# Patient Record
Sex: MALE | Race: BLACK OR AFRICAN AMERICAN
[De-identification: names, ages, dates, MRNs, and addresses within clinical notes are randomized per-mention and may not be internally consistent; named-entity substitution may affect disease eponyms.]

---

## 2019-04-06 ENCOUNTER — HOSPITAL ENCOUNTER (EMERGENCY)
Dept: HOSPITAL 62 - ER | Age: 64
Discharge: HOME | End: 2019-04-06
Payer: SELF-PAY

## 2019-04-06 VITALS — SYSTOLIC BLOOD PRESSURE: 172 MMHG | DIASTOLIC BLOOD PRESSURE: 102 MMHG

## 2019-04-06 DIAGNOSIS — I10: ICD-10-CM

## 2019-04-06 DIAGNOSIS — S81.801A: Primary | ICD-10-CM

## 2019-04-06 DIAGNOSIS — F17.210: ICD-10-CM
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
## 2019-04-06 PROCEDURE — 82962 GLUCOSE BLOOD TEST: CPT

## 2019-04-06 PROCEDURE — 99283 EMERGENCY DEPT VISIT LOW MDM: CPT

## 2019-04-06 NOTE — ER DOCUMENT REPORT
ED Medical Screen (RME)





- General


Chief Complaint: Skin Sore(s)


Stated Complaint: LEG SORE


Time Seen by Provider: 04/06/19 11:42


Primary Care Provider: 


ANCA TALLEY [Primary Care Provider] - Follow up as needed


TRAVEL OUTSIDE OF THE [place] IN LAST 30 DAYS: No





- HPI


Notes: 





04/06/19 11:47


Patient is a 63-year-old male with a history of hypertension who presents 

emergency department complaining of a wound to his right lower leg that is been 

present for 2 weeks.  Patient states that it did turn into an abscess and 

drained purulent material recently.  Patient states he does have some soreness 

associated, but has not noticed any further drainage.  Denies history of 

diabetes.  Patient states that he has not been taking his blood pressure 

medicine as he has been out and does not remember the name of it.  Denies HA, 

fever, neck pain, URI, CP, SOB, Abd pain.





I have treated and performed a rapid initial assessment of this patient.  A 

comprehensive ED assessment and evaluation of the patient, analysis of test 

results and completion of medical decision making process will be conducted by 

additional ED providers.





PHYSICAL EXAMINATION:





GENERAL: Well-appearing, well-nourished and in no acute distress.  A&Ox4.  

Answers questions appropriately.





LUNGS: Breath sounds clear to auscultation bilaterally and equal.  No wheezes 

rales or rhonchi.





HEART: Regular rate and rhythm without murmurs, rubs, gallops.





Extremities:  No cyanosis, clubbing, or edema b/l.  





Skin:  there is a fairly large scabbed wound noted to the right anterior lower 

leg w/o evidence of fluctuance, streaks, or active purulence noted.  + mild 

tenderness.





- Related Data


Allergies/Adverse Reactions: 


                                        





No Known Allergies Allergy (Verified 04/06/19 10:51)


   











Physical Exam





- Vital signs


Vitals: 





                                        











Temp Pulse Resp BP Pulse Ox


 


 98.1 F   68   16   199/109 H  97 


 


 04/06/19 11:08  04/06/19 11:08  04/06/19 11:08  04/06/19 11:08  04/06/19 11:08














Course





- Vital Signs


Vital signs: 





                                        











Temp Pulse Resp BP Pulse Ox


 


 98.1 F   68   16   199/109 H  97 


 


 04/06/19 11:08  04/06/19 11:08  04/06/19 11:08  04/06/19 11:08  04/06/19 11:08














Doctor's Discharge





- Discharge


Referrals: 


LOCALMD,NO [Primary Care Provider] - Follow up as needed

## 2019-04-06 NOTE — ER DOCUMENT REPORT
Entered by TONE CARRERA SCRIBE  04/06/19 1229 





Acting as scribe for:MARY ELLEN DE LA VEGA MD





ED General





- General


Chief Complaint: Skin Sore(s)


Stated Complaint: LEG SORE


Time Seen by Provider: 04/06/19 11:42


Primary Care Provider: 


MAYANK,NO [Primary Care Provider] - Follow up in 3-5 days


Mode of Arrival: Ambulatory


Information source: Patient


Notes: 


Patient is a 63 year old male presenting to the emergency department complaining

of a wound on his right lower leg onset 2 weeks ago. Patient states the area is 

painful and reports purulent discharge from the area recently. He reports 

possibly bumping into something that could have caused the wound.





TRAVEL OUTSIDE OF THE [place] IN LAST 30 DAYS: No





- Related Data


Allergies/Adverse Reactions: 


                                        





No Known Allergies Allergy (Verified 04/06/19 10:51)


   











Past Medical History





- General


Information source: Patient





- Social History


Smoking Status: Current Every Day Smoker


Cigarette use (# per day): Yes - less than 1PPD


Chew tobacco use (# tins/day): No


Smoking Education Provided: No


Frequency of alcohol use: Heavy - beer daily


Drug Abuse: None


Lives with: Spouse/Significant other


Family History: Reviewed & Not Pertinent


Patient has suicidal ideation: No


Patient has homicidal ideation: No





- Past Medical History


Cardiac Medical History: Reports: Hx Hypertension


Past Surgical History: Reports: Hx Orthopedic Surgery - Right ACL repair, Hx 

Tonsillectomy, Other - Explartory abdominal surgery in 2017





Review of Systems





- Review of Systems


Constitutional: No symptoms reported


EENT: No symptoms reported


Cardiovascular: No symptoms reported


Respiratory: No symptoms reported


Gastrointestinal: No symptoms reported


Genitourinary: No symptoms reported


Male Genitourinary: No symptoms reported


Musculoskeletal: See HPI


Skin: See HPI


Hematologic/Lymphatic: No symptoms reported


Neurological/Psychological: No symptoms reported


-: Yes All other systems reviewed and negative





Physical Exam





- Vital signs


Vitals: 


                                        











Temp Pulse Resp BP Pulse Ox


 


 98.1 F   68   16   199/109 H  97 


 


 04/06/19 11:08  04/06/19 11:08  04/06/19 11:08  04/06/19 11:08  04/06/19 11:08














- Notes


Notes: 


 


GENERAL: Alert, interacts well. No acute distress.


HEAD: Normocephalic, atraumatic.


EYES: Pupils equal, round, and reactive to light. Extraocular movements intact.


ENT: Oral mucosa moist, tongue midline


NECK: Full range of motion. Supple. Trachea midline.


LUNGS: Clear to auscultation bilaterally, no wheezes, rales, or rhonchi. No 

respiratory distress.


HEART: Regular rate and rhythm. No murmurs, gallops, or rubs.


ABDOMEN: Soft, non-tender. Non-distended. Bowel sounds present in all 4 

quadrants. No guarding, rigidity, or rebound.


EXTREMITIES: Moves all 4 extremities spontaneously. 


NEUROLOGICAL: Alert and oriented x3. Normal speech. 


PSYCH: Normal affect, normal mood.


SKIN: Warm, dry. Right lower medial tibia contains a 0.5x5cm wound. Skin is dry 

and thickened with no drainage at this time. In the center of the wound, there 

is a 2mm gap exposing subcutaneous tissue. Patient's socks and shoes are wet.








Course





- Vital Signs


Vital signs: 


                                        











Temp Pulse Resp BP Pulse Ox


 


 98.1 F   71   16   172/102 H  98 


 


 04/06/19 11:08  04/06/19 12:48  04/06/19 12:48  04/06/19 12:48  04/06/19 12:48














Discharge





- Discharge


Clinical Impression: 


Wound of right lower extremity


Qualifiers:


 Encounter type: initial encounter Qualified Code(s): S81.801A - Unspecified 

open wound, right lower leg, initial encounter





Condition: Stable


Disposition: HOME, SELF-CARE


Additional Instructions: 


Take the antibiotics as prescribed.


Keep the wound clean and dressed using Neosporin or bacitracin ointment on it.  

This should help keep the dried out hardened tissue soft and moist.


Elevate your leg as much as possible.


Be sure you take your blood pressure medicine, because your blood pressure was 

quite high today.


Follow-up with a local medical doctor this week to recheck your wound and treat 

your blood pressure.





RETURN TO THE EMERGENCY ROOM IF ANY NEW OR WORSENING SYMPTOMS.








Prescriptions: 


Cephalexin Monohydrate [Keflex 500 mg Capsule] 500 mg PO QID #28 capsule


Referrals: 


MAYANKNO [Primary Care Provider] - Follow up in 3-5 days


Scribe Attestation: 





04/06/19 12:24


I personally performed the services described in the documentation, reviewed and

edited the documentation which was dictated to the scribe in my presence, and it

accurately records my words and actions.





I personally performed the services described in the documentation, reviewed and

edited the documentation which was dictated to the scribe in my presence, and it

accurately records my words and actions.

## 2019-12-10 ENCOUNTER — HOSPITAL ENCOUNTER (EMERGENCY)
Dept: HOSPITAL 62 - ER | Age: 64
LOS: 1 days | Discharge: HOME | End: 2019-12-11
Payer: SELF-PAY

## 2019-12-10 DIAGNOSIS — R42: ICD-10-CM

## 2019-12-10 DIAGNOSIS — F17.200: ICD-10-CM

## 2019-12-10 DIAGNOSIS — E16.2: Primary | ICD-10-CM

## 2019-12-10 DIAGNOSIS — R53.1: ICD-10-CM

## 2019-12-10 DIAGNOSIS — I10: ICD-10-CM

## 2019-12-10 PROCEDURE — 99285 EMERGENCY DEPT VISIT HI MDM: CPT

## 2019-12-10 PROCEDURE — 82962 GLUCOSE BLOOD TEST: CPT

## 2019-12-11 VITALS — DIASTOLIC BLOOD PRESSURE: 109 MMHG | SYSTOLIC BLOOD PRESSURE: 150 MMHG

## 2019-12-11 NOTE — ER DOCUMENT REPORT
ED General





- General


Chief Complaint: Low Blood Sugar


Stated Complaint: DIZZY


Time Seen by Provider: 12/11/19 02:55


TRAVEL OUTSIDE OF THE [place] IN LAST 30 DAYS: No





- HPI


Notes: 





Patient is a 64-year-old male who presents emergency department for evaluation 

of dizziness.  He was at home, states that he called EMS because he felt dizzy 

and slightly weak.  He was treated with dextrose via EMS, and he states all of 

the symptoms resolved.  He states that all he had to eat today it was a bologna 

sandwich, which she described as a "double [place]."  That was at about 1130 this

morning.  He also admits to drinking 40 ounces of beer.  He states he otherwise 

did not eat anything, states he simply "forgot."  He has no history of diabetes.

 He states he feels entirely back to normal, denies any other acute complaints 

or concerns.





- Related Data


Allergies/Adverse Reactions: 


                                        





No Known Allergies Allergy (Verified 04/06/19 10:51)


   








Home Medications: None





Past Medical History





- General


Information source: Patient





- Social History


Smoking Status: Current Every Day Smoker


Frequency of alcohol use: Heavy - Will not elaborate on quantity of alcohol


Drug Abuse: None


Family History: Reviewed & Not Pertinent


Patient has suicidal ideation: No


Patient has homicidal ideation: No





- Past Medical History


Cardiac Medical History: Reports: Hx Hypertension - untreated


Renal/ Medical History: Denies: Hx Peritoneal Dialysis


Past Surgical History: Reports: Hx Orthopedic Surgery - Right ACL repair, Hx 

Tonsillectomy, Other - Explartory abdominal surgery in 2017





Review of Systems





- Review of Systems


Constitutional: See HPI


EENT: No symptoms reported


Cardiovascular: No symptoms reported


Respiratory: No symptoms reported


Gastrointestinal: No symptoms reported


Genitourinary: No symptoms reported


Musculoskeletal: No symptoms reported


Skin: No symptoms reported


Neurological/Psychological: No symptoms reported





Physical Exam





- Vital signs


Vitals: 


                                        











Temp Resp BP Pulse Ox


 


 97.9 F   22 H  131/79 H  94 


 


 12/10/19 23:29  12/10/19 23:29  12/10/19 23:29  12/10/19 23:29














- Notes


Notes: 





This is a 64-year-old male who appears older than his stated age, no acute 

distress.  He is resting comfortably in the room.  Vital signs reviewed, please 

refer to chart. Head is normocephalic, atraumatic.  Pupils equal round, reactive

to light.  Oral mucosa is moist.  Dentition is in poor condition, but I do not 

appreciate any significant abscesses or active infection.  Neck is supple 

without meningismus.  Heart is regular rate and rhythm.  Lungs are clear to 

auscultation bilaterally.  Abdomen is soft, nontender, normoactive bowel sounds 

throughout.  Extremities without cyanosis, clubbing. Posterior calves are 

nontender.  Peripheral pulses are equal.  Skin is warm and dry.  Patient is 

awake, alert, oriented x3.  Cranial nerves II - XII are grossly intact without 

focal neurological deficits.  Strength is plus 5 out of 5 bilateral upper and 

lower extremities.  Sensation is intact.  Reflexes symmetrical.  Intact 

finger-nose-finger, rapid alternating movements, heel-to-shin.





Course





- Re-evaluation


Re-evalutation: 





12/11/19 03:07


Patient presents emergency department for evaluation.  He is a 64-year-old 

gentleman who complained of dizziness, was found to be hypoglycemic.  My 

suspicion is that this patient may in fact have an alcohol abuse problem, which 

contributed to his hypoglycemia.  At the time of my evaluation, the patient had 

already eaten most of the food on a cafeteria tray.  He was resting comfortably.

 He had no acute complaints or concerns.  Because of my concerns of alcoholism, 

I did go ahead and administer thiamine orally.  The patient states he has no 

acute complaints and concerns, he is back to baseline.  He is anxious for 

discharge.  Pending 1 more normal blood glucose, we will discharge the patient 

with close follow-up.  He is to return to the ED with worsening.


12/11/19 04:03


Patient's repeat blood glucose is within normal limits.  He is stable.  He is 

neurologically intact.  His heart rate is normal at this time.  We will 

discharge him to home.  He is encouraged to quit drinking, we discussed 

appropriate nutrition.  He voiced understanding was discharged.











- Vital Signs


Vital signs: 


                                        











Temp Pulse Resp BP Pulse Ox


 


 97.9 F      21 H  140/76 H  98 


 


 12/10/19 23:30     12/11/19 00:01  12/11/19 00:01  12/11/19 00:01














- Laboratory


Laboratory results interpreted by me: 


                                        











  12/10/19





  23:22


 


POC Glucose  331 H














Discharge





- Discharge


Clinical Impression: 


 Hypoglycemia





Condition: Stable


Disposition: HOME, SELF-CARE


Instructions:  Dizziness ([facility]), Hypoglycemia ([facility])


Additional Instructions: 


It is important that you eat regularly.  Follow-up with primary care this week. 

Return to the ED with worsening or new concerning symptoms of any sort.


Forms:  Elevated Blood Pressure

## 2019-12-17 ENCOUNTER — HOSPITAL ENCOUNTER (INPATIENT)
Dept: HOSPITAL 62 - ER | Age: 64
LOS: 3 days | Discharge: HOME | DRG: 179 | End: 2019-12-20
Attending: INTERNAL MEDICINE | Admitting: INTERNAL MEDICINE
Payer: MEDICAID

## 2019-12-17 DIAGNOSIS — F17.210: ICD-10-CM

## 2019-12-17 DIAGNOSIS — I48.0: ICD-10-CM

## 2019-12-17 DIAGNOSIS — Z59.0: ICD-10-CM

## 2019-12-17 DIAGNOSIS — F10.20: ICD-10-CM

## 2019-12-17 DIAGNOSIS — J69.0: Primary | ICD-10-CM

## 2019-12-17 DIAGNOSIS — I10: ICD-10-CM
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
## 2019-12-17 DIAGNOSIS — J44.9: ICD-10-CM

## 2019-12-17 DIAGNOSIS — R07.9: ICD-10-CM

## 2019-12-17 LAB
ADD MANUAL DIFF: NO
ALBUMIN SERPL-MCNC: 3.6 G/DL (ref 3.5–5)
ALP SERPL-CCNC: 94 U/L (ref 38–126)
ANION GAP SERPL CALC-SCNC: 14 MMOL/L (ref 5–19)
AST SERPL-CCNC: 23 U/L (ref 17–59)
BASOPHILS # BLD AUTO: 0 10^3/UL (ref 0–0.2)
BASOPHILS NFR BLD AUTO: 0.8 % (ref 0–2)
BILIRUB DIRECT SERPL-MCNC: 0.2 MG/DL (ref 0–0.4)
BILIRUB SERPL-MCNC: 0.3 MG/DL (ref 0.2–1.3)
BUN SERPL-MCNC: 11 MG/DL (ref 7–20)
CALCIUM: 9.3 MG/DL (ref 8.4–10.2)
CHLORIDE SERPL-SCNC: 101 MMOL/L (ref 98–107)
CO2 SERPL-SCNC: 24 MMOL/L (ref 22–30)
EOSINOPHIL # BLD AUTO: 0.1 10^3/UL (ref 0–0.6)
EOSINOPHIL NFR BLD AUTO: 3.2 % (ref 0–6)
ERYTHROCYTE [DISTWIDTH] IN BLOOD BY AUTOMATED COUNT: 13.9 % (ref 11.5–14)
ETHANOL SERPL-MCNC: 78 MG/DL
GLUCOSE SERPL-MCNC: 104 MG/DL (ref 75–110)
HCT VFR BLD CALC: 38.5 % (ref 37.9–51)
HGB BLD-MCNC: 12.9 G/DL (ref 13.5–17)
LYMPHOCYTES # BLD AUTO: 1.2 10^3/UL (ref 0.5–4.7)
LYMPHOCYTES NFR BLD AUTO: 27 % (ref 13–45)
MCH RBC QN AUTO: 28.8 PG (ref 27–33.4)
MCHC RBC AUTO-ENTMCNC: 33.5 G/DL (ref 32–36)
MCV RBC AUTO: 86 FL (ref 80–97)
MONOCYTES # BLD AUTO: 0.6 10^3/UL (ref 0.1–1.4)
MONOCYTES NFR BLD AUTO: 13.4 % (ref 3–13)
NEUTROPHILS # BLD AUTO: 2.5 10^3/UL (ref 1.7–8.2)
NEUTS SEG NFR BLD AUTO: 55.6 % (ref 42–78)
PLATELET # BLD: 318 10^3/UL (ref 150–450)
POTASSIUM SERPL-SCNC: 3.9 MMOL/L (ref 3.6–5)
PROT SERPL-MCNC: 6.9 G/DL (ref 6.3–8.2)
RBC # BLD AUTO: 4.48 10^6/UL (ref 4.35–5.55)
TOTAL CELLS COUNTED % (AUTO): 100 %
WBC # BLD AUTO: 4.5 10^3/UL (ref 4–10.5)

## 2019-12-17 PROCEDURE — 80048 BASIC METABOLIC PNL TOTAL CA: CPT

## 2019-12-17 PROCEDURE — 84484 ASSAY OF TROPONIN QUANT: CPT

## 2019-12-17 PROCEDURE — 81001 URINALYSIS AUTO W/SCOPE: CPT

## 2019-12-17 PROCEDURE — 84481 FREE ASSAY (FT-3): CPT

## 2019-12-17 PROCEDURE — 87077 CULTURE AEROBIC IDENTIFY: CPT

## 2019-12-17 PROCEDURE — 80307 DRUG TEST PRSMV CHEM ANLYZR: CPT

## 2019-12-17 PROCEDURE — 84439 ASSAY OF FREE THYROXINE: CPT

## 2019-12-17 PROCEDURE — 87150 DNA/RNA AMPLIFIED PROBE: CPT

## 2019-12-17 PROCEDURE — 83735 ASSAY OF MAGNESIUM: CPT

## 2019-12-17 PROCEDURE — 36415 COLL VENOUS BLD VENIPUNCTURE: CPT

## 2019-12-17 PROCEDURE — 87205 SMEAR GRAM STAIN: CPT

## 2019-12-17 PROCEDURE — 84443 ASSAY THYROID STIM HORMONE: CPT

## 2019-12-17 PROCEDURE — 85025 COMPLETE CBC W/AUTO DIFF WBC: CPT

## 2019-12-17 PROCEDURE — 83690 ASSAY OF LIPASE: CPT

## 2019-12-17 PROCEDURE — 93010 ELECTROCARDIOGRAM REPORT: CPT

## 2019-12-17 PROCEDURE — 83036 HEMOGLOBIN GLYCOSYLATED A1C: CPT

## 2019-12-17 PROCEDURE — 87040 BLOOD CULTURE FOR BACTERIA: CPT

## 2019-12-17 PROCEDURE — 71260 CT THORAX DX C+: CPT

## 2019-12-17 PROCEDURE — 87186 SC STD MICRODIL/AGAR DIL: CPT

## 2019-12-17 PROCEDURE — 94799 UNLISTED PULMONARY SVC/PX: CPT

## 2019-12-17 PROCEDURE — 80053 COMPREHEN METABOLIC PANEL: CPT

## 2019-12-17 PROCEDURE — 71045 X-RAY EXAM CHEST 1 VIEW: CPT

## 2019-12-17 PROCEDURE — 99285 EMERGENCY DEPT VISIT HI MDM: CPT

## 2019-12-17 PROCEDURE — 84100 ASSAY OF PHOSPHORUS: CPT

## 2019-12-17 PROCEDURE — 87070 CULTURE OTHR SPECIMN AEROBIC: CPT

## 2019-12-17 PROCEDURE — 93005 ELECTROCARDIOGRAM TRACING: CPT

## 2019-12-17 PROCEDURE — 94667 MNPJ CHEST WALL 1ST: CPT

## 2019-12-17 PROCEDURE — 83605 ASSAY OF LACTIC ACID: CPT

## 2019-12-17 SDOH — ECONOMIC STABILITY - HOUSING INSECURITY: HOMELESSNESS: Z59.0

## 2019-12-17 NOTE — ER DOCUMENT REPORT
ED General





- General


Chief Complaint: Chest Pain


Stated Complaint: CHEST PAIN


Time Seen by Provider: 12/17/19 19:55


Notes: 





64 year old male with h/o htn takes medicine for this "when I can get it" 

complains of chest pain that started yesterday and has been constant.  No fever 

or chills.  Only htn as past history.  No heart disease or dm.  Tob user.  

Living with brother he tells me and pain is gone when I see him.  Drinks daily. 

2 shots of etoh today. Currently he is hungry and would like something to eat. 


TRAVEL OUTSIDE OF THE [place] IN LAST 30 DAYS: No





- HPI


Onset: Yesterday


Onset/Duration: Sudden, Constant


Quality of pain: No pain


Severity: Severe


Associated symptoms: None





- Related Data


Allergies/Adverse Reactions: 


                                        





No Known Allergies Allergy (Verified 04/06/19 10:51)


   











Past Medical History





- General


Information source: Patient





- Social History


Smoking Status: Current Every Day Smoker


Family History: Reviewed & Not Pertinent


Patient has suicidal ideation: No


Patient has homicidal ideation: No





- Past Medical History


Cardiac Medical History: Reports: Hx Hypertension - untreated


Renal/ Medical History: Denies: Hx Peritoneal Dialysis


Past Surgical History: Reports: Hx Orthopedic Surgery - Right ACL repair, Hx 

Tonsillectomy, Other - Explartory abdominal surgery in 2017





Review of Systems





- Review of Systems


Constitutional: No symptoms reported


EENT: No symptoms reported


Cardiovascular: No symptoms reported


Respiratory: No symptoms reported


Gastrointestinal: No symptoms reported


Genitourinary: No symptoms reported


Male Genitourinary: No symptoms reported


Musculoskeletal: No symptoms reported


Skin: No symptoms reported


Hematologic/Lymphatic: No symptoms reported


Neurological/Psychological: No symptoms reported





Physical Exam





- Vital signs


Vitals: 


                                        











Temp Pulse Resp BP Pulse Ox


 


 99.2 F   120 H  22 H  145/92 H  98 


 


 12/17/19 19:08  12/17/19 19:08  12/17/19 19:08  12/17/19 19:08  12/17/19 19:08











Interpretation: Normal





- General


General appearance: Appears well, Alert





- HEENT


Head: Normocephalic, Atraumatic


Eyes: Normal


Pupils: PERRL





- Respiratory


Respiratory status: No respiratory distress


Chest status: Nontender


Breath sounds: Normal


Chest palpation: Normal





- Cardiovascular


Rhythm: Regular


Heart sounds: Normal auscultation


Murmur: No





- Abdominal


Inspection: Normal


Distension: No distension


Bowel sounds: Normal


Tenderness: Nontender


Organomegaly: No organomegaly





- Back


Back: Normal, Nontender





- Extremities


General upper extremity: Normal inspection, Nontender, Normal color, Normal ROM,

Normal temperature


General lower extremity: Normal inspection, Nontender, Normal color, Normal ROM,

Normal temperature, Normal weight bearing.  No: Se's sign





- Neurological


Neuro grossly intact: Yes


Cognition: Normal


Orientation: AAOx4


[place] Coma Scale Eye Opening: Spontaneous


[place] Coma Scale Verbal: Oriented


[place] Coma Scale Motor: Obeys Commands


[place] Coma Scale Total: 15


Speech: Normal


Motor strength normal: LUE, RUE, LLE, RLE


Sensory: Normal





- Psychological


Associated symptoms: Normal affect, Normal mood





- Skin


Skin Temperature: Warm


Skin Moisture: Dry


Skin Color: Normal





Course





- Re-evaluation


Re-evalutation: 





12/18/19 01:35


MDM  64 year old with htn perhaps etohic is here with chest pain for a day. A 

fib originally and cxr and ct are supportive of airspace disease.  Will cover 

with antibiotics that would cover aspiration.  I have discussed with him 

admission and he is willing to stay here.  The hospitalist has been called and 

we are awaiting a call back. 





- Vital Signs


Vital signs: 


                                        











Temp Pulse Resp BP Pulse Ox


 


 98.3 F   120 H  33 H  146/94 H  94 


 


 12/18/19 02:01  12/17/19 19:08  12/18/19 02:01  12/18/19 02:00  12/18/19 02:01














- Laboratory


Result Diagrams: 


                                 12/17/19 19:45





                                 12/17/19 19:45


Laboratory results interpreted by me: 


                                        











  12/17/19 12/17/19





  19:45 23:40


 


Hgb  12.9 L 


 


Mono % (Auto)  13.4 H 


 


Urine Blood   SMALL H


 


Ur Leukocyte Esterase   SMALL H














- Diagnostic Test


Radiology reviewed: Image reviewed, Reports reviewed





- EKG Interpretation by Me


Rate: Tachycardia - Original EKG is A fib with  BPM no st elevation or 

depression recheck is NSR 80 BPM no st elevation or depression my 

interpretation.





Discharge





- Discharge


Clinical Impression: 


 Atrial fibrillation by electrocardiogram





Pneumonia


Qualifiers:


 Pneumonia type: due to unspecified organism Laterality: bilateral Lung locati

on: upper lobe of lung Qualified Code(s): J18.9 - Pneumonia, unspecified 

organism





Condition: Fair


Disposition: ADMITTED AS INPATIENT


Admitting Provider: Ciro (Hospitalist)


Unit Admitted: Telemetry

## 2019-12-17 NOTE — EKG REPORT
SEVERITY:- ABNORMAL ECG -

ATRIAL FIBRILLATION, V-RATE 

MULTIFORM VENTRICULAR PREMATURE COMPLEXES

BORDERLINE LEFT AXIS DEVIATION

CONSIDER POSTERIOR INFARCT

BORDERLINE PROLONGED QT INTERVAL

:

Confirmed by: Dejon Rodríguez 17-Dec-2019 22:29:59

## 2019-12-17 NOTE — EKG REPORT
SEVERITY:- ABNORMAL ECG -

SINUS RHYTHM

NONSPECIFIC T ABNORMALITIES, ANT-LAT LEADS

BORDERLINE PROLONGED QT INTERVAL

:

Confirmed by: Dejon Rodríguez 17-Dec-2019 22:29:24

## 2019-12-17 NOTE — RADIOLOGY REPORT (SQ)
EXAM DESCRIPTION:

X-RAY CHEST- TWO VIEWS



CLINICAL HISTORY:

Chest pain



COMPARISON:

None available



TECHNIQUE:

2 views of the chest



FINDINGS:

There are overlying EKG leads.

There are multifocal patchy opacities, with the most prominent

opacity overlying the right apex. 

No discrete pneumothorax or pleural effusion is identified.



The pulmonary vascularity is mildly prominent in appearance.

The cardiomediastinal silhouette is borderline enlarged.



No suspicious lytic or blastic osseous lesions are identified.



IMPRESSION:

1. Suggestion of multifocal patchy opacities most prominently

affecting the right upper lobe. Differential diagnosis includes

multifocal infectious or inflammatory process. Clinical

correlation is advised with consideration for CT chest if

clinically indicated.

2. Borderline enlargement of the cardiac silhouette with

prominence of the pulmonary vasculature.

## 2019-12-18 LAB
APPEARANCE UR: (no result)
APTT PPP: YELLOW S
BILIRUB UR QL STRIP: NEGATIVE
GLUCOSE UR STRIP-MCNC: NEGATIVE MG/DL
KETONES UR STRIP-MCNC: NEGATIVE MG/DL
NITRITE UR QL STRIP: NEGATIVE
PH UR STRIP: 6 [PH] (ref 5–9)
PHOSPHATE SERPL-MCNC: 4 MG/DL (ref 2.5–4.5)
PROT UR STRIP-MCNC: NEGATIVE MG/DL
SP GR UR STRIP: 1.01
UROBILINOGEN UR-MCNC: NEGATIVE MG/DL (ref ?–2)

## 2019-12-18 RX ADMIN — NICOTINE SCH: 14 PATCH, EXTENDED RELEASE TOPICAL at 09:01

## 2019-12-18 RX ADMIN — Medication SCH ML: at 05:09

## 2019-12-18 RX ADMIN — FLUTICASONE PROPIONATE SCH SPRAY: 50 SPRAY, METERED NASAL at 21:58

## 2019-12-18 RX ADMIN — IPRATROPIUM BROMIDE SCH MG: 0.5 SOLUTION RESPIRATORY (INHALATION) at 14:01

## 2019-12-18 RX ADMIN — FLUTICASONE PROPIONATE SCH SPRAY: 50 SPRAY, METERED NASAL at 09:32

## 2019-12-18 RX ADMIN — DILTIAZEM HYDROCHLORIDE SCH MG: 60 TABLET, FILM COATED ORAL at 13:57

## 2019-12-18 RX ADMIN — LEVALBUTEROL HYDROCHLORIDE SCH MG: 1.25 SOLUTION RESPIRATORY (INHALATION) at 02:13

## 2019-12-18 RX ADMIN — HEPARIN SODIUM SCH UNIT: 5000 INJECTION, SOLUTION INTRAVENOUS; SUBCUTANEOUS at 05:08

## 2019-12-18 RX ADMIN — HEPARIN SODIUM SCH UNIT: 5000 INJECTION, SOLUTION INTRAVENOUS; SUBCUTANEOUS at 22:01

## 2019-12-18 RX ADMIN — DIAZEPAM SCH MG: 5 TABLET ORAL at 21:58

## 2019-12-18 RX ADMIN — LEVOFLOXACIN SCH MLS/HR: 750 INJECTION, SOLUTION INTRAVENOUS at 05:08

## 2019-12-18 RX ADMIN — IPRATROPIUM BROMIDE SCH MG: 0.5 SOLUTION RESPIRATORY (INHALATION) at 08:40

## 2019-12-18 RX ADMIN — IPRATROPIUM BROMIDE SCH MG: 0.5 SOLUTION RESPIRATORY (INHALATION) at 21:12

## 2019-12-18 RX ADMIN — Medication SCH: at 14:05

## 2019-12-18 RX ADMIN — LEVALBUTEROL HYDROCHLORIDE SCH MG: 1.25 SOLUTION RESPIRATORY (INHALATION) at 21:10

## 2019-12-18 RX ADMIN — LEVALBUTEROL HYDROCHLORIDE SCH MG: 1.25 SOLUTION RESPIRATORY (INHALATION) at 14:01

## 2019-12-18 RX ADMIN — DIAZEPAM SCH MG: 5 TABLET ORAL at 09:34

## 2019-12-18 RX ADMIN — DIAZEPAM SCH MG: 5 TABLET ORAL at 02:12

## 2019-12-18 RX ADMIN — IPRATROPIUM BROMIDE SCH MG: 0.5 SOLUTION RESPIRATORY (INHALATION) at 02:12

## 2019-12-18 RX ADMIN — DILTIAZEM HYDROCHLORIDE SCH MG: 60 TABLET, FILM COATED ORAL at 21:58

## 2019-12-18 RX ADMIN — DILTIAZEM HYDROCHLORIDE SCH MG: 60 TABLET, FILM COATED ORAL at 05:09

## 2019-12-18 RX ADMIN — HEPARIN SODIUM SCH UNIT: 5000 INJECTION, SOLUTION INTRAVENOUS; SUBCUTANEOUS at 13:57

## 2019-12-18 RX ADMIN — NICOTINE SCH EACH: 14 PATCH, EXTENDED RELEASE TOPICAL at 02:12

## 2019-12-18 RX ADMIN — LEVALBUTEROL HYDROCHLORIDE SCH MG: 1.25 SOLUTION RESPIRATORY (INHALATION) at 08:40

## 2019-12-18 RX ADMIN — Medication SCH ML: at 22:02

## 2019-12-18 NOTE — RADIOLOGY REPORT (SQ)
EXAM DESCRIPTION: 



CT chest with contrast



CLINICAL HISTORY: 



64 years Male, pulmonary infiltrates on chest x-ray.    



COMPARISON: 

None.



TECHNIQUE Axial images of the chest were performed utilizing

intravenous contrast, with sagittal and coronal reformatted

images. 



This exam was performed according to our departmental

dose-optimization program which includes use of Automated

Exposure Control, adjustment of the mA and/or kV according to

patient size and/or use of iterative reconstruction technique.





FINDINGS: 



There is patchy bilateral pulmonary infiltrate, most apparent in

the right upper lobe, compatible with pneumonia.



There is emphysema.



There are small bilateral pleural effusions.



There is mediastinal and bilateral hilar adenopathy.



There is coronary artery calcification.



No evidence of pulmonary embolus.



No evidence of aortic dissection.



IMPRESSION: 



Bilateral pneumonia.



Emphysema.



Small bilateral pleural effusions.



Mediastinal and bilateral hilar adenopathy.



Coronary artery calcification.

## 2019-12-18 NOTE — PDOC H&P
History of Present Illness


Admission Date/PCP: 


  19 02:12





  





Patient complains of: Shortness of breath and chest pain


History of Present Illness: 


KIESHA WINN is a 64 year old male with a past medical history of COPD,, 

hypertension, tobacco and alcohol dependence who is long-term homeless.  He 

presents with chest pains.  In the emergency room he is found to have paroxysmal

atrial fibrillation and a right lung infiltrate.  He started on empiric 

antibiotics and referred to the hospitalist for admission.  Patient admits to 

some sharp intermittent self resolving pain without radiation associated with 

shortness of breath.  Denies fever or current chest pain.  Patient is a poor 

historian he denies current medication use admits to heavy alcohol.








Past Medical History


Cardiac Medical History: Reports: Hypertension - untreated


Pulmonary Medical History: Reports: Bronchitis, Chronic Obstructive Pulmonary 

Disease (COPD)


Psychiatric Medical History: Reports: Alcohol Dependency, Tobacco Dependency


   Denies: Depression





Past Surgical History


Past Surgical History: Reports: Orthopedic Surgery - Right ACL repair, 

Tonsillectomy, Other - Explartory abdominal surgery in 2017





Social History


Information Source: Patient


Smoking Status: Current Every Day Smoker


Electronic Cigarette use?: No


Number of Years Smokin


Last Time Smoked: 2019


Frequency of Alcohol Use: Heavy


Hx Recreational Drug Use: No


Drugs: None


Hx Prescription Drug Abuse: No





- Advance Directive


Resuscitation Status: Full Code





Family History


Family History: COPD, Hypertension


Parental Family History Reviewed: Yes


Children Family History Reviewed: Yes


Sibling(s) Family History Reviewed.: Yes





Medication/Allergy


Home Medications: 








RX: No Home Medications  19 








Allergies/Adverse Reactions: 


                                        





No Known Allergies Allergy (Verified 19 10:51)


   











Review of Systems


Constitutional: ABSENT: chills, fever(s), headache(s), weight gain, weight loss


Eyes: ABSENT: visual disturbances


Ears: ABSENT: hearing changes


Cardiovascular: ABSENT: chest pain, dyspnea on exertion, edema, orthropnea, 

palpitations


Respiratory: ABSENT: cough, hemoptysis


Gastrointestinal: ABSENT: abdominal pain, constipation, diarrhea, hematemesis, 

hematochezia, nausea, vomiting


Genitourinary: ABSENT: dysuria, hematuria


Musculoskeletal: ABSENT: joint swelling


Integumentary: ABSENT: rash, wounds


Neurological: ABSENT: abnormal gait, abnormal speech, confusion, dizziness, 

focal weakness, syncope


Psychiatric: ABSENT: anxiety, depression, homidical ideation, suicidal ideation


Endocrine: ABSENT: cold intolerance, heat intolerance, polydipsia, polyuria


Hematologic/Lymphatic: ABSENT: easy bleeding, easy bruising





Physical Exam


Vital Signs: 


                                        











Temp Pulse Resp BP Pulse Ox


 


 98.3 F   120 H  24 H  113/80   97 


 


 19 02:01  19 19:08  19 05:01  19 05:00  19 05:01








                                 Intake & Output











 19





 11:59 11:59 11:59


 


Weight   70 kg











General appearance: PRESENT: cooperative, disheveled, mild distress, thin


Head exam: PRESENT: atraumatic, normocephalic


Eye exam: PRESENT: conjunctiva pink, EOMI, PERRLA.  ABSENT: scleral icterus


Ear exam: PRESENT: normal external ear exam


Mouth exam: PRESENT: moist, tongue midline


Neck exam: ABSENT: carotid bruit, JVD, lymphadenopathy, thyromegaly


Respiratory exam: PRESENT: decreased breath sounds, prolonged expiratory phas, 

retraction, symmetrical, tachypnea.  ABSENT: chest wall tenderness


Cardiovascular exam: PRESENT: +S1, +S2, tachycardia


Pulses: PRESENT: normal dorsalis pedis pul


Vascular exam: PRESENT: normal capillary refill


GI/Abdominal exam: PRESENT: normal bowel sounds, soft.  ABSENT: distended, 

guarding, mass, organolmegaly, rebound, tenderness


Rectal exam: PRESENT: deferred


Extremities exam: PRESENT: full ROM.  ABSENT: calf tenderness, clubbing, pedal 

edema


Neurological exam: PRESENT: alert, awake, oriented to person, oriented to place,

oriented to time, oriented to situation, CN II-XII grossly intact.  ABSENT: 

motor sensory deficit


Psychiatric exam: PRESENT: appropriate affect, normal mood.  ABSENT: homicidal 

ideation, suicidal ideation


Skin exam: PRESENT: dry, intact, warm.  ABSENT: cyanosis, rash





Results


Laboratory Results: 


                                        





                                 19 19:45 





                                 19 19:45 





                                        











  19





  19:45 19:45 22:43


 


WBC  4.5  


 


RBC  4.48  


 


Hgb  12.9 L  


 


Hct  38.5  


 


MCV  86  


 


MCH  28.8  


 


MCHC  33.5  


 


RDW  13.9  


 


Plt Count  318  


 


Seg Neutrophils %  55.6  


 


Sodium   138.6 


 


Potassium   3.9 


 


Chloride   101 


 


Carbon Dioxide   24 


 


Anion Gap   14 


 


BUN   11 


 


Creatinine   0.76 


 


Est GFR ( Amer)   > 60 


 


Glucose   104 


 


Lactic Acid   


 


Calcium   9.3 


 


Phosphorus    4.0


 


Magnesium   2.1  2.1


 


Total Bilirubin   0.3 


 


AST   23 


 


Alkaline Phosphatase   94 


 


Total Protein   6.9 


 


Albumin   3.6 


 


Lipase   33.4 


 


Urine Color   


 


Urine Appearance   


 


Urine pH   


 


Ur Specific Gravity   


 


Urine Protein   


 


Urine Glucose (UA)   


 


Urine Ketones   


 


Urine Blood   


 


Urine Nitrite   


 


Ur Leukocyte Esterase   














  19





  22:43 23:40 23:40


 


WBC   


 


RBC   


 


Hgb   


 


Hct   


 


MCV   


 


MCH   


 


MCHC   


 


RDW   


 


Plt Count   


 


Seg Neutrophils %   


 


Sodium   


 


Potassium   


 


Chloride   


 


Carbon Dioxide   


 


Anion Gap   


 


BUN   


 


Creatinine   


 


Est GFR (African Amer)   


 


Glucose   


 


Lactic Acid   1.3 


 


Calcium   


 


Phosphorus  Cancelled  


 


Magnesium   


 


Total Bilirubin   


 


AST   


 


Alkaline Phosphatase   


 


Total Protein   


 


Albumin   


 


Lipase   


 


Urine Color    YELLOW


 


Urine Appearance    SLIGHTLY-CLOUDY


 


Urine pH    6.0


 


Ur Specific Gravity    1.013


 


Urine Protein    NEGATIVE


 


Urine Glucose (UA)    NEGATIVE


 


Urine Ketones    NEGATIVE


 


Urine Blood    SMALL H


 


Urine Nitrite    NEGATIVE


 


Ur Leukocyte Esterase    SMALL H








                                        











  19





  19:45 22:43


 


Troponin I  0.050  0.040











Impressions: 


                                        





Chest X-Ray  19 20:15


IMPRESSION:


1. Suggestion of multifocal patchy opacities most prominently


affecting the right upper lobe. Differential diagnosis includes


multifocal infectious or inflammatory process. Clinical


correlation is advised with consideration for CT chest if


clinically indicated.


2. Borderline enlargement of the cardiac silhouette with


prominence of the pulmonary vasculature.


 








Chest CT  19 22:43


IMPRESSION: 


 


Bilateral pneumonia.


 


Emphysema.


 


Small bilateral pleural effusions.


 


Mediastinal and bilateral hilar adenopathy.


 


Coronary artery calcification.


 














Assessment and Plan





- Diagnosis


(1) Atrial fibrillation by electrocardiogram


Is this a current diagnosis for this admission?: Yes   


Plan: 


Spontaneous resolution to sinus rhythm with improved respiratory status.  Not a 

candidate for anticoagulation given alcoholism and homeless state.  Follow-up 

telemetry








(2) Pneumonia


Qualifiers: 


   Pneumonia type: due to unspecified organism   Laterality: bilateral   Lung 

location: upper lobe of lung   Qualified Code(s): J18.9 - Pneumonia, unspecified

organism   


Is this a current diagnosis for this admission?: Yes   


Plan: 


Possible aspiration pneumonitis, pneumonia care set, aggressive pulmonary 

toilet, empiric antibiotics, follow-up CBC and blood culture








(3) Alcohol dependence


Is this a current diagnosis for this admission?: Yes   


Plan: 


Thiamine, folate, Valium with Ativan PRN








(4) Homeless


Is this a current diagnosis for this admission?: Yes   


Plan: 


Discharge planning consulted








(5) Tobacco abuse


Is this a current diagnosis for this admission?: Yes   


Plan: 


Cessation counseling, patient agrees to nicotine replacement








- Time


Time Spent with patient: 25-34 minutes





- Inpatient Certification


Medical Necessity: Need Close Monitoring Due to Risk of Patient Decompensation

## 2019-12-19 LAB
ADD MANUAL DIFF: NO
ANION GAP SERPL CALC-SCNC: 7 MMOL/L (ref 5–19)
BASOPHILS # BLD AUTO: 0 10^3/UL (ref 0–0.2)
BASOPHILS NFR BLD AUTO: 0.9 % (ref 0–2)
BUN SERPL-MCNC: 10 MG/DL (ref 7–20)
CALCIUM: 9.1 MG/DL (ref 8.4–10.2)
CHLORIDE SERPL-SCNC: 104 MMOL/L (ref 98–107)
CO2 SERPL-SCNC: 26 MMOL/L (ref 22–30)
EOSINOPHIL # BLD AUTO: 0.2 10^3/UL (ref 0–0.6)
EOSINOPHIL NFR BLD AUTO: 5.8 % (ref 0–6)
ERYTHROCYTE [DISTWIDTH] IN BLOOD BY AUTOMATED COUNT: 13.9 % (ref 11.5–14)
FREE T4 (FREE THYROXINE): 1.09 NG/DL (ref 0.78–2.19)
GLUCOSE SERPL-MCNC: 132 MG/DL (ref 75–110)
HCT VFR BLD CALC: 39.2 % (ref 37.9–51)
HGB BLD-MCNC: 13.3 G/DL (ref 13.5–17)
LYMPHOCYTES # BLD AUTO: 1.1 10^3/UL (ref 0.5–4.7)
LYMPHOCYTES NFR BLD AUTO: 29.2 % (ref 13–45)
MCH RBC QN AUTO: 29 PG (ref 27–33.4)
MCHC RBC AUTO-ENTMCNC: 33.8 G/DL (ref 32–36)
MCV RBC AUTO: 86 FL (ref 80–97)
MONOCYTES # BLD AUTO: 0.6 10^3/UL (ref 0.1–1.4)
MONOCYTES NFR BLD AUTO: 15.2 % (ref 3–13)
NEUTROPHILS # BLD AUTO: 1.9 10^3/UL (ref 1.7–8.2)
NEUTS SEG NFR BLD AUTO: 48.9 % (ref 42–78)
PLATELET # BLD: 333 10^3/UL (ref 150–450)
POTASSIUM SERPL-SCNC: 4.5 MMOL/L (ref 3.6–5)
RBC # BLD AUTO: 4.58 10^6/UL (ref 4.35–5.55)
T3FREE SERPL-MCNC: 3.53 PG/ML (ref 2.77–5.27)
TOTAL CELLS COUNTED % (AUTO): 100 %
TSH SERPL-ACNC: 2.94 UIU/ML (ref 0.47–4.68)
WBC # BLD AUTO: 3.9 10^3/UL (ref 4–10.5)

## 2019-12-19 RX ADMIN — Medication SCH ML: at 06:56

## 2019-12-19 RX ADMIN — Medication SCH ML: at 21:43

## 2019-12-19 RX ADMIN — DILTIAZEM HYDROCHLORIDE SCH MG: 60 TABLET, FILM COATED ORAL at 21:38

## 2019-12-19 RX ADMIN — DIAZEPAM SCH MG: 5 TABLET ORAL at 09:40

## 2019-12-19 RX ADMIN — FLUTICASONE PROPIONATE SCH SPRAY: 50 SPRAY, METERED NASAL at 09:50

## 2019-12-19 RX ADMIN — IPRATROPIUM BROMIDE SCH MG: 0.5 SOLUTION RESPIRATORY (INHALATION) at 08:34

## 2019-12-19 RX ADMIN — Medication SCH ML: at 14:29

## 2019-12-19 RX ADMIN — LEVOFLOXACIN SCH MLS/HR: 750 INJECTION, SOLUTION INTRAVENOUS at 06:56

## 2019-12-19 RX ADMIN — DILTIAZEM HYDROCHLORIDE SCH MG: 60 TABLET, FILM COATED ORAL at 06:55

## 2019-12-19 RX ADMIN — IPRATROPIUM BROMIDE SCH MG: 0.5 SOLUTION RESPIRATORY (INHALATION) at 02:47

## 2019-12-19 RX ADMIN — DILTIAZEM HYDROCHLORIDE SCH MG: 60 TABLET, FILM COATED ORAL at 14:33

## 2019-12-19 RX ADMIN — NICOTINE SCH EACH: 14 PATCH, EXTENDED RELEASE TOPICAL at 09:41

## 2019-12-19 RX ADMIN — IPRATROPIUM BROMIDE SCH MG: 0.5 SOLUTION RESPIRATORY (INHALATION) at 13:48

## 2019-12-19 RX ADMIN — DIAZEPAM SCH MG: 5 TABLET ORAL at 21:38

## 2019-12-19 RX ADMIN — LEVALBUTEROL HYDROCHLORIDE SCH MG: 1.25 SOLUTION RESPIRATORY (INHALATION) at 20:48

## 2019-12-19 RX ADMIN — LEVALBUTEROL HYDROCHLORIDE SCH MG: 1.25 SOLUTION RESPIRATORY (INHALATION) at 02:47

## 2019-12-19 RX ADMIN — HEPARIN SODIUM SCH UNIT: 5000 INJECTION, SOLUTION INTRAVENOUS; SUBCUTANEOUS at 21:42

## 2019-12-19 RX ADMIN — IPRATROPIUM BROMIDE SCH MG: 0.5 SOLUTION RESPIRATORY (INHALATION) at 20:48

## 2019-12-19 RX ADMIN — HEPARIN SODIUM SCH UNIT: 5000 INJECTION, SOLUTION INTRAVENOUS; SUBCUTANEOUS at 14:33

## 2019-12-19 RX ADMIN — FLUTICASONE PROPIONATE SCH: 50 SPRAY, METERED NASAL at 21:37

## 2019-12-19 RX ADMIN — LEVALBUTEROL HYDROCHLORIDE SCH MG: 1.25 SOLUTION RESPIRATORY (INHALATION) at 13:50

## 2019-12-19 RX ADMIN — HEPARIN SODIUM SCH UNIT: 5000 INJECTION, SOLUTION INTRAVENOUS; SUBCUTANEOUS at 06:55

## 2019-12-19 RX ADMIN — LEVALBUTEROL HYDROCHLORIDE SCH MG: 1.25 SOLUTION RESPIRATORY (INHALATION) at 08:34

## 2019-12-19 NOTE — PDOC PROGRESS REPORT
Subjective


Progress Note for:: 12/19/19


Subjective:: 


This is a 64 year old male with a past medical history of COPD, hypertension, 

tobacco, homelessness and alcohol dependence who was admitted for bilateral 

pneumonia.  He also had a very short run of A. fib which spontaneously converted

to normal sinus rhythm.  Started on IV antibiotics and breathing treatments.





On encounter this morning, patient says that her shortness of breath continued 

to improve.  He denies chest pain today.  He  is in sinus rhythm.





Called by RN later this afternoon.  Patient became very agitated after his 

personal belongings got lost.  He also became very tachycardic in the 140s and 

was given IV Ativan.


Reason For Visit: 


COPD EXACERBATION,TACHYCARDIA,ETOH DEP PNUEMONIA








Physical Exam


Vital Signs: 


                                        











Temp Pulse Resp BP Pulse Ox


 


 97.1 F   138 H  19   138/86 H  100 


 


 12/19/19 16:00  12/19/19 16:00  12/19/19 16:00  12/19/19 16:00  12/19/19 16:00








                                 Intake & Output











 12/18/19 12/19/19 12/20/19





 06:59 06:59 06:59


 


Intake Total 150 2080 746


 


Output Total  1460 725


 


Balance 150 620 21


 


Weight 154 lb 5.177 oz 150 lb 2.157 oz 











General appearance: PRESENT: no acute distress, well-developed, well-nourished


Head exam: PRESENT: atraumatic, normocephalic


Eye exam: PRESENT: conjunctiva pink, EOMI, PERRLA.  ABSENT: scleral icterus


Ear exam: PRESENT: normal external ear exam


Mouth exam: PRESENT: moist, tongue midline


Neck exam: ABSENT: carotid bruit, JVD, lymphadenopathy, thyromegaly


Respiratory exam: PRESENT: rhonchi.  ABSENT: rales, wheezes


Cardiovascular exam: PRESENT: RRR.  ABSENT: diastolic murmur, rubs, systolic 

murmur


Pulses: PRESENT: normal dorsalis pedis pul


GI/Abdominal exam: PRESENT: normal bowel sounds, soft.  ABSENT: distended, 

guarding, mass, organolmegaly, rebound, tenderness


Rectal exam: PRESENT: deferred


Extremities exam: PRESENT: full ROM.  ABSENT: calf tenderness, clubbing, pedal 

edema


Neurological exam: PRESENT: alert, awake, oriented to person, oriented to place,

oriented to time, oriented to situation, CN II-XII grossly intact.  ABSENT: 

motor sensory deficit





Results


Laboratory Results: 


                                        





                                 12/19/19 04:03 





                                 12/19/19 04:03 





                                        











  12/19/19 12/19/19 12/19/19





  04:03 04:03 04:03


 


WBC  3.9 L  


 


RBC  4.58  


 


Hgb  13.3 L  


 


Hct  39.2  


 


MCV  86  


 


MCH  29.0  


 


MCHC  33.8  


 


RDW  13.9  


 


Plt Count  333  


 


Seg Neutrophils %  48.9  


 


Sodium   137.2 


 


Potassium   4.5 


 


Chloride   104 


 


Carbon Dioxide   26 


 


Anion Gap   7 


 


BUN   10 


 


Creatinine   0.92 


 


Est GFR ( Amer)   > 60 


 


Glucose   132 H 


 


Calcium   9.1 


 


TSH    2.94


 


Free T4    1.09


 


Free T3 pg/mL    3.53








                                        





12/18/19 00:28   Blood   Blood Culture (PCR) - Final


                            Staphylococcus Species





                                        











  12/17/19 12/17/19 12/18/19





  19:45 22:43 07:45


 


Troponin I  0.050  0.040  0.043











Impressions: 


                                        





Chest X-Ray  12/17/19 20:15


IMPRESSION:


1. Suggestion of multifocal patchy opacities most prominently


affecting the right upper lobe. Differential diagnosis includes


multifocal infectious or inflammatory process. Clinical


correlation is advised with consideration for CT chest if


clinically indicated.


2. Borderline enlargement of the cardiac silhouette with


prominence of the pulmonary vasculature.


 








Chest CT  12/17/19 22:43


IMPRESSION: 


 


Bilateral pneumonia.


 


Emphysema.


 


Small bilateral pleural effusions.


 


Mediastinal and bilateral hilar adenopathy.


 


Coronary artery calcification.


 














Assessment and Plan





- Diagnosis


(1) Pneumonia


Qualifiers: 


   Pneumonia type: due to unspecified organism   Laterality: bilateral   Lung 

location: upper lobe of lung   Qualified Code(s): J18.9 - Pneumonia, unspecified

organism   


Is this a current diagnosis for this admission?: Yes   


Plan: 


CT chest shows bilateral pneumonia more prominent on the right upper lobe.  

Suspect there is aspiration component given his alcohol abuse.  Continue 

levofloxacin. Blood culture growing Staph 1/2.








(2) Alcohol dependence


Is this a current diagnosis for this admission?: Yes   


Plan: 


Counseled in length about alcohol cessation.








(3) Tobacco abuse


Is this a current diagnosis for this admission?: Yes   


Plan: 


Counseled about smoking cessation.








(4) Paroxysmal atrial fibrillation


Is this a current diagnosis for this admission?: Yes   


Plan: 


Patient had a short run of A. fib which spontaneously converted to normal sinus 

rhythm in the ER. He has a CHADVASC score of 2.  Attempted to discuss long-term 

anticoagulation given his alcohol abuse and homelessness but patient is upset at

the moment and does not want to carry on this conversation.








- Time


Time Spent with patient: 25-34 minutes

## 2019-12-20 VITALS — DIASTOLIC BLOOD PRESSURE: 69 MMHG | SYSTOLIC BLOOD PRESSURE: 113 MMHG

## 2019-12-20 RX ADMIN — IPRATROPIUM BROMIDE SCH MG: 0.5 SOLUTION RESPIRATORY (INHALATION) at 02:17

## 2019-12-20 RX ADMIN — IPRATROPIUM BROMIDE SCH MG: 0.5 SOLUTION RESPIRATORY (INHALATION) at 07:48

## 2019-12-20 RX ADMIN — DILTIAZEM HYDROCHLORIDE SCH MG: 60 TABLET, FILM COATED ORAL at 05:47

## 2019-12-20 RX ADMIN — NICOTINE SCH EACH: 14 PATCH, EXTENDED RELEASE TOPICAL at 09:41

## 2019-12-20 RX ADMIN — LEVALBUTEROL HYDROCHLORIDE SCH MG: 1.25 SOLUTION RESPIRATORY (INHALATION) at 07:48

## 2019-12-20 RX ADMIN — LEVOFLOXACIN SCH MLS/HR: 750 INJECTION, SOLUTION INTRAVENOUS at 05:48

## 2019-12-20 RX ADMIN — Medication SCH ML: at 05:48

## 2019-12-20 RX ADMIN — FLUTICASONE PROPIONATE SCH SPRAY: 50 SPRAY, METERED NASAL at 09:42

## 2019-12-20 RX ADMIN — LEVALBUTEROL HYDROCHLORIDE SCH MG: 1.25 SOLUTION RESPIRATORY (INHALATION) at 02:17

## 2019-12-20 RX ADMIN — HEPARIN SODIUM SCH UNIT: 5000 INJECTION, SOLUTION INTRAVENOUS; SUBCUTANEOUS at 05:48

## 2019-12-20 RX ADMIN — DIAZEPAM SCH: 5 TABLET ORAL at 09:44

## 2019-12-20 NOTE — EKG REPORT
SEVERITY:- BORDERLINE ECG -

SINUS RHYTHM

BORDERLINE PROLONGED QT INTERVAL

:

Confirmed by: Dejon Rodríguez 20-Dec-2019 17:04:47

## 2019-12-21 NOTE — PDOC DISCHARGE SUMMARY
Impression





- Admit/DC Date/PCP


Admission Date/Primary Care Provider: 


  12/18/19 02:12





  





Discharge Date: 12/20/19





- Discharge Diagnosis


(1) Pneumonia


Is this a current diagnosis for this admission?: Yes   





(2) Alcohol dependence


Is this a current diagnosis for this admission?: Yes   





(3) Tobacco abuse


Is this a current diagnosis for this admission?: Yes   





(4) Paroxysmal atrial fibrillation


Is this a current diagnosis for this admission?: Yes   





- Additional Information


Resuscitation Status: Full Code


Discharge Diet: Regular


Referrals: 


[facility] [Provider Group] - 01/08/20 (PLEASE STOP BY OFFICE AND 

 YOUR NEW PATIENT PAPERWORK AND A TIME FOR YOUR APPOINTMENT WILL BE GIVEN

TO YOU AT THAT TIME)


Prescriptions: 


Aspirin [Aspirin 81 mg Chewable Tablet] 81 mg PO DAILY #30 tab.chew


Diltiazem HCl [Cardizem Cd 120 mg Capsule] 1 cap.sr PO DAILY #30 cap.sr


Levofloxacin [Levaquin 500 mg Tablet] 500 mg PO DAILY 5 Days #5 tablet


Nicotine [Nicoderm 14 mg/24 Hr Transdermal Patch] 1 each TD DAILY #30 patch.td24


Home Medications: 








Aspirin [Aspirin 81 mg Chewable Tablet] 81 mg PO DAILY #30 tab.chew 12/20/19 


Diltiazem HCl [Cardizem Cd 120 mg Capsule] 1 cap.sr PO DAILY #30 cap.sr 12/20/19




Levofloxacin [Levaquin 500 mg Tablet] 500 mg PO DAILY 5 Days #5 tablet 12/20/19 


Nicotine [Nicoderm 14 mg/24 Hr Transdermal Patch] 1 each TD DAILY #30 patch.td24

12/20/19 











History of Present Illiness


History of Present Illness: 


KIESHA WINN is a 64 year old male with a past medical history of COPD,, hy

pertension, tobacco and alcohol dependence who is long-term homeless.  He 

presents with chest pains.  In the emergency room he is found to have paroxysmal

atrial fibrillation and a right lung infiltrate.  He started on empiric 

antibiotics and referred to the hospitalist for admission.  Patient admits to 

some sharp intermittent self resolving pain without radiation associated with 

shortness of breath.  Denies fever or current chest pain.  Patient is a poor 

historian he denies current medication use but admits to heavy alcohol drinking.











Hospital Course


Hospital Course: 


This is a 64 year old male with a past medical history of COPD, hypertension, 

tobacco, homelessness and alcohol dependence who was admitted for bilateral 

pneumonia.  He also had a very short run of A. fib which spontaneously converted

to normal sinus rhythm.  He was started on IV antibiotics and breathing 

treatments.





Patient promptly returned to his baseline.  His pneumonia is likely aspiration 

in nature due to his heavy alcohol drinking. He remains in normal sinus rhythm. 

We also discussed risk and benefits long-term anticoagulation for his paroxysmal

A. fib.  He is not amenable to the risk of bleeding and prefers not to be on 

long-term anticoagulation.  Offered him on just aspirin instead and he is 

amenable to this.





He will be discharged on p.o. levofloxacin.  Counseled in length about tobacco 

and alcohol cessation.





Physical Exam


Vital Signs: 


                                        











Temp Pulse Resp BP Pulse Ox


 


 98.3 F   78   17   113/69   100 


 


 12/20/19 12:00  12/20/19 12:00  12/20/19 12:00  12/20/19 12:00  12/20/19 12:00








                                 Intake & Output











 12/19/19 12/20/19 12/21/19





 06:59 06:59 06:59


 


Intake Total 2080 2408 390


 


Output Total 1460 1075 


 


Balance 620 1333 390


 


Weight 150 lb 2.157 oz 150 lb 5.684 oz 











General appearance: PRESENT: no acute distress, well-developed, well-nourished


Head exam: PRESENT: atraumatic, normocephalic


Eye exam: PRESENT: conjunctiva pink, EOMI, PERRLA.  ABSENT: scleral icterus


Ear exam: PRESENT: normal external ear exam


Mouth exam: PRESENT: moist, tongue midline


Neck exam: ABSENT: carotid bruit, JVD, lymphadenopathy, thyromegaly


Respiratory exam: PRESENT: clear to auscultation ana paula.  ABSENT: rales, rhonchi, 

wheezes


Cardiovascular exam: PRESENT: RRR.  ABSENT: diastolic murmur, rubs, systolic 

murmur


Pulses: PRESENT: normal dorsalis pedis pul


Vascular exam: PRESENT: normal capillary refill


GI/Abdominal exam: PRESENT: normal bowel sounds, soft.  ABSENT: distended, 

guarding, mass, organolmegaly, rebound, tenderness


Rectal exam: PRESENT: deferred


Extremities exam: PRESENT: full ROM.  ABSENT: calf tenderness, clubbing, pedal 

edema


Neurological exam: PRESENT: alert, awake, oriented to person, oriented to place,

oriented to time, oriented to situation, CN II-XII grossly intact.  ABSENT: 

motor sensory deficit





Results


Laboratory Results: 


                                        











WBC  3.9 10^3/uL (4.0-10.5)  L  12/19/19  04:03    


 


RBC  4.58 10^6/uL (4.35-5.55)   12/19/19  04:03    


 


Hgb  13.3 g/dL (13.5-17.0)  L  12/19/19  04:03    


 


Hct  39.2 % (37.9-51.0)   12/19/19  04:03    


 


MCV  86 fl (80-97)   12/19/19  04:03    


 


MCH  29.0 pg (27.0-33.4)   12/19/19  04:03    


 


MCHC  33.8 g/dL (32.0-36.0)   12/19/19  04:03    


 


RDW  13.9 % (11.5-14.0)   12/19/19  04:03    


 


Plt Count  333 10^3/uL (150-450)   12/19/19  04:03    


 


Lymph % (Auto)  29.2 % (13-45)   12/19/19  04:03    


 


Mono % (Auto)  15.2 % (3-13)  H  12/19/19  04:03    


 


Eos % (Auto)  5.8 % (0-6)   12/19/19  04:03    


 


Baso % (Auto)  0.9 % (0-2)   12/19/19  04:03    


 


Absolute Neuts (auto)  1.9 10^3/uL (1.7-8.2)   12/19/19  04:03    


 


Absolute Lymphs (auto)  1.1 10^3/uL (0.5-4.7)   12/19/19  04:03    


 


Absolute Monos (auto)  0.6 10^3/uL (0.1-1.4)   12/19/19  04:03    


 


Absolute Eos (auto)  0.2 10^3/uL (0.0-0.6)   12/19/19  04:03    


 


Absolute Basos (auto)  0.0 10^3/uL (0.0-0.2)   12/19/19  04:03    


 


Seg Neutrophils %  48.9 % (42-78)   12/19/19  04:03    


 


Sodium  137.2 mmol/L (137-145)   12/19/19  04:03    


 


Potassium  4.5 mmol/L (3.6-5.0)   12/19/19  04:03    


 


Chloride  104 mmol/L ()   12/19/19  04:03    


 


Carbon Dioxide  26 mmol/L (22-30)   12/19/19  04:03    


 


Anion Gap  7  (5-19)   12/19/19  04:03    


 


BUN  10 mg/dL (7-20)   12/19/19  04:03    


 


Creatinine  0.92 mg/dL (0.52-1.25)   12/19/19  04:03    


 


Est GFR ( Amer)  > 60  (>60)   12/19/19  04:03    


 


Est GFR (MDRD) Non-Af  > 60  (>60)   12/19/19  04:03    


 


Glucose  132 mg/dL ()  H  12/19/19  04:03    


 


Hemoglobin A1c %  5.8 % (4.7-6.0)   12/19/19  04:03    


 


Lactic Acid  1.3 mmol/L (0.7-2.1)   12/17/19  23:40    


 


Calcium  9.1 mg/dL (8.4-10.2)   12/19/19  04:03    


 


Phosphorus  4.0 mg/dL (2.5-4.5)   12/17/19  22:43    


 


Phosphorus  Cancelled   12/17/19  22:43    


 


Magnesium  2.1 mg/dL (1.6-2.3)   12/17/19  22:43    


 


Total Bilirubin  0.3 mg/dL (0.2-1.3)   12/17/19  19:45    


 


Direct Bilirubin  0.2 mg/dL (0.0-0.4)   12/17/19  19:45    


 


Neonat Total Bilirubin  Not Reportable   12/17/19  19:45    


 


Neonat Direct Bilirubin  Not Reportable   12/17/19  19:45    


 


Neonat Indirect Bili  Not Reportable   12/17/19  19:45    


 


AST  23 U/L (17-59)   12/17/19  19:45    


 


ALT  12 U/L (<50)   12/17/19  19:45    


 


Alkaline Phosphatase  94 U/L ()   12/17/19  19:45    


 


Troponin I  0.043 ng/mL  12/18/19  07:45    


 


Total Protein  6.9 g/dL (6.3-8.2)   12/17/19  19:45    


 


Albumin  3.6 g/dL (3.5-5.0)   12/17/19  19:45    


 


Lipase  33.4 U/L ()   12/17/19  19:45    


 


TSH  2.94 uIU/mL (0.47-4.68)   12/19/19  04:03    


 


Free T4  1.09 ng/dL (0.78-2.19)   12/19/19  04:03    


 


Free T3 pg/mL  3.53 pg/mL (2.77-5.27)   12/19/19  04:03    


 


Urine Color  YELLOW   12/17/19  23:40    


 


Urine Appearance  SLIGHTLY-CLOUDY   12/17/19  23:40    


 


Urine pH  6.0  (5.0-9.0)   12/17/19  23:40    


 


Ur Specific Gravity  1.013   12/17/19  23:40    


 


Urine Protein  NEGATIVE mg/dL (NEGATIVE)   12/17/19  23:40    


 


Urine Glucose (UA)  NEGATIVE mg/dL (NEGATIVE)   12/17/19  23:40    


 


Urine Ketones  NEGATIVE mg/dL (NEGATIVE)   12/17/19  23:40    


 


Urine Blood  SMALL  (NEGATIVE)  H  12/17/19  23:40    


 


Urine Nitrite  NEGATIVE  (NEGATIVE)   12/17/19  23:40    


 


Urine Bilirubin  NEGATIVE  (NEGATIVE)   12/17/19  23:40    


 


Urine Urobilinogen  NEGATIVE mg/dL (<2.0)   12/17/19  23:40    


 


Ur Leukocyte Esterase  SMALL  (NEGATIVE)  H  12/17/19  23:40    


 


Urine Ascorbic Acid  NEGATIVE  (NEGATIVE)   12/17/19  23:40    


 


Serum Alcohol  78 mg/dL (NONE DETECTED)   12/17/19  19:45    








                                        











  12/17/19 12/17/19 12/18/19





  19:45 22:43 07:45


 


Troponin I  0.050  0.040  0.043











Impressions: 


                                        





Chest X-Ray  12/17/19 20:15


IMPRESSION:


1. Suggestion of multifocal patchy opacities most prominently


affecting the right upper lobe. Differential diagnosis includes


multifocal infectious or inflammatory process. Clinical


correlation is advised with consideration for CT chest if


clinically indicated.


2. Borderline enlargement of the cardiac silhouette with


prominence of the pulmonary vasculature.


 








Chest CT  12/17/19 22:43


IMPRESSION: 


 


Bilateral pneumonia.


 


Emphysema.


 


Small bilateral pleural effusions.


 


Mediastinal and bilateral hilar adenopathy.


 


Coronary artery calcification.


 














Stroke


Is this a Stroke Patient?: No





Acute Heart Failure





- **


Is this a Heart Failure Patient?: No

## 2020-06-14 ENCOUNTER — HOSPITAL ENCOUNTER (INPATIENT)
Dept: HOSPITAL 62 - ER | Age: 65
LOS: 12 days | Discharge: NURSING, REHAB OR LONG-TERM CARE | DRG: 291 | End: 2020-06-26
Attending: FAMILY MEDICINE | Admitting: INTERNAL MEDICINE
Payer: MEDICAID

## 2020-06-14 DIAGNOSIS — I50.21: ICD-10-CM

## 2020-06-14 DIAGNOSIS — F17.210: ICD-10-CM

## 2020-06-14 DIAGNOSIS — F12.10: ICD-10-CM

## 2020-06-14 DIAGNOSIS — Z78.1: ICD-10-CM

## 2020-06-14 DIAGNOSIS — Z82.61: ICD-10-CM

## 2020-06-14 DIAGNOSIS — I48.0: ICD-10-CM

## 2020-06-14 DIAGNOSIS — G93.41: ICD-10-CM

## 2020-06-14 DIAGNOSIS — I11.0: Primary | ICD-10-CM

## 2020-06-14 DIAGNOSIS — R94.01: ICD-10-CM

## 2020-06-14 DIAGNOSIS — Z59.0: ICD-10-CM

## 2020-06-14 DIAGNOSIS — Z82.49: ICD-10-CM

## 2020-06-14 DIAGNOSIS — J44.9: ICD-10-CM

## 2020-06-14 DIAGNOSIS — E87.1: ICD-10-CM

## 2020-06-14 DIAGNOSIS — Z20.818: ICD-10-CM

## 2020-06-14 DIAGNOSIS — I42.6: ICD-10-CM

## 2020-06-14 DIAGNOSIS — R68.0: ICD-10-CM

## 2020-06-14 DIAGNOSIS — F10.288: ICD-10-CM

## 2020-06-14 LAB
ADD MANUAL DIFF: NO
ALBUMIN SERPL-MCNC: 3.9 G/DL (ref 3.5–5)
ALP SERPL-CCNC: 142 U/L (ref 38–126)
ANION GAP SERPL CALC-SCNC: 12 MMOL/L (ref 5–19)
APPEARANCE UR: (no result)
APTT PPP: (no result) S
AST SERPL-CCNC: 62 U/L (ref 17–59)
BARBITURATES UR QL SCN: NEGATIVE
BASOPHILS # BLD AUTO: 0 10^3/UL (ref 0–0.2)
BASOPHILS NFR BLD AUTO: 0.9 % (ref 0–2)
BILIRUB DIRECT SERPL-MCNC: 0.3 MG/DL (ref 0–0.4)
BILIRUB SERPL-MCNC: 1.3 MG/DL (ref 0.2–1.3)
BILIRUB UR QL STRIP: NEGATIVE
BUN SERPL-MCNC: 14 MG/DL (ref 7–20)
CALCIUM: 9.1 MG/DL (ref 8.4–10.2)
CHLORIDE SERPL-SCNC: 98 MMOL/L (ref 98–107)
CK SERPL-CCNC: 289 U/L (ref 55–170)
CO2 SERPL-SCNC: 21 MMOL/L (ref 22–30)
EOSINOPHIL # BLD AUTO: 0.1 10^3/UL (ref 0–0.6)
EOSINOPHIL NFR BLD AUTO: 1.4 % (ref 0–6)
ERYTHROCYTE [DISTWIDTH] IN BLOOD BY AUTOMATED COUNT: 15.1 % (ref 11.5–14)
ETHANOL SERPL-MCNC: 10 MG/DL
GLUCOSE SERPL-MCNC: 76 MG/DL (ref 75–110)
GLUCOSE UR STRIP-MCNC: NEGATIVE MG/DL
HCT VFR BLD CALC: 42.7 % (ref 37.9–51)
HGB BLD-MCNC: 14.6 G/DL (ref 13.5–17)
KETONES UR STRIP-MCNC: NEGATIVE MG/DL
LYMPHOCYTES # BLD AUTO: 1.2 10^3/UL (ref 0.5–4.7)
LYMPHOCYTES NFR BLD AUTO: 23.5 % (ref 13–45)
MCH RBC QN AUTO: 29.4 PG (ref 27–33.4)
MCHC RBC AUTO-ENTMCNC: 34.1 G/DL (ref 32–36)
MCV RBC AUTO: 86 FL (ref 80–97)
METHADONE UR QL SCN: NEGATIVE
MONOCYTES # BLD AUTO: 0.6 10^3/UL (ref 0.1–1.4)
MONOCYTES NFR BLD AUTO: 11.1 % (ref 3–13)
NEUTROPHILS # BLD AUTO: 3.2 10^3/UL (ref 1.7–8.2)
NEUTS SEG NFR BLD AUTO: 63.1 % (ref 42–78)
NITRITE UR QL STRIP: NEGATIVE
PCP UR QL SCN: NEGATIVE
PH UR STRIP: 6 [PH] (ref 5–9)
PLATELET # BLD: 346 10^3/UL (ref 150–450)
POTASSIUM SERPL-SCNC: 4.6 MMOL/L (ref 3.6–5)
PROT SERPL-MCNC: 7.4 G/DL (ref 6.3–8.2)
PROT UR STRIP-MCNC: 100 MG/DL
RBC # BLD AUTO: 4.95 10^6/UL (ref 4.35–5.55)
SP GR UR STRIP: 1.02
TOTAL CELLS COUNTED % (AUTO): 100 %
TROPONIN I SERPL-MCNC: 0.03 NG/ML
URINE AMPHETAMINES SCREEN: NEGATIVE
URINE BENZODIAZEPINES SCREEN: NEGATIVE
URINE COCAINE SCREEN: NEGATIVE
URINE MARIJUANA (THC) SCREEN: (no result)
UROBILINOGEN UR-MCNC: 4 MG/DL (ref ?–2)
WBC # BLD AUTO: 5.1 10^3/UL (ref 4–10.5)

## 2020-06-14 PROCEDURE — 81001 URINALYSIS AUTO W/SCOPE: CPT

## 2020-06-14 PROCEDURE — 82962 GLUCOSE BLOOD TEST: CPT

## 2020-06-14 PROCEDURE — 94640 AIRWAY INHALATION TREATMENT: CPT

## 2020-06-14 PROCEDURE — 87077 CULTURE AEROBIC IDENTIFY: CPT

## 2020-06-14 PROCEDURE — 70450 CT HEAD/BRAIN W/O DYE: CPT

## 2020-06-14 PROCEDURE — 87070 CULTURE OTHR SPECIMN AEROBIC: CPT

## 2020-06-14 PROCEDURE — 87186 SC STD MICRODIL/AGAR DIL: CPT

## 2020-06-14 PROCEDURE — 83735 ASSAY OF MAGNESIUM: CPT

## 2020-06-14 PROCEDURE — 96360 HYDRATION IV INFUSION INIT: CPT

## 2020-06-14 PROCEDURE — 80307 DRUG TEST PRSMV CHEM ANLYZR: CPT

## 2020-06-14 PROCEDURE — 36415 COLL VENOUS BLD VENIPUNCTURE: CPT

## 2020-06-14 PROCEDURE — 84443 ASSAY THYROID STIM HORMONE: CPT

## 2020-06-14 PROCEDURE — 80053 COMPREHEN METABOLIC PANEL: CPT

## 2020-06-14 PROCEDURE — 87635 SARS-COV-2 COVID-19 AMP PRB: CPT

## 2020-06-14 PROCEDURE — C9803 HOPD COVID-19 SPEC COLLECT: HCPCS

## 2020-06-14 PROCEDURE — 82550 ASSAY OF CK (CPK): CPT

## 2020-06-14 PROCEDURE — 99285 EMERGENCY DEPT VISIT HI MDM: CPT

## 2020-06-14 PROCEDURE — 87040 BLOOD CULTURE FOR BACTERIA: CPT

## 2020-06-14 PROCEDURE — 71046 X-RAY EXAM CHEST 2 VIEWS: CPT

## 2020-06-14 PROCEDURE — 82533 TOTAL CORTISOL: CPT

## 2020-06-14 PROCEDURE — 82607 VITAMIN B-12: CPT

## 2020-06-14 PROCEDURE — 82746 ASSAY OF FOLIC ACID SERUM: CPT

## 2020-06-14 PROCEDURE — 83880 ASSAY OF NATRIURETIC PEPTIDE: CPT

## 2020-06-14 PROCEDURE — 71045 X-RAY EXAM CHEST 1 VIEW: CPT

## 2020-06-14 PROCEDURE — 80048 BASIC METABOLIC PNL TOTAL CA: CPT

## 2020-06-14 PROCEDURE — 84484 ASSAY OF TROPONIN QUANT: CPT

## 2020-06-14 PROCEDURE — 93005 ELECTROCARDIOGRAM TRACING: CPT

## 2020-06-14 PROCEDURE — 83605 ASSAY OF LACTIC ACID: CPT

## 2020-06-14 PROCEDURE — 93010 ELECTROCARDIOGRAM REPORT: CPT

## 2020-06-14 PROCEDURE — 80076 HEPATIC FUNCTION PANEL: CPT

## 2020-06-14 PROCEDURE — 85025 COMPLETE CBC W/AUTO DIFF WBC: CPT

## 2020-06-14 PROCEDURE — 87205 SMEAR GRAM STAIN: CPT

## 2020-06-14 PROCEDURE — 93306 TTE W/DOPPLER COMPLETE: CPT

## 2020-06-14 SDOH — ECONOMIC STABILITY - HOUSING INSECURITY: HOMELESSNESS: Z59.0

## 2020-06-14 NOTE — RADIOLOGY REPORT (SQ)
XR CHEST 1 VIEW



HISTORY: Shortness of breath.



COMPARISON: 12/18/2019



FINDINGS:



The heart size is enlarged. There is no pulmonary vascular

congestion. No consolidation, pleural effusion, or pneumothorax

is seen. There is scarring at the left lung apex. The bony

structures are preserved.



IMPRESSION:



No evidence of acute cardiopulmonary disease.

## 2020-06-15 LAB
ANION GAP SERPL CALC-SCNC: 8 MMOL/L (ref 5–19)
BUN SERPL-MCNC: 22 MG/DL (ref 7–20)
CALCIUM: 9 MG/DL (ref 8.4–10.2)
CHLORIDE SERPL-SCNC: 101 MMOL/L (ref 98–107)
CK SERPL-CCNC: 212 U/L (ref 55–170)
CO2 SERPL-SCNC: 24 MMOL/L (ref 22–30)
GLUCOSE SERPL-MCNC: 98 MG/DL (ref 75–110)
POTASSIUM SERPL-SCNC: 4.1 MMOL/L (ref 3.6–5)

## 2020-06-15 RX ADMIN — LEVALBUTEROL HYDROCHLORIDE SCH MG: 1.25 SOLUTION RESPIRATORY (INHALATION) at 08:58

## 2020-06-15 RX ADMIN — DIAZEPAM SCH MG: 5 TABLET ORAL at 06:45

## 2020-06-15 RX ADMIN — DILTIAZEM HYDROCHLORIDE SCH MG: 60 TABLET, FILM COATED ORAL at 23:33

## 2020-06-15 RX ADMIN — HEPARIN SODIUM SCH: 5000 INJECTION, SOLUTION INTRAVENOUS; SUBCUTANEOUS at 13:07

## 2020-06-15 RX ADMIN — DILTIAZEM HYDROCHLORIDE SCH MG: 60 TABLET, FILM COATED ORAL at 17:23

## 2020-06-15 RX ADMIN — Medication SCH ML: at 21:22

## 2020-06-15 RX ADMIN — PANTOPRAZOLE SODIUM SCH MG: 20 TABLET, DELAYED RELEASE ORAL at 06:45

## 2020-06-15 RX ADMIN — LEVALBUTEROL HYDROCHLORIDE SCH MG: 1.25 SOLUTION RESPIRATORY (INHALATION) at 16:18

## 2020-06-15 RX ADMIN — METOCLOPRAMIDE SCH MLS/HR: 5 INJECTION, SOLUTION INTRAMUSCULAR; INTRAVENOUS at 09:51

## 2020-06-15 RX ADMIN — HEPARIN SODIUM SCH: 5000 INJECTION, SOLUTION INTRAVENOUS; SUBCUTANEOUS at 09:07

## 2020-06-15 RX ADMIN — DILTIAZEM HYDROCHLORIDE SCH MG: 60 TABLET, FILM COATED ORAL at 12:31

## 2020-06-15 RX ADMIN — DIAZEPAM SCH MG: 5 TABLET ORAL at 21:21

## 2020-06-15 RX ADMIN — Medication SCH ML: at 06:45

## 2020-06-15 RX ADMIN — ASPIRIN SCH MG: 81 TABLET, COATED ORAL at 09:51

## 2020-06-15 RX ADMIN — Medication SCH: at 13:07

## 2020-06-15 RX ADMIN — DIAZEPAM SCH MG: 5 TABLET ORAL at 15:01

## 2020-06-15 RX ADMIN — HEPARIN SODIUM SCH UNIT: 5000 INJECTION, SOLUTION INTRAVENOUS; SUBCUTANEOUS at 21:21

## 2020-06-15 NOTE — ER DOCUMENT REPORT
Entered by GENARO GRAFF SCRIBE  06/14/20 2153 





Acting as scribe for:MARGARITA NATHAN IV, MD





ED General





- General


Chief Complaint: Shortness Of Breath


Stated Complaint: LEG/FEET SWELLING,SHORT OF BREATH


Time Seen by Provider: 06/14/20 17:27


Mode of Arrival: Wheelchair


Information source: Patient


Notes: 





This 64 year old male patient with a history of COPD and HTN presents to the ED 

today with complaints of shortness of breath and bilateral feet swelling for the

past x1 week. Patient reports that he noted the redness to his bilateral feet 

around the same time. He notes that he has never had a problem with swelling to 

his feet in the past. Patient states that he is a current every day smoker. Per 

nursing note, patient admits to being a chronic alcoholic and that he is trying 

to quit.


TRAVEL OUTSIDE OF THE [place] IN LAST 30 DAYS: No





- Related Data


Allergies/Adverse Reactions: 


                                        





No Known Allergies Allergy (Verified 06/14/20 17:38)


   











Past Medical History





- General


Information source: Patient, [facility] Records





- Social History


Smoking Status: Current Every Day Smoker


Cigarette use (# per day): Yes


Chew tobacco use (# tins/day): No


Smoking Education Provided: No


Frequency of alcohol use: Heavy


Family History: Reviewed & Not Pertinent, COPD, Hypertension


Patient has suicidal ideation: No


Patient has homicidal ideation: No





- Past Medical History


Cardiac Medical History: Reports: Hx Hypertension - untreated


Pulmonary Medical History: Reports: Hx Bronchitis, Hx COPD


Past Surgical History: Reports: Hx Abdominal Surgery - Exploratory in 2017, Hx 

Orthopedic Surgery - Right ACL repair, Hx Tonsillectomy, Other





Review of Systems





- Review of Systems


Constitutional: No symptoms reported


EENT: No symptoms reported


Cardiovascular: No symptoms reported


Respiratory: See HPI, Short of breath


Gastrointestinal: No symptoms reported


Genitourinary: No symptoms reported


Male Genitourinary: No symptoms reported


Musculoskeletal: See HPI, Other - Feet swelling


Skin: See HPI, Other - Erythema to bilateral feet


Hematologic/Lymphatic: No symptoms reported


Neurological/Psychological: No symptoms reported


-: Yes All other systems reviewed and negative





Physical Exam





- Vital signs


Vitals: 


                                        











Temp Pulse Resp BP Pulse Ox


 


 97.5 F   110 H  35 H  149/89 H  100 


 


 06/14/20 17:38  06/14/20 17:38  06/14/20 17:38  06/14/20 17:38  06/14/20 17:38














- General


General appearance: Alert


In distress: None





- HEENT


Head: Normocephalic, Atraumatic


Eyes: Normal


Pupils: PERRL





- Respiratory


Respiratory status: No respiratory distress


Chest status: Nontender


Breath sounds: Normal


Chest palpation: Normal





- Cardiovascular


Rhythm: Regular, Tachycardia


Heart sounds: Normal auscultation


Murmur: No


Friction rub: No


Gallop: None auscultated





- Abdominal


Inspection: Normal


Distension: No distension


Bowel sounds: Normal


Tenderness: Nontender - Abdomen soft


Organomegaly: No organomegaly





- Back


Back: Normal, Nontender





- Extremities


General upper extremity: Normal inspection


Foot: Edema - +1 pitting edema to feet bilaterally





- Neurological


Neuro grossly intact: Yes


Orientation: AAOx4





- Psychological


Associated symptoms: Normal affect, Normal mood





- Skin


Skin irregularity: Erythema - Erythema noted to dorsal surface of feet 

bilaterally and anterior lower legs, other - Chronic appearing excoriations 

noted to anterior lower legs bilaterally, appearance which is consistent with 

chronic venous insufficiency





Course





- Re-evaluation


Re-evalutation: 





06/15/20 03:31


Patient's current heart rate is 97, blood pressure is 138/93, O2 sats 100%, 

respirations 17.  Patient discussed with hospitalist will be admitted for acute 

EKG changes and elevated BNP.





- Vital Signs


Vital signs: 


                                        











Temp Pulse Resp BP Pulse Ox


 


 97.5 F   110 H  24 H  151/103 H  96 


 


 06/14/20 17:38  06/14/20 17:38  06/15/20 02:01  06/15/20 02:01  06/15/20 02:01














- Laboratory


Result Diagrams: 


                                 06/14/20 17:46





                                 06/14/20 17:46


Laboratory results interpreted by me: 


                                        











  06/14/20 06/14/20 06/14/20





  17:46 17:46 17:46


 


RDW  15.1 H  


 


Sodium   130.6 L 


 


Carbon Dioxide   21 L 


 


AST   62 H 


 


Alkaline Phosphatase   142 H 


 


Creatine Kinase   289 H 


 


NT-Pro-B Natriuret Pep    8780 H


 


Urine Protein   


 


Urine Urobilinogen   














  06/14/20





  17:46


 


RDW 


 


Sodium 


 


Carbon Dioxide 


 


AST 


 


Alkaline Phosphatase 


 


Creatine Kinase 


 


NT-Pro-B Natriuret Pep 


 


Urine Protein  100 H


 


Urine Urobilinogen  4.0 H














- Diagnostic Test


Radiology reviewed: Reports reviewed





- EKG Interpretation by Me


Additional EKG results interpreted by me: 





06/15/20 02:58


EKG obtained on 6/15/2020 at 00 32 hours was interpreted by this MD.  Findings: 

Sinus tachycardia, rate 102, normal axis, P waves preceding QRS complexes, 

patient has T wave inversions in leads V2 through V5 which are not seen on the 

patient's prior EKG done 12/20/2019.  Impression: EKG appears abnormal and 

changed from prior.





- Consults


  ** dr. dominguez


Time consulted: 03:32 - dr dominguez agreed to admit pt


Reason for consultation: 





06/15/20 03:32


ekg changes, elevated bnp





Discharge





- Discharge


Clinical Impression: 


 Acute electrocardiogram changes, Elevated brain natriuretic peptide (BNP) level





Dyspnea


Qualifiers:


 Dyspnea type: unspecified Qualified Code(s): R06.00 - Dyspnea, unspecified





Condition: Good


Disposition: ADMITTED AS INPATIENT


Admitting Provider: Ciro (Hospitalist)


Unit Admitted: Telemetry





I personally performed the services described in the documentation, reviewed and

edited the documentation which was dictated to the scribe in my presence, and it

accurately records my words and actions.

## 2020-06-15 NOTE — PROGRESS NOTE
Provider Note


Provider Note: 





Patient comfortable on room air.  Vital signs stable.  No chest pain.  Troponins

have been flat.  Echocardiogram shows an EF of 20 to 25% with moderately reduced

right ventricular systolic function.  The main issue is going to be 

dispositioning him.  Medically he looks very stable.

## 2020-06-15 NOTE — EKG REPORT
SEVERITY:- ABNORMAL ECG -

SINUS TACHYCARDIA

LEFT ANTERIOR FASCICULAR BLOCK

NONSPECIFIC T ABNORMALITIES, DIFFUSE LEADS, NEW, SINCE 12/20/19 EKG , CLINICAL CORRELATION NEEDED

BORDERLINE PROLONGED QT INTERVAL

:

Confirmed by: Mauricio Browne MD 15-Edilson-2020 07:26:41

## 2020-06-15 NOTE — PDOC H&P
History of Present Illness


Admission Date/PCP: 


  06/15/20 03:43





  





Patient complains of: Shortness of breath


History of Present Illness: 


KIESHA WINN is a 64 year old male with a past medical history of COPD, 

hypertension, tobacco, alcohol and substance abuse who is long-term homeless.  

He presents with 2 days of shortness of breath, nonproductive cough and lower 

extremity swelling.  He denies fever, chest pain nausea vomiting or 

palpitations.  In the emergency department he is found to have an sinus 

tachycardia, uncontrolled hypertension, borderline long QT, new T wave 

inversions and elevated BNP of 9000.  He admits to drinking a minimum of 12 

beers per day and history of blackouts.  He denies any medication use.








Past Medical History


Cardiac Medical History: Reports: Atrial Fibrillation, Hypertension - untreated


   Denies: Congestive Heart Failure


Pulmonary Medical History: Reports: Bronchitis, Chronic Obstructive Pulmonary 

Disease (COPD)


Psychiatric Medical History: Reports: Alcohol Dependency, Substance Abuse, 

Tobacco Dependency


   Denies: Depression





Past Surgical History


Past Surgical History: Reports: Orthopedic Surgery - Right ACL repair, 

Tonsillectomy, Other - Homeless





Social History


Information Source: Patient, [facility] Records


Lives with: Homeless


Smoking Status: Current Every Day Smoker


Electronic Cigarette use?: No


Frequency of Alcohol Use: Heavy


Hx Recreational Drug Use: No


Drugs: None


Hx Prescription Drug Abuse: No





- Advance Directive


Resuscitation Status: Full Code





Family History


Family History: Arthritis, COPD, Hypertension


Parental Family History Reviewed: Yes


Children Family History Reviewed: Yes


Sibling(s) Family History Reviewed.: Yes





Medication/Allergy


Home Medications: 








Aspirin [Aspirin 81 mg Chewable Tablet] 81 mg PO DAILY #30 tab.chew 12/20/19 


Diltiazem HCl [Cardizem Cd 120 mg Capsule] 1 cap.sr PO DAILY #30 cap.sr 12/20/19




Levofloxacin [Levaquin 500 mg Tablet] 500 mg PO DAILY 5 Days #5 tablet 12/20/19 


Nicotine [Nicoderm 14 mg/24 Hr Transdermal Patch] 1 each TD DAILY #30 patch.td24

12/20/19 








Allergies/Adverse Reactions: 


                                        





No Known Allergies Allergy (Verified 06/14/20 17:38)


   











Review of Systems


Constitutional: ABSENT: chills, fever(s), headache(s), weight gain, weight loss


Eyes: ABSENT: visual disturbances


Ears: ABSENT: hearing changes


Cardiovascular: ABSENT: chest pain, dyspnea on exertion, edema, orthropnea, 

palpitations


Respiratory: ABSENT: cough, hemoptysis


Gastrointestinal: ABSENT: abdominal pain, constipation, diarrhea, hematemesis, 

hematochezia, nausea, vomiting


Genitourinary: ABSENT: dysuria, hematuria


Musculoskeletal: ABSENT: joint swelling


Integumentary: ABSENT: rash, wounds


Neurological: ABSENT: abnormal gait, abnormal speech, confusion, dizziness, foca

l weakness, syncope


Psychiatric: ABSENT: anxiety, depression, homidical ideation, suicidal ideation


Endocrine: ABSENT: cold intolerance, heat intolerance, polydipsia, polyuria


Hematologic/Lymphatic: ABSENT: easy bleeding, easy bruising





Physical Exam


Vital Signs: 


                                        











Temp Pulse Resp BP Pulse Ox


 


 97.8 F   110 H  19   129/89 H  94 


 


 06/15/20 03:51  06/14/20 17:38  06/15/20 05:01  06/15/20 05:01  06/15/20 04:31








                                 Intake & Output











 06/13/20 06/14/20 06/15/20





 11:59 11:59 11:59


 


Intake Total   500


 


Balance   500


 


Weight   68.492 kg











General appearance: PRESENT: cooperative, disheveled, mild distress, thin, 

well-developed.  ABSENT: well-nourished


Head exam: PRESENT: atraumatic, normocephalic


Eye exam: PRESENT: conjunctiva pink, EOMI, PERRLA.  ABSENT: scleral icterus


Ear exam: PRESENT: normal external ear exam


Mouth exam: PRESENT: moist, tongue midline


Neck exam: PRESENT: JVD.  ABSENT: carotid bruit, lymphadenopathy, thyromegaly


Respiratory exam: PRESENT: accessory muscle use, crackles, prolonged expiratory 

phas, symmetrical, tachypnea.  ABSENT: rhonchi, stridor


Cardiovascular exam: PRESENT: gallop, RRR, +S1, +S2, systolic murmur, tach

ycardia


Pulses: PRESENT: normal dorsalis pedis pul


Vascular exam: PRESENT: normal capillary refill


GI/Abdominal exam: PRESENT: normal bowel sounds, soft.  ABSENT: distended, 

guarding, mass, organolmegaly, rebound, tenderness


Rectal exam: PRESENT: deferred


Extremities exam: PRESENT: pedal edema, +2 edema.  ABSENT: joint swelling


Neurological exam: PRESENT: alert, awake, oriented to person, oriented to place,

oriented to time, oriented to situation, CN II-XII grossly intact.  ABSENT: 

motor sensory deficit


Psychiatric exam: PRESENT: appropriate affect, normal mood.  ABSENT: homicidal 

ideation, suicidal ideation


Skin exam: PRESENT: dry, intact, warm.  ABSENT: cyanosis, rash





Results


Laboratory Results: 


                                        





                                 06/14/20 17:46 





                                 06/14/20 17:46 





                                        











  06/14/20 06/14/20 06/14/20





  17:46 17:46 17:46


 


WBC  5.1  


 


RBC  4.95  


 


Hgb  14.6  


 


Hct  42.7  


 


MCV  86  


 


MCH  29.4  


 


MCHC  34.1  


 


RDW  15.1 H  


 


Plt Count  346  


 


Seg Neutrophils %  63.1  


 


Sodium   130.6 L 


 


Potassium   4.6 


 


Chloride   98 


 


Carbon Dioxide   21 L 


 


Anion Gap   12 


 


BUN   14 


 


Creatinine   0.87 


 


Est GFR ( Amer)   > 60 


 


Glucose   76 


 


Calcium   9.1 


 


Magnesium   


 


Total Bilirubin   1.3 


 


AST   62 H 


 


Alkaline Phosphatase   142 H 


 


Total Protein   7.4 


 


Albumin   3.9 


 


TSH   


 


Urine Color    MANDI


 


Urine Appearance    SLIGHTLY-CLOUDY


 


Urine pH    6.0


 


Ur Specific Gravity    1.021


 


Urine Protein    100 H


 


Urine Glucose (UA)    NEGATIVE


 


Urine Ketones    NEGATIVE


 


Urine Blood    NEGATIVE


 


Urine Nitrite    NEGATIVE


 


Ur Leukocyte Esterase    NEGATIVE


 


Urine WBC (Auto)    1


 


Urine RBC (Auto)    0














  06/14/20 06/15/20





  17:46 01:38


 


WBC  


 


RBC  


 


Hgb  


 


Hct  


 


MCV  


 


MCH  


 


MCHC  


 


RDW  


 


Plt Count  


 


Seg Neutrophils %  


 


Sodium  


 


Potassium  


 


Chloride  


 


Carbon Dioxide  


 


Anion Gap  


 


BUN  


 


Creatinine  


 


Est GFR (African Amer)  


 


Glucose  


 


Calcium  


 


Magnesium   1.9


 


Total Bilirubin  


 


AST  


 


Alkaline Phosphatase  


 


Total Protein  


 


Albumin  


 


TSH  3.62 


 


Urine Color  


 


Urine Appearance  


 


Urine pH  


 


Ur Specific Gravity  


 


Urine Protein  


 


Urine Glucose (UA)  


 


Urine Ketones  


 


Urine Blood  


 


Urine Nitrite  


 


Ur Leukocyte Esterase  


 


Urine WBC (Auto)  


 


Urine RBC (Auto)  








                                        











  06/14/20 06/14/20 06/15/20





  17:46 17:46 01:38


 


Creatine Kinase  289 H  


 


Troponin I   0.025  0.028


 


NT-Pro-B Natriuret Pep   8780 H 














  06/15/20





  01:38


 


Creatine Kinase  212 H


 


Troponin I 


 


NT-Pro-B Natriuret Pep 











Impressions: 


                                        





Chest X-Ray  06/14/20 19:14


IMPRESSION:


 


No evidence of acute cardiopulmonary disease.


 














Assessment and Plan





- Diagnosis


(1) Congestive heart failure


Qualifiers: 


   Heart failure type: combined systolic and diastolic 


Is this a current diagnosis for this admission?: Yes   


Plan: 


Likely secondary to uncontrolled paroxysmal atrial fibrillation versus alcoholic

cardiomyopathy.  Cardizem initiated for rate control, thiamine and folate, 

follow-up 2D echo.  Introduce ACE inhibitor and beta-blocker as pressure allows.








(2) Paroxysmal atrial fibrillation


Is this a current diagnosis for this admission?: Yes   


Plan: 


Cardizem trial, not an anticoagulation candidate given alcoholism and 

homelessness.








(3) Acute electrocardiography changes


Is this a current diagnosis for this admission?: Yes   


Plan: 


Follow-up serial cardiac enzymes








(4) Alcohol dependence


Is this a current diagnosis for this admission?: Yes   


Plan: 


Thiamine, folate, Valium ordered with Ativan as needed








(5) Homeless


Is this a current diagnosis for this admission?: Yes   


Plan: 


Discharge planning consult








(6) Tobacco abuse


Is this a current diagnosis for this admission?: Yes   


Plan: 


Tobacco cessation counseling performed and nicotine replacement options 

discussed








- Time


Time Spent with patient: 25-34 minutes





- Inpatient Certification


Medical Necessity: Need Close Monitoring Due to Risk of Patient Decompensation

## 2020-06-15 NOTE — XCELERA REPORT
[address] [address]

                               Tel: [phone]

                               Fax: [phone]



                      Transthoracic Echocardiogram Report

_______________________________________________________________________________



Name: KIESHA WINN

MRN: [id number]                           Age: 64 yrs

Gender: Male                              : 1955

Patient Status: Inpatient                 Patient Location: [address] [place] [address]

Account #: [id number]

Study Date: 06/15/2020 09:05 AM

History: Daisy

Accession #: [id number]

_______________________________________________________________________________



Height: 60 in        Weight: 151 lb        BSA: 1.7 m2

_______________________________________________________________________________

Procedure: A complete two-dimensional transthoracic echocardiogram was

performed (2D, M-mode, spectral and color flow Doppler). The study was

technically adequate with some images being suboptimal in quality.

Reason For Study: sytolic murmur

Previous Evaluation: No previous studies were available.

History: Murmur.



Ordering Physician: PATRICIA CORDOVA

Performed By: Aurelia Brock

_______________________________________________________________________________



Interpretation Summary

Left ventricular systolic function is moderate to severely reduced.

The Ejection Fraction estimate is 20-25%

The right ventricular systolic function is moderately reduced.

There is a mild amount of mitral regurgitation

There is no aortic valve stenosis

There is a mild to moderate amount of tricuspid regurgitation

Minimal pericardial effusion.



MMode/2D Measurements & Calculations



RVDd: 2.5 cm         LVIDd: 5.6 cm     FS: 14.6 %         Ao root diam:

IVSd: 1.1 cm         LVIDs: 4.8 cm     EDV(Teich):        3.0 cm

                     LVPWd: 0.97 cm    154.8 ml           Ao root area:

                                       ESV(Teich):        7.2 cm2

                                       107.3 ml

                                       EF(Teich): 30.7 %

        _______________________________________________________________

EDV(MOD-sp4):        SV(MOD-sp4):

116.3 ml             27.5 ml

ESV(MOD-sp4):

88.8 ml

EF(MOD-sp4): 23.6 %



Doppler Measurements & Calculations

MV E max yony:       MV dec slope:        Ao V2 max:         LV V1 max P.1 cm/sec                              76.9 cm/sec        1.0 mmHg

MV A max yony:       679.1 cm/sec2        Ao max P.4 mmHgLV V1 max:

64.2 cm/sec         MV dec time: 0.12 sec                   50.5 cm/sec



MV E/A: 1.2

        _______________________________________________________________

PA V2 max:          PI end-d yony:        TR max yony:

43.9 cm/sec         125.2 cm/sec         233.0 cm/sec

PA max PG:                               TR max P.77 mmHg                                21.8 mmHg



Left Ventricle

The left ventricle is mildly dilated. There is mild to moderate concentric

left ventricular hypertrophy. Left ventricular systolic function is moderate

to severely reduced. The Ejection Fraction estimate is 20-25%. Doppler

measurements suggest reversible restrictive left ventricular relaxation, which

is associated with grade III/IV or moderate diastolic dysfunction. There is

moderate to severe global hypokinesis of the left ventricle.





Right Ventricle

The right ventricle is moderately dilated. The right ventricular systolic

function is moderately reduced.



Atria

The right atrium is normal. The left atrium is borderline dilated.



Mitral Valve

The mitral valve is grossly normal. There is no mitral valve stenosis. There

is a mild amount of mitral regurgitation.



Aortic Valve

The aortic valve is normal in structure and function. The aortic valve is

trileaflet. The aortic valve opens well. There is no aortic valve stenosis. No

aortic regurgitation is present.



Tricuspid Valve

The tricuspid valve is normal in structure and function. There is a mild to

moderate amount of tricuspid regurgitation. Best estimated RVSP is

approximately 20-25 mm Hg mm/Hg. There is mild pulmonary hypertension by echo.





Pulmonic Valve

The pulmonic valve is normal in structure and function. There is a mild amount

of pulmonic regurgitation.



Great Vessels

The aortic root is normal size. The inferior vena cava appeared dilated and

did not change with respiration (RAP > 20 mmHg).



Effusions

Minimal pericardial effusion.





_______________________________________________________________________________

_______________________________________________________________________________

Electronically signed by:      Mikal Diaz      on 06/15/2020 02:14 PM



CC: PATRICIA CORDOVA Anil

## 2020-06-16 LAB
ANION GAP SERPL CALC-SCNC: 8 MMOL/L (ref 5–19)
BUN SERPL-MCNC: 17 MG/DL (ref 7–20)
CALCIUM: 8.7 MG/DL (ref 8.4–10.2)
CHLORIDE SERPL-SCNC: 102 MMOL/L (ref 98–107)
CO2 SERPL-SCNC: 22 MMOL/L (ref 22–30)
GLUCOSE SERPL-MCNC: 122 MG/DL (ref 75–110)
POTASSIUM SERPL-SCNC: 3.9 MMOL/L (ref 3.6–5)

## 2020-06-16 RX ADMIN — METOCLOPRAMIDE SCH MLS/HR: 5 INJECTION, SOLUTION INTRAMUSCULAR; INTRAVENOUS at 10:07

## 2020-06-16 RX ADMIN — Medication SCH ML: at 05:59

## 2020-06-16 RX ADMIN — DIAZEPAM SCH MG: 5 TABLET ORAL at 14:25

## 2020-06-16 RX ADMIN — LEVALBUTEROL HYDROCHLORIDE SCH MG: 1.25 SOLUTION RESPIRATORY (INHALATION) at 16:14

## 2020-06-16 RX ADMIN — NICOTINE PRN EACH: 14 PATCH, EXTENDED RELEASE TOPICAL at 12:14

## 2020-06-16 RX ADMIN — DILTIAZEM HYDROCHLORIDE SCH MG: 60 TABLET, FILM COATED ORAL at 05:59

## 2020-06-16 RX ADMIN — HEPARIN SODIUM SCH UNIT: 5000 INJECTION, SOLUTION INTRAVENOUS; SUBCUTANEOUS at 21:19

## 2020-06-16 RX ADMIN — ASPIRIN SCH MG: 81 TABLET, COATED ORAL at 10:07

## 2020-06-16 RX ADMIN — LISINOPRIL SCH MG: 5 TABLET ORAL at 10:07

## 2020-06-16 RX ADMIN — HEPARIN SODIUM SCH UNIT: 5000 INJECTION, SOLUTION INTRAVENOUS; SUBCUTANEOUS at 06:00

## 2020-06-16 RX ADMIN — DIAZEPAM SCH MG: 5 TABLET ORAL at 06:00

## 2020-06-16 RX ADMIN — Medication SCH ML: at 14:27

## 2020-06-16 RX ADMIN — Medication SCH ML: at 21:19

## 2020-06-16 RX ADMIN — LEVALBUTEROL HYDROCHLORIDE SCH MG: 1.25 SOLUTION RESPIRATORY (INHALATION) at 00:22

## 2020-06-16 RX ADMIN — HEPARIN SODIUM SCH UNIT: 5000 INJECTION, SOLUTION INTRAVENOUS; SUBCUTANEOUS at 14:24

## 2020-06-16 RX ADMIN — PANTOPRAZOLE SODIUM SCH MG: 20 TABLET, DELAYED RELEASE ORAL at 05:59

## 2020-06-16 RX ADMIN — LEVALBUTEROL HYDROCHLORIDE SCH MG: 1.25 SOLUTION RESPIRATORY (INHALATION) at 08:41

## 2020-06-16 NOTE — CDI QUERY
CDI Query


CDI Review: 





Dear Provider:


 To better reflect your patients severity of illness, morbidity, and resource 


utilization    


 Please specify and document in the Progress Notes and Discharge Summary        


                                                                   if you are 

monitoring / treating / evaluating any of the following conditions:











                                             Query                              

    Clinical indicators                       


 


Please specify the acuity of the CHF:





Acute


Acute on chronic


Chronic


Unable to determine








Please clarify if the A-Fib can be further specified:





Persistent


Chronic (permanent)


Longstanding


Other 


Unable to determine








 Per H&P:


 presents with 2 days of shortness of breath, nonproductive cough and lower 

extremity swelling





- Diagnosis


(1) Congestive heart failure


Qualifiers: 


   Heart failure type: combined systolic and diastolic 


Is this a current diagnosis for this admission?: Yes   


Plan: 


Likely secondary to uncontrolled paroxysmal atrial fibrillation versus alcoholic

cardiomyopathy.  Cardizem initiated for rate control, thiamine and folate, 

follow-up 2D echo.  Introduce ACE inhibitor and beta-blocker as pressure allows.








  The terms probable, suspected, likely, possible or still to be ruled 

out may be used if you are unable to determine the exact nature of a condition.


Thank you for your consideration,


                    Clinical Documentation Physician Advisors





                                                                                

                                                                       [email or web address]     


                                                                                

                                                                         Cell: 

[phone]

## 2020-06-16 NOTE — PDOC PROGRESS REPORT
Subjective


Progress Note for:: 06/16/20


Subjective:: 





Patient states improvement in leg swelling.  Denies shortness of breath at this 

time.


Reason For Visit: 


ALCOHOLIC CARDIOMYOPATHY,HEART FAILURE








Physical Exam


Vital Signs: 


                                        











Temp Pulse Resp BP Pulse Ox


 


 97.4 F   85   16   106/65   94 


 


 06/16/20 16:02  06/16/20 16:14  06/16/20 16:14  06/16/20 16:02  06/16/20 16:14








                                 Intake & Output











 06/15/20 06/16/20 06/17/20





 06:59 06:59 06:59


 


Intake Total 500 1477.2 847.2


 


Output Total  450 


 


Balance 500 1027.2 847.2


 


Weight 72.9 kg 73.5 kg 











General appearance: PRESENT: no acute distress, cooperative


Respiratory exam: PRESENT: clear to auscultation ana paula, symmetrical, unlabored.  

ABSENT: tachypnea, wheezes


Cardiovascular exam: PRESENT: RRR, +S1, +S2.  ABSENT: tachycardia


GI/Abdominal exam: PRESENT: soft.  ABSENT: rebound, rigid, tenderness


Extremities exam: ABSENT: pedal edema


Musculoskeletal exam: PRESENT: ambulatory


Neurological exam: PRESENT: alert, awake


Psychiatric exam: ABSENT: agitated, anxious





Results


Laboratory Results: 


                                        





                                 06/14/20 17:46 





                                 06/16/20 06:23 





                                        











  06/16/20





  06:23


 


Sodium  131.7 L


 


Potassium  3.9


 


Chloride  102


 


Carbon Dioxide  22


 


Anion Gap  8


 


BUN  17


 


Creatinine  0.82


 


Est GFR (African Amer)  > 60


 


Glucose  122 H


 


Calcium  8.7








                                        











  06/14/20 06/14/20 06/15/20





  17:46 17:46 01:38


 


Creatine Kinase  289 H  


 


Troponin I   0.025  0.028


 


NT-Pro-B Natriuret Pep   8780 H 














  06/15/20 06/15/20 06/15/20





  01:38 07:35 13:18


 


Creatine Kinase  212 H  


 


Troponin I   0.028  0.024


 


NT-Pro-B Natriuret Pep   














  06/15/20





  19:57


 


Creatine Kinase 


 


Troponin I  0.021


 


NT-Pro-B Natriuret Pep 











Impressions: 


                                        





Chest X-Ray  06/14/20 19:14


IMPRESSION:


 


No evidence of acute cardiopulmonary disease.


 














Assessment and Plan





- Diagnosis


(1) Acute systolic (congestive) heart failure


Is this a current diagnosis for this admission?: Yes   





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


Is this a current diagnosis for this admission?: Yes   





(3) Paroxysmal atrial fibrillation


Is this a current diagnosis for this admission?: Yes   





(4) Alcohol dependence


Is this a current diagnosis for this admission?: Yes   





(5) Homeless


Is this a current diagnosis for this admission?: Yes   





(6) Tobacco abuse


Is this a current diagnosis for this admission?: Yes   





- Plan Summary


Summary: 


Discussed with patient about his cardiomyopathy noted on echo with EF of 20 to 

25% and biventricular dilation.  Picture appears to be typical for alcoholic 

cardiomyopathy.  He denies any history of prior CHF.


I have started patient on low-dose lisinopril, Toprol-XL and Lasix.  

Electrolytes in the morning.


He will need to be set up with cardiology for outpatient follow-up prior to 

discharge.


Does not appear to be overtly fluid overloaded at this moment.


CIWA and as needed Ativan as needed.  Will discontinue diazepam this evening.


Alcohol cessation and tobacco cessation counseling performed.





- Time


Time Spent with patient: 15-24 minutes

## 2020-06-17 LAB
ADD MANUAL DIFF: NO
ALBUMIN SERPL-MCNC: 3.3 G/DL (ref 3.5–5)
ALP SERPL-CCNC: 113 U/L (ref 38–126)
ANION GAP SERPL CALC-SCNC: 9 MMOL/L (ref 5–19)
AST SERPL-CCNC: 39 U/L (ref 17–59)
BASOPHILS # BLD AUTO: 0.1 10^3/UL (ref 0–0.2)
BASOPHILS NFR BLD AUTO: 1.2 % (ref 0–2)
BILIRUB DIRECT SERPL-MCNC: 0 MG/DL (ref 0–0.4)
BILIRUB SERPL-MCNC: 0.7 MG/DL (ref 0.2–1.3)
BUN SERPL-MCNC: 14 MG/DL (ref 7–20)
CALCIUM: 8.8 MG/DL (ref 8.4–10.2)
CHLORIDE SERPL-SCNC: 100 MMOL/L (ref 98–107)
CO2 SERPL-SCNC: 25 MMOL/L (ref 22–30)
EOSINOPHIL # BLD AUTO: 0.1 10^3/UL (ref 0–0.6)
EOSINOPHIL NFR BLD AUTO: 2.9 % (ref 0–6)
ERYTHROCYTE [DISTWIDTH] IN BLOOD BY AUTOMATED COUNT: 15.2 % (ref 11.5–14)
GLUCOSE SERPL-MCNC: 104 MG/DL (ref 75–110)
HCT VFR BLD CALC: 39.7 % (ref 37.9–51)
HGB BLD-MCNC: 13.1 G/DL (ref 13.5–17)
LYMPHOCYTES # BLD AUTO: 1.3 10^3/UL (ref 0.5–4.7)
LYMPHOCYTES NFR BLD AUTO: 30.1 % (ref 13–45)
MCH RBC QN AUTO: 28.9 PG (ref 27–33.4)
MCHC RBC AUTO-ENTMCNC: 33.1 G/DL (ref 32–36)
MCV RBC AUTO: 87 FL (ref 80–97)
MONOCYTES # BLD AUTO: 0.5 10^3/UL (ref 0.1–1.4)
MONOCYTES NFR BLD AUTO: 12.4 % (ref 3–13)
NEUTROPHILS # BLD AUTO: 2.2 10^3/UL (ref 1.7–8.2)
NEUTS SEG NFR BLD AUTO: 53.4 % (ref 42–78)
PLATELET # BLD: 291 10^3/UL (ref 150–450)
POTASSIUM SERPL-SCNC: 4 MMOL/L (ref 3.6–5)
PROT SERPL-MCNC: 6.4 G/DL (ref 6.3–8.2)
RBC # BLD AUTO: 4.55 10^6/UL (ref 4.35–5.55)
TOTAL CELLS COUNTED % (AUTO): 100 %
WBC # BLD AUTO: 4.2 10^3/UL (ref 4–10.5)

## 2020-06-17 RX ADMIN — Medication SCH ML: at 05:42

## 2020-06-17 RX ADMIN — LISINOPRIL SCH MG: 5 TABLET ORAL at 11:39

## 2020-06-17 RX ADMIN — HEPARIN SODIUM SCH UNIT: 5000 INJECTION, SOLUTION INTRAVENOUS; SUBCUTANEOUS at 15:36

## 2020-06-17 RX ADMIN — LEVALBUTEROL HYDROCHLORIDE SCH MG: 1.25 SOLUTION RESPIRATORY (INHALATION) at 00:45

## 2020-06-17 RX ADMIN — PANTOPRAZOLE SODIUM SCH MG: 20 TABLET, DELAYED RELEASE ORAL at 05:42

## 2020-06-17 RX ADMIN — FUROSEMIDE SCH MG: 10 INJECTION, SOLUTION INTRAMUSCULAR; INTRAVENOUS at 17:51

## 2020-06-17 RX ADMIN — HEPARIN SODIUM SCH UNIT: 5000 INJECTION, SOLUTION INTRAVENOUS; SUBCUTANEOUS at 05:42

## 2020-06-17 RX ADMIN — Medication SCH: at 21:15

## 2020-06-17 RX ADMIN — METOPROLOL SUCCINATE SCH MG: 25 TABLET, EXTENDED RELEASE ORAL at 11:40

## 2020-06-17 RX ADMIN — Medication SCH: at 15:37

## 2020-06-17 RX ADMIN — HEPARIN SODIUM SCH UNIT: 5000 INJECTION, SOLUTION INTRAVENOUS; SUBCUTANEOUS at 21:15

## 2020-06-17 RX ADMIN — FUROSEMIDE SCH MG: 10 INJECTION, SOLUTION INTRAMUSCULAR; INTRAVENOUS at 15:37

## 2020-06-17 RX ADMIN — LEVALBUTEROL HYDROCHLORIDE SCH MG: 1.25 SOLUTION RESPIRATORY (INHALATION) at 08:21

## 2020-06-17 RX ADMIN — FUROSEMIDE SCH MG: 20 TABLET ORAL at 11:40

## 2020-06-17 RX ADMIN — METOCLOPRAMIDE SCH MLS/HR: 5 INJECTION, SOLUTION INTRAMUSCULAR; INTRAVENOUS at 11:40

## 2020-06-17 RX ADMIN — ASPIRIN SCH MG: 81 TABLET, COATED ORAL at 11:40

## 2020-06-17 RX ADMIN — NICOTINE PRN EACH: 14 PATCH, EXTENDED RELEASE TOPICAL at 11:47

## 2020-06-17 RX ADMIN — LEVALBUTEROL HYDROCHLORIDE SCH: 1.25 SOLUTION RESPIRATORY (INHALATION) at 23:44

## 2020-06-17 RX ADMIN — LEVALBUTEROL HYDROCHLORIDE SCH: 1.25 SOLUTION RESPIRATORY (INHALATION) at 16:59

## 2020-06-17 NOTE — PDOC CONSULTATION
Consultation


Consult Date: 20


Attending physician:: TACO STEVENS


Provider Consulted: MO JALLOH


Consult reason:: Congestive heart failure





History of Present Illness


Admission Date/PCP: 


  06/15/20 03:43





  





Patient complains of: Dyspnea


History of Present Illness: 


KIESHA WINN is a 64 year old male


Who is not a great historian.


Active problems


1.  Homelessness


2.  Alcohol dependence


3.  LV dysfunction


4.  Congestive heart failure


5.  Paroxysmal atrial fibrillation





He was recently admitted to the hospital for pneumonia and atrial fibrillation 

with rapid ventricular response.  This admission he is admitted with complaint 

suggestive of congestive heart failure.  Echocardiogram revealed severely 

diminished left ventricular systolic function with ejection fraction estimated 

at 20 to 25%.  He has evidence of RV dysfunction as well.


Patient reports heavy alcohol use and is homeless.  Other medical history is not

very forthcoming.  He reports prior orthopedic surgery but no implants.





He smokes cigarettes and also drinks a sixpack of beer every day.  This  is 

probably an underestimation





Past Medical History


Cardiac Medical History: Reports: Atrial Fibrillation, Hypertension - untreated


   Denies: Congestive Heart Failure


Pulmonary Medical History: Reports: Bronchitis, Chronic Obstructive Pulmonary 

Disease (COPD)


Psychiatric Medical History: Reports: Alcohol Dependency, Substance Abuse, 

Tobacco Dependency


   Denies: Depression





Past Surgical History


Past Surgical History: Reports: Orthopedic Surgery - Right ACL repair, 

Tonsillectomy, Other - Homeless





Social History


Lives with: Homeless


Smoking Status: Current Every Day Smoker


Cigarettes Packs Per Day: 10


Electronic Cigarette use?: No


Number of Years Smokin


Last Time Smoked: t-1


Frequency of Alcohol Use: Heavy


Hx Recreational Drug Use: Yes


Drugs: Marijuana


Hx Prescription Drug Abuse: No





- Advance Directive


Resuscitation Status: Full Code





Family History


Family History: Arthritis, COPD, Hypertension


Parental Family History Reviewed: No - Unable to obtain.  Patient is not very 

coherent about details


Children Family History Reviewed: NA


Sibling(s) Family History Reviewed.: NA





Medication/Allergy


Home Medications: 








Diltiazem HCl [Cardizem Cd 120 mg Capsule] 1 cap.sr PO DAILY #30 cap.sr 19










Allergies/Adverse Reactions: 


                                        





No Known Allergies Allergy (Verified 20 17:38)


   











Review of Systems


Constitutional: PRESENT: as per HPI


Ears: PRESENT: as per HPI


Cardiovascular: PRESENT: dyspnea on exertion, edema


Respiratory: PRESENT: as per HPI, dyspnea





Physical Exam


Vital Signs: 


                                        











Temp Pulse Resp BP Pulse Ox


 


 97.4 F   107 H  23 H  135/104 H  98 


 


 20 12:09  20 12:09  20 12:09  20 12:09  20 12:09








                                 Intake & Output











 20





 06:59 06:59 06:59


 


Intake Total 1477.2 1467.2 358


 


Output Total 450  


 


Balance 1027.2 1467.2 358


 


Weight 73.5 kg 75.4 kg 











General appearance: PRESENT: no acute distress, cooperative, thin, well-

developed


Head exam: PRESENT: atraumatic, normocephalic


Eye exam: PRESENT: conjunctiva pink, EOMI


Mouth exam: PRESENT: moist


Respiratory exam: PRESENT: crackles, decreased breath sounds, rales, 

symmetrical, tachypnea


Cardiovascular exam: PRESENT: +S1, +S2, systolic murmur


Pulses: PRESENT: normal radial pulses


GI/Abdominal exam: PRESENT: soft


Rectal exam: PRESENT: deferred


Extremities exam: PRESENT: pedal edema


Neurological exam: PRESENT: alert, awake, oriented to person, oriented to place,

oriented to time, oriented to situation


Skin exam: PRESENT: dry, intact





Results


Laboratory Results: 


                                        





                                 20 09:11 





                                 20 05:40 





                                        











  20





  05:40 09:11 09:11


 


WBC   4.2 


 


RBC   4.55 


 


Hgb   13.1 L 


 


Hct   39.7 


 


MCV   87 


 


MCH   28.9 


 


MCHC   33.1 


 


RDW   15.2 H 


 


Plt Count   291 


 


Seg Neutrophils %   53.4 


 


Sodium  134.4 L  


 


Potassium  4.0  


 


Chloride  100  


 


Carbon Dioxide  25  


 


Anion Gap  9  


 


BUN  14  


 


Creatinine  0.85  


 


Est GFR ( Amer)  > 60  


 


Glucose  104  


 


Lactic Acid    3.1 H


 


Calcium  8.8  


 


Magnesium  1.9  


 


Total Bilirubin   


 


AST   


 


Alkaline Phosphatase   


 


Total Protein   


 


Albumin   














  20





  09:11


 


WBC 


 


RBC 


 


Hgb 


 


Hct 


 


MCV 


 


MCH 


 


MCHC 


 


RDW 


 


Plt Count 


 


Seg Neutrophils % 


 


Sodium 


 


Potassium 


 


Chloride 


 


Carbon Dioxide 


 


Anion Gap 


 


BUN 


 


Creatinine 


 


Est GFR (African Amer) 


 


Glucose 


 


Lactic Acid 


 


Calcium 


 


Magnesium 


 


Total Bilirubin  0.7


 


AST  39


 


Alkaline Phosphatase  113


 


Total Protein  6.4


 


Albumin  3.3 L








                                        











  06/14/20 06/14/20 06/15/20





  17:46 17:46 01:38


 


Creatine Kinase  289 H  


 


Troponin I   0.025  0.028


 


NT-Pro-B Natriuret Pep   8780 H 














  06/15/20 06/15/20 06/15/20





  01:38 07:35 13:18


 


Creatine Kinase  212 H  


 


Troponin I   0.028  0.024


 


NT-Pro-B Natriuret Pep   














  06/15/20





  19:57


 


Creatine Kinase 


 


Troponin I  0.021


 


NT-Pro-B Natriuret Pep 











EKG Comments: 





Twelve-lead EKG 6/15/2020


Sinus tachycardia 102 bpm, left anterior fascicular block nonspecific T 

inversion in anterior precordial leads with nonspecific ST-T abnormalities, QTC 

is 496 ms





Transthoracic echocardiogram 6/15/2020


Left ventricular ejection fraction is severely diminished and is estimated at 

approximately 20 to 25%.


Moderately reduced RV function also.


Mild mitral regurgitation no aortic stenosis mild to moderate tricuspid 

regurgitation minimal pericardial effusion





Telemetry shows sinus rhythm at 96 bpm


Impressions: 


                                        





Chest X-Ray  20 00:00


IMPRESSION:  New small moderate bilateral pleural effusions


Minimal right basilar airspace disease


 














Assessment & Plan





- Diagnosis


(1) Acute systolic (congestive) heart failure


Is this a current diagnosis for this admission?: Yes   


Plan: 


Likely acute exacerbation of chronic systolic congestive heart failure


Echocardiogram with severe LV dysfunction with ejection fraction estimated at 20

to 25%


Evidence of RV dysfunction as well


Likely metabolic cardiomyopathy probably secondary to heavy alcohol usage over 

the years.


Ischemic etiology has yet to be excluded and is required as part of the work-up.


Given acute exacerbation would persist with intravenous diuretic


Clinical presentations suggest a wet and dry presentation suggestive of low 

output state with elevated filling pressures and pulmonary edema.  Given this 

would recommend a trial of low-dose dobutamine to augment contractility to 

increase output while continuing diuretic therapy


Anticipate continued use of beta-blocker and ACE inhibitor


Difficulties in arranging appropriate follow-up care and other therapies due to 

homelessness and other social issues.








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


Is this a current diagnosis for this admission?: Yes   


Plan: 


Likely secondary to alcohol although ischemic etiology needs to be excluded.


Supportive care with maintenance of electrolytes








(3) Alcohol dependence


Is this a current diagnosis for this admission?: Yes   


Plan: 


Needs counseling








(4) Paroxysmal atrial fibrillation


Is this a current diagnosis for this admission?: No   


Plan: 


Paroxysmal atrial fibrillation likely in the setting of infection.


Maintaining sinus rhythm presently


Continue beta-blocker to suppress triggers








- Notes


Notes: 





Trial of low-dose dobutamine to augment contractility given low output and 

pulmonary edema

## 2020-06-17 NOTE — RADIOLOGY REPORT (SQ)
EXAM DESCRIPTION:  CHEST 2 VIEWS



IMAGES COMPLETED DATE/TIME:  6/17/2020 9:54 am



REASON FOR STUDY:  hypothermia



COMPARISON:  Chest films 6/14/2020, 12/17/2019

CT chest 12/18/2019



EXAM PARAMETERS:  NUMBER OF VIEWS: two views

TECHNIQUE: Digital Frontal and Lateral radiographic views of the chest acquired.

RADIATION DOSE: NA

LIMITATIONS: none



FINDINGS:  LUNGS AND PLEURA: Small to moderate bilateral pleural effusions are present, new compared 
to 6/14/2020.

Minimal right basilar airspace disease

No pneumothorax

MEDIASTINUM AND HILAR STRUCTURES: No masses or contour abnormalities.

HEART AND VASCULAR STRUCTURES: Stable moderate cardiomegaly

BONES: No acute findings.

HARDWARE: None in the chest.

OTHER: No other significant finding.



IMPRESSION:  New small moderate bilateral pleural effusions

Minimal right basilar airspace disease



TECHNICAL DOCUMENTATION:  JOB ID:  [id number]

 2011 [employer]- All Rights Reserved



Reading location - IP/workstation name: [email or web address]

## 2020-06-17 NOTE — PDOC PROGRESS REPORT
Subjective


Progress Note for:: 06/17/20


Subjective:: 





Patient became hypothermic this morning.  Patient denies any fevers, chills, 

cough, shortness of breath, chest pain, abdominal pain, diarrhea or headaches.


Reason For Visit: 


ALCOHOLIC CARDIOMYOPATHY,HEART FAILURE








Physical Exam


Vital Signs: 


                                        











Temp Pulse Resp BP Pulse Ox


 


 97.4 F   107 H  23 H  135/104 H  98 


 


 06/17/20 12:09  06/17/20 12:09  06/17/20 12:09  06/17/20 12:09  06/17/20 12:09








                                 Intake & Output











 06/16/20 06/17/20 06/18/20





 06:59 06:59 06:59


 


Intake Total 1477.2 1467.2 


 


Output Total 450  


 


Balance 1027.2 1467.2 


 


Weight 73.5 kg 75.4 kg 











General appearance: PRESENT: no acute distress, cooperative


Neck exam: PRESENT: JVD


Respiratory exam: PRESENT: symmetrical, unlabored.  ABSENT: tachypnea, wheezes


Cardiovascular exam: PRESENT: RRR, +S1, +S2.  ABSENT: tachycardia


GI/Abdominal exam: PRESENT: soft.  ABSENT: rebound, rigid, tenderness


Extremities exam: PRESENT: other - Hands were cool to touch. Feet were warm at 

time of encounter as patient was already on the jessica hugger. However informed by

RN that feet were cold earlier..  ABSENT: pedal edema


Neurological exam: PRESENT: alert, awake


Psychiatric exam: PRESENT: flat affect.  ABSENT: agitated, anxious


Focused psych exam: ABSENT: pressured speech


Skin exam: ABSENT: jaundice





Results


Laboratory Results: 


                                        





                                 06/17/20 09:11 





                                 06/17/20 05:40 





                                        











  06/17/20 06/17/20 06/17/20





  05:40 09:11 09:11


 


WBC   4.2 


 


RBC   4.55 


 


Hgb   13.1 L 


 


Hct   39.7 


 


MCV   87 


 


MCH   28.9 


 


MCHC   33.1 


 


RDW   15.2 H 


 


Plt Count   291 


 


Seg Neutrophils %   53.4 


 


Sodium  134.4 L  


 


Potassium  4.0  


 


Chloride  100  


 


Carbon Dioxide  25  


 


Anion Gap  9  


 


BUN  14  


 


Creatinine  0.85  


 


Est GFR ( Amer)  > 60  


 


Glucose  104  


 


Lactic Acid    3.1 H


 


Calcium  8.8  


 


Magnesium  1.9  








                                        











  06/14/20 06/14/20 06/15/20





  17:46 17:46 01:38


 


Creatine Kinase  289 H  


 


Troponin I   0.025  0.028


 


NT-Pro-B Natriuret Pep   8780 H 














  06/15/20 06/15/20 06/15/20





  01:38 07:35 13:18


 


Creatine Kinase  212 H  


 


Troponin I   0.028  0.024


 


NT-Pro-B Natriuret Pep   














  06/15/20





  19:57


 


Creatine Kinase 


 


Troponin I  0.021


 


NT-Pro-B Natriuret Pep 











Impressions: 


                                        





Chest X-Ray  06/17/20 00:00


IMPRESSION:  New small moderate bilateral pleural effusions


Minimal right basilar airspace disease


 














Assessment and Plan





- Diagnosis


(1) Acute systolic (congestive) heart failure


Is this a current diagnosis for this admission?: Yes   





(2) Hypothermia


Is this a current diagnosis for this admission?: Yes   





(3) Lactic acidosis


Is this a current diagnosis for this admission?: Yes   





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


Is this a current diagnosis for this admission?: Yes   





(5) Paroxysmal atrial fibrillation


Is this a current diagnosis for this admission?: Yes   





(6) Alcohol dependence


Is this a current diagnosis for this admission?: Yes   





(7) Homeless


Is this a current diagnosis for this admission?: Yes   





(8) Tobacco abuse


Is this a current diagnosis for this admission?: Yes   





- Plan Summary


Summary: 


Discussed with patient about his cardiomyopathy noted on echo with EF of 20 to 

25% and biventricular dilation.  Picture appears to be typical for alcoholic 

cardiomyopathy.  He denies any history of prior CHF.


I have started patient on low-dose lisinopril, Toprol-XL and Lasix.  

Electrolytes in the morning.


He will need to be set up with cardiology for outpatient follow-up prior to 

discharge.


Does not appear to be overtly fluid overloaded at this moment.


CIWA and as needed Ativan as needed.  Will discontinue diazepam this evening.


Alcohol cessation and tobacco cessation counseling performed.





6/17/2020


Patient hypothermic to 94F this morning.  Lactic acid also elevated at 3.1.  

Cool extremities noted.


Chest x-ray showing interstitial opacities possibly pulmonary edema without 

pneumonia.  Prominent JVD noted on exam.  Urinalysis on 614 was negative.  Will 

repeat.


Currently doubt that patient's presentation is due to an infection as he has no 

complaints concerning for infection no other findings.


Patient has been placed on a jessica hugger with improvement to 97F.


Concern for possible low output heart failure especially given his dilated 

cardiomyopathy with biventricular failure.


I have consulted Dr. Diaz


Will consider placing patient on milrinone drip and repeat lactic acid later.  

Patient currently not hypotensive.  Will check liver enzymes and Bnp.


IV Lasix, continue lisinopril and beta-blocker.





- Time


Time Spent with patient: 15-24 minutes

## 2020-06-18 LAB
ANION GAP SERPL CALC-SCNC: 9 MMOL/L (ref 5–19)
BUN SERPL-MCNC: 13 MG/DL (ref 7–20)
CALCIUM: 9 MG/DL (ref 8.4–10.2)
CHLORIDE SERPL-SCNC: 101 MMOL/L (ref 98–107)
CO2 SERPL-SCNC: 24 MMOL/L (ref 22–30)
GLUCOSE SERPL-MCNC: 89 MG/DL (ref 75–110)
POTASSIUM SERPL-SCNC: 4.1 MMOL/L (ref 3.6–5)

## 2020-06-18 RX ADMIN — LEVALBUTEROL HYDROCHLORIDE SCH MG: 1.25 SOLUTION RESPIRATORY (INHALATION) at 16:37

## 2020-06-18 RX ADMIN — HEPARIN SODIUM SCH UNIT: 5000 INJECTION, SOLUTION INTRAVENOUS; SUBCUTANEOUS at 13:21

## 2020-06-18 RX ADMIN — HEPARIN SODIUM SCH: 5000 INJECTION, SOLUTION INTRAVENOUS; SUBCUTANEOUS at 05:27

## 2020-06-18 RX ADMIN — HEPARIN SODIUM SCH: 5000 INJECTION, SOLUTION INTRAVENOUS; SUBCUTANEOUS at 22:42

## 2020-06-18 RX ADMIN — NICOTINE PRN EACH: 14 PATCH, EXTENDED RELEASE TOPICAL at 09:16

## 2020-06-18 RX ADMIN — PANTOPRAZOLE SODIUM SCH: 20 TABLET, DELAYED RELEASE ORAL at 05:27

## 2020-06-18 RX ADMIN — ASPIRIN SCH MG: 81 TABLET, COATED ORAL at 09:15

## 2020-06-18 RX ADMIN — LEVALBUTEROL HYDROCHLORIDE SCH MG: 1.25 SOLUTION RESPIRATORY (INHALATION) at 23:42

## 2020-06-18 RX ADMIN — LORAZEPAM PRN MG: 2 INJECTION INTRAMUSCULAR; INTRAVENOUS at 23:46

## 2020-06-18 RX ADMIN — METOPROLOL SUCCINATE SCH MG: 25 TABLET, EXTENDED RELEASE ORAL at 09:15

## 2020-06-18 RX ADMIN — LEVALBUTEROL HYDROCHLORIDE SCH MG: 1.25 SOLUTION RESPIRATORY (INHALATION) at 08:26

## 2020-06-18 RX ADMIN — Medication SCH: at 05:27

## 2020-06-18 RX ADMIN — FOLIC ACID SCH MG: 1 TABLET ORAL at 09:15

## 2020-06-18 RX ADMIN — Medication SCH ML: at 22:42

## 2020-06-18 RX ADMIN — LISINOPRIL SCH MG: 5 TABLET ORAL at 09:15

## 2020-06-18 RX ADMIN — Medication SCH ML: at 13:22

## 2020-06-18 RX ADMIN — FUROSEMIDE SCH MG: 20 TABLET ORAL at 09:15

## 2020-06-18 NOTE — PDOC PROGRESS REPORT
Subjective


Progress Note for:: 06/18/20


Subjective:: 





Patient feels better today.  States that tingling sensation in his legs 

resolved.  Denies any shortness of breath.


Reason For Visit: 


ALCOHOLIC CARDIOMYOPATHY,HEART FAILURE








Physical Exam


Vital Signs: 


                                        











Temp Pulse Resp BP Pulse Ox


 


 97.4 F   104 H  22 H  131/80 H  94 


 


 06/18/20 11:36  06/18/20 13:00  06/18/20 11:36  06/18/20 13:00  06/18/20 11:36








                                 Intake & Output











 06/17/20 06/18/20 06/19/20





 06:59 06:59 06:59


 


Intake Total 1467.2 1098 476


 


Output Total  1500 


 


Balance 1467.2 -402 476


 


Weight 75.4 kg 75.4 kg 











General appearance: PRESENT: no acute distress, cooperative


Neck exam: PRESENT: JVD


Respiratory exam: PRESENT: symmetrical, unlabored.  ABSENT: tachypnea, wheezes


Cardiovascular exam: PRESENT: RRR, +S1, +S2.  ABSENT: tachycardia


GI/Abdominal exam: PRESENT: soft.  ABSENT: rebound, rigid, tenderness


Neurological exam: PRESENT: alert, awake


Psychiatric exam: PRESENT: flat affect.  ABSENT: agitated, anxious


Focused psych exam: ABSENT: pressured speech


Skin exam: ABSENT: jaundice





Results


Laboratory Results: 


                                        





                                 06/17/20 09:11 





                                 06/18/20 06:25 





                                        











  06/17/20 06/18/20 06/18/20





  18:43 06:25 06:25


 


Sodium   133.5 L 


 


Potassium   4.1 


 


Chloride   101 


 


Carbon Dioxide   24 


 


Anion Gap   9 


 


BUN   13 


 


Creatinine   0.96 


 


Est GFR ( Amer)   > 60 


 


Glucose   89 


 


Lactic Acid  1.5   1.6


 


Calcium   9.0 








                                        











  06/14/20 06/14/20 06/15/20





  17:46 17:46 01:38


 


Creatine Kinase  289 H  


 


Troponin I   0.025  0.028


 


NT-Pro-B Natriuret Pep   8780 H 














  06/15/20 06/15/20 06/15/20





  01:38 07:35 13:18


 


Creatine Kinase  212 H  


 


Troponin I   0.028  0.024


 


NT-Pro-B Natriuret Pep   














  06/15/20 06/17/20





  19:57 09:11


 


Creatine Kinase  


 


Troponin I  0.021 


 


NT-Pro-B Natriuret Pep   2400 H











Impressions: 


                                        





Chest X-Ray  06/17/20 00:00


IMPRESSION:  New small moderate bilateral pleural effusions


Minimal right basilar airspace disease


 














Assessment and Plan





- Diagnosis


(1) Acute systolic (congestive) heart failure


Is this a current diagnosis for this admission?: Yes   





(2) Hypothermia


Is this a current diagnosis for this admission?: Yes   





(3) Lactic acidosis


Is this a current diagnosis for this admission?: Yes   





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


Is this a current diagnosis for this admission?: Yes   





(5) Paroxysmal atrial fibrillation


Is this a current diagnosis for this admission?: Yes   





(6) Alcohol dependence


Is this a current diagnosis for this admission?: Yes   





(7) Homeless


Is this a current diagnosis for this admission?: Yes   





(8) Tobacco abuse


Is this a current diagnosis for this admission?: Yes   





- Plan Summary


Summary: 


Discussed with patient about his cardiomyopathy noted on echo with EF of 20 to 

25% and biventricular dilation.  Picture appears to be typical for alcoholic ca

rdiomyopathy.  He denies any history of prior CHF.


I have started patient on low-dose lisinopril, Toprol-XL and Lasix.  

Electrolytes in the morning.


He will need to be set up with cardiology for outpatient follow-up prior to 

discharge.


Does not appear to be overtly fluid overloaded at this moment.


CIWA and as needed Ativan as needed.  Will discontinue diazepam this evening.


Alcohol cessation and tobacco cessation counseling performed.





6/17/2020


Patient hypothermic to 94F this morning.  Lactic acid also elevated at 3.1.  

Cool extremities noted.


Chest x-ray showing interstitial opacities possibly pulmonary edema without 

pneumonia.  Prominent JVD noted on exam.  Urinalysis on 614 was negative.  Will 

repeat.


Currently doubt that patient's presentation is due to an infection as he has no 

complaints concerning for infection no other findings.


Patient has been placed on a jessica hugger with improvement to 97F.


Concern for possible low output heart failure especially given his dilated 

cardiomyopathy with biventricular failure.


I have consulted Dr. Diaz


Will consider placing patient on milrinone drip and repeat lactic acid later.  

Patient currently not hypotensive.  Will check liver enzymes and Bnp.


IV Lasix, continue lisinopril and beta-blocker.





6/18/2020


Hypothermia has resolved at this time as well as lactic acidosis.  Extremities 

feel warm today.


Started patient on dobutamine low-dose yesterday as milrinone drip can only be 

done in the ICU.  This seems to have led to resolution of hypothermia lactic 

acidosis.  We will maintain on dopamine drip for today.  Continue lisinopril and

beta-blocker.


Continue to monitor vital signs closely in the IMCU.  EKG in the morning


Cardiology following.





- Time


Time Spent with patient: Less than 15 minutes

## 2020-06-18 NOTE — PDOC PROGRESS REPORT
Subjective


Progress Note for:: 06/18/20


Subjective:: 





KIESHA WINN is a 64 year old homeless male with history of alcohol 

dependence, paroxysmal atrial fibrillation, who has been followed by my partner,

Dr. Diaz, for what appears to be new onset cardiomyopathy with an ejection 

fraction between 20 and 25% as well as new onset of heart failure with reduced 

ejection fraction.  He apparently had been stable until yesterday or the day 

before when he became hypothermic, with significant JVD and shortness of breath.

 At that point he was placed on low-dose dobutamine.  Yesterday evening the 

nursing staff found the patient sitting at the edge of the bed covered in feces.

 He had pulled out his IV and dobutamine which is running onto the floor.  He 

was cleaned up and placed back in the bed.  This morning he is found sitting at 

the edge of the bed after finishing breakfast and without any cardiac 

complaints.  He is eager to go home as soon as possible.  His telemetry shows 

normal sinus rhythm with episodes of sinus tachycardia and PVCs.  His proBNP was

elevated at 2400, he tested positive for marijuana and his most recent chemistry

shows hyponatremia.  His blood pressure is stable.  Unfortunately he is not 

measuring his urine therefore accurate urinary output is not possible.





Physical exam on 06/18/2020:


GENERAL:  Pleasant and conversational.  He is disheveled, with feces on the 

floor and on his hands, appears chronically ill, he does not appear to 

comprehend the seriousness of his illness and appears to have some degree of 

mental disability.  Not in acute distress.


HEENT:  Normocephalic, atraumatic.  Pupils equal.   Sclerae anicteric.  

Oropharynx moist. 


NECK:  No JVD.  No carotid bruits. 


LUNGS:  Clear to auscultation bilaterally.  Normal respiratory effort without 

the use of accessory muscles or intercostal retractions.  


CARDIOVASCULAR:  Regular rate and rhythm, normal S1 and S2 without murmurs, 

rubs, or gallops.  PMI not displaced.


EXTREMITIES:  No pretibial edema, very mild edema in the dorsum of both feet, no

cyanosis, no clubbing.  +2 pulses femoral and pedal pulses bilaterally.  


SKIN: No lesions or rashes.


MUSCULOSKELETAL:  No chest tenderness to palpation. 


NEUROLOGIC: Nonfocal.  No gross sensory or motor deficits bilateral upper or 

lower extremities.





Echocardiogram on 06/15/2020:


-LV systolic function is moderately to severely reduced.


-EF 20 to 25%.


-RV systolic function is moderately reduced.


-Mild MR, mild to moderate TR.


-Minimal pericardial effusion.





Reason For Visit: 


ALCOHOLIC CARDIOMYOPATHY,HEART FAILURE








Physical Exam


Vital Signs: 


                                        











Temp Pulse Resp BP Pulse Ox


 


 97.8 F   96   18   108/75   94 


 


 06/18/20 03:48  06/18/20 06:00  06/18/20 03:48  06/18/20 06:00  06/18/20 03:48








                                 Intake & Output











 06/17/20 06/18/20 06/19/20





 06:59 06:59 06:59


 


Intake Total 1467.2 1098 


 


Output Total  1500 


 


Balance 1467.2 -402 


 


Weight 75.4 kg 75.4 kg 














Results


Laboratory Results: 


                                        





                                 06/17/20 09:11 





                                 06/18/20 06:25 





                                        











  06/17/20 06/17/20 06/17/20





  09:11 09:11 09:11


 


WBC  4.2  


 


RBC  4.55  


 


Hgb  13.1 L  


 


Hct  39.7  


 


MCV  87  


 


MCH  28.9  


 


MCHC  33.1  


 


RDW  15.2 H  


 


Plt Count  291  


 


Seg Neutrophils %  53.4  


 


Sodium   


 


Potassium   


 


Chloride   


 


Carbon Dioxide   


 


Anion Gap   


 


BUN   


 


Creatinine   


 


Est GFR (African Amer)   


 


Glucose   


 


Lactic Acid   3.1 H 


 


Calcium   


 


Total Bilirubin    0.7


 


AST    39


 


Alkaline Phosphatase    113


 


Total Protein    6.4


 


Albumin    3.3 L














  06/17/20 06/18/20 06/18/20





  18:43 06:25 06:25


 


WBC   


 


RBC   


 


Hgb   


 


Hct   


 


MCV   


 


MCH   


 


MCHC   


 


RDW   


 


Plt Count   


 


Seg Neutrophils %   


 


Sodium   133.5 L 


 


Potassium   4.1 


 


Chloride   101 


 


Carbon Dioxide   24 


 


Anion Gap   9 


 


BUN   13 


 


Creatinine   0.96 


 


Est GFR ( Amer)   > 60 


 


Glucose   89 


 


Lactic Acid  1.5   1.6


 


Calcium   9.0 


 


Total Bilirubin   


 


AST   


 


Alkaline Phosphatase   


 


Total Protein   


 


Albumin   








                                        











  06/14/20 06/14/20 06/15/20





  17:46 17:46 01:38


 


Creatine Kinase  289 H  


 


Troponin I   0.025  0.028


 


NT-Pro-B Natriuret Pep   8780 H 














  06/15/20 06/15/20 06/15/20





  01:38 07:35 13:18


 


Creatine Kinase  212 H  


 


Troponin I   0.028  0.024


 


NT-Pro-B Natriuret Pep   














  06/15/20 06/17/20





  19:57 09:11


 


Creatine Kinase  


 


Troponin I  0.021 


 


NT-Pro-B Natriuret Pep   2400 H











Impressions: 


                                        





Chest X-Ray  06/17/20 00:00


IMPRESSION:  New small moderate bilateral pleural effusions


Minimal right basilar airspace disease


 








                                        





                                 06/17/20 09:11 





                                 06/18/20 06:25 





                                        











MCV  87 fl (80-97)   06/17/20  09:11    


 


MCH  28.9 pg (27.0-33.4)   06/17/20  09:11    


 


MCHC  33.1 g/dL (32.0-36.0)   06/17/20  09:11    


 


RDW  15.2 % (11.5-14.0)  H  06/17/20  09:11    


 


Seg Neutrophils %  53.4 % (42-78)   06/17/20  09:11    


 


Chloride  101 mmol/L ()   06/18/20  06:25    


 


Carbon Dioxide  24 mmol/L (22-30)   06/18/20  06:25    


 


Anion Gap  9  (5-19)   06/18/20  06:25    


 


Est GFR ( Amer)  > 60  (>60)   06/18/20  06:25    


 


Glucose  89 mg/dL ()   06/18/20  06:25    


 


Lactic Acid  1.6 mmol/L (0.7-2.1)   06/18/20  06:25    


 


Calcium  9.0 mg/dL (8.4-10.2)   06/18/20  06:25    


 


Magnesium  1.9 mg/dL (1.6-2.3)   06/17/20  05:40    


 


Total Bilirubin  0.7 mg/dL (0.2-1.3)   06/17/20  09:11    


 


AST  39 U/L (17-59)   06/17/20  09:11    


 


Alkaline Phosphatase  113 U/L ()   06/17/20  09:11    


 


Total Protein  6.4 g/dL (6.3-8.2)   06/17/20  09:11    


 


Albumin  3.3 g/dL (3.5-5.0)  L  06/17/20  09:11    


 


TSH  3.62 uIU/mL (0.47-4.68)   06/14/20  17:46    


 


Urine Color  MANDI   06/14/20  17:46    


 


Urine Appearance  SLIGHTLY-CLOUDY   06/14/20  17:46    


 


Urine pH  6.0  (5.0-9.0)   06/14/20  17:46    


 


Ur Specific Gravity  1.021   06/14/20  17:46    


 


Urine Protein  100 mg/dL (NEGATIVE)  H  06/14/20  17:46    


 


Urine Glucose (UA)  NEGATIVE mg/dL (NEGATIVE)   06/14/20  17:46    


 


Urine Ketones  NEGATIVE mg/dL (NEGATIVE)   06/14/20  17:46    


 


Urine Blood  NEGATIVE  (NEGATIVE)   06/14/20  17:46    


 


Urine Nitrite  NEGATIVE  (NEGATIVE)   06/14/20  17:46    


 


Ur Leukocyte Esterase  NEGATIVE  (NEGATIVE)   06/14/20  17:46    


 


Urine WBC (Auto)  1 /HPF  06/14/20  17:46    


 


Urine RBC (Auto)  0 /HPF  06/14/20  17:46    








                                        











  06/14/20 06/14/20 06/15/20





  17:46 17:46 01:38


 


Creatine Kinase  289 H  


 


Troponin I   0.025  0.028


 


NT-Pro-B Natriuret Pep   8780 H 














  06/15/20 06/15/20 06/15/20





  01:38 07:35 13:18


 


Creatine Kinase  212 H  


 


Troponin I   0.028  0.024


 


NT-Pro-B Natriuret Pep   














  06/15/20 06/17/20





  19:57 09:11


 


Creatine Kinase  


 


Troponin I  0.021 


 


NT-Pro-B Natriuret Pep   2400 H








                             Current Medication List











Generic Name Dose Route Start Last Admin





  Trade Name Nirmalq  PRN Reason Stop Dose Admin


 


Acetaminophen  650 mg  06/15/20 03:39 





  Tylenol 325 Mg Tablet  PO  07/15/20 03:38 





  Q4HP PRN  





  pain or temp greater than 101F  


 


Al Hydrox/Mg Hydrox/Simethicone  30 ml  06/15/20 03:39 





  Maalox Plus Susp 30 Udcup  PO  07/15/20 03:38 





  Q4HP PRN  





  HEARTBURN  


 


Aspirin  81 mg  06/15/20 10:00  06/17/20 11:40





  Ecotrin 81 Mg Ec Tablet  PO  07/15/20 09:59  81 mg





  DAILY ILSY   Administration


 


Folic Acid  1 mg  06/18/20 10:00 





  Folvite 1 Mg Tablet  PO  07/18/20 09:59 





  DAILY LISY  


 


Furosemide  20 mg  06/17/20 10:00  06/17/20 11:40





  Lasix 20 Mg Tablet  PO  07/17/20 09:59  20 mg





  DAILY LISY   Administration


 


Heparin Sodium (Porcine)  5,000 unit  06/15/20 06:00  06/18/20 05:27





  Heparin Inj 5,000 Units/Ml 1 Ml Vial  SUBCUT  07/15/20 05:59  Not Given





  Q8 LISY  


 


Dobutamine HCl/Dextrose  500 mg in 250 mls @ 0 mls/hr  06/17/20 13:58  06/17/20 

15:36





  Dobutrex Rtu 500 Mg-D5w 250 Ml Premixed Bag  IV  07/17/20 13:57  5.65 mls/hr





  CONTINUOUS PRN   5.65 mls/hr





  THIS MED IS NOT "PRN"   Administration





  Protocol  





  Titrate  


 


Levalbuterol HCl  1.25 mg  06/15/20 08:00  06/17/20 23:44





  Xopenex Neb 1.25 Mg/3 Ml Ampul  NEB  07/15/20 07:59  Not Given





  RTQ8 LISY  


 


Lisinopril  5 mg  06/16/20 10:00  06/17/20 11:39





  Prinivil 5 Mg Tablet  PO  07/16/20 09:59  5 mg





  DAILY LISY   Administration


 


Lorazepam  2 mg  06/16/20 18:06 





  Ativan Inj 2 Mg/1 Ml Vial  IV  06/22/20 03:38 





  Q2HP PRN  





  SEE LABEL COMMENTS  


 


Metoprolol Succinate  25 mg  06/17/20 10:00  06/17/20 11:40





  Toprol Xl 25 Mg Tab.Sr  PO  07/17/20 09:59  25 mg





  DAILY LISY   Administration


 


Nicotine  1 each  06/16/20 10:20  06/17/20 11:47





  Nicoderm 14 Mg/24 Hr Transdermal Patch  TD  07/16/20 10:19  1 each





  DAILYP PRN   Administration





  SMOKING CESSATION  


 


Pantoprazole Sodium  20 mg  06/15/20 06:00  06/18/20 05:27





  Protonix 20 Mg Dr Tablet  PO  07/15/20 05:59  Not Given





  Q6AM LISY  


 


Sodium Chloride  2.5 ml  06/15/20 06:00  06/18/20 05:27





  Saline Flush 2.5 Ml Monoject Prefil Syrin  IV  07/15/20 05:59  Not Given





  Q8 LISY  














Discontinued Medications














Generic Name Dose Route Start Last Admin





  Trade Name Freq  PRN Reason Stop Dose Admin


 


Aspirin  324 mg  06/15/20 00:57  06/15/20 01:20





  Aspirin 81 Mg Chewable Tablet  PO  06/15/20 00:58  324 mg





  NOW ONE   Administration


 


Diazepam  5 mg  06/15/20 00:27  06/15/20 00:56





  Valium 5 Mg Tablet  PO  06/15/20 00:28  5 mg





  NOW ONE   Administration


 


Diazepam  2.5 mg  06/15/20 06:00  06/16/20 14:25





  Valium 5 Mg Tablet  PO  06/22/20 05:59  2.5 mg





  Q8 LISY   Administration


 


Diltiazem HCl  90 mg  06/15/20 03:35  06/15/20 04:26





  Cardizem 90 Mg Tablet  PO  06/15/20 03:36  Not Given





  NOW ONE  


 


Diltiazem HCl  60 mg  06/15/20 06:00 





  Cardizem 60 Mg Tablet  PO  07/15/20 05:59 





  Q6 LISY  


 


Diltiazem HCl  60 mg  06/15/20 12:00  06/16/20 05:59





  Cardizem 60 Mg Tablet  PO  07/15/20 11:59  60 mg





  Q6 LISY   Administration


 


Folic Acid  1 mg  06/15/20 00:27  06/15/20 00:56





  Folvite 1 Mg Tablet  PO  06/15/20 00:28  1 mg





  NOW ONE   Administration


 


Furosemide  20 mg  06/15/20 03:31  06/15/20 03:45





  Lasix Inj/Pf 20 Mg/2 Ml Sdv  IV  06/15/20 03:32  20 mg





  NOW ONE   Administration


 


Furosemide  20 mg  06/16/20 18:04  06/16/20 18:42





  Lasix 20 Mg Tablet  PO  06/16/20 18:05  20 mg





  NOW ONE   Administration


 


Furosemide  20 mg  06/17/20 12:00  06/17/20 17:51





  Lasix Inj/Pf 20 Mg/2 Ml Sdv  IV  06/17/20 18:01  20 mg





  Q6H LISY   Administration


 


Sodium Chloride  500 mls @ 0 mls/hr  06/14/20 22:09  06/15/20 01:34





  Nacl 0.9% 500 Ml Iv Soln  IV  06/14/20 22:10  Infused





  NOW ONE   Infusion





  Wide Open  


 


Thiamine HCl 100 mg/ Folic  251.2 mls @ 502.4 mls/hr  06/15/20 10:00  06/17/20 

11:40





  Acid 1 mg/ Sodium Chloride  IV  07/15/20 09:59  502.4 mls/hr





  DAILY LISY   Administration


 


Lorazepam  2 mg  06/15/20 03:39 





  Ativan Inj 2 Mg/1 Ml Vial  IV  06/22/20 03:38 





  Q2HP PRN  





  ANXIETY/AGITATION  


 


Metoprolol Succinate  25 mg  06/16/20 10:00  06/16/20 12:14





  Toprol Xl 25 Mg Tab.Sr  PO  07/16/20 09:59  25 mg





  Q12 LISY   Administration


 


Multivitamins  1 tab  06/15/20 00:26  06/15/20 00:56





  Tab-A-Leidy (Multiple Vitamin) Tablet  PO  06/15/20 00:27  1 tab





  NOW ONE   Administration


 


Thiamine HCl  100 mg  06/14/20 22:10  06/15/20 01:20





  Thiamine 100 Mg Tablet  PO  06/14/20 22:11  100 mg





  NOW ONE   Administration


 


Thiamine HCl  100 mg  06/15/20 02:00  06/15/20 01:33





  Thiamine 100 Mg Tablet  PO  06/15/20 02:01  Not Given





  NOW ONE  














Assessment & Plan





- Diagnosis


(1) Acute systolic (congestive) heart failure


Is this a current diagnosis for this admission?: Yes   


Plan: 


Likely acute exacerbation of chronic systolic congestive heart failure with an 

ejection fraction between 20 and 25% and RV dysfunction.  His blood pressure is 

stable and he has been ambulating in his room without any cardiovascular 

complaints.  His blood pressure is stable and without any particular evidence of

low cardiac output.





Recommendations:


-Restrict fluid intake to 1500 cc daily.


-Low sodium diet, less than 1500 mg daily.


-Strict intake and output.


-Daily weights.


-Continue with metoprolol and lisinopril at current doses for now.


-Discontinue p.o. Lasix and start IV Lasix 20 mg twice daily.


-Wean off/discontinue dobutamine infusion.


-The patient will require an ischemic assessment prior to discharge and when he 

is more stable.


-Please note that outpatient follow-up on this gentleman will be very difficult 

to obtain given his homelessness and his other social issues to include 

marijuana use.








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


Is this a current diagnosis for this admission?: Yes   


Plan: 


Likely secondary to alcohol although ischemic etiology needs to be excluded.





Recommendations:


-Please see #1 above for recommendations.








(3) Alcohol dependence


Is this a current diagnosis for this admission?: Yes   


Plan: 


Needs counseling.








(4) Paroxysmal atrial fibrillation


Is this a current diagnosis for this admission?: Yes   


Plan: 


The patient is currently in normal sinus rhythm.  His calculated chads 2 

vascular score is currently 1, given his social issues, drug use and 

difficulties obtaining follow-up we will hold off on full anticoagulation for 

now.  Continue cardiac telemetry please.

## 2020-06-19 LAB
ALBUMIN SERPL-MCNC: 3.5 G/DL (ref 3.5–5)
ALP SERPL-CCNC: 133 U/L (ref 38–126)
ANION GAP SERPL CALC-SCNC: 7 MMOL/L (ref 5–19)
AST SERPL-CCNC: 53 U/L (ref 17–59)
BILIRUB DIRECT SERPL-MCNC: 0.1 MG/DL (ref 0–0.4)
BILIRUB SERPL-MCNC: 1.2 MG/DL (ref 0.2–1.3)
BUN SERPL-MCNC: 15 MG/DL (ref 7–20)
CALCIUM: 9 MG/DL (ref 8.4–10.2)
CHLORIDE SERPL-SCNC: 99 MMOL/L (ref 98–107)
CO2 SERPL-SCNC: 26 MMOL/L (ref 22–30)
GLUCOSE SERPL-MCNC: 111 MG/DL (ref 75–110)
POTASSIUM SERPL-SCNC: 4.1 MMOL/L (ref 3.6–5)
PROT SERPL-MCNC: 6.9 G/DL (ref 6.3–8.2)

## 2020-06-19 RX ADMIN — HEPARIN SODIUM SCH UNIT: 5000 INJECTION, SOLUTION INTRAVENOUS; SUBCUTANEOUS at 05:09

## 2020-06-19 RX ADMIN — HEPARIN SODIUM SCH: 5000 INJECTION, SOLUTION INTRAVENOUS; SUBCUTANEOUS at 13:41

## 2020-06-19 RX ADMIN — DIAZEPAM SCH MG: 5 TABLET ORAL at 13:42

## 2020-06-19 RX ADMIN — HALOPERIDOL LACTATE PRN MG: 5 INJECTION, SOLUTION INTRAMUSCULAR at 20:48

## 2020-06-19 RX ADMIN — ASPIRIN SCH MG: 81 TABLET, COATED ORAL at 09:18

## 2020-06-19 RX ADMIN — NICOTINE PRN EACH: 14 PATCH, EXTENDED RELEASE TOPICAL at 09:18

## 2020-06-19 RX ADMIN — HEPARIN SODIUM SCH UNIT: 5000 INJECTION, SOLUTION INTRAVENOUS; SUBCUTANEOUS at 21:08

## 2020-06-19 RX ADMIN — DIAZEPAM SCH MG: 5 TABLET ORAL at 09:18

## 2020-06-19 RX ADMIN — FOLIC ACID SCH MG: 1 TABLET ORAL at 09:18

## 2020-06-19 RX ADMIN — DIAZEPAM SCH MG: 5 TABLET ORAL at 01:47

## 2020-06-19 RX ADMIN — Medication SCH: at 13:41

## 2020-06-19 RX ADMIN — LEVALBUTEROL HYDROCHLORIDE SCH MG: 1.25 SOLUTION RESPIRATORY (INHALATION) at 16:08

## 2020-06-19 RX ADMIN — LEVALBUTEROL HYDROCHLORIDE SCH MG: 1.25 SOLUTION RESPIRATORY (INHALATION) at 08:24

## 2020-06-19 RX ADMIN — LORAZEPAM PRN MG: 2 INJECTION INTRAMUSCULAR; INTRAVENOUS at 01:48

## 2020-06-19 RX ADMIN — Medication SCH ML: at 05:10

## 2020-06-19 RX ADMIN — LORAZEPAM PRN MG: 2 INJECTION INTRAMUSCULAR; INTRAVENOUS at 16:11

## 2020-06-19 RX ADMIN — LISINOPRIL SCH MG: 10 TABLET ORAL at 09:18

## 2020-06-19 RX ADMIN — Medication SCH ML: at 21:08

## 2020-06-19 RX ADMIN — LORAZEPAM PRN MG: 2 INJECTION INTRAMUSCULAR; INTRAVENOUS at 10:33

## 2020-06-19 RX ADMIN — PANTOPRAZOLE SODIUM SCH MG: 20 TABLET, DELAYED RELEASE ORAL at 05:09

## 2020-06-19 RX ADMIN — DIAZEPAM SCH: 5 TABLET ORAL at 17:57

## 2020-06-19 RX ADMIN — LEVALBUTEROL HYDROCHLORIDE SCH MG: 1.25 SOLUTION RESPIRATORY (INHALATION) at 23:55

## 2020-06-19 RX ADMIN — DIAZEPAM SCH MG: 5 TABLET ORAL at 05:09

## 2020-06-19 NOTE — PDOC PROGRESS REPORT
Subjective


Progress Note for:: 06/19/20


Subjective:: 


Informed that patient got agitated earlier requiring ativan.  Started on some 

diazepam overnight as well.  Also reported that patient often defecates himself 

and urinates all over the floor.


Reason For Visit: 


ALCOHOLIC CARDIOMYOPATHY,HEART FAILURE








Physical Exam


Vital Signs: 


                                        











Temp Pulse Resp BP Pulse Ox


 


 97.4 F   98   16   128/87 H  95 


 


 06/19/20 12:05  06/19/20 14:00  06/19/20 12:05  06/19/20 12:05  06/19/20 12:05








                                 Intake & Output











 06/18/20 06/19/20 06/20/20





 06:59 06:59 06:59


 


Intake Total 1098 1056 120


 


Output Total 1500 300 


 


Balance -402 756 120


 


Weight 75.4 kg 69.3 kg 











General appearance: PRESENT: no acute distress, disheveled, thin.  ABSENT: 

cooperative


Neck exam: PRESENT: JVD


Respiratory exam: PRESENT: clear to auscultation ana paula, unlabored.  ABSENT: 

crackles, tachypnea, wheezes


Cardiovascular exam: PRESENT: RRR, +S1, +S2.  ABSENT: tachycardia


GI/Abdominal exam: PRESENT: soft.  ABSENT: rebound, rigid, tenderness


Neurological exam: PRESENT: alert, awake, oriented to person, other - Confused





Results


Laboratory Results: 


                                        





                                 06/17/20 09:11 





                                 06/19/20 06:32 





                                        











  06/19/20





  06:32


 


Sodium  132.2 L


 


Potassium  4.1


 


Chloride  99


 


Carbon Dioxide  26


 


Anion Gap  7


 


BUN  15


 


Creatinine  0.90


 


Est GFR (African Amer)  > 60


 


Glucose  111 H


 


Calcium  9.0


 


Magnesium  1.9


 


Total Bilirubin  1.2


 


AST  53


 


Alkaline Phosphatase  133 H


 


Total Protein  6.9


 


Albumin  3.5








                                        











  06/14/20 06/14/20 06/15/20





  17:46 17:46 01:38


 


Creatine Kinase  289 H  


 


Troponin I   0.025  0.028


 


NT-Pro-B Natriuret Pep   8780 H 














  06/15/20 06/15/20 06/15/20





  01:38 07:35 13:18


 


Creatine Kinase  212 H  


 


Troponin I   0.028  0.024


 


NT-Pro-B Natriuret Pep   














  06/15/20 06/17/20





  19:57 09:11


 


Creatine Kinase  


 


Troponin I  0.021 


 


NT-Pro-B Natriuret Pep   2400 H











Impressions: 


                                        





Chest X-Ray  06/17/20 00:00


IMPRESSION:  New small moderate bilateral pleural effusions


Minimal right basilar airspace disease


 














Assessment and Plan





- Diagnosis


(1) Acute systolic (congestive) heart failure


Is this a current diagnosis for this admission?: Yes   





(2) Encephalopathy, metabolic


Is this a current diagnosis for this admission?: Yes   





(3) Hypothermia


Is this a current diagnosis for this admission?: Yes   





(4) Lactic acidosis


Is this a current diagnosis for this admission?: Yes   





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


Is this a current diagnosis for this admission?: Yes   





(6) Paroxysmal atrial fibrillation


Is this a current diagnosis for this admission?: Yes   





(7) Alcohol dependence


Is this a current diagnosis for this admission?: Yes   





(8) Homeless


Is this a current diagnosis for this admission?: Yes   





(9) Tobacco abuse


Is this a current diagnosis for this admission?: Yes   





- Plan Summary


Summary: 


Discussed with patient about his cardiomyopathy noted on echo with EF of 20 to 

25% and biventricular dilation.  Picture appears to be typical for alcoholic 

cardiomyopathy.  He denies any history of prior CHF.


I have started patient on low-dose lisinopril, Toprol-XL and Lasix.  

Electrolytes in the morning.


He will need to be set up with cardiology for outpatient follow-up prior to 

discharge.


Does not appear to be overtly fluid overloaded at this moment.


CIWA and as needed Ativan as needed.  Will discontinue diazepam this evening.


Alcohol cessation and tobacco cessation counseling performed.





6/17/2020


Patient hypothermic to 94F this morning.  Lactic acid also elevated at 3.1.  

Cool extremities noted.


Chest x-ray showing interstitial opacities possibly pulmonary edema without 

pneumonia.  Prominent JVD noted on exam.  Urinalysis on 614 was negative.  Will 

repeat.


Currently doubt that patient's presentation is due to an infection as he has no 

complaints concerning for infection no other findings.


Patient has been placed on a jessica hugger with improvement to 97F.


Concern for possible low output heart failure especially given his dilated 

cardiomyopathy with biventricular failure.


I have consulted Dr. Diaz


Will consider placing patient on milrinone drip and repeat lactic acid later.  

Patient currently not hypotensive.  Will check liver enzymes and Bnp.


IV Lasix, continue lisinopril and beta-blocker.





6/18/2020


Hypothermia has resolved at this time as well as lactic acidosis.  Extremities 

feel warm today.


Started patient on dobutamine low-dose yesterday as milrinone drip can only be 

done in the ICU.  This seems to have led to resolution of hypothermia lactic 

acidosis.  We will maintain on dopamine drip for today.  Continue lisinopril and

beta-blocker.


Continue to monitor vital signs closely in the IMCU.  EKG in the morning


Cardiology following.





6/19/2020


Dobutamine drip discontinued.  Continue Toprol and lisinopril.  Diuretics 

changed to IV Lasix per Cardiology recommendation.


I believe patient likely has encephalopathy that is likely chronic from his 

chronic alcohol abuse.  There is a very good chance that he has Wernicke's 

encephalopathy.


I do not think he is mental status is as a result of alcohol withdrawal.


I will discontinue Ativan.  I will give some standing diazepam.  I will order 

for Haldol IM as needed.  Redirection if possible.  Currently in restraints.


I have called patient's brother to try to get a better sense of patient's 

baseline mental status but no response.  Left voice message to call back 

tomorrow or before end of shift today.








- Time


Time Spent with patient: Less than 15 minutes

## 2020-06-19 NOTE — PDOC PROGRESS REPORT
Subjective


Progress Note for:: 06/19/20


Subjective:: 





KIESHA WINN is a 64 year old homeless male with history of alcohol 

dependence, paroxysmal atrial fibrillation, who has been followed by my partner,

Dr. Diaz, for what appears to be new onset cardiomyopathy with an ejection 

fraction between 20 and 25% as well as new onset of heart failure with reduced 

ejection fraction.  He apparently had been stable until yesterday or the day 

before when he became hypothermic, with significant JVD and shortness of breath.

 At that point he was placed on low-dose dobutamine.  Yesterday evening the 

nursing staff found the patient sitting at the edge of the bed covered in feces.

 He had pulled out his IV and dobutamine which is running onto the floor.  He 

was cleaned up and placed back in the bed.  This morning he is found sitting at 

the edge of the bed after finishing breakfast and without any cardiac 

complaints.  He is eager to go home as soon as possible.  His telemetry shows 

normal sinus rhythm with episodes of sinus tachycardia and PVCs.  His proBNP was

elevated at 2400, he tested positive for marijuana and his most recent chemistry

shows hyponatremia.  His blood pressure is stable.  Unfortunately he is not 

measuring his urine therefore accurate urinary output is not possible.





06/19/2020:


The patient became agitated again last night, pulling out IVs, removing 

telemetry, demading to be able to smoke a cigarette.  He was treated with 

lorazepam administered at 2346 and 0148.  Valium was given at 0545 for increased

agitation again, pulling IV out, removing clothes, telemetry, urinating on the 

floor, screaming at staff.  This morning he is found in bed, fully awake, with 

four-point soft restraints in place and eating breakfast with his bare hands.  

He does complain of increased shortness of breath but denies chest pain, 

palpitations, syncope and presyncope.  His blood pressure is now elevated as he 

was kept on dobutamine drip yesterday.  His telemetry shows normal sinus rhythm 

with one episode of 5 beat SVT yesterday.  His lisinopril was increased to 10 mg

daily, his Lasix was increased to 40 mg p.o. daily and his Toprol was also 

increased to 50 mg daily.





Physical exam on 06/19/2020:


GENERAL:  Pleasant and conversational.  He is disheveled, with feces on the 

floor and on his hands, appears chronically ill, he does not appear to 

comprehend the seriousness of his illness and appears to have some degree of 

mental disability.  Not in acute distress.


HEENT:  Normocephalic, atraumatic.  Pupils equal.   Sclerae anicteric.  

Oropharynx moist. 


NECK:  No JVD.  No carotid bruits. 


LUNGS:  Clear to auscultation bilaterally.  Normal respiratory effort without 

the use of accessory muscles or intercostal retractions.  


CARDIOVASCULAR:  Regular rate and rhythm, normal S1 and S2 without murmurs, 

rubs, or gallops.  PMI not displaced.


EXTREMITIES:  No pretibial edema, very mild edema in the dorsum of both feet, no

cyanosis, no clubbing.  +2 pulses femoral and pedal pulses bilaterally.  


SKIN: No lesions or rashes.


MUSCULOSKELETAL:  No chest tenderness to palpation. 


NEUROLOGIC: Nonfocal.  No gross sensory or motor deficits bilateral upper or 

lower extremities.





Echocardiogram on 06/15/2020:


-LV systolic function is moderately to severely reduced.


-EF 20 to 25%.


-RV systolic function is moderately reduced.


-Mild MR, mild to moderate TR.


-Minimal pericardial effusion.





Reason For Visit: 


ALCOHOLIC CARDIOMYOPATHY,HEART FAILURE








Physical Exam


Vital Signs: 


                                        











Temp Pulse Resp BP Pulse Ox


 


 98.7 F   92   24 H  127/99 H  97 


 


 06/18/20 23:20  06/19/20 07:00  06/18/20 23:42  06/19/20 07:00  06/18/20 23:42








                                 Intake & Output











 06/18/20 06/19/20 06/20/20





 06:59 06:59 06:59


 


Intake Total 1098 1056 


 


Output Total 1500 300 


 


Balance -402 756 


 


Weight 75.4 kg 75.4 kg 














Results


Laboratory Results: 


                                        





                                 06/17/20 09:11 





                                        











  06/18/20 06/18/20





  06:25 06:25


 


Sodium  133.5 L 


 


Potassium  4.1 


 


Chloride  101 


 


Carbon Dioxide  24 


 


Anion Gap  9 


 


BUN  13 


 


Creatinine  0.96 


 


Est GFR (African Amer)  > 60 


 


Glucose  89 


 


Lactic Acid   1.6


 


Calcium  9.0 








                                        











  06/14/20 06/14/20 06/15/20





  17:46 17:46 01:38


 


Creatine Kinase  289 H  


 


Troponin I   0.025  0.028


 


NT-Pro-B Natriuret Pep   8780 H 














  06/15/20 06/15/20 06/15/20





  01:38 07:35 13:18


 


Creatine Kinase  212 H  


 


Troponin I   0.028  0.024


 


NT-Pro-B Natriuret Pep   














  06/15/20 06/17/20





  19:57 09:11


 


Creatine Kinase  


 


Troponin I  0.021 


 


NT-Pro-B Natriuret Pep   2400 H











Impressions: 


                                        





Chest X-Ray  06/17/20 00:00


IMPRESSION:  New small moderate bilateral pleural effusions


Minimal right basilar airspace disease


 








                                        





                                 06/17/20 09:11 





                                        











MCV  87 fl (80-97)   06/17/20  09:11    


 


MCH  28.9 pg (27.0-33.4)   06/17/20  09:11    


 


MCHC  33.1 g/dL (32.0-36.0)   06/17/20  09:11    


 


RDW  15.2 % (11.5-14.0)  H  06/17/20  09:11    


 


Seg Neutrophils %  53.4 % (42-78)   06/17/20  09:11    


 


Chloride  101 mmol/L ()   06/18/20  06:25    


 


Carbon Dioxide  24 mmol/L (22-30)   06/18/20  06:25    


 


Anion Gap  9  (5-19)   06/18/20  06:25    


 


Est GFR ( Amer)  > 60  (>60)   06/18/20  06:25    


 


Glucose  89 mg/dL ()   06/18/20  06:25    


 


Lactic Acid  1.6 mmol/L (0.7-2.1)   06/18/20  06:25    


 


Calcium  9.0 mg/dL (8.4-10.2)   06/18/20  06:25    


 


Magnesium  1.9 mg/dL (1.6-2.3)   06/17/20  05:40    


 


Total Bilirubin  0.7 mg/dL (0.2-1.3)   06/17/20  09:11    


 


AST  39 U/L (17-59)   06/17/20  09:11    


 


Alkaline Phosphatase  113 U/L ()   06/17/20  09:11    


 


Total Protein  6.4 g/dL (6.3-8.2)   06/17/20  09:11    


 


Albumin  3.3 g/dL (3.5-5.0)  L  06/17/20  09:11    


 


TSH  3.62 uIU/mL (0.47-4.68)   06/14/20  17:46    


 


Urine Color  MANDI   06/14/20  17:46    


 


Urine Appearance  SLIGHTLY-CLOUDY   06/14/20  17:46    


 


Urine pH  6.0  (5.0-9.0)   06/14/20  17:46    


 


Ur Specific Gravity  1.021   06/14/20  17:46    


 


Urine Protein  100 mg/dL (NEGATIVE)  H  06/14/20  17:46    


 


Urine Glucose (UA)  NEGATIVE mg/dL (NEGATIVE)   06/14/20  17:46    


 


Urine Ketones  NEGATIVE mg/dL (NEGATIVE)   06/14/20  17:46    


 


Urine Blood  NEGATIVE  (NEGATIVE)   06/14/20  17:46    


 


Urine Nitrite  NEGATIVE  (NEGATIVE)   06/14/20  17:46    


 


Ur Leukocyte Esterase  NEGATIVE  (NEGATIVE)   06/14/20  17:46    


 


Urine WBC (Auto)  1 /HPF  06/14/20  17:46    


 


Urine RBC (Auto)  0 /HPF  06/14/20  17:46    








                                        











  06/14/20 06/14/20 06/15/20





  17:46 17:46 01:38


 


Creatine Kinase  289 H  


 


Troponin I   0.025  0.028


 


NT-Pro-B Natriuret Pep   8780 H 














  06/15/20 06/15/20 06/15/20





  01:38 07:35 13:18


 


Creatine Kinase  212 H  


 


Troponin I   0.028  0.024


 


NT-Pro-B Natriuret Pep   














  06/15/20 06/17/20





  19:57 09:11


 


Creatine Kinase  


 


Troponin I  0.021 


 


NT-Pro-B Natriuret Pep   2400 H








                             Current Medication List











Generic Name Dose Route Start Last Admin





  Trade Name [facility]  PRN Reason Stop Dose Admin


 


Acetaminophen  650 mg  06/15/20 03:39 





  Tylenol 325 Mg Tablet  PO  07/15/20 03:38 





  Q4HP PRN  





  pain or temp greater than 101F  


 


Al Hydrox/Mg Hydrox/Simethicone  30 ml  06/15/20 03:39 





  Maalox Plus Susp 30 Udcup  PO  07/15/20 03:38 





  Q4HP PRN  





  HEARTBURN  


 


Aspirin  81 mg  06/15/20 10:00  06/18/20 09:15





  Ecotrin 81 Mg Ec Tablet  PO  07/15/20 09:59  81 mg





  DAILY LISY   Administration


 


Diazepam  10 mg  06/19/20 01:12  06/19/20 05:45





  Valium Inj 10 Mg/2 Ml Disp.Syrin  IV  06/26/20 01:11  10 mg





  Q1HP PRN   Administration





  AGITATION/ANXIETY  


 


Diazepam  10 mg  06/19/20 02:00  06/19/20 05:09





  Valium 5 Mg Tablet  PO  06/26/20 01:11  10 mg





  Q4 LISY   Administration


 


Folic Acid  1 mg  06/18/20 10:00  06/18/20 09:15





  Folvite 1 Mg Tablet  PO  07/18/20 09:59  1 mg





  DAILY LISY   Administration


 


Furosemide  20 mg  06/17/20 10:00  06/18/20 09:15





  Lasix 20 Mg Tablet  PO  07/17/20 09:59  20 mg





  DAILY LISY   Administration


 


Heparin Sodium (Porcine)  5,000 unit  06/15/20 06:00  06/19/20 05:09





  Heparin Inj 5,000 Units/Ml 1 Ml Vial  SUBCUT  07/15/20 05:59  5,000 unit





  Q8 LISY   Administration


 


Dobutamine HCl/Dextrose  500 mg in 250 mls @ 0 mls/hr  06/17/20 13:58  06/17/20 

15:36





  Dobutrex Rtu 500 Mg-D5w 250 Ml Premixed Bag  IV  07/17/20 13:57  5.65 mls/hr





  CONTINUOUS PRN   5.65 mls/hr





  THIS MED IS NOT "PRN"   Administration





  Protocol  





  Titrate  


 


Levalbuterol HCl  1.25 mg  06/15/20 08:00  06/18/20 23:42





  Xopenex Neb 1.25 Mg/3 Ml Ampul  NEB  07/15/20 07:59  1.25 mg





  RTQ8 LISY   Administration


 


Lisinopril  5 mg  06/16/20 10:00  06/18/20 09:15





  Prinivil 5 Mg Tablet  PO  07/16/20 09:59  5 mg





  DAILY LISY   Administration


 


Lorazepam  2 mg  06/16/20 18:06  06/19/20 01:48





  Ativan Inj 2 Mg/1 Ml Vial  IV  06/22/20 03:38  2 mg





  Q2HP PRN   Administration





  SEE LABEL COMMENTS  


 


Metoprolol Succinate  25 mg  06/17/20 10:00  06/18/20 09:15





  Toprol Xl 25 Mg Tab.Sr  PO  07/17/20 09:59  25 mg





  DAILY LISY   Administration


 


Nicotine  1 each  06/16/20 10:20  06/18/20 09:16





  Nicoderm 14 Mg/24 Hr Transdermal Patch  TD  07/16/20 10:19  1 each





  DAILYP PRN   Administration





  SMOKING CESSATION  


 


Pantoprazole Sodium  20 mg  06/15/20 06:00  06/19/20 05:09





  Protonix 20 Mg Dr Tablet  PO  07/15/20 05:59  20 mg





  Q6AM LISY   Administration


 


Sodium Chloride  2.5 ml  06/15/20 06:00  06/19/20 05:10





  Saline Flush 2.5 Ml Monoject Prefil Syrin  IV  07/15/20 05:59  2.5 ml





  Q8 LISY   Administration














Discontinued Medications














Generic Name Dose Route Start Last Admin





  Trade Name Freq  PRN Reason Stop Dose Admin


 


Aspirin  324 mg  06/15/20 00:57  06/15/20 01:20





  Aspirin 81 Mg Chewable Tablet  PO  06/15/20 00:58  324 mg





  NOW ONE   Administration


 


Diazepam  5 mg  06/15/20 00:27  06/15/20 00:56





  Valium 5 Mg Tablet  PO  06/15/20 00:28  5 mg





  NOW ONE   Administration


 


Diazepam  2.5 mg  06/15/20 06:00  06/16/20 14:25





  Valium 5 Mg Tablet  PO  06/22/20 05:59  2.5 mg





  Q8 LISY   Administration


 


Diazepam  10 mg  06/19/20 01:12 





  Valium 5 Mg Tablet  PO  06/26/20 01:11 





  Q4HP PRN  





  AGITATION  


 


Diltiazem HCl  90 mg  06/15/20 03:35  06/15/20 04:26





  Cardizem 90 Mg Tablet  PO  06/15/20 03:36  Not Given





  NOW ONE  


 


Diltiazem HCl  60 mg  06/15/20 06:00 





  Cardizem 60 Mg Tablet  PO  07/15/20 05:59 





  Q6 LISY  


 


Diltiazem HCl  60 mg  06/15/20 12:00  06/16/20 05:59





  Cardizem 60 Mg Tablet  PO  07/15/20 11:59  60 mg





  Q6 LISY   Administration


 


Folic Acid  1 mg  06/15/20 00:27  06/15/20 00:56





  Folvite 1 Mg Tablet  PO  06/15/20 00:28  1 mg





  NOW ONE   Administration


 


Furosemide  20 mg  06/15/20 03:31  06/15/20 03:45





  Lasix Inj/Pf 20 Mg/2 Ml Sdv  IV  06/15/20 03:32  20 mg





  NOW ONE   Administration


 


Furosemide  20 mg  06/16/20 18:04  06/16/20 18:42





  Lasix 20 Mg Tablet  PO  06/16/20 18:05  20 mg





  NOW ONE   Administration


 


Furosemide  20 mg  06/17/20 12:00  06/17/20 17:51





  Lasix Inj/Pf 20 Mg/2 Ml Sdv  IV  06/17/20 18:01  20 mg





  Q6H LISY   Administration


 


Furosemide  40 mg  06/18/20 20:02  06/18/20 22:42





  Lasix Inj/Pf 40 Mg/4 Ml Sdv  IV  06/18/20 20:03  40 mg





  NOW ONE   Administration


 


Furosemide  40 mg  06/18/20 22:30  06/18/20 22:44





  Lasix Inj/Pf 40 Mg/4 Ml Sdv  IV  06/18/20 22:31  Not Given





  NOW ONE  


 


Sodium Chloride  500 mls @ 0 mls/hr  06/14/20 22:09  06/15/20 01:34





  Nacl 0.9% 500 Ml Iv Soln  IV  06/14/20 22:10  Infused





  NOW ONE   Infusion





  [employer] [employer]  


 


Thiamine HCl 100 mg/ Folic  251.2 mls @ 502.4 mls/hr  06/15/20 10:00  06/17/20 

11:40





  Acid 1 mg/ Sodium Chloride  IV  07/15/20 09:59  502.4 mls/hr





  DAILY LISY   Administration


 


Lorazepam  2 mg  06/15/20 03:39 





  Ativan Inj 2 Mg/1 Ml Vial  IV  06/22/20 03:38 





  Q2HP PRN  





  ANXIETY/AGITATION  


 


Metoprolol Succinate  25 mg  06/16/20 10:00  06/16/20 12:14





  Toprol Xl 25 Mg Tab.Sr  PO  07/16/20 09:59  25 mg





  Q12 LISY   Administration


 


Multivitamins  1 tab  06/15/20 00:26  06/15/20 00:56





  Tab-A-Leidy (Multiple Vitamin) Tablet  PO  06/15/20 00:27  1 tab





  NOW ONE   Administration


 


Thiamine HCl  100 mg  06/14/20 22:10  06/15/20 01:20





  Thiamine 100 Mg Tablet  PO  06/14/20 22:11  100 mg





  NOW ONE   Administration


 


Thiamine HCl  100 mg  06/15/20 02:00  06/15/20 01:33





  Thiamine 100 Mg Tablet  PO  06/15/20 02:01  Not Given





  NOW ONE  














Assessment & Plan





- Diagnosis


(1) Acute systolic (congestive) heart failure


Is this a current diagnosis for this admission?: Yes   


Plan: 


Likely acute exacerbation of chronic systolic congestive heart failure with an 

ejection fraction between 20 and 25% and RV dysfunction.  His blood pressure is 

actually elevated.  He does complain of mild shortness of breath this morning.  

Unfortunately his urine output is unreliable as the patient does not measure it 

consistently.  It does not appear that p.o. Lasix is been effective in diuresing

the patient given his increased shortness of breath.





Recommendations:


-Restrict fluid intake to 1500 cc daily.


-Low sodium diet, less than 1500 mg daily.


-Strict intake and output.


-Daily weights.


-Continue with metoprolol and lisinopril at current doses for now.


-Discontinue p.o. Lasix and start IV Lasix 40 mg daily.


-Discontinue dobutamine infusion.


-The patient will require an ischemic assessment prior to discharge and when he 

is more stable.


-Replace electrolytes as needed.


-Please note that outpatient follow-up on this gentleman will be very difficult 

to obtain given his homelessness and his other social issues to include 

marijuana use.








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


Is this a current diagnosis for this admission?: Yes   


Plan: 


Likely secondary to alcohol although ischemic etiology needs to be excluded.





Recommendations:


-Please see #1 above for recommendations.








(3) Alcohol dependence


Is this a current diagnosis for this admission?: Yes   


Plan: 


Needs counseling.








(4) Paroxysmal atrial fibrillation


Is this a current diagnosis for this admission?: Yes   


Plan: 


The patient is remains in normal sinus rhythm.  His calculated chads 2 vascular 

score is currently 1, given his social issues, drug use and difficulties 

obtaining follow-up we will hold off on full anticoagulation for now.  Continue 

cardiac telemetry please.

## 2020-06-19 NOTE — EKG REPORT
SEVERITY:- ABNORMAL ECG -

SINUS RHYTHM

LEFT ATRIAL ABNORMALITY

LAD, CONSIDER LEFT ANTERIOR FASCICULAR BLOCK

ABNORMAL T, CONSIDER ISCHEMIA, ANTERIOR LEADS

BORDERLINE PROLONGED QT INTERVAL

:

Confirmed by: Mauricio Browne MD 19-Jun-2020 06:57:26

## 2020-06-20 LAB
ANION GAP SERPL CALC-SCNC: 9 MMOL/L (ref 5–19)
BUN SERPL-MCNC: 13 MG/DL (ref 7–20)
CALCIUM: 9.1 MG/DL (ref 8.4–10.2)
CHLORIDE SERPL-SCNC: 100 MMOL/L (ref 98–107)
CO2 SERPL-SCNC: 26 MMOL/L (ref 22–30)
GLUCOSE SERPL-MCNC: 97 MG/DL (ref 75–110)
POTASSIUM SERPL-SCNC: 4.2 MMOL/L (ref 3.6–5)

## 2020-06-20 RX ADMIN — DIAZEPAM SCH MG: 5 TABLET ORAL at 00:03

## 2020-06-20 RX ADMIN — HALOPERIDOL LACTATE PRN MG: 5 INJECTION, SOLUTION INTRAMUSCULAR at 20:00

## 2020-06-20 RX ADMIN — METOPROLOL SUCCINATE SCH: 50 TABLET, EXTENDED RELEASE ORAL at 10:41

## 2020-06-20 RX ADMIN — FUROSEMIDE SCH MG: 10 INJECTION, SOLUTION INTRAMUSCULAR; INTRAVENOUS at 11:01

## 2020-06-20 RX ADMIN — Medication SCH ML: at 05:31

## 2020-06-20 RX ADMIN — ASPIRIN SCH: 81 TABLET, COATED ORAL at 10:40

## 2020-06-20 RX ADMIN — HEPARIN SODIUM SCH UNIT: 5000 INJECTION, SOLUTION INTRAVENOUS; SUBCUTANEOUS at 21:56

## 2020-06-20 RX ADMIN — HEPARIN SODIUM SCH UNIT: 5000 INJECTION, SOLUTION INTRAVENOUS; SUBCUTANEOUS at 05:31

## 2020-06-20 RX ADMIN — ASPIRIN SCH MG: 81 TABLET, COATED ORAL at 12:13

## 2020-06-20 RX ADMIN — LEVALBUTEROL HYDROCHLORIDE SCH MG: 1.25 SOLUTION RESPIRATORY (INHALATION) at 08:34

## 2020-06-20 RX ADMIN — LEVALBUTEROL HYDROCHLORIDE SCH MG: 1.25 SOLUTION RESPIRATORY (INHALATION) at 16:11

## 2020-06-20 RX ADMIN — PANTOPRAZOLE SODIUM SCH MG: 20 TABLET, DELAYED RELEASE ORAL at 05:31

## 2020-06-20 RX ADMIN — DIAZEPAM SCH MG: 5 TABLET ORAL at 05:31

## 2020-06-20 RX ADMIN — LISINOPRIL SCH MG: 10 TABLET ORAL at 12:13

## 2020-06-20 RX ADMIN — Medication SCH: at 13:27

## 2020-06-20 RX ADMIN — FOLIC ACID SCH MG: 1 TABLET ORAL at 12:13

## 2020-06-20 RX ADMIN — HEPARIN SODIUM SCH: 5000 INJECTION, SOLUTION INTRAVENOUS; SUBCUTANEOUS at 13:27

## 2020-06-20 RX ADMIN — FOLIC ACID SCH: 1 TABLET ORAL at 10:41

## 2020-06-20 RX ADMIN — LISINOPRIL SCH: 10 TABLET ORAL at 10:41

## 2020-06-20 RX ADMIN — METOPROLOL SUCCINATE SCH MG: 50 TABLET, EXTENDED RELEASE ORAL at 12:13

## 2020-06-20 RX ADMIN — Medication SCH: at 22:54

## 2020-06-20 NOTE — RADIOLOGY REPORT (SQ)
EXAM DESCRIPTION:  CHEST SINGLE VIEW



IMAGES COMPLETED DATE/TIME:  6/20/2020 8:55 am



REASON FOR STUDY:  possible aspiration



COMPARISON:  6/14/2020



EXAM PARAMETERS:  NUMBER OF VIEWS: One view.

TECHNIQUE: Single frontal radiographic view of the chest acquired.

RADIATION DOSE: NA

LIMITATIONS: None.



FINDINGS:  LUNGS AND PLEURA: Very minimal opacity in the right mid zone and left base.  No effusions 
or pneumothorax.

MEDIASTINUM AND HILAR STRUCTURES: No masses.  Contour normal.

HEART AND VASCULAR STRUCTURES: Heart normal in size.  Normal vasculature.

BONES: No acute findings.

HARDWARE: None in the chest.

OTHER: No other significant finding.



IMPRESSION:  Minimal right lung and left basilar opacities.  Unlikely to represent aspiration.



TECHNICAL DOCUMENTATION:  JOB ID:  [id number]

 2011 [employer]- All Rights Reserved



Reading location - IP/workstation name: [email or web address]

## 2020-06-20 NOTE — PDOC PROGRESS REPORT
Subjective


Progress Note for:: 06/20/20


Subjective:: 





KIESHA WINN is a 64 year old homeless male with history of alcohol 

dependence, paroxysmal atrial fibrillation, who has been followed by my partner,

Dr. Diaz, for what appears to be new onset cardiomyopathy with an ejection 

fraction between 20 and 25% as well as new onset of heart failure with reduced 

ejection fraction.  He apparently had been stable until yesterday or the day 

before when he became hypothermic, with significant JVD and shortness of breath.

 At that point he was placed on low-dose dobutamine.  Yesterday evening the 

nursing staff found the patient sitting at the edge of the bed covered in feces.

 He had pulled out his IV and dobutamine which is running onto the floor.  He 

was cleaned up and placed back in the bed.  This morning he is found sitting at 

the edge of the bed after finishing breakfast and without any cardiac 

complaints.  He is eager to go home as soon as possible.  His telemetry shows 

normal sinus rhythm with episodes of sinus tachycardia and PVCs.  His proBNP was

elevated at 2400, he tested positive for marijuana and his most recent chemistry

shows hyponatremia.  His blood pressure is stable.  Unfortunately he is not 

measuring his urine therefore accurate urinary output is not possible.





06/20/2020:


The patient became agitated again last night and was provided Haldol.  This 

morning he is sleeping, on soft four-point restraints.  He is arousable however 

falls back asleep very quickly.  His blood pressure has been stable even after 

dobutamine infusion was discontinued.  His telemetry shows normal sinus rhythm 

with episodes of sinus tachycardia and 1 episode of nonsustained VT lasting 5 

beats.





Physical exam on 06/20/2020:


GENERAL: Sleeping.  Arousable but falls back asleep quickly.


HEENT:  Normocephalic, atraumatic.  Pupils equal.   Sclerae anicteric.  

Oropharynx moist. 


NECK:  No JVD.  No carotid bruits. 


LUNGS:  Clear to auscultation bilaterally.  Normal respiratory effort without 

the use of accessory muscles or intercostal retractions.  


CARDIOVASCULAR:  Regular rate and rhythm, normal S1 and S2 without murmurs, 

rubs, or gallops.  PMI not displaced.


EXTREMITIES:  No pretibial edema, very mild edema in the dorsum of both feet, no

cyanosis, no clubbing.  +2 pulses femoral and pedal pulses bilaterally.  


SKIN: No lesions or rashes.


MUSCULOSKELETAL:  No chest tenderness to palpation. 


NEUROLOGIC: Nonfocal.  No gross sensory or motor deficits bilateral upper or 

lower extremities.





Echocardiogram on 06/15/2020:


-LV systolic function is moderately to severely reduced.


-EF 20 to 25%.


-RV systolic function is moderately reduced.


-Mild MR, mild to moderate TR.


-Minimal pericardial effusion.





Reason For Visit: 


ALCOHOLIC CARDIOMYOPATHY,HEART FAILURE








Physical Exam


Vital Signs: 


                                        











Temp Pulse Resp BP Pulse Ox


 


 97.4 F   99   16   140/88 H  94 


 


 06/20/20 04:08  06/20/20 04:08  06/20/20 04:08  06/20/20 04:08  06/20/20 04:08








                                 Intake & Output











 06/19/20 06/20/20 06/21/20





 06:59 06:59 06:59


 


Intake Total 1056 120 


 


Output Total 300  


 


Balance 756 120 


 


Weight 69.3 kg 62.9 kg 














Results


Laboratory Results: 


                                        





                                 06/17/20 09:11 





                                 06/20/20 06:35 





                                        











  06/20/20





  06:35


 


Sodium  134.9 L


 


Potassium  4.2


 


Chloride  100


 


Carbon Dioxide  26


 


Anion Gap  9


 


BUN  13


 


Creatinine  0.83


 


Est GFR (African Amer)  > 60


 


Glucose  97


 


Calcium  9.1








                                        











  06/14/20 06/14/20 06/15/20





  17:46 17:46 01:38


 


Creatine Kinase  289 H  


 


Troponin I   0.025  0.028


 


NT-Pro-B Natriuret Pep   8780 H 














  06/15/20 06/15/20 06/15/20





  01:38 07:35 13:18


 


Creatine Kinase  212 H  


 


Troponin I   0.028  0.024


 


NT-Pro-B Natriuret Pep   














  06/15/20 06/17/20





  19:57 09:11


 


Creatine Kinase  


 


Troponin I  0.021 


 


NT-Pro-B Natriuret Pep   2400 H











Impressions: 


                                        





Chest X-Ray  06/17/20 00:00


IMPRESSION:  New small moderate bilateral pleural effusions


Minimal right basilar airspace disease


 








                                        





                                 06/17/20 09:11 





                                 06/20/20 06:35 





                                        











MCV  87 fl (80-97)   06/17/20  09:11    


 


MCH  28.9 pg (27.0-33.4)   06/17/20  09:11    


 


MCHC  33.1 g/dL (32.0-36.0)   06/17/20  09:11    


 


RDW  15.2 % (11.5-14.0)  H  06/17/20  09:11    


 


Seg Neutrophils %  53.4 % (42-78)   06/17/20  09:11    


 


Chloride  100 mmol/L ()   06/20/20  06:35    


 


Carbon Dioxide  26 mmol/L (22-30)   06/20/20  06:35    


 


Anion Gap  9  (5-19)   06/20/20  06:35    


 


Est GFR ( Amer)  > 60  (>60)   06/20/20  06:35    


 


Glucose  97 mg/dL ()   06/20/20  06:35    


 


Lactic Acid  1.6 mmol/L (0.7-2.1)   06/18/20  06:25    


 


Calcium  9.1 mg/dL (8.4-10.2)   06/20/20  06:35    


 


Magnesium  1.9 mg/dL (1.6-2.3)   06/19/20  06:32    


 


Total Bilirubin  1.2 mg/dL (0.2-1.3)   06/19/20  06:32    


 


AST  53 U/L (17-59)   06/19/20  06:32    


 


Alkaline Phosphatase  133 U/L ()  H  06/19/20  06:32    


 


Total Protein  6.9 g/dL (6.3-8.2)   06/19/20  06:32    


 


Albumin  3.5 g/dL (3.5-5.0)   06/19/20  06:32    


 


TSH  3.62 uIU/mL (0.47-4.68)   06/14/20  17:46    


 


Urine Color  MANDI   06/14/20  17:46    


 


Urine Appearance  SLIGHTLY-CLOUDY   06/14/20  17:46    


 


Urine pH  6.0  (5.0-9.0)   06/14/20  17:46    


 


Ur Specific Gravity  1.021   06/14/20  17:46    


 


Urine Protein  100 mg/dL (NEGATIVE)  H  06/14/20  17:46    


 


Urine Glucose (UA)  NEGATIVE mg/dL (NEGATIVE)   06/14/20  17:46    


 


Urine Ketones  NEGATIVE mg/dL (NEGATIVE)   06/14/20  17:46    


 


Urine Blood  NEGATIVE  (NEGATIVE)   06/14/20  17:46    


 


Urine Nitrite  NEGATIVE  (NEGATIVE)   06/14/20  17:46    


 


Ur Leukocyte Esterase  NEGATIVE  (NEGATIVE)   06/14/20  17:46    


 


Urine WBC (Auto)  1 /HPF  06/14/20  17:46    


 


Urine RBC (Auto)  0 /HPF  06/14/20  17:46    








                                        











  06/14/20 06/14/20 06/15/20





  17:46 17:46 01:38


 


Creatine Kinase  289 H  


 


Troponin I   0.025  0.028


 


NT-Pro-B Natriuret Pep   8780 H 














  06/15/20 06/15/20 06/15/20





  01:38 07:35 13:18


 


Creatine Kinase  212 H  


 


Troponin I   0.028  0.024


 


NT-Pro-B Natriuret Pep   














  06/15/20 06/17/20





  19:57 09:11


 


Creatine Kinase  


 


Troponin I  0.021 


 


NT-Pro-B Natriuret Pep   2400 H








                             Current Medication List











Generic Name Dose Route Start Last Admin





  Trade Name Freq  PRN Reason Stop Dose Admin


 


Acetaminophen  650 mg  06/15/20 03:39 





  Tylenol 325 Mg Tablet  PO  07/15/20 03:38 





  Q4HP PRN  





  pain or temp greater than 101F  


 


Al Hydrox/Mg Hydrox/Simethicone  30 ml  06/15/20 03:39 





  Maalox Plus Susp 30 Udcup  PO  07/15/20 03:38 





  Q4HP PRN  





  HEARTBURN  


 


Aspirin  81 mg  06/15/20 10:00  06/19/20 09:18





  Ecotrin 81 Mg Ec Tablet  PO  07/15/20 09:59  81 mg





  DAILY LISY   Administration


 


Diazepam  10 mg  06/19/20 18:00  06/20/20 05:31





  Valium 5 Mg Tablet  PO  06/26/20 17:59  10 mg





  Q6 LISY   Administration


 


Folic Acid  1 mg  06/18/20 10:00  06/19/20 09:18





  Folvite 1 Mg Tablet  PO  07/18/20 09:59  1 mg





  DAILY LISY   Administration


 


Furosemide  40 mg  06/20/20 10:00 





  Lasix Inj/Pf 40 Mg/4 Ml Sdv  IV  07/20/20 09:59 





  DAILY LISY  


 


Haloperidol Lactate  5 mg  06/19/20 17:29  06/19/20 20:48





  Haldol 5 Mg/Ml Inj 1 Ml Vial  IM  07/19/20 17:28  5 mg





  Q6HP PRN   Administration





  RESTLESSNESS/AGITATION  


 


Heparin Sodium (Porcine)  5,000 unit  06/15/20 06:00  06/20/20 05:31





  Heparin Inj 5,000 Units/Ml 1 Ml Vial  SUBCUT  07/15/20 05:59  5,000 unit





  Q8 LISY   Administration


 


Levalbuterol HCl  1.25 mg  06/15/20 08:00  06/19/20 23:55





  Xopenex Neb 1.25 Mg/3 Ml Ampul  NEB  07/15/20 07:59  1.25 mg





  RTQ8 LISY   Administration


 


Lisinopril  10 mg  06/19/20 10:00  06/19/20 09:18





  Prinivil 10 Mg Tablet  PO  07/19/20 09:59  10 mg





  DAILY LISY   Administration


 


Metoprolol Succinate  50 mg  06/19/20 10:00  06/19/20 09:18





  Toprol Xl 50 Mg Tab.Sr  PO  07/19/20 09:59  50 mg





  DAILY LISY   Administration


 


Nicotine  1 each  06/16/20 10:20  06/19/20 09:18





  Nicoderm 14 Mg/24 Hr Transdermal Patch  TD  07/16/20 10:19  1 each





  DAILYP PRN   Administration





  SMOKING CESSATION  


 


Pantoprazole Sodium  20 mg  06/15/20 06:00  06/20/20 05:31





  Protonix 20 Mg Dr Tablet  PO  07/15/20 05:59  20 mg





  Q6AM LISY   Administration


 


Sodium Chloride  2.5 ml  06/15/20 06:00  06/20/20 05:31





  Saline Flush 2.5 Ml Monoject Prefil Syrin  IV  07/15/20 05:59  2.5 ml





  Q8 LISY   Administration














Discontinued Medications














Generic Name Dose Route Start Last Admin





  Trade Name Freq  PRN Reason Stop Dose Admin


 


Aspirin  324 mg  06/15/20 00:57  06/15/20 01:20





  Aspirin 81 Mg Chewable Tablet  PO  06/15/20 00:58  324 mg





  NOW ONE   Administration


 


Diazepam  5 mg  06/15/20 00:27  06/15/20 00:56





  Valium 5 Mg Tablet  PO  06/15/20 00:28  5 mg





  NOW ONE   Administration


 


Diazepam  2.5 mg  06/15/20 06:00  06/16/20 14:25





  Valium 5 Mg Tablet  PO  06/22/20 05:59  2.5 mg





  Q8 LISY   Administration


 


Diazepam  10 mg  06/19/20 01:12  06/19/20 05:45





  Valium Inj 10 Mg/2 Ml Disp.Syrin  IV  06/26/20 01:11  10 mg





  Q1HP PRN   Administration





  AGITATION/ANXIETY  


 


Diazepam  10 mg  06/19/20 01:12 





  Valium 5 Mg Tablet  PO  06/26/20 01:11 





  Q4HP PRN  





  AGITATION  


 


Diazepam  10 mg  06/19/20 02:00  06/19/20 13:42





  Valium 5 Mg Tablet  PO  06/26/20 01:11  10 mg





  Q4 LISY   Administration


 


Diltiazem HCl  90 mg  06/15/20 03:35  06/15/20 04:26





  Cardizem 90 Mg Tablet  PO  06/15/20 03:36  Not Given





  NOW ONE  


 


Diltiazem HCl  60 mg  06/15/20 06:00 





  Cardizem 60 Mg Tablet  PO  07/15/20 05:59 





  Q6 LISY  


 


Diltiazem HCl  60 mg  06/15/20 12:00  06/16/20 05:59





  Cardizem 60 Mg Tablet  PO  07/15/20 11:59  60 mg





  Q6 LISY   Administration


 


Folic Acid  1 mg  06/15/20 00:27  06/15/20 00:56





  Folvite 1 Mg Tablet  PO  06/15/20 00:28  1 mg





  NOW ONE   Administration


 


Furosemide  20 mg  06/15/20 03:31  06/15/20 03:45





  Lasix Inj/Pf 20 Mg/2 Ml Sdv  IV  06/15/20 03:32  20 mg





  NOW ONE   Administration


 


Furosemide  20 mg  06/16/20 18:04  06/16/20 18:42





  Lasix 20 Mg Tablet  PO  06/16/20 18:05  20 mg





  NOW ONE   Administration


 


Furosemide  20 mg  06/17/20 10:00  06/18/20 09:15





  Lasix 20 Mg Tablet  PO  07/17/20 09:59  20 mg





  DAILY LISY   Administration


 


Furosemide  20 mg  06/17/20 12:00  06/17/20 17:51





  Lasix Inj/Pf 20 Mg/2 Ml Sdv  IV  06/17/20 18:01  20 mg





  Q6H LISY   Administration


 


Furosemide  40 mg  06/18/20 20:02  06/18/20 22:42





  Lasix Inj/Pf 40 Mg/4 Ml Sdv  IV  06/18/20 20:03  40 mg





  NOW ONE   Administration


 


Furosemide  40 mg  06/18/20 22:30  06/18/20 22:44





  Lasix Inj/Pf 40 Mg/4 Ml Sdv  IV  06/18/20 22:31  Not Given





  NOW ONE  


 


Furosemide  40 mg  06/19/20 10:00  06/19/20 09:18





  Lasix 40 Mg Tablet  PO  07/19/20 09:59  40 mg





  DAILY LISY   Administration


 


Sodium Chloride  500 mls @ 0 mls/hr  06/14/20 22:09  06/15/20 01:34





  Nacl 0.9% 500 Ml Iv Soln  IV  06/14/20 22:10  Infused





  NOW ONE   Infusion





  Wide Open  


 


Thiamine HCl 100 mg/ Folic  251.2 mls @ 502.4 mls/hr  06/15/20 10:00  06/17/20 

11:40





  Acid 1 mg/ Sodium Chloride  IV  07/15/20 09:59  502.4 mls/hr





  DAILY LISY   Administration


 


Dobutamine HCl/Dextrose  500 mg in 250 mls @ 0 mls/hr  06/17/20 13:58  06/17/20 

15:36





  Dobutrex Rtu 500 Mg-D5w 250 Ml Premixed Bag  IV  07/17/20 13:57  5.65 mls/hr





  CONTINUOUS PRN   5.65 mls/hr





  THIS MED IS NOT "PRN"   Administration





  Protocol  





  Titrate  


 


Lisinopril  5 mg  06/16/20 10:00  06/18/20 09:15





  Prinivil 5 Mg Tablet  PO  07/16/20 09:59  5 mg





  DAILY LISY   Administration


 


Lorazepam  2 mg  06/15/20 03:39 





  Ativan Inj 2 Mg/1 Ml Vial  IV  06/22/20 03:38 





  Q2HP PRN  





  ANXIETY/AGITATION  


 


Lorazepam  2 mg  06/16/20 18:06  06/19/20 16:11





  Ativan Inj 2 Mg/1 Ml Vial  IV  06/22/20 03:38  2 mg





  Q2HP PRN   Administration





  SEE LABEL COMMENTS  


 


Magnesium Oxide  400 mg  06/19/20 08:45  06/19/20 09:18





  Mag-Ox 400 Mg Tablet  PO  06/19/20 08:46  400 mg





  NOW ONE   Administration


 


Metoprolol Succinate  25 mg  06/16/20 10:00  06/16/20 12:14





  Toprol Xl 25 Mg Tab.Sr  PO  07/16/20 09:59  25 mg





  Q12 LISY   Administration


 


Metoprolol Succinate  25 mg  06/17/20 10:00  06/18/20 09:15





  Toprol Xl 25 Mg Tab.Sr  PO  07/17/20 09:59  25 mg





  DAILY LISY   Administration


 


Multivitamins  1 tab  06/15/20 00:26  06/15/20 00:56





  Tab-A-Leidy (Multiple Vitamin) Tablet  PO  06/15/20 00:27  1 tab





  NOW ONE   Administration


 


Thiamine HCl  100 mg  06/14/20 22:10  06/15/20 01:20





  Thiamine 100 Mg Tablet  PO  06/14/20 22:11  100 mg





  NOW ONE   Administration


 


Thiamine HCl  100 mg  06/15/20 02:00  06/15/20 01:33





  Thiamine 100 Mg Tablet  PO  06/15/20 02:01  Not Given





  NOW ONE  














Assessment & Plan





- Diagnosis


(1) Acute systolic (congestive) heart failure


Is this a current diagnosis for this admission?: Yes   


Plan: 


Likely acute exacerbation of chronic systolic congestive heart failure with an 

ejection fraction between 20 and 25% and RV dysfunction.  His blood pressure is 

now at goal and he appears very comfortable laying flat in bed.  He denies 

shortness of breath this morning.  Unfortunately his urine output is unreliable 

as the patient does not measure it consistently.  Of concern is his short 

episode of nonsustained VT therefore we will increase his metoprolol..





Recommendations:


-Chest x-ray today.


-Restrict fluid intake to 1500 cc daily.


-Low sodium diet, less than 1500 mg daily.


-Strict intake and output.


-Daily weights.


-Continue with lisinopril at current doses for now.


-Increase Toprol-XL to 100 mg daily.


-Continue with IV Lasix 40 mg daily.


-The patient will require an ischemic assessment prior to discharge and when he 

is more stable.


-Replace electrolytes as needed.


-Please note that outpatient follow-up on this gentleman will be very difficult 

to obtain given his homelessness and his other social issues to include 

marijuana use.








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


Is this a current diagnosis for this admission?: Yes   


Plan: 


Likely secondary to alcohol although ischemic etiology needs to be excluded.





Recommendations:


-Please see #1 above for recommendations.








(3) Alcohol dependence


Is this a current diagnosis for this admission?: Yes   





(4) Paroxysmal atrial fibrillation


Is this a current diagnosis for this admission?: Yes   


Plan: 


The patient is remains in normal sinus rhythm.  His calculated chads 2 vascular 

score is currently 1, given his social issues, drug use and difficulties 

obtaining follow-up we will hold off on full anticoagulation for now.  Continue 

cardiac telemetry please.

## 2020-06-20 NOTE — PDOC PROGRESS REPORT
Subjective


Progress Note for:: 06/20/20


Subjective:: 





Patient denies any shortness of breath at this time.  Denies any chest pain.  

His affect is very poor.  Seems very nonchalant.  Reported by nursing staff that

patient from urinates and defecates over himself and he is unsteady on his feet.

 Also noted to have moments of confusion.


Reason For Visit: 


ALCOHOLIC CARDIOMYOPATHY,HEART FAILURE








Physical Exam


Vital Signs: 


                                        











Temp Pulse Resp BP Pulse Ox


 


 98.3 F   91   20   157/111 H  99 


 


 06/20/20 11:37  06/20/20 14:00  06/20/20 11:37  06/20/20 11:37  06/20/20 11:37








                                 Intake & Output











 06/19/20 06/20/20 06/21/20





 06:59 06:59 06:59


 


Intake Total 1056 120 


 


Output Total 300  


 


Balance 756 120 


 


Weight 69.3 kg 62.9 kg 











General appearance: PRESENT: no acute distress, cooperative


Neck exam: ABSENT: JVD


Respiratory exam: PRESENT: clear to auscultation ana paula, unlabored.  ABSENT: 

tachypnea, wheezes


Cardiovascular exam: PRESENT: RRR, +S1, +S2.  ABSENT: tachycardia


GI/Abdominal exam: PRESENT: soft.  ABSENT: rebound, rigid, tenderness


Neurological exam: PRESENT: alert, awake, oriented to person, oriented to place.

 ABSENT: oriented to time


Psychiatric exam: ABSENT: agitated, anxious


Focused psych exam: PRESENT: other - Psychomotor retardation





Results


Laboratory Results: 


                                        





                                 06/17/20 09:11 





                                 06/20/20 06:35 





                                        











  06/20/20





  06:35


 


Sodium  134.9 L


 


Potassium  4.2


 


Chloride  100


 


Carbon Dioxide  26


 


Anion Gap  9


 


BUN  13


 


Creatinine  0.83


 


Est GFR (African Amer)  > 60


 


Glucose  97


 


Calcium  9.1








                                        











  06/14/20 06/14/20 06/15/20





  17:46 17:46 01:38


 


Creatine Kinase  289 H  


 


Troponin I   0.025  0.028


 


NT-Pro-B Natriuret Pep   8780 H 














  06/15/20 06/15/20 06/15/20





  01:38 07:35 13:18


 


Creatine Kinase  212 H  


 


Troponin I   0.028  0.024


 


NT-Pro-B Natriuret Pep   














  06/15/20 06/17/20





  19:57 09:11


 


Creatine Kinase  


 


Troponin I  0.021 


 


NT-Pro-B Natriuret Pep   2400 H











Impressions: 


                                        





Chest X-Ray  06/20/20 00:00


IMPRESSION:  Minimal right lung and left basilar opacities.  Unlikely to 

represent aspiration.


 














Assessment and Plan





- Diagnosis


(1) Acute systolic (congestive) heart failure


Is this a current diagnosis for this admission?: Yes   





(2) Encephalopathy, metabolic


Is this a current diagnosis for this admission?: Yes   





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


Is this a current diagnosis for this admission?: Yes   





(4) Paroxysmal atrial fibrillation


Is this a current diagnosis for this admission?: Yes   





(5) Alcohol dependence


Is this a current diagnosis for this admission?: Yes   





(6) Homeless


Is this a current diagnosis for this admission?: Yes   





(7) Tobacco abuse


Is this a current diagnosis for this admission?: Yes   





(8) Debility


Is this a current diagnosis for this admission?: Yes   





- Plan Summary


Summary: 


Discussed with patient about his cardiomyopathy noted on echo with EF of 20 to 

25% and biventricular dilation.  Picture appears to be typical for alcoholic 

cardiomyopathy.  He denies any history of prior CHF.


I have started patient on low-dose lisinopril, Toprol-XL and Lasix.  

Electrolytes in the morning.


He will need to be set up with cardiology for outpatient follow-up prior to 

discharge.


Does not appear to be overtly fluid overloaded at this moment.


CIWA and as needed Ativan as needed.  Will discontinue diazepam this evening.


Alcohol cessation and tobacco cessation counseling performed.





6/17/2020


Patient hypothermic to 94F this morning.  Lactic acid also elevated at 3.1.  

Cool extremities noted.


Chest x-ray showing interstitial opacities possibly pulmonary edema without 

pneumonia.  Prominent JVD noted on exam.  Urinalysis on 614 was negative.  Will 

repeat.


Currently doubt that patient's presentation is due to an infection as he has no 

complaints concerning for infection no other findings.


Patient has been placed on a jessica hugger with improvement to 97F.


Concern for possible low output heart failure especially given his dilated 

cardiomyopathy with biventricular failure.


I have consulted Dr. Diaz


Will consider placing patient on milrinone drip and repeat lactic acid later.  

Patient currently not hypotensive.  Will check liver enzymes and Bnp.


IV Lasix, continue lisinopril and beta-blocker.





6/18/2020


Hypothermia has resolved at this time as well as lactic acidosis.  Extremities 

feel warm today.


Started patient on dobutamine low-dose yesterday as milrinone drip can only be 

done in the ICU.  This seems to have led to resolution of hypothermia lactic 

acidosis.  We will maintain on dopamine drip for today.  Continue lisinopril and

beta-blocker.


Continue to monitor vital signs closely in the IMCU.  EKG in the morning


Cardiology following.





6/19/2020


Dobutamine drip discontinued.  Continue Toprol and lisinopril.  Diuretics armstrong

ed to IV Lasix per Cardiology recommendation.


I believe patient likely has encephalopathy that is likely chronic from his 

chronic alcohol abuse.  There is a very good chance that he has Wernicke's enc

ephalopathy.


I do not think he is mental status is as a result of alcohol withdrawal.


I will discontinue Ativan.  I will give some standing diazepam.  I will order 

for Haldol IM as needed.  Redirection if possible.  Currently in restraints.


I have called patient's brother to try to get a better sense of patient's 

baseline mental status but no response.  Left voice message to call back 

tomorrow or before end of shift today.





6/20/2020


Toprol-XL on lisinopril 10 mg daily which I will increase if patient remains 

hypertensive.  IV Lasix.  Cardiology following and recommending ischemic 

evaluation prior to discharge.


Patient certainly has cognitive impairment unlikely chronic encephalopathy from 

his chronic alcohol abuse.  It is very possible that he may have a component of 

Wernicke's especially given his unsteady gait.  


Despite patient's hypotension and tachycardia, patient is not showing any 

tremulousness, damp skin, diaphoresis, agitation or other physical signs 

suggestive of alcohol withdrawal and I do not believe him to be withdrawing at 

this moment.


I will check B12 and folic acid levels.


Continue IV thiamine supplements.


I have asked psychiatry to evaluate patient for capacity.  Also discussed with 

patient's brother.


Physical therapy will need to evaluate patient and help to walk strength.








- Time


Time Spent with patient: 15-24 minutes

## 2020-06-21 LAB
ANION GAP SERPL CALC-SCNC: 7 MMOL/L (ref 5–19)
BUN SERPL-MCNC: 13 MG/DL (ref 7–20)
CALCIUM: 8.5 MG/DL (ref 8.4–10.2)
CHLORIDE SERPL-SCNC: 104 MMOL/L (ref 98–107)
CO2 SERPL-SCNC: 23 MMOL/L (ref 22–30)
FOLATE SERPL-MCNC: 15.6 NG/ML (ref 2.76–?)
GLUCOSE SERPL-MCNC: 109 MG/DL (ref 75–110)
POTASSIUM SERPL-SCNC: 4.4 MMOL/L (ref 3.6–5)

## 2020-06-21 RX ADMIN — ASPIRIN SCH MG: 81 TABLET, COATED ORAL at 10:52

## 2020-06-21 RX ADMIN — FUROSEMIDE SCH MG: 10 INJECTION, SOLUTION INTRAMUSCULAR; INTRAVENOUS at 10:52

## 2020-06-21 RX ADMIN — FOLIC ACID SCH MG: 1 TABLET ORAL at 10:53

## 2020-06-21 RX ADMIN — LEVALBUTEROL HYDROCHLORIDE SCH MG: 1.25 SOLUTION RESPIRATORY (INHALATION) at 16:19

## 2020-06-21 RX ADMIN — LEVALBUTEROL HYDROCHLORIDE SCH MG: 1.25 SOLUTION RESPIRATORY (INHALATION) at 00:34

## 2020-06-21 RX ADMIN — AMPICILLIN SODIUM AND SULBACTAM SODIUM SCH MLS/HR: 2; 1 INJECTION, POWDER, FOR SOLUTION INTRAMUSCULAR; INTRAVENOUS at 17:56

## 2020-06-21 RX ADMIN — LEVALBUTEROL HYDROCHLORIDE SCH MG: 1.25 SOLUTION RESPIRATORY (INHALATION) at 08:43

## 2020-06-21 RX ADMIN — Medication SCH ML: at 05:08

## 2020-06-21 RX ADMIN — METOPROLOL SUCCINATE SCH MG: 50 TABLET, EXTENDED RELEASE ORAL at 10:52

## 2020-06-21 RX ADMIN — AMPICILLIN SODIUM AND SULBACTAM SODIUM SCH MLS/HR: 2; 1 INJECTION, POWDER, FOR SOLUTION INTRAMUSCULAR; INTRAVENOUS at 11:15

## 2020-06-21 RX ADMIN — PANTOPRAZOLE SODIUM SCH MG: 20 TABLET, DELAYED RELEASE ORAL at 05:07

## 2020-06-21 RX ADMIN — Medication SCH ML: at 22:17

## 2020-06-21 RX ADMIN — HEPARIN SODIUM SCH UNIT: 5000 INJECTION, SOLUTION INTRAVENOUS; SUBCUTANEOUS at 22:17

## 2020-06-21 RX ADMIN — HEPARIN SODIUM SCH UNIT: 5000 INJECTION, SOLUTION INTRAVENOUS; SUBCUTANEOUS at 05:06

## 2020-06-21 RX ADMIN — HEPARIN SODIUM SCH UNIT: 5000 INJECTION, SOLUTION INTRAVENOUS; SUBCUTANEOUS at 13:53

## 2020-06-21 RX ADMIN — Medication SCH ML: at 17:56

## 2020-06-21 RX ADMIN — LISINOPRIL SCH MG: 10 TABLET ORAL at 10:52

## 2020-06-21 NOTE — PDOC PROGRESS REPORT
Subjective


Progress Note for:: 06/21/20


Subjective:: 





KIESHA WINN is a 64 year old homeless male with history of alcohol 

dependence, paroxysmal atrial fibrillation, who has been followed by my partner,

Dr. Diaz, for what appears to be new onset cardiomyopathy with an ejection 

fraction between 20 and 25% as well as new onset of heart failure with reduced 

ejection fraction.  He apparently had been stable until yesterday or the day 

before when he became hypothermic, with significant JVD and shortness of breath.

 At that point he was placed on low-dose dobutamine.  Yesterday evening the 

nursing staff found the patient sitting at the edge of the bed covered in feces.

 He had pulled out his IV and dobutamine which is running onto the floor.  He 

was cleaned up and placed back in the bed.  This morning he is found sitting at 

the edge of the bed after finishing breakfast and without any cardiac 

complaints.  He is eager to go home as soon as possible.  His telemetry shows 

normal sinus rhythm with episodes of sinus tachycardia and PVCs.  His proBNP was

elevated at 2400, he tested positive for marijuana and his most recent chemistry

shows hyponatremia.  His blood pressure is stable.  Unfortunately he is not 

measuring his urine therefore accurate urinary output is not possible.





06/21/2020:


The patient spiked a fever last night associated with a decrease in his pulse ox

down to 85% needing oxygen by nasal cannula.  He was provided with Tylenol and 

Motrin with resolution of his fever.  This morning he is more awake than 

yesterday and asking for his breakfast.  He denies chest pain and shortness of 

breath.  His most recent BNP is actually worst than on admission at 6800 

although his exam is benign.  His telemetry shows normal sinus rhythm with 

episodes of supraventricular tachycardia some of which appear to be atrial 

tachycardia.





Physical exam on 06/21/2020:


GENERAL: Awake and cooperative.  Oriented x3.


HEENT:  Normocephalic, atraumatic.  Pupils equal.   Sclerae anicteric.  

Oropharynx moist. 


NECK:  No JVD.  No carotid bruits. 


LUNGS:  Clear to auscultation bilaterally.  Normal respiratory effort without 

the use of accessory muscles or intercostal retractions.  


CARDIOVASCULAR:  Regular rate and rhythm, normal S1 and S2 without murmurs, 

rubs, or gallops.  PMI not displaced.


EXTREMITIES:  No pretibial edema, very mild edema in the dorsum of both feet, no

cyanosis, no clubbing.  +2 pulses femoral and pedal pulses bilaterally.  


SKIN: No lesions or rashes.


MUSCULOSKELETAL:  No chest tenderness to palpation. 


NEUROLOGIC: Nonfocal.  No gross sensory or motor deficits bilateral upper or l

ower extremities.





Echocardiogram on 06/15/2020:


-LV systolic function is moderately to severely reduced.


-EF 20 to 25%.


-RV systolic function is moderately reduced.


-Mild MR, mild to moderate TR.


-Minimal pericardial effusion.





Reason For Visit: 


ALCOHOLIC CARDIOMYOPATHY,HEART FAILURE








Physical Exam


Vital Signs: 


                                        











Temp Pulse Resp BP Pulse Ox


 


 97.1 F   104 H  20   113/85   100 


 


 06/21/20 03:34  06/21/20 03:34  06/21/20 03:34  06/21/20 03:34  06/21/20 03:34








                                 Intake & Output











 06/19/20 06/20/20 06/21/20





 06:59 06:59 06:59


 


Intake Total 1056 120 100


 


Output Total 300  20


 


Balance 756 120 80


 


Weight 69.3 kg 62.9 kg 65.9 kg














Results


Laboratory Results: 


                                        





                                 06/17/20 09:11 





                                 06/21/20 05:54 





                                        











  06/20/20 06/21/20





  06:35 05:54


 


Sodium  134.9 L  133.5 L


 


Potassium  4.2  4.4


 


Chloride  100  104


 


Carbon Dioxide  26  23


 


Anion Gap  9  7


 


BUN  13  13


 


Creatinine  0.83  0.78


 


Est GFR ( Amer)  > 60  > 60


 


Glucose  97  109


 


Calcium  9.1  8.5








                                        











  06/14/20 06/14/20 06/15/20





  17:46 17:46 01:38


 


Creatine Kinase  289 H  


 


Troponin I   0.025  0.028


 


NT-Pro-B Natriuret Pep   8780 H 














  06/15/20 06/15/20 06/15/20





  01:38 07:35 13:18


 


Creatine Kinase  212 H  


 


Troponin I   0.028  0.024


 


NT-Pro-B Natriuret Pep   














  06/15/20 06/17/20





  19:57 09:11


 


Creatine Kinase  


 


Troponin I  0.021 


 


NT-Pro-B Natriuret Pep   2400 H











Impressions: 


                                        





Chest X-Ray  06/20/20 00:00


IMPRESSION:  Minimal right lung and left basilar opacities.  Unlikely to 

represent aspiration.


 








                                        





                                 06/17/20 09:11 





                                 06/21/20 05:54 





                                        











MCV  87 fl (80-97)   06/17/20  09:11    


 


MCH  28.9 pg (27.0-33.4)   06/17/20  09:11    


 


MCHC  33.1 g/dL (32.0-36.0)   06/17/20  09:11    


 


RDW  15.2 % (11.5-14.0)  H  06/17/20  09:11    


 


Seg Neutrophils %  53.4 % (42-78)   06/17/20  09:11    


 


Chloride  104 mmol/L ()   06/21/20  05:54    


 


Carbon Dioxide  23 mmol/L (22-30)   06/21/20  05:54    


 


Anion Gap  7  (5-19)   06/21/20  05:54    


 


Est GFR ( Amer)  > 60  (>60)   06/21/20  05:54    


 


Glucose  109 mg/dL ()   06/21/20  05:54    


 


Lactic Acid  1.6 mmol/L (0.7-2.1)   06/18/20  06:25    


 


Calcium  8.5 mg/dL (8.4-10.2)   06/21/20  05:54    


 


Magnesium  1.9 mg/dL (1.6-2.3)   06/19/20  06:32    


 


Total Bilirubin  1.2 mg/dL (0.2-1.3)   06/19/20  06:32    


 


AST  53 U/L (17-59)   06/19/20  06:32    


 


Alkaline Phosphatase  133 U/L ()  H  06/19/20  06:32    


 


Total Protein  6.9 g/dL (6.3-8.2)   06/19/20  06:32    


 


Albumin  3.5 g/dL (3.5-5.0)   06/19/20  06:32    


 


TSH  3.62 uIU/mL (0.47-4.68)   06/14/20  17:46    


 


Urine Color  MANDI   06/14/20  17:46    


 


Urine Appearance  SLIGHTLY-CLOUDY   06/14/20  17:46    


 


Urine pH  6.0  (5.0-9.0)   06/14/20  17:46    


 


Ur Specific Gravity  1.021   06/14/20  17:46    


 


Urine Protein  100 mg/dL (NEGATIVE)  H  06/14/20  17:46    


 


Urine Glucose (UA)  NEGATIVE mg/dL (NEGATIVE)   06/14/20  17:46    


 


Urine Ketones  NEGATIVE mg/dL (NEGATIVE)   06/14/20  17:46    


 


Urine Blood  NEGATIVE  (NEGATIVE)   06/14/20  17:46    


 


Urine Nitrite  NEGATIVE  (NEGATIVE)   06/14/20  17:46    


 


Ur Leukocyte Esterase  NEGATIVE  (NEGATIVE)   06/14/20  17:46    


 


Urine WBC (Auto)  1 /HPF  06/14/20  17:46    


 


Urine RBC (Auto)  0 /HPF  06/14/20  17:46    








                                        











  06/14/20 06/14/20 06/15/20





  17:46 17:46 01:38


 


Creatine Kinase  289 H  


 


Troponin I   0.025  0.028


 


NT-Pro-B Natriuret Pep   8780 H 














  06/15/20 06/15/20 06/15/20





  01:38 07:35 13:18


 


Creatine Kinase  212 H  


 


Troponin I   0.028  0.024


 


NT-Pro-B Natriuret Pep   














  06/15/20 06/17/20





  19:57 09:11


 


Creatine Kinase  


 


Troponin I  0.021 


 


NT-Pro-B Natriuret Pep   2400 H








                             Current Medication List











Generic Name Dose Route Start Last Admin





  Trade Name Freq  PRN Reason Stop Dose Admin


 


Acetaminophen  650 mg  06/15/20 03:39  06/20/20 20:01





  Tylenol 325 Mg Tablet  PO  07/15/20 03:38  650 mg





  Q4HP PRN   Administration





  pain or temp greater than 101F  


 


Al Hydrox/Mg Hydrox/Simethicone  30 ml  06/15/20 03:39 





  Maalox Plus Susp 30 Udcup  PO  07/15/20 03:38 





  Q4HP PRN  





  HEARTBURN  


 


Aspirin  81 mg  06/15/20 10:00  06/20/20 12:13





  Ecotrin 81 Mg Ec Tablet  PO  07/15/20 09:59  81 mg





  DAILY LISY   Administration


 


Folic Acid  1 mg  06/18/20 10:00  06/20/20 12:13





  Folvite 1 Mg Tablet  PO  07/18/20 09:59  1 mg





  DAILY LISY   Administration


 


Furosemide  40 mg  06/20/20 10:00  06/20/20 11:01





  Lasix Inj/Pf 40 Mg/4 Ml Sdv  IV  07/20/20 09:59  40 mg





  DAILY LISY   Administration


 


Haloperidol Lactate  5 mg  06/19/20 17:29  06/20/20 20:00





  Haldol 5 Mg/Ml Inj 1 Ml Vial  IM  07/19/20 17:28  5 mg





  Q6HP PRN   Administration





  RESTLESSNESS/AGITATION  


 


Heparin Sodium (Porcine)  5,000 unit  06/15/20 06:00  06/21/20 05:06





  Heparin Inj 5,000 Units/Ml 1 Ml Vial  SUBCUT  07/15/20 05:59  5,000 unit





  Q8 LISY   Administration


 


Levalbuterol HCl  1.25 mg  06/15/20 08:00  06/21/20 00:34





  Xopenex Neb 1.25 Mg/3 Ml Ampul  NEB  07/15/20 07:59  1.25 mg





  RTQ8 LISY   Administration


 


Lisinopril  10 mg  06/19/20 10:00  06/20/20 12:13





  Prinivil 10 Mg Tablet  PO  07/19/20 09:59  10 mg





  DAILY LISY   Administration


 


Metoprolol Succinate  100 mg  06/20/20 10:00  06/20/20 12:13





  Toprol Xl 50 Mg Tab.Sr  PO  07/20/20 09:59  100 mg





  DAILY LISY   Administration


 


Nicotine  1 each  06/16/20 10:20  06/19/20 09:18





  Nicoderm 14 Mg/24 Hr Transdermal Patch  TD  07/16/20 10:19  1 each





  DAILYP PRN   Administration





  SMOKING CESSATION  


 


Pantoprazole Sodium  20 mg  06/15/20 06:00  06/21/20 05:07





  Protonix 20 Mg Dr Tablet  PO  07/15/20 05:59  20 mg





  Q6AM LISY   Administration


 


Sodium Chloride  2.5 ml  06/15/20 06:00  06/21/20 05:08





  Saline Flush 2.5 Ml Monoject Prefil Syrin  IV  07/15/20 05:59  2.5 ml





  Q8 LISY   Administration














Discontinued Medications














Generic Name Dose Route Start Last Admin





  Trade Name Freq  PRN Reason Stop Dose Admin


 


Aspirin  324 mg  06/15/20 00:57  06/15/20 01:20





  Aspirin 81 Mg Chewable Tablet  PO  06/15/20 00:58  324 mg





  NOW ONE   Administration


 


Diazepam  5 mg  06/15/20 00:27  06/15/20 00:56





  Valium 5 Mg Tablet  PO  06/15/20 00:28  5 mg





  NOW ONE   Administration


 


Diazepam  2.5 mg  06/15/20 06:00  06/16/20 14:25





  Valium 5 Mg Tablet  PO  06/22/20 05:59  2.5 mg





  Q8 LISY   Administration


 


Diazepam  10 mg  06/19/20 01:12  06/19/20 05:45





  Valium Inj 10 Mg/2 Ml Disp.Syrin  IV  06/26/20 01:11  10 mg





  Q1HP PRN   Administration





  AGITATION/ANXIETY  


 


Diazepam  10 mg  06/19/20 01:12 





  Valium 5 Mg Tablet  PO  06/26/20 01:11 





  Q4HP PRN  





  AGITATION  


 


Diazepam  10 mg  06/19/20 02:00  06/19/20 13:42





  Valium 5 Mg Tablet  PO  06/26/20 01:11  10 mg





  Q4 LISY   Administration


 


Diazepam  10 mg  06/19/20 18:00  06/20/20 05:31





  Valium 5 Mg Tablet  PO  06/26/20 17:59  10 mg





  Q6 LISY   Administration


 


Diltiazem HCl  90 mg  06/15/20 03:35  06/15/20 04:26





  Cardizem 90 Mg Tablet  PO  06/15/20 03:36  Not Given





  NOW ONE  


 


Diltiazem HCl  60 mg  06/15/20 06:00 





  Cardizem 60 Mg Tablet  PO  07/15/20 05:59 





  Q6 LISY  


 


Diltiazem HCl  60 mg  06/15/20 12:00  06/16/20 05:59





  Cardizem 60 Mg Tablet  PO  07/15/20 11:59  60 mg





  Q6 LISY   Administration


 


Folic Acid  1 mg  06/15/20 00:27  06/15/20 00:56





  Folvite 1 Mg Tablet  PO  06/15/20 00:28  1 mg





  NOW ONE   Administration


 


Furosemide  20 mg  06/15/20 03:31  06/15/20 03:45





  Lasix Inj/Pf 20 Mg/2 Ml Sdv  IV  06/15/20 03:32  20 mg





  NOW ONE   Administration


 


Furosemide  20 mg  06/16/20 18:04  06/16/20 18:42





  Lasix 20 Mg Tablet  PO  06/16/20 18:05  20 mg





  NOW ONE   Administration


 


Furosemide  20 mg  06/17/20 10:00  06/18/20 09:15





  Lasix 20 Mg Tablet  PO  07/17/20 09:59  20 mg





  DAILY LISY   Administration


 


Furosemide  20 mg  06/17/20 12:00  06/17/20 17:51





  Lasix Inj/Pf 20 Mg/2 Ml Sdv  IV  06/17/20 18:01  20 mg





  Q6H LISY   Administration


 


Furosemide  40 mg  06/18/20 20:02  06/18/20 22:42





  Lasix Inj/Pf 40 Mg/4 Ml Sdv  IV  06/18/20 20:03  40 mg





  NOW ONE   Administration


 


Furosemide  40 mg  06/18/20 22:30  06/18/20 22:44





  Lasix Inj/Pf 40 Mg/4 Ml Sdv  IV  06/18/20 22:31  Not Given





  NOW ONE  


 


Furosemide  40 mg  06/19/20 10:00  06/19/20 09:18





  Lasix 40 Mg Tablet  PO  07/19/20 09:59  40 mg





  DAILY LISY   Administration


 


Sodium Chloride  500 mls @ 0 mls/hr  06/14/20 22:09  06/15/20 01:34





  Nacl 0.9% 500 Ml Iv Soln  IV  06/14/20 22:10  Infused





  NOW ONE   Infusion





  Wide Open  


 


Thiamine HCl 100 mg/ Folic  251.2 mls @ 502.4 mls/hr  06/15/20 10:00  06/17/20 

11:40





  Acid 1 mg/ Sodium Chloride  IV  07/15/20 09:59  502.4 mls/hr





  DAILY LISY   Administration


 


Dobutamine HCl/Dextrose  500 mg in 250 mls @ 0 mls/hr  06/17/20 13:58  06/17/20 

15:36





  Dobutrex Rtu 500 Mg-D5w 250 Ml Premixed Bag  IV  07/17/20 13:57  5.65 mls/hr





  CONTINUOUS PRN   5.65 mls/hr





  THIS MED IS NOT "PRN"   Administration





  Protocol  





  Titrate  


 


Ibuprofen  800 mg  06/20/20 20:45  06/20/20 20:53





  Motrin 400 Mg Tablet  PO  06/20/20 20:46  800 mg





  NOW ONE   Administration


 


Ibuprofen  Confirm  06/20/20 20:52  06/20/20 21:54





  Motrin 800 Mg Tablet  Administered  06/20/20 20:53  Not Given





  Dose  





  800 mg  





  .ROUTE  





  .STK-MED ONE  


 


Lisinopril  5 mg  06/16/20 10:00  06/18/20 09:15





  Prinivil 5 Mg Tablet  PO  07/16/20 09:59  5 mg





  DAILY LISY   Administration


 


Lorazepam  2 mg  06/15/20 03:39 





  Ativan Inj 2 Mg/1 Ml Vial  IV  06/22/20 03:38 





  Q2HP PRN  





  ANXIETY/AGITATION  


 


Lorazepam  2 mg  06/16/20 18:06  06/19/20 16:11





  Ativan Inj 2 Mg/1 Ml Vial  IV  06/22/20 03:38  2 mg





  Q2HP PRN   Administration





  SEE LABEL COMMENTS  


 


Magnesium Oxide  400 mg  06/19/20 08:45  06/19/20 09:18





  Mag-Ox 400 Mg Tablet  PO  06/19/20 08:46  400 mg





  NOW ONE   Administration


 


Metoprolol Succinate  25 mg  06/16/20 10:00  06/16/20 12:14





  Toprol Xl 25 Mg Tab.Sr  PO  07/16/20 09:59  25 mg





  Q12 LISY   Administration


 


Metoprolol Succinate  25 mg  06/17/20 10:00  06/18/20 09:15





  Toprol Xl 25 Mg Tab.Sr  PO  07/17/20 09:59  25 mg





  DAILY LISY   Administration


 


Metoprolol Succinate  50 mg  06/19/20 10:00  06/19/20 09:18





  Toprol Xl 50 Mg Tab.Sr  PO  07/19/20 09:59  50 mg





  DAILY LISY   Administration


 


Multivitamins  1 tab  06/15/20 00:26  06/15/20 00:56





  Tab-A-Leidy (Multiple Vitamin) Tablet  PO  06/15/20 00:27  1 tab





  NOW ONE   Administration


 


Thiamine HCl  100 mg  06/14/20 22:10  06/15/20 01:20





  Thiamine 100 Mg Tablet  PO  06/14/20 22:11  100 mg





  NOW ONE   Administration


 


Thiamine HCl  100 mg  06/15/20 02:00  06/15/20 01:33





  Thiamine 100 Mg Tablet  PO  06/15/20 02:01  Not Given





  NOW ONE  














Assessment & Plan





- Diagnosis


(1) Acute systolic (congestive) heart failure


Is this a current diagnosis for this admission?: Yes   


Plan: 


Likely acute exacerbation of chronic systolic congestive heart failure with an 

ejection fraction between 20 and 25% and RV dysfunction.  His blood pressure is 

now at goal and he appears very comfortable laying flat in bed.  He did spike a 

fever last night of unclear etiology although his chest x-ray demonstrated 

bilateral basilar opacities.  Although his proBNP is worse than on admission, 

his clinical exam is not consistent with fluid overload any longer, there is no 

lower extremity edema, his lungs are clear and he is able to lay flat without 

any shortness of breath.  It has been very difficult to gauge his response to 

treatment because of the fact that he is not measuring his urine output given 

his mental status.





Recommendations:


-Restrict fluid intake to 1500 cc daily.


-Low sodium diet, less than 1500 mg daily.


-Strict intake and output.


-Daily weights.


-Continue with lisinopril at current doses for now.


-Increase Toprol-XL to 100 mg daily.


-Continue with IV Lasix 40 mg daily.


-Ideally the patient should get an ischemic assessment with nuclear stress test 

prior to discharge however I do not believe he fully understands his condition 

and certainly he will not be able to be compliant with cardiology follow-up 

given his homelessness.  I do not believe that the patient at this point is a 

candidate for invasive cardiovascular procedures.


-Replace electrolytes as needed.


-Please note that outpatient follow-up on this gentleman will be very difficult 

to obtain given his homelessness and his other social issues to include 

marijuana use.








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


Is this a current diagnosis for this admission?: Yes   


Plan: 


Likely secondary to alcohol although ischemic etiology needs to be excluded.





Recommendations:


-Please see #1 above for recommendations.








(3) Alcohol dependence


Is this a current diagnosis for this admission?: Yes   


Plan: 


Needs counseling.








(4) Paroxysmal atrial fibrillation


Is this a current diagnosis for this admission?: Yes   


Plan: 


The patient is remains in normal sinus rhythm.  His calculated chads 2 vascular 

score is currently 1, given his social issues, drug use and difficulties 

obtaining follow-up we will hold off on full anticoagulation for now.  Continue 

cardiac telemetry please.

## 2020-06-21 NOTE — PDOC PROGRESS REPORT
Subjective


Progress Note for:: 06/21/20


Subjective:: 





Patient denies any shortness of breath or chest pain today.  Appears comfortable

in bed.  Asking if he can get a ladder to climb down from his bed.


Reason For Visit: 


ALCOHOLIC CARDIOMYOPATHY,HEART FAILURE








Physical Exam


Vital Signs: 


                                        











Temp Pulse Resp BP Pulse Ox


 


 97.4 F   77   20   91/59 L  96 


 


 06/21/20 11:27  06/21/20 11:27  06/21/20 11:27  06/21/20 11:27  06/21/20 11:27








                                 Intake & Output











 06/20/20 06/21/20 06/22/20





 06:59 06:59 06:59


 


Intake Total 120 100 340


 


Output Total  20 


 


Balance 120 80 340


 


Weight 62.9 kg 65.9 kg 











General appearance: PRESENT: no acute distress, cooperative


Neck exam: ABSENT: JVD


Respiratory exam: PRESENT: clear to auscultation [place], unlabored.  ABSENT: 

tachypnea, wheezes


Cardiovascular exam: PRESENT: RRR, +S1, +S2.  ABSENT: tachycardia


GI/Abdominal exam: PRESENT: soft.  ABSENT: rebound, rigid, tenderness


Extremities exam: ABSENT: joint swelling, pedal edema, +1 edema, +2 edema


Neurological exam: PRESENT: alert, awake, oriented to person, oriented to place,

oriented to time





Results


Laboratory Results: 


                                        





                                 06/17/20 09:11 





                                 06/21/20 05:54 





                                        











  06/21/20





  05:54


 


Sodium  133.5 L


 


Potassium  4.4


 


Chloride  104


 


Carbon Dioxide  23


 


Anion Gap  7


 


BUN  13


 


Creatinine  0.78


 


Est GFR (African Amer)  > 60


 


Glucose  109


 


Calcium  8.5


 


Vitamin B12  784.0


 


Folate  15.60








                                        











  06/14/20 06/14/20 06/15/20





  17:46 17:46 01:38


 


Creatine Kinase  289 H  


 


Troponin I   0.025  0.028


 


NT-Pro-B Natriuret Pep   8780 H 














  06/15/20 06/15/20 06/15/20





  01:38 07:35 13:18


 


Creatine Kinase  212 H  


 


Troponin I   0.028  0.024


 


NT-Pro-B Natriuret Pep   














  06/15/20 06/17/20 06/21/20





  19:57 09:11 05:54


 


Creatine Kinase   


 


Troponin I  0.021  


 


NT-Pro-B Natriuret Pep   2400 H  6810 H











Impressions: 


                                        





Chest X-Ray  06/20/20 00:00


IMPRESSION:  Minimal right lung and left basilar opacities.  Unlikely to 

represent aspiration.


 














Assessment and Plan





- Diagnosis


(1) Acute systolic (congestive) heart failure


Is this a current diagnosis for this admission?: Yes   





(2) Encephalopathy, metabolic


Is this a current diagnosis for this admission?: Yes   





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


Is this a current diagnosis for this admission?: Yes   





(4) Fever


Is this a current diagnosis for this admission?: Yes   





(5) Paroxysmal atrial fibrillation


Is this a current diagnosis for this admission?: Yes   





(6) Alcohol dependence


Is this a current diagnosis for this admission?: Yes   





(7) Homeless


Is this a current diagnosis for this admission?: Yes   





(8) Tobacco abuse


Is this a current diagnosis for this admission?: Yes   





(9) Debility


Is this a current diagnosis for this admission?: Yes   





- Plan Summary


Summary: 


Discussed with patient about his cardiomyopathy noted on echo with EF of 20 to 

25% and biventricular dilation.  Picture appears to be typical for alcoholic 

cardiomyopathy.  He denies any history of prior CHF.


I have started patient on low-dose lisinopril, Toprol-XL and Lasix.  

Electrolytes in the morning.


He will need to be set up with cardiology for outpatient follow-up prior to 

discharge.


Does not appear to be overtly fluid overloaded at this moment.


CIWA and as needed Ativan as needed.  Will discontinue diazepam this evening.


Alcohol cessation and tobacco cessation counseling performed.





6/17/2020


Patient hypothermic to 94F this morning.  Lactic acid also elevated at 3.1.  

Cool extremities noted.


Chest x-ray showing interstitial opacities possibly pulmonary edema without 

pneumonia.  Prominent JVD noted on exam.  Urinalysis on 614 was negative.  Will 

repeat.


Currently doubt that patient's presentation is due to an infection as he has no 

complaints concerning for infection no other findings.


Patient has been placed on a jessica hugger with improvement to 97F.


Concern for possible low output heart failure especially given his dilated 

cardiomyopathy with biventricular failure.


I have consulted Dr. Diaz


Will consider placing patient on milrinone drip and repeat lactic acid later.  

Patient currently not hypotensive.  Will check liver enzymes and Bnp.


IV Lasix, continue lisinopril and beta-blocker.





6/18/2020


Hypothermia has resolved at this time as well as lactic acidosis.  Extremities 

feel warm today.


Started patient on dobutamine low-dose yesterday as milrinone drip can only be 

done in the ICU.  This seems to have led to resolution of hypothermia lactic 

acidosis.  We will maintain on dopamine drip for today.  Continue lisinopril and

beta-blocker.


Continue to monitor vital signs closely in the IMCU.  EKG in the morning


Cardiology following.





6/19/2020


Dobutamine drip discontinued.  Continue Toprol and lisinopril.  Diuretics 

changed to IV Lasix per Cardiology recommendation.


I believe patient likely has encephalopathy that is likely chronic from his 

chronic alcohol abuse.  There is a very good chance that he has Wernicke's 

encephalopathy.


I do not think he is mental status is as a result of alcohol withdrawal.


I will discontinue Ativan.  I will give some standing diazepam.  I will order 

for Haldol IM as needed.  Redirection if possible.  Currently in restraints.


I have called patient's brother to try to get a better sense of patient's 

baseline mental status but no response.  Left voice message to call back 

tomorrow or before end of shift today.





6/20/2020


Toprol-XL on lisinopril 10 mg daily which I will increase if patient remains 

hypertensive.  IV Lasix.  Cardiology following and recommending ischemic 

evaluation prior to discharge.


Patient certainly has cognitive impairment unlikely chronic encephalopathy from 

his chronic alcohol abuse.  It is very possible that he may have a component of 

Wernicke's especially given his unsteady gait.  


Despite patient's hypotension and tachycardia, patient is not showing any 

tremulousness, damp skin, diaphoresis, agitation or other physical signs 

suggestive of alcohol withdrawal and I do not believe him to be withdrawing at 

this moment.


I will check B12 and folic acid levels.


Continue IV thiamine supplements.


I have asked psychiatry to evaluate patient for capacity.  Also discussed with 

patient's brother.


Physical therapy will need to evaluate patient and help to walk strength.





6/21/2020


Patient appears better today.  He is more calm and off all restraints.  He also 

seems more awake.  I requested a physical therapy work with him today.  

Currently awaiting evaluation by psychiatry for capacity assessment.  I do 

believe patient is not withdrawing from alcohol and this is just his baseline m

ental status from chronic alcohol use.  Continue thiamine supplements.


His vitamin B12 and folic acid levels are normal.  TSH and cortisol levels are 

normal as well.


BMP seems to still be elevated over 6000 but this does not correlate with 

patient's improvement in terms of respiratory status and resolution of lower 

extremity swelling.  We will continue on Toprol XL, lisinopril and IV Lasix will

be continued for today and switch to p.o. Lasix tomorrow.


I have discussed with cardiologist about patient's plan and we have made the 

shared decision to forego stress test at this time as patient is certainly not 

going to be compliant with therapy and as such will not be a candidate for any 

form of PCI even if stress test was positive.


Patient notably spiked a fever earlier.  Chest x-ray does show some opacities in

the right lobe and patient has been noted to be aspirating during this 

admission.  I have started patient on Unasyn for coverage for aspiration 

pneumonia.  Blood cultures and sputum cultures will be obtained.








- Time


Time Spent with patient: 15-24 minutes

## 2020-06-21 NOTE — RADIOLOGY REPORT (SQ)
EXAM DESCRIPTION:  CT HEAD WITHOUT



IMAGES COMPLETED DATE/TIME:  6/21/2020 4:46 pm



REASON FOR STUDY:  encephalopathy, alcoholic



COMPARISON:  None.



TECHNIQUE:  Axial images acquired through the brain without intravenous contrast.  Images reviewed wi
th bone, brain and subdural windows.  Additional sagittal and coronal reconstructions were generated.
 Images stored on PACS.

All CT scanners at this facility use dose modulation, iterative reconstruction, and/or weight based d
osing when appropriate to reduce radiation dose to as low as reasonably achievable (ALARA).

[facility]: Dose Right  [facility]: CareDose    [facility]: Dose Right    [facility]: Teradose 4D    [facility]: [email or web address]



RADIATION DOSE:  CT Rad equipment meets quality standard of care and radiation dose reduction techniq
ues were employed. CTDIvol: 53.2 mGy. DLP: 937 mGy-cm.mGy.



LIMITATIONS:  None.



FINDINGS:  VENTRICLES: Prominent.

CEREBRUM: No masses.  No hemorrhage.  No midline shift.  Focal encephalomalacia is seen involving the
 right frontal lobe, consistent with sequela of remote ischemic injury.  Scattered areas of low densi
ty in the white matter most likely due to chronic micro-vascular ischemic change.  No evidence for ac
[place] infarction.

CEREBELLUM: No masses.  No hemorrhage.  No alteration of density.  No evidence for acute infarction.

EXTRAAXIAL SPACES: Age-related involutional change.  No fluid collections.  No masses.

ORBITS AND GLOBE: No intra- or extraconal masses.  Normal contour of globe without masses.

CALVARIUM: No fracture.

PARANASAL SINUSES: No fluid or mucosal thickening.

SOFT TISSUES: No mass or hematoma.

OTHER: No other significant finding.



IMPRESSION:  Right frontal lobe encephalomalacia consistent with sequela of previous ischemic injury.
  Background of chronic microvascular ischemic changes and age related involutional changes.  No acut
e findings.

EVIDENCE OF ACUTE STROKE: NO.



TECHNICAL DOCUMENTATION:  JOB ID:  [id number]

Quality ID # 436: Final reports with documentation of one or more dose reduction techniques (e.g., Au
tomated exposure control, adjustment of the mA and/or kV according to patient size, use of iterative 
reconstruction technique)

 2011 [employer]- All Rights Reserved



Reading location - IP/workstation name: [email or web address]

## 2020-06-22 LAB
ANION GAP SERPL CALC-SCNC: 11 MMOL/L (ref 5–19)
BUN SERPL-MCNC: 16 MG/DL (ref 7–20)
CALCIUM: 9.3 MG/DL (ref 8.4–10.2)
CHLORIDE SERPL-SCNC: 103 MMOL/L (ref 98–107)
CO2 SERPL-SCNC: 23 MMOL/L (ref 22–30)
GLUCOSE SERPL-MCNC: 112 MG/DL (ref 75–110)
POTASSIUM SERPL-SCNC: 4.2 MMOL/L (ref 3.6–5)

## 2020-06-22 RX ADMIN — Medication SCH MG: at 10:36

## 2020-06-22 RX ADMIN — HEPARIN SODIUM SCH UNIT: 5000 INJECTION, SOLUTION INTRAVENOUS; SUBCUTANEOUS at 05:19

## 2020-06-22 RX ADMIN — AMPICILLIN SODIUM AND SULBACTAM SODIUM SCH MLS/HR: 2; 1 INJECTION, POWDER, FOR SOLUTION INTRAMUSCULAR; INTRAVENOUS at 10:32

## 2020-06-22 RX ADMIN — LEVALBUTEROL HYDROCHLORIDE SCH MG: 1.25 SOLUTION RESPIRATORY (INHALATION) at 16:55

## 2020-06-22 RX ADMIN — Medication SCH ML: at 05:19

## 2020-06-22 RX ADMIN — LISINOPRIL SCH MG: 10 TABLET ORAL at 10:36

## 2020-06-22 RX ADMIN — PANTOPRAZOLE SODIUM SCH MG: 20 TABLET, DELAYED RELEASE ORAL at 05:19

## 2020-06-22 RX ADMIN — AMPICILLIN SODIUM AND SULBACTAM SODIUM SCH MLS/HR: 2; 1 INJECTION, POWDER, FOR SOLUTION INTRAMUSCULAR; INTRAVENOUS at 01:23

## 2020-06-22 RX ADMIN — AMOXICILLIN AND CLAVULANATE POTASSIUM SCH TAB: 875; 125 TABLET, FILM COATED ORAL at 22:16

## 2020-06-22 RX ADMIN — HEPARIN SODIUM SCH UNIT: 5000 INJECTION, SOLUTION INTRAVENOUS; SUBCUTANEOUS at 22:16

## 2020-06-22 RX ADMIN — FUROSEMIDE SCH: 10 INJECTION, SOLUTION INTRAMUSCULAR; INTRAVENOUS at 10:29

## 2020-06-22 RX ADMIN — NICOTINE PRN EACH: 14 PATCH, EXTENDED RELEASE TOPICAL at 01:23

## 2020-06-22 RX ADMIN — Medication SCH ML: at 14:12

## 2020-06-22 RX ADMIN — LEVALBUTEROL HYDROCHLORIDE SCH MG: 1.25 SOLUTION RESPIRATORY (INHALATION) at 07:41

## 2020-06-22 RX ADMIN — LEVALBUTEROL HYDROCHLORIDE SCH: 1.25 SOLUTION RESPIRATORY (INHALATION) at 04:17

## 2020-06-22 RX ADMIN — Medication SCH: at 22:16

## 2020-06-22 RX ADMIN — ASPIRIN SCH MG: 81 TABLET, COATED ORAL at 10:36

## 2020-06-22 RX ADMIN — HEPARIN SODIUM SCH UNIT: 5000 INJECTION, SOLUTION INTRAVENOUS; SUBCUTANEOUS at 14:12

## 2020-06-22 RX ADMIN — METOPROLOL SUCCINATE SCH MG: 50 TABLET, EXTENDED RELEASE ORAL at 10:36

## 2020-06-22 NOTE — PDOC PROGRESS REPORT
Subjective


Progress Note for:: 06/22/20


Subjective:: 





Patient denies any shortness of breath or chest pain today.  Appears comfortable

in bed.  


Reason For Visit: 


ALCOHOLIC CARDIOMYOPATHY,HEART FAILURE








Physical Exam


Vital Signs: 


                                        











Temp Pulse Resp BP Pulse Ox


 


 97.3 F   95   17   124/95 H  92 


 


 06/22/20 12:00  06/22/20 12:00  06/22/20 12:00  06/22/20 12:00  06/22/20 12:00








                                 Intake & Output











 06/21/20 06/22/20 06/23/20





 06:59 06:59 06:59


 


Intake Total 100 1156 100


 


Output Total 20  


 


Balance 80 1156 100


 


Weight 65.9 kg 65.9 kg 











General appearance: PRESENT: no acute distress, cooperative


Neck exam: ABSENT: JVD


Respiratory exam: PRESENT: clear to auscultation [place], symmetrical, unlabored.  

ABSENT: tachypnea, wheezes


Cardiovascular exam: PRESENT: RRR, +S1, +S2.  ABSENT: tachycardia


GI/Abdominal exam: PRESENT: normal bowel sounds, soft.  ABSENT: rebound, rigid, 

tenderness


Neurological exam: PRESENT: alert, awake, oriented to person, oriented to place,

other - Forgetful.  ABSENT: oriented to time





Results


Laboratory Results: 


                                        





                                 06/17/20 09:11 





                                 06/22/20 08:17 





                                        











  06/22/20





  08:17


 


Sodium  137.4


 


Potassium  4.2


 


Chloride  103


 


Carbon Dioxide  23


 


Anion Gap  11


 


BUN  16


 


Creatinine  0.85


 


Est GFR (African Amer)  > 60


 


Glucose  112 H


 


Calcium  9.3


 


Magnesium  2.1








                                        











  06/14/20 06/14/20 06/15/20





  17:46 17:46 01:38


 


Creatine Kinase  289 H  


 


Troponin I   0.025  0.028


 


NT-Pro-B Natriuret Pep   8780 H 














  06/15/20 06/15/20 06/15/20





  01:38 07:35 13:18


 


Creatine Kinase  212 H  


 


Troponin I   0.028  0.024


 


NT-Pro-B Natriuret Pep   














  06/15/20 06/17/20 06/21/20





  19:57 09:11 05:54


 


Creatine Kinase   


 


Troponin I  0.021  


 


NT-Pro-B Natriuret Pep   2400 H  6810 H











Impressions: 


                                        





Chest X-Ray  06/20/20 00:00


IMPRESSION:  Minimal right lung and left basilar opacities.  Unlikely to 

represent aspiration.


 








Head CT  06/21/20 00:00


IMPRESSION:  Right frontal lobe encephalomalacia consistent with sequela of pre

vious ischemic injury.  Background of chronic microvascular ischemic changes and

age related involutional changes.  No acute findings.


EVIDENCE OF ACUTE STROKE: NO.


 














Assessment and Plan





- Diagnosis


(1) Acute systolic (congestive) heart failure


Is this a current diagnosis for this admission?: Yes   





(2) Encephalopathy, metabolic


Is this a current diagnosis for this admission?: Yes   





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


Is this a current diagnosis for this admission?: Yes   





(4) Fever


Is this a current diagnosis for this admission?: Yes   





(5) Paroxysmal atrial fibrillation


Is this a current diagnosis for this admission?: Yes   





(6) Alcohol dependence


Is this a current diagnosis for this admission?: Yes   





(7) Homeless


Is this a current diagnosis for this admission?: Yes   





(8) Tobacco abuse


Is this a current diagnosis for this admission?: Yes   





(9) Debility


Is this a current diagnosis for this admission?: Yes   





- Plan Summary


Summary: 


Discussed with patient about his cardiomyopathy noted on echo with EF of 20 to 

25% and biventricular dilation.  Picture appears to be typical for alcoholic 

cardiomyopathy.  He denies any history of prior CHF.


I have started patient on low-dose lisinopril, Toprol-XL and Lasix.  

Electrolytes in the morning.


He will need to be set up with cardiology for outpatient follow-up prior to 

discharge.


Does not appear to be overtly fluid overloaded at this moment.


CIWA and as needed Ativan as needed.  Will discontinue diazepam this evening.


Alcohol cessation and tobacco cessation counseling performed.





6/17/2020


Patient hypothermic to 94F this morning.  Lactic acid also elevated at 3.1.  

Cool extremities noted.


Chest x-ray showing interstitial opacities possibly pulmonary edema without 

pneumonia.  Prominent JVD noted on exam.  Urinalysis on 614 was negative.  Will 

repeat.


Currently doubt that patient's presentation is due to an infection as he has no 

complaints concerning for infection no other findings.


Patient has been placed on a jessica hugger with improvement to 97F.


Concern for possible low output heart failure especially given his dilated 

cardiomyopathy with biventricular failure.


I have consulted Dr. Diaz


Will consider placing patient on milrinone drip and repeat lactic acid later.  

Patient currently not hypotensive.  Will check liver enzymes and Bnp.


IV Lasix, continue lisinopril and beta-blocker.





6/18/2020


Hypothermia has resolved at this time as well as lactic acidosis.  Extremities 

feel warm today.


Started patient on dobutamine low-dose yesterday as milrinone drip can only be 

done in the ICU.  This seems to have led to resolution of hypothermia lactic 

acidosis.  We will maintain on dopamine drip for today.  Continue lisinopril and

beta-blocker.


Continue to monitor vital signs closely in the IMCU.  EKG in the morning


Cardiology following.





6/19/2020


Dobutamine drip discontinued.  Continue Toprol and lisinopril.  Diuretics 

changed to IV Lasix per Cardiology recommendation.


I believe patient likely has encephalopathy that is likely chronic from his 

chronic alcohol abuse.  There is a very good chance that he has Wernicke's 

encephalopathy.


I do not think he is mental status is as a result of alcohol withdrawal.


I will discontinue Ativan.  I will give some standing diazepam.  I will order 

for Haldol IM as needed.  Redirection if possible.  Currently in restraints.


I have called patient's brother to try to get a better sense of patient's 

baseline mental status but no response.  Left voice message to call back 

tomorrow or before end of shift today.





6/20/2020


Toprol-XL on lisinopril 10 mg daily which I will increase if patient remains 

hypertensive.  IV Lasix.  Cardiology following and recommending ischemic 

evaluation prior to discharge.


Patient certainly has cognitive impairment unlikely chronic encephalopathy from 

his chronic alcohol abuse.  It is very possible that he may have a component of 

Wernicke's especially given his unsteady gait.  


Despite patient's hypotension and tachycardia, patient is not showing any 

tremulousness, damp skin, diaphoresis, agitation or other physical signs 

suggestive of alcohol withdrawal and I do not believe him to be withdrawing at 

this moment.


I will check B12 and folic acid levels.


Continue IV thiamine supplements.


I have asked psychiatry to evaluate patient for capacity.  Also discussed with 

patient's brother.


Physical therapy will need to evaluate patient and help to walk strength.





6/21/2020


Patient appears better today.  He is more calm and off all restraints.  He also 

seems more awake.  I requested a physical therapy work with him today.  

Currently awaiting evaluation by psychiatry for capacity assessment.  I do 

believe patient is not withdrawing from alcohol and this is just his baseline 

mental status from chronic alcohol use.  Continue thiamine supplements.


His vitamin B12 and folic acid levels are normal.  TSH and cortisol levels are 

normal as well.


BMP seems to still be elevated over 6000 but this does not correlate with 

patient's improvement in terms of respiratory status and resolution of lower 

extremity swelling.  We will continue on Toprol XL, lisinopril and IV Lasix will

be continued for today and switch to p.o. Lasix tomorrow.


I have discussed with cardiologist about patient's plan and we have made the 

shared decision to forego stress test at this time as patient is certainly not g

oing to be compliant with therapy and as such will not be a candidate for any 

form of PCI even if stress test was positive.


Patient notably spiked a fever earlier.  Chest x-ray does show some opacities in

the right lobe and patient has been noted to be aspirating during this 

admission.  I have started patient on Unasyn for coverage for aspiration 

pneumonia.  Blood cultures and sputum cultures will be obtained.





6/22/2020


Patient has been adequately treated for heart failure secondary to dilated 

cardiomyopathy EF 20 to 25% likely from alcohol abuse.  He seems to be euvolemic

at this time and Lasix has been de-escalated to p.o. 20 mg daily.  Continue 

Toprol-XL and lisinopril. On ASA.


I agree with cardiology that ischemic evaluation should be deferred at this time

as patient is a high risk for noncompliance with therapy and will not be a PCI 

candidate, if warranted, due to this.


DC Unasyn.  Augmentin.  D2/5 ABX.  Fever resolved.


I discussed case with Dr. Mike Wilson yesterday and awaiting capacity 

evaluation as this will significantly affect disposition given his homelessness 

and may need long-term placement at SNF.


As part of capacity evaluation, I ordered head CT which shows focal right 

frontal lobe encephalomalacia likely from a prior ischemic injury as well as 

age-related involutional changes.








- Time


Time Spent with patient: Less than 15 minutes

## 2020-06-22 NOTE — PDOC PROGRESS REPORT
Subjective


Progress Note for:: 06/22/20


Subjective:: 





KIESHA WINN is a 64 year old homeless male with history of alcohol 

dependence, paroxysmal atrial fibrillation, who has been followed by my partner,

Dr. Diaz, for what appears to be new onset cardiomyopathy with an ejection 

fraction between 20 and 25% as well as new onset of heart failure with reduced 

ejection fraction.  He apparently had been stable until yesterday or the day 

before when he became hypothermic, with significant JVD and shortness of breath.

 At that point he was placed on low-dose dobutamine.  Yesterday evening the 

nursing staff found the patient sitting at the edge of the bed covered in feces.

 He had pulled out his IV and dobutamine which is running onto the floor.  He 

was cleaned up and placed back in the bed.  This morning he is found sitting at 

the edge of the bed after finishing breakfast and without any cardiac 

complaints.  He is eager to go home as soon as possible.  His telemetry shows 

normal sinus rhythm with episodes of sinus tachycardia and PVCs.  His proBNP was

elevated at 2400, he tested positive for marijuana and his most recent chemistry

shows hyponatremia.  His blood pressure is stable.  Unfortunately he is not 

measuring his urine therefore accurate urinary output is not possible.





06/22/2020:


The patient spiked another fever last night and his blood pressure has been on 

the low side.  He was provided with Tylenol and Motrin with resolution of his 

fever.  This morning he is awake and eating breakfast.  He denies chest pain and

shortness of breath.  His telemetry shows normal sinus rhythm with episodes of 

supraventricular tachycardia some of which appear to be atrial tachycardia.





Physical exam on 06/22/2020:


GENERAL: Awake and cooperative.  Oriented x3.


HEENT:  Normocephalic, atraumatic.  Pupils equal.   Sclerae anicteric.  

Oropharynx moist. 


NECK:  No JVD.  No carotid bruits. 


LUNGS:  Clear to auscultation bilaterally.  Normal respiratory effort without 

the use of accessory muscles or intercostal retractions.  


CARDIOVASCULAR:  Regular rate and rhythm, normal S1 and S2 without murmurs, 

rubs, or gallops.  PMI not displaced.


EXTREMITIES:  No pretibial edema, very mild edema in the dorsum of both feet, no

cyanosis, no clubbing.  +2 pulses femoral and pedal pulses bilaterally.  


SKIN: No lesions or rashes.


MUSCULOSKELETAL:  No chest tenderness to palpation. 


NEUROLOGIC: Nonfocal.  No gross sensory or motor deficits bilateral upper or 

lower extremities.





Echocardiogram on 06/15/2020:


-LV systolic function is moderately to severely reduced.


-EF 20 to 25%.


-RV systolic function is moderately reduced.


-Mild MR, mild to moderate TR.


-Minimal pericardial effusion.





Reason For Visit: 


ALCOHOLIC CARDIOMYOPATHY,HEART FAILURE








Physical Exam


Vital Signs: 


                                        











Temp Pulse Resp BP Pulse Ox


 


 97.3 F   89   18   116/96 H  95 


 


 06/22/20 08:00  06/22/20 08:00  06/22/20 08:00  06/22/20 08:00  06/22/20 08:00








                                 Intake & Output











 06/21/20 06/22/20 06/23/20





 06:59 06:59 06:59


 


Intake Total 100 1156 


 


Output Total 20  


 


Balance 80 1156 


 


Weight 65.9 kg 65.9 kg 














Results


Laboratory Results: 


                                        





                                 06/17/20 09:11 





                                        











  06/14/20 06/14/20 06/15/20





  17:46 17:46 01:38


 


Creatine Kinase  289 H  


 


Troponin I   0.025  0.028


 


NT-Pro-B Natriuret Pep   8780 H 














  06/15/20 06/15/20 06/15/20





  01:38 07:35 13:18


 


Creatine Kinase  212 H  


 


Troponin I   0.028  0.024


 


NT-Pro-B Natriuret Pep   














  06/15/20 06/17/20 06/21/20





  19:57 09:11 05:54


 


Creatine Kinase   


 


Troponin I  0.021  


 


NT-Pro-B Natriuret Pep   2400 H  6810 H











Impressions: 


                                        





Chest X-Ray  06/20/20 00:00


IMPRESSION:  Minimal right lung and left basilar opacities.  Unlikely to 

represent aspiration.


 








Head CT  06/21/20 00:00


IMPRESSION:  Right frontal lobe encephalomalacia consistent with sequela of 

previous ischemic injury.  Background of chronic microvascular ischemic changes 

and age related involutional changes.  No acute findings.


EVIDENCE OF ACUTE STROKE: NO.


 








                                        





                                 06/17/20 09:11 





                                        











MCV  87 fl (80-97)   06/17/20  09:11    


 


MCH  28.9 pg (27.0-33.4)   06/17/20  09:11    


 


MCHC  33.1 g/dL (32.0-36.0)   06/17/20  09:11    


 


RDW  15.2 % (11.5-14.0)  H  06/17/20  09:11    


 


Seg Neutrophils %  53.4 % (42-78)   06/17/20  09:11    


 


Chloride  104 mmol/L ()   06/21/20  05:54    


 


Carbon Dioxide  23 mmol/L (22-30)   06/21/20  05:54    


 


Anion Gap  7  (5-19)   06/21/20  05:54    


 


Est GFR ( Amer)  > 60  (>60)   06/21/20  05:54    


 


Glucose  109 mg/dL ()   06/21/20  05:54    


 


Lactic Acid  1.6 mmol/L (0.7-2.1)   06/18/20  06:25    


 


Calcium  8.5 mg/dL (8.4-10.2)   06/21/20  05:54    


 


Magnesium  1.9 mg/dL (1.6-2.3)   06/19/20  06:32    


 


Total Bilirubin  1.2 mg/dL (0.2-1.3)   06/19/20  06:32    


 


AST  53 U/L (17-59)   06/19/20  06:32    


 


Alkaline Phosphatase  133 U/L ()  H  06/19/20  06:32    


 


Total Protein  6.9 g/dL (6.3-8.2)   06/19/20  06:32    


 


Albumin  3.5 g/dL (3.5-5.0)   06/19/20  06:32    


 


Vitamin B12  784.0 pg/mL (239-931)   06/21/20  05:54    


 


Folate  15.60 ng/mL (>2.76)   06/21/20  05:54    


 


TSH  3.62 uIU/mL (0.47-4.68)   06/14/20  17:46    


 


Urine Color  MANDI   06/14/20  17:46    


 


Urine Appearance  SLIGHTLY-CLOUDY   06/14/20  17:46    


 


Urine pH  6.0  (5.0-9.0)   06/14/20  17:46    


 


Ur Specific Gravity  1.021   06/14/20  17:46    


 


Urine Protein  100 mg/dL (NEGATIVE)  H  06/14/20  17:46    


 


Urine Glucose (UA)  NEGATIVE mg/dL (NEGATIVE)   06/14/20  17:46    


 


Urine Ketones  NEGATIVE mg/dL (NEGATIVE)   06/14/20  17:46    


 


Urine Blood  NEGATIVE  (NEGATIVE)   06/14/20  17:46    


 


Urine Nitrite  NEGATIVE  (NEGATIVE)   06/14/20  17:46    


 


Ur Leukocyte Esterase  NEGATIVE  (NEGATIVE)   06/14/20  17:46    


 


Urine WBC (Auto)  1 /HPF  06/14/20  17:46    


 


Urine RBC (Auto)  0 /HPF  06/14/20  17:46    








                                        











  06/14/20 06/14/20 06/15/20





  17:46 17:46 01:38


 


Creatine Kinase  289 H  


 


Troponin I   0.025  0.028


 


NT-Pro-B Natriuret Pep   8780 H 














  06/15/20 06/15/20 06/15/20





  01:38 07:35 13:18


 


Creatine Kinase  212 H  


 


Troponin I   0.028  0.024


 


NT-Pro-B Natriuret Pep   














  06/15/20 06/17/20 06/21/20





  19:57 09:11 05:54


 


Creatine Kinase   


 


Troponin I  0.021  


 


NT-Pro-B Natriuret Pep   2400 H  6810 H








                             Current Medication List











Generic Name Dose Route Start Last Admin





  Trade Name Nirmal  PRN Reason Stop Dose Admin


 


Acetaminophen  650 mg  06/15/20 03:39  06/20/20 20:01





  Tylenol 325 Mg Tablet  PO  07/15/20 03:38  650 mg





  Q4HP PRN   Administration





  pain or temp greater than 101F  


 


Al Hydrox/Mg Hydrox/Simethicone  30 ml  06/15/20 03:39 





  Maalox Plus Susp 30 Udcup  PO  07/15/20 03:38 





  Q4HP PRN  





  HEARTBURN  


 


Aspirin  81 mg  06/15/20 10:00  06/21/20 10:52





  Ecotrin 81 Mg Ec Tablet  PO  07/15/20 09:59  81 mg





  DAILY LISY   Administration


 


Furosemide  40 mg  06/20/20 10:00  06/21/20 10:52





  Lasix Inj/Pf 40 Mg/4 Ml Sdv  IV  07/20/20 09:59  40 mg





  DAILY LISY   Administration


 


Haloperidol Lactate  5 mg  06/19/20 17:29  06/20/20 20:00





  Haldol 5 Mg/Ml Inj 1 Ml Vial  IM  07/19/20 17:28  5 mg





  Q6HP PRN   Administration





  RESTLESSNESS/AGITATION  


 


Heparin Sodium (Porcine)  5,000 unit  06/15/20 06:00  06/22/20 05:19





  Heparin Inj 5,000 Units/Ml 1 Ml Vial  SUBCUT  07/15/20 05:59  5,000 unit





  Q8 LISY   Administration


 


Ampicillin Sodium/Sulbactam  100 mls @ 100 mls/hr  06/21/20 11:00  06/22/20 

02:23





  Sodium 3 gm/ Sodium Chloride  IV  06/28/20 10:59  Infused





  Q8A LISY   Infusion


 


Levalbuterol HCl  1.25 mg  06/15/20 08:00  06/22/20 07:41





  Xopenex Neb 1.25 Mg/3 Ml Ampul  NEB  07/15/20 07:59  1.25 mg





  RTQ8 LISY   Administration


 


Lisinopril  10 mg  06/19/20 10:00  06/21/20 10:52





  Prinivil 10 Mg Tablet  PO  07/19/20 09:59  10 mg





  DAILY LISY   Administration


 


Metoprolol Succinate  100 mg  06/20/20 10:00  06/21/20 10:52





  Toprol Xl 50 Mg Tab.Sr  PO  07/20/20 09:59  100 mg





  DAILY LISY   Administration


 


Nicotine  1 each  06/16/20 10:20  06/22/20 01:23





  Nicoderm 14 Mg/24 Hr Transdermal Patch  TD  07/16/20 10:19  1 each





  DAILYP PRN   Administration





  SMOKING CESSATION  


 


Pantoprazole Sodium  20 mg  06/15/20 06:00  06/22/20 05:19





  Protonix 20 Mg Dr Tablet  PO  07/15/20 05:59  20 mg





  Q6AM LISY   Administration


 


Sodium Chloride  2.5 ml  06/15/20 06:00  06/22/20 05:19





  Saline Flush 2.5 Ml Monoject Prefil Syrin  IV  07/15/20 05:59  2.5 ml





  Q8 LISY   Administration


 


Thiamine HCl  100 mg  06/22/20 10:00 





  Thiamine 100 Mg Tablet  PO  07/22/20 09:59 





  DAILY LISY  














Discontinued Medications














Generic Name Dose Route Start Last Admin





  Trade Name Freq  PRN Reason Stop Dose Admin


 


Aspirin  324 mg  06/15/20 00:57  06/15/20 01:20





  Aspirin 81 Mg Chewable Tablet  PO  06/15/20 00:58  324 mg





  NOW ONE   Administration


 


Diazepam  5 mg  06/15/20 00:27  06/15/20 00:56





  Valium 5 Mg Tablet  PO  06/15/20 00:28  5 mg





  NOW ONE   Administration


 


Diazepam  2.5 mg  06/15/20 06:00  06/16/20 14:25





  Valium 5 Mg Tablet  PO  06/22/20 05:59  2.5 mg





  Q8 LISY   Administration


 


Diazepam  10 mg  06/19/20 01:12  06/19/20 05:45





  Valium Inj 10 Mg/2 Ml Disp.Syrin  IV  06/26/20 01:11  10 mg





  Q1HP PRN   Administration





  AGITATION/ANXIETY  


 


Diazepam  10 mg  06/19/20 01:12 





  Valium 5 Mg Tablet  PO  06/26/20 01:11 





  Q4HP PRN  





  AGITATION  


 


Diazepam  10 mg  06/19/20 02:00  06/19/20 13:42





  Valium 5 Mg Tablet  PO  06/26/20 01:11  10 mg





  Q4 LISY   Administration


 


Diazepam  10 mg  06/19/20 18:00  06/20/20 05:31





  Valium 5 Mg Tablet  PO  06/26/20 17:59  10 mg





  Q6 LISY   Administration


 


Diltiazem HCl  90 mg  06/15/20 03:35  06/15/20 04:26





  Cardizem 90 Mg Tablet  PO  06/15/20 03:36  Not Given





  NOW ONE  


 


Diltiazem HCl  60 mg  06/15/20 06:00 





  Cardizem 60 Mg Tablet  PO  07/15/20 05:59 





  Q6 LISY  


 


Diltiazem HCl  60 mg  06/15/20 12:00  06/16/20 05:59





  Cardizem 60 Mg Tablet  PO  07/15/20 11:59  60 mg





  Q6 LISY   Administration


 


Folic Acid  1 mg  06/15/20 00:27  06/15/20 00:56





  Folvite 1 Mg Tablet  PO  06/15/20 00:28  1 mg





  NOW ONE   Administration


 


Folic Acid  1 mg  06/18/20 10:00  06/21/20 10:53





  Folvite 1 Mg Tablet  PO  07/18/20 09:59  1 mg





  DAILY LISY   Administration


 


Furosemide  20 mg  06/15/20 03:31  06/15/20 03:45





  Lasix Inj/Pf 20 Mg/2 Ml Sdv  IV  06/15/20 03:32  20 mg





  NOW ONE   Administration


 


Furosemide  20 mg  06/16/20 18:04  06/16/20 18:42





  Lasix 20 Mg Tablet  PO  06/16/20 18:05  20 mg





  NOW ONE   Administration


 


Furosemide  20 mg  06/17/20 10:00  06/18/20 09:15





  Lasix 20 Mg Tablet  PO  07/17/20 09:59  20 mg





  DAILY LISY   Administration


 


Furosemide  20 mg  06/17/20 12:00  06/17/20 17:51





  Lasix Inj/Pf 20 Mg/2 Ml Sdv  IV  06/17/20 18:01  20 mg





  Q6H LISY   Administration


 


Furosemide  40 mg  06/18/20 20:02  06/18/20 22:42





  Lasix Inj/Pf 40 Mg/4 Ml Sdv  IV  06/18/20 20:03  40 mg





  NOW ONE   Administration


 


Furosemide  40 mg  06/18/20 22:30  06/18/20 22:44





  Lasix Inj/Pf 40 Mg/4 Ml Sdv  IV  06/18/20 22:31  Not Given





  NOW ONE  


 


Furosemide  40 mg  06/19/20 10:00  06/19/20 09:18





  Lasix 40 Mg Tablet  PO  07/19/20 09:59  40 mg





  DAILY LISY   Administration


 


Sodium Chloride  500 mls @ 0 mls/hr  06/14/20 22:09  06/15/20 01:34





  Nacl 0.9% 500 Ml Iv Soln  IV  06/14/20 22:10  Infused





  NOW ONE   Infusion





  Wide Open  


 


Thiamine HCl 100 mg/ Folic  251.2 mls @ 502.4 mls/hr  06/15/20 10:00  06/17/20 

11:40





  Acid 1 mg/ Sodium Chloride  IV  07/15/20 09:59  502.4 mls/hr





  DAILY LISY   Administration


 


Dobutamine HCl/Dextrose  500 mg in 250 mls @ 0 mls/hr  06/17/20 13:58  06/17/20 

15:36





  Dobutrex Rtu 500 Mg-D5w 250 Ml Premixed Bag  IV  07/17/20 13:57  5.65 mls/hr





  CONTINUOUS PRN   5.65 mls/hr





  THIS MED IS NOT "PRN"   Administration





  Protocol  





  Titrate  


 


Ibuprofen  800 mg  06/20/20 20:45  06/20/20 20:53





  Motrin 400 Mg Tablet  PO  06/20/20 20:46  800 mg





  NOW ONE   Administration


 


Ibuprofen  Confirm  06/20/20 20:52  06/20/20 21:54





  Motrin 800 Mg Tablet  Administered  06/20/20 20:53  Not Given





  Dose  





  800 mg  





  .ROUTE  





  .STK-MED ONE  


 


Lisinopril  5 mg  06/16/20 10:00  06/18/20 09:15





  Prinivil 5 Mg Tablet  PO  07/16/20 09:59  5 mg





  DAILY LISY   Administration


 


Lorazepam  2 mg  06/15/20 03:39 





  Ativan Inj 2 Mg/1 Ml Vial  IV  06/22/20 03:38 





  Q2HP PRN  





  ANXIETY/AGITATION  


 


Lorazepam  2 mg  06/16/20 18:06  06/19/20 16:11





  Ativan Inj 2 Mg/1 Ml Vial  IV  06/22/20 03:38  2 mg





  Q2HP PRN   Administration





  SEE LABEL COMMENTS  


 


Magnesium Oxide  400 mg  06/19/20 08:45  06/19/20 09:18





  Mag-Ox 400 Mg Tablet  PO  06/19/20 08:46  400 mg





  NOW ONE   Administration


 


Metoprolol Succinate  25 mg  06/16/20 10:00  06/16/20 12:14





  Toprol Xl 25 Mg Tab.Sr  PO  07/16/20 09:59  25 mg





  Q12 LISY   Administration


 


Metoprolol Succinate  25 mg  06/17/20 10:00  06/18/20 09:15





  Toprol Xl 25 Mg Tab.Sr  PO  07/17/20 09:59  25 mg





  DAILY LISY   Administration


 


Metoprolol Succinate  50 mg  06/19/20 10:00  06/19/20 09:18





  Toprol Xl 50 Mg Tab.Sr  PO  07/19/20 09:59  50 mg





  DAILY LISY   Administration


 


Multivitamins  1 tab  06/15/20 00:26  06/15/20 00:56





  Tab-A-Leidy (Multiple Vitamin) Tablet  PO  06/15/20 00:27  1 tab





  NOW ONE   Administration


 


Thiamine HCl  100 mg  06/14/20 22:10  06/15/20 01:20





  Thiamine 100 Mg Tablet  PO  06/14/20 22:11  100 mg





  NOW ONE   Administration


 


Thiamine HCl  100 mg  06/15/20 02:00  06/15/20 01:33





  Thiamine 100 Mg Tablet  PO  06/15/20 02:01  Not Given





  NOW ONE  














Assessment & Plan





- Diagnosis


(1) Acute systolic (congestive) heart failure


Is this a current diagnosis for this admission?: Yes   


Plan: 


Likely acute exacerbation of chronic systolic congestive heart failure with an 

ejection fraction between 20 and 25% and RV dysfunction.  His blood pressure is 

now on the low side and the patient continues to spike fevers therefore he is 

now on antibiotics.  He continues to be free of heart failure and ischemic 

symptoms.  He appears to be euvolemic on exam. Unfortunately he is homeless and 

his cognition is not normal which makes him ineligible for invasive 

cardiovascular treatments.





Recommendations:


-Restrict fluid intake to 1500 cc daily.


-Low sodium diet, less than 1500 mg daily.


-Strict intake and output.


-Daily weights.


-Continue with current medical management.


-Transition patient from IV Lasix to p.o. Lasix.


-Replace electrolytes as needed.


-Please note that outpatient follow-up on this gentleman will be very difficult 

to obtain given his homelessness and his other social issues to include 

marijuana use.


-Cardiology does not have further recommendations therefore we will signed off 

for now.  Please reconsult if necessary.








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


Is this a current diagnosis for this admission?: Yes   


Plan: 


Likely secondary to alcohol.





Recommendations:


-Please see #1 above for recommendations.








(3) Alcohol dependence


Is this a current diagnosis for this admission?: Yes   





(4) Paroxysmal atrial fibrillation


Is this a current diagnosis for this admission?: Yes   


Plan: 


The patient is remains in normal sinus rhythm.  His calculated chads 2 vascular 

score is currently 1, given his social issues, drug use and difficulties 

obtaining follow-up we will hold off on full anticoagulation for now.  Continue 

cardiac telemetry please.

## 2020-06-23 RX ADMIN — Medication SCH: at 17:57

## 2020-06-23 RX ADMIN — HEPARIN SODIUM SCH: 5000 INJECTION, SOLUTION INTRAVENOUS; SUBCUTANEOUS at 17:56

## 2020-06-23 RX ADMIN — METOPROLOL SUCCINATE SCH MG: 50 TABLET, EXTENDED RELEASE ORAL at 09:39

## 2020-06-23 RX ADMIN — LEVALBUTEROL HYDROCHLORIDE SCH MG: 1.25 SOLUTION RESPIRATORY (INHALATION) at 16:03

## 2020-06-23 RX ADMIN — AMOXICILLIN AND CLAVULANATE POTASSIUM SCH TAB: 875; 125 TABLET, FILM COATED ORAL at 21:17

## 2020-06-23 RX ADMIN — ASPIRIN SCH MG: 81 TABLET, COATED ORAL at 09:39

## 2020-06-23 RX ADMIN — LEVALBUTEROL HYDROCHLORIDE SCH MG: 1.25 SOLUTION RESPIRATORY (INHALATION) at 08:53

## 2020-06-23 RX ADMIN — HEPARIN SODIUM SCH UNIT: 5000 INJECTION, SOLUTION INTRAVENOUS; SUBCUTANEOUS at 21:17

## 2020-06-23 RX ADMIN — FUROSEMIDE SCH MG: 20 TABLET ORAL at 09:39

## 2020-06-23 RX ADMIN — Medication SCH MG: at 09:39

## 2020-06-23 RX ADMIN — AMOXICILLIN AND CLAVULANATE POTASSIUM SCH TAB: 875; 125 TABLET, FILM COATED ORAL at 09:39

## 2020-06-23 RX ADMIN — HEPARIN SODIUM SCH UNIT: 5000 INJECTION, SOLUTION INTRAVENOUS; SUBCUTANEOUS at 05:07

## 2020-06-23 RX ADMIN — PANTOPRAZOLE SODIUM SCH MG: 20 TABLET, DELAYED RELEASE ORAL at 05:07

## 2020-06-23 RX ADMIN — Medication SCH: at 21:18

## 2020-06-23 RX ADMIN — Medication SCH: at 05:07

## 2020-06-23 RX ADMIN — LEVALBUTEROL HYDROCHLORIDE SCH MG: 1.25 SOLUTION RESPIRATORY (INHALATION) at 00:52

## 2020-06-23 RX ADMIN — LISINOPRIL SCH MG: 10 TABLET ORAL at 09:40

## 2020-06-23 NOTE — PDOC PROGRESS REPORT
Subjective


Progress Note for:: 06/23/20


Subjective:: 


Patient feels well today.  Denies any shortness of breath or chest pain.  States

he is agreeable to going to SNF for rehabilitation.





Reason For Visit: 


ALCOHOLIC CARDIOMYOPATHY,HEART FAILURE








Physical Exam


Vital Signs: 


                                        











Temp Pulse Resp BP Pulse Ox


 


 97.3 F   90   17   141/96 H  99 


 


 06/23/20 08:18  06/23/20 08:53  06/23/20 08:53  06/23/20 08:18  06/23/20 08:18








                                 Intake & Output











 06/22/20 06/23/20 06/24/20





 06:59 06:59 06:59


 


Intake Total 1156 580 


 


Output Total  525 


 


Balance 1156 55 


 


Weight 65.9 kg 65.9 kg 65.9 kg











General appearance: PRESENT: no acute distress, cooperative, thin


Neck exam: ABSENT: JVD


Respiratory exam: PRESENT: clear to auscultation ana paula, symmetrical, unlabored.  

ABSENT: tachypnea, wheezes


Cardiovascular exam: PRESENT: RRR, +S1, +S2.  ABSENT: tachycardia


Neurological exam: PRESENT: alert, awake


Psychiatric exam: ABSENT: agitated, anxious





Results


Laboratory Results: 


                                        





                                 06/17/20 09:11 





                                 06/22/20 08:17 





                                        











  06/14/20 06/14/20 06/15/20





  17:46 17:46 01:38


 


Creatine Kinase  289 H  


 


Troponin I   0.025  0.028


 


NT-Pro-B Natriuret Pep   8780 H 














  06/15/20 06/15/20 06/15/20





  01:38 07:35 13:18


 


Creatine Kinase  212 H  


 


Troponin I   0.028  0.024


 


NT-Pro-B Natriuret Pep   














  06/15/20 06/17/20 06/21/20





  19:57 09:11 05:54


 


Creatine Kinase   


 


Troponin I  0.021  


 


NT-Pro-B Natriuret Pep   2400 H  6810 H











Impressions: 


                                        





Chest X-Ray  06/20/20 00:00


IMPRESSION:  Minimal right lung and left basilar opacities.  Unlikely to 

represent aspiration.


 








Head CT  06/21/20 00:00


IMPRESSION:  Right frontal lobe encephalomalacia consistent with sequela of 

previous ischemic injury.  Background of chronic microvascular ischemic changes 

and age related involutional changes.  No acute findings.


EVIDENCE OF ACUTE STROKE: NO.


 














Assessment and Plan





- Diagnosis


(1) Acute systolic (congestive) heart failure


Is this a current diagnosis for this admission?: Yes   





(2) Encephalopathy, metabolic


Is this a current diagnosis for this admission?: Yes   





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


Is this a current diagnosis for this admission?: Yes   





(4) Fever


Is this a current diagnosis for this admission?: Yes   





(5) Paroxysmal atrial fibrillation


Is this a current diagnosis for this admission?: Yes   





(6) Alcohol dependence


Is this a current diagnosis for this admission?: Yes   





(7) Homeless


Is this a current diagnosis for this admission?: Yes   





(8) Tobacco abuse


Is this a current diagnosis for this admission?: Yes   





(9) Debility


Is this a current diagnosis for this admission?: Yes   





- Plan Summary


Summary: 


Discussed with patient about his cardiomyopathy noted on echo with EF of 20 to 

25% and biventricular dilation.  Picture appears to be typical for alcoholic 

cardiomyopathy.  He denies any history of prior CHF.


I have started patient on low-dose lisinopril, Toprol-XL and Lasix.  

Electrolytes in the morning.


He will need to be set up with cardiology for outpatient follow-up prior to 

discharge.


Does not appear to be overtly fluid overloaded at this moment.


CIWA and as needed Ativan as needed.  Will discontinue diazepam this evening.


Alcohol cessation and tobacco cessation counseling performed.





6/17/2020


Patient hypothermic to 94F this morning.  Lactic acid also elevated at 3.1.  

Cool extremities noted.


Chest x-ray showing interstitial opacities possibly pulmonary edema without 

pneumonia.  Prominent JVD noted on exam.  Urinalysis on 614 was negative.  Will 

repeat.


Currently doubt that patient's presentation is due to an infection as he has no 

complaints concerning for infection no other findings.


Patient has been placed on a jessica hugger with improvement to 97F.


Concern for possible low output heart failure especially given his dilated 

cardiomyopathy with biventricular failure.


I have consulted Dr. Diaz


Will consider placing patient on milrinone drip and repeat lactic acid later.  

Patient currently not hypotensive.  Will check liver enzymes and Bnp.


IV Lasix, continue lisinopril and beta-blocker.





6/18/2020


Hypothermia has resolved at this time as well as lactic acidosis.  Extremities 

feel warm today.


Started patient on dobutamine low-dose yesterday as milrinone drip can only be 

done in the ICU.  This seems to have led to resolution of hypothermia lactic 

acidosis.  We will maintain on dopamine drip for today.  Continue lisinopril and

beta-blocker.


Continue to monitor vital signs closely in the IMCU.  EKG in the morning


Cardiology following.





6/19/2020


Dobutamine drip discontinued.  Continue Toprol and lisinopril.  Diuretics 

changed to IV Lasix per Cardiology recommendation.


I believe patient likely has encephalopathy that is likely chronic from his 

chronic alcohol abuse.  There is a very good chance that he has Wernicke's 

encephalopathy.


I do not think he is mental status is as a result of alcohol withdrawal.


I will discontinue Ativan.  I will give some standing diazepam.  I will order 

for Haldol IM as needed.  Redirection if possible.  Currently in restraints.


I have called patient's brother to try to get a better sense of patient's 

baseline mental status but no response.  Left voice message to call back 

tomorrow or before end of shift today.





6/20/2020


Toprol-XL on lisinopril 10 mg daily which I will increase if patient remains 

hypertensive.  IV Lasix.  Cardiology following and recommending ischemic 

evaluation prior to discharge.


Patient certainly has cognitive impairment unlikely chronic encephalopathy from 

his chronic alcohol abuse.  It is very possible that he may have a component of 

Wernicke's especially given his unsteady gait.  


Despite patient's hypotension and tachycardia, patient is not showing any 

tremulousness, damp skin, diaphoresis, agitation or other physical signs 

suggestive of alcohol withdrawal and I do not believe him to be withdrawing at 

this moment.


I will check B12 and folic acid levels.


Continue IV thiamine supplements.


I have asked psychiatry to evaluate patient for capacity.  Also discussed with 

patient's brother.


Physical therapy will need to evaluate patient and help to walk strength.





6/21/2020


Patient appears better today.  He is more calm and off all restraints.  He also 

seems more awake.  I requested a physical therapy work with him today.  

Currently awaiting evaluation by psychiatry for capacity assessment.  I do 

believe patient is not withdrawing from alcohol and this is just his baseline 

mental status from chronic alcohol use.  Continue thiamine supplements.


His vitamin B12 and folic acid levels are normal.  TSH and cortisol levels are 

normal as well.


BMP seems to still be elevated over 6000 but this does not correlate with phani gilbert's improvement in terms of respiratory status and resolution of lower 

extremity swelling.  We will continue on Toprol XL, lisinopril and IV Lasix will

be continued for today and switch to p.o. Lasix tomorrow.


I have discussed with cardiologist about patient's plan and we have made the 

shared decision to forego stress test at this time as patient is certainly not 

going to be compliant with therapy and as such will not be a candidate for any 

form of PCI even if stress test was positive.


Patient notably spiked a fever earlier.  Chest x-ray does show some opacities in

the right lobe and patient has been noted to be aspirating during this 

admission.  I have started patient on Unasyn for coverage for aspiration 

pneumonia.  Blood cultures and sputum cultures will be obtained.





6/22/2020


Patient has been adequately treated for heart failure secondary to dilated 

cardiomyopathy EF 20 to 25% likely from alcohol abuse.  He seems to be euvolemic

at this time and Lasix has been de-escalated to p.o. 20 mg daily.  Continue 

Toprol-XL and lisinopril. On ASA.


I agree with cardiology that ischemic evaluation should be deferred at this time

as patient is a high risk for noncompliance with therapy and will not be a PCI 

candidate, if warranted, due to this.


DC Unasyn.  Augmentin.  D2/5 ABX.  Fever resolved.


I discussed case with Dr. Mike Wilson yesterday and awaiting capacity 

evaluation as this will significantly affect disposition given his homelessness 

and may need long-term placement at SNF.


As part of capacity evaluation, I ordered head CT which shows focal right 

frontal lobe encephalomalacia likely from a prior ischemic injury as well as 

age-related involutional changes.





6/23/2020


Patient stable from heart failure perspective.  Plan to continue Toprol-XL, 

lisinopril, aspirin and Lasix.


I agree with cardiology that ischemic evaluation should be deferred at this time

as patient is a high risk for noncompliance with therapy and will not be a PCI 

candidate, if warranted, due to this.


Augmentin.  D3/5 ABX.  Fever resolved.


Patient has been evaluated by psychiatry and deemed to have capacity to make his

own decisions.  I have discussed with patient about rehab at SNF and he is a

greeable to it at this time.  Discharge planning aware.  At this time, patient 

is medically stable for discharge pending SNF placement.








- Time


Time Spent with patient: Less than 15 minutes

## 2020-06-23 NOTE — PDOC CONSULTATION
Consultation-Loli


Consultation: 





DOS: 06.21.2020


Time: 19:21





Met with Patient at attending Physician request.





Met with Patient who was noted to be laying in bed watching television. I 

introduced myself and explained the nature of the consultation. Patient was 

noted to be cooperative during the evaluation but difficult to understand at 

times as he speaks in a low tone and has poor dentation. Patient was oriented to

person, place, time, and circumstance. Mood was cooperative while affect was 

blunted. Patient reported he was ready to "go home" but when asked where "home" 

was, it was difficult to understand his answer even after asking him to repeat 

his response. When asked if his response was his brother's, he responded 

affirmatively both verbally and by shaking his head. Patient answered "no" when 

asked if returning to live on the streets was an option for him.Upon seeking 

clarification or a reason for his "no" response, Patient simply stated "my 

medicines." He did not provide further elaboration. Patient reported he is 

capable of walking and caring for self though he observably appears cachectic. 

Patient acknowledged he liked to drink alcohol, specifically beer, but would not

elaborate on how much, how often, or the size of the beers he consumed. Patient 

stated "heart problem" when asked if he knew what medical history he had, and 

stated,"yes" to wanting to live. He denied suicidal/homicidal ideation, intent 

or plan. He denied auditory/visual hallucinations and no delusions were noted. 

Thought processes were difficult to evaluate given the brevity of his answers, 

though his responses were on task. Conversational speech was notable for 

apoverty of speech and language was simple vocabulary, consistent with his 

self-reported 11th grade education. Eye contact was poor as Patient tended to 

keep them closed most of the evaluation but opened them when asked. His remote, 

recent, and immediate memories were intact. Attention and concentration were 

within normal limits. Insight, judgment, and impulse control were fair as 

evidenced by answers to questions regarding management of his medical illness 

such as follow up with outpatient cardiac appointments, filling prescriptions 

medications and taking them daily, etc. Patient's responses to how he would 

accomplish these tasks were mostly "I don't know" and no further problem solving

thought process. Patient knew to call 911 if he saw smoke coming from his 

neighbor's house, but was unsure what he should do if he came home to find his 

bathroom overflowing with water. 





Patient's medical history reveals significant cardiac issues that require 

monitoring by a physician, testing, and medication intervention. Optimally, 

Patient would be asked to complete these tests outpatient as diligently and 

quickly as he can to ensure to ensure longevity and healthiness of his heart, 

organs and brain. However, Patient was homeless upon admission to [facility] and has a 

history of chronic alcoholism and his ability to follow up outpatient unknown. 

Moreover, Patient's head CT completed on 06.21.2020 revealed no acute processes 

but there is strong evidence of prominent ventricles which suggests cerebral 

atrophy, old right frontal ischemic injury or ischemic stroke, chronic white 

matter ischemia, and age related involutional changes in the cerebrum. Taken 

together, these findings suggest a neurodegenerative process in the brain often 

consistent with a dementia like process. 





In summary, the evaluation revealed the Patient responded mostly appropriately 

to questions concerning capacity, though questions of safety and of insight were

not completely satisfied. This type of presentation, where many questions are 

answered appropriately and others fall within the borderline range and others 

are unsatisfactory, does not allow for a recommendation to the court for 

capacity. The Patient at this time is felt to be capable of participating in his

own medical care even though it may be felt by the professionals he is making a 

wrong decision. The best that can happen is to make sure he is clearly informed 

both verbally and in writing of his choices, and allow him some time to think 

about his choices, and then reapproach him for his decision. If indeed he does 

have Dementia, he will likely require a guardian in the future to take over the 

decision making responsibilities, but at this time, that recommendation cannot 

be made. His physical stature is diminished and he will likely require 

assistance to regain strength and stamina. As the attending physician you feel 

it is appropriate, physical and occupational therapy referrals might be 

beneficial. An Adult Protective Services referral would also be helpful as they 

can assist the Patient with transportation to medical appointments, assist with 

ensuring he is getting his medications, and link him to the appropriate medical 

and  that he needs following their evaluation of him.





Diagnoses:


1. 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


2. Alcoholic Cardiomyopathy


3. Alcohol Dependence


4. Ischemic Injury by history


5. Homelessness





Medication Recommendations from consulting psychiatric provider include:


1. Add Depakote 250 mg twice per day


2. Add Buspar 5 mg twice per day





Impression: Patient is currently capable of participating and making decisions 

about his medical care, though futuristically this may  not be the case as his 

neurocognitive and physical capacities are likely to decline given his cognitive

and medical diagnoses. Patient's cardiac disease process is progressive and shou

ld he continue to choose to drink, it is very likely that his heart function 

will fail as will his cognitive acuity. Patient would currently benefit from a 

power of  who could assist the Patient in the event he rapidly declines 

and can no longer assist in his treatment. 





Recommendations:


1. Power of  in the event of Rapid physical and cognitive decline 

impacting his ability to participate in his medical care.


2. Follow up with Neurology for assessment of ischemia and medication management


3. IF attending physician feels it is appropriate, referral to physical and 

occupational therapies for rehabilitation.


4. Recommend abstinence from alcohol.


5. Recommend attendance at a 12-step meeting or fattend a virtual 12-meeting. 

Meetings and resources can be found online at [email or web address]





Thank you for this kind referall. Please contact the behavioral health office at

[phone] with questions regarding this assessment.

## 2020-06-24 RX ADMIN — HEPARIN SODIUM SCH UNIT: 5000 INJECTION, SOLUTION INTRAVENOUS; SUBCUTANEOUS at 16:12

## 2020-06-24 RX ADMIN — Medication SCH: at 16:22

## 2020-06-24 RX ADMIN — Medication SCH: at 21:04

## 2020-06-24 RX ADMIN — PANTOPRAZOLE SODIUM SCH MG: 20 TABLET, DELAYED RELEASE ORAL at 05:29

## 2020-06-24 RX ADMIN — Medication SCH: at 05:31

## 2020-06-24 RX ADMIN — AMOXICILLIN AND CLAVULANATE POTASSIUM SCH TAB: 875; 125 TABLET, FILM COATED ORAL at 09:28

## 2020-06-24 RX ADMIN — HEPARIN SODIUM SCH UNIT: 5000 INJECTION, SOLUTION INTRAVENOUS; SUBCUTANEOUS at 05:31

## 2020-06-24 RX ADMIN — HEPARIN SODIUM SCH UNIT: 5000 INJECTION, SOLUTION INTRAVENOUS; SUBCUTANEOUS at 21:04

## 2020-06-24 RX ADMIN — FUROSEMIDE SCH MG: 20 TABLET ORAL at 09:26

## 2020-06-24 RX ADMIN — LISINOPRIL SCH MG: 10 TABLET ORAL at 09:26

## 2020-06-24 RX ADMIN — LEVALBUTEROL HYDROCHLORIDE SCH MG: 1.25 SOLUTION RESPIRATORY (INHALATION) at 00:57

## 2020-06-24 RX ADMIN — AMOXICILLIN AND CLAVULANATE POTASSIUM SCH TAB: 875; 125 TABLET, FILM COATED ORAL at 21:04

## 2020-06-24 RX ADMIN — ASPIRIN SCH MG: 81 TABLET, COATED ORAL at 09:25

## 2020-06-24 RX ADMIN — NICOTINE PRN EACH: 14 PATCH, EXTENDED RELEASE TOPICAL at 16:12

## 2020-06-24 RX ADMIN — Medication SCH MG: at 09:26

## 2020-06-24 RX ADMIN — LEVALBUTEROL HYDROCHLORIDE SCH MG: 1.25 SOLUTION RESPIRATORY (INHALATION) at 08:46

## 2020-06-24 RX ADMIN — METOPROLOL SUCCINATE SCH MG: 50 TABLET, EXTENDED RELEASE ORAL at 09:25

## 2020-06-24 RX ADMIN — LEVALBUTEROL HYDROCHLORIDE SCH MG: 1.25 SOLUTION RESPIRATORY (INHALATION) at 16:30

## 2020-06-24 NOTE — PDOC PROGRESS REPORT
Subjective


Progress Note for:: 06/24/20


Subjective:: 





No adverse events overnight.  No new complaints.  Vital signs been stable.  

Eating and drinking without difficulty.  He fell this evening while coming back 

from the bathroom.  1 of the staff was with him and apparently it was not a very

hard fall and he was able to ease himself into the floor and did not hit his 

head.  He was not complaining of any pain or discomfort afterwards.


Reason For Visit: 


ALCOHOLIC CARDIOMYOPATHY,HEART FAILURE








Physical Exam


Vital Signs: 


                                        











Temp Pulse Resp BP Pulse Ox


 


 98.1 F   91   18   128/64 H  95 


 


 06/24/20 12:00  06/24/20 16:32  06/24/20 16:32  06/24/20 12:00  06/24/20 12:00








                                 Intake & Output











 06/23/20 06/24/20 06/25/20





 06:59 06:59 06:59


 


Intake Total 580 1200 


 


Output Total 525  


 


Balance 55 1200 


 


Weight 65.9 kg 65.9 kg 











General appearance: PRESENT: no acute distress, cooperative, disheveled, thin


Respiratory exam: PRESENT: clear to auscultation ana paula, symmetrical, unlabored.  

ABSENT: accessory muscle use, chest wall tenderness, crackles, prolonged 

expiratory phas, rhonchi, tachypnea, wheezes


Cardiovascular exam: PRESENT: RRR, +S1, +S2


Pulses: PRESENT: normal carotid pulses


Vascular exam: PRESENT: normal capillary refill


GI/Abdominal exam: PRESENT: normal bowel sounds, soft.  ABSENT: distended, 

guarding, rebound, tenderness


Extremities exam: ABSENT: clubbing, pedal edema


Musculoskeletal exam: PRESENT: normal inspection.  ABSENT: deformity


Neurological exam: PRESENT: awake, oriented to person, oriented to place


Psychiatric exam: PRESENT: flat affect


Skin exam: PRESENT: dry, warm





Results


Laboratory Results: 


                                        





                                 06/17/20 09:11 





                                 06/22/20 08:17 





                                        











  06/14/20 06/14/20 06/15/20





  17:46 17:46 01:38


 


Creatine Kinase  289 H  


 


Troponin I   0.025  0.028


 


NT-Pro-B Natriuret Pep   8780 H 














  06/15/20 06/15/20 06/15/20





  01:38 07:35 13:18


 


Creatine Kinase  212 H  


 


Troponin I   0.028  0.024


 


NT-Pro-B Natriuret Pep   














  06/15/20 06/17/20 06/21/20





  19:57 09:11 05:54


 


Creatine Kinase   


 


Troponin I  0.021  


 


NT-Pro-B Natriuret Pep   2400 H  6810 H











Impressions: 


                                        





Chest X-Ray  06/20/20 00:00


IMPRESSION:  Minimal right lung and left basilar opacities.  Unlikely to 

represent aspiration.


 








Head CT  06/21/20 00:00


IMPRESSION:  Right frontal lobe encephalomalacia consistent with sequela of 

previous ischemic injury.  Background of chronic microvascular ischemic changes 

and age related involutional changes.  No acute findings.


EVIDENCE OF ACUTE STROKE: NO.


 














Assessment and Plan





- Diagnosis


(1) Acute systolic (congestive) heart failure


Is this a current diagnosis for this admission?: Yes   


Plan: 


Resolved.  He has been medically optimized.  Cardiology has been following.  

Further ischemic work-up seems to be deferred for now due to concerns over high 

potential for loss to follow-up.








(2) Debility


Is this a current diagnosis for this admission?: Yes   


Plan: 


We are currently working on placement.  Case management is involved.  He is on 

fall precautions but is a high fall risk.








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


Is this a current diagnosis for this admission?: Yes   


Plan: 


Medical optimization.  Cardiology following.








(4) Encephalopathy, metabolic


Is this a current diagnosis for this admission?: Yes   


Plan: 


Resolved.  Mental status back to baseline.








(5) Alcohol dependence


Qualifiers: 


   Substance use status: uncomplicated   Qualified Code(s): F10.20 - Alcohol 

dependence, uncomplicated   


Is this a current diagnosis for this admission?: Yes   


Plan: 


No signs of withdrawal at this time








(6) Homeless


Is this a current diagnosis for this admission?: Yes   


Plan: 


Case management involved for dispositioning, currently working on placement








(7) Tobacco abuse


Is this a current diagnosis for this admission?: Yes   


Plan: 


Tobacco cessation counseling performed and nicotine replacement options 

discussed








- Plan Summary


Summary: 


Discussed with patient about his cardiomyopathy noted on echo with EF of 20 to 

25% and biventricular dilation.  Picture appears to be typical for alcoholic 

cardiomyopathy.  He denies any history of prior CHF.


I have started patient on low-dose lisinopril, Toprol-XL and Lasix.  Elect

rolytes in the morning.


He will need to be set up with cardiology for outpatient follow-up prior to 

discharge.


Does not appear to be overtly fluid overloaded at this moment.


CIWA and as needed Ativan as needed.  Will discontinue diazepam this evening.


Alcohol cessation and tobacco cessation counseling performed.





6/17/2020


Patient hypothermic to 94F this morning.  Lactic acid also elevated at 3.1.  

Cool extremities noted.


Chest x-ray showing interstitial opacities possibly pulmonary edema without 

pneumonia.  Prominent JVD noted on exam.  Urinalysis on 614 was negative.  Will 

repeat.


Currently doubt that patient's presentation is due to an infection as he has no 

complaints concerning for infection no other findings.


Patient has been placed on a jessica hugger with improvement to 97F.


Concern for possible low output heart failure especially given his dilated 

cardiomyopathy with biventricular failure.


I have consulted Dr. Diaz


Will consider placing patient on milrinone drip and repeat lactic acid later.  

Patient currently not hypotensive.  Will check liver enzymes and Bnp.


IV Lasix, continue lisinopril and beta-blocker.





6/18/2020


Hypothermia has resolved at this time as well as lactic acidosis.  Extremities 

feel warm today.


Started patient on dobutamine low-dose yesterday as milrinone drip can only be 

done in the ICU.  This seems to have led to resolution of hypothermia lactic aci

dosis.  We will maintain on dopamine drip for today.  Continue lisinopril and 

beta-blocker.


Continue to monitor vital signs closely in the IMCU.  EKG in the morning


Cardiology following.





6/19/2020


Dobutamine drip discontinued.  Continue Toprol and lisinopril.  Diuretics 

changed to IV Lasix per Cardiology recommendation.


I believe patient likely has encephalopathy that is likely chronic from his c

hronic alcohol abuse.  There is a very good chance that he has Wernicke's 

encephalopathy.


I do not think he is mental status is as a result of alcohol withdrawal.


I will discontinue Ativan.  I will give some standing diazepam.  I will order 

for Haldol IM as needed.  Redirection if possible.  Currently in restraints.


I have called patient's brother to try to get a better sense of patient's 

baseline mental status but no response.  Left voice message to call back 

tomorrow or before end of shift today.





6/20/2020


Toprol-XL on lisinopril 10 mg daily which I will increase if patient remains 

hypertensive.  IV Lasix.  Cardiology following and recommending ischemic 

evaluation prior to discharge.


Patient certainly has cognitive impairment unlikely chronic encephalopathy from 

his chronic alcohol abuse.  It is very possible that he may have a component of 

Wernicke's especially given his unsteady gait.  


Despite patient's hypotension and tachycardia, patient is not showing any 

tremulousness, damp skin, diaphoresis, agitation or other physical signs 

suggestive of alcohol withdrawal and I do not believe him to be withdrawing at 

this moment.


I will check B12 and folic acid levels.


Continue IV thiamine supplements.


I have asked psychiatry to evaluate patient for capacity.  Also discussed with 

patient's brother.


Physical therapy will need to evaluate patient and help to walk strength.





6/21/2020


Patient appears better today.  He is more calm and off all restraints.  He also 

seems more awake.  I requested a physical therapy work with him today.  

Currently awaiting evaluation by psychiatry for capacity assessment.  I do 

believe patient is not withdrawing from alcohol and this is just his baseline 

mental status from chronic alcohol use.  Continue thiamine supplements.


His vitamin B12 and folic acid levels are normal.  TSH and cortisol levels are 

normal as well.


BMP seems to still be elevated over 6000 but this does not correlate with 

patient's improvement in terms of respiratory status and resolution of lower 

extremity swelling.  We will continue on Toprol XL, lisinopril and IV Lasix will

be continued for today and switch to p.o. Lasix tomorrow.


I have discussed with cardiologist about patient's plan and we have made the 

shared decision to forego stress test at this time as patient is certainly not 

going to be compliant with therapy and as such will not be a candidate for any 

form of PCI even if stress test was positive.


Patient notably spiked a fever earlier.  Chest x-ray does show some opacities in

the right lobe and patient has been noted to be aspirating during this admi

ssion.  I have started patient on Unasyn for coverage for aspiration pneumonia. 

Blood cultures and sputum cultures will be obtained.





6/22/2020


Patient has been adequately treated for heart failure secondary to dilated 

cardiomyopathy EF 20 to 25% likely from alcohol abuse.  He seems to be euvolemic

at this time and Lasix has been de-escalated to p.o. 20 mg daily.  Continue 

Toprol-XL and lisinopril. On ASA.


I agree with cardiology that ischemic evaluation should be deferred at this time

as patient is a high risk for noncompliance with therapy and will not be a PCI 

candidate, if warranted, due to this.


DC Unasyn.  Augmentin.  D2/5 ABX.  Fever resolved.


I discussed case with Dr. Mike Wilson yesterday and awaiting capacity 

evaluation as this will significantly affect disposition given his homelessness 

and may need long-term placement at SNF.


As part of capacity evaluation, I ordered head CT which shows focal right 

frontal lobe encephalomalacia likely from a prior ischemic injury as well as 

age-related involutional changes.





6/23/2020


Patient stable from heart failure perspective.  Plan to continue Toprol-XL, 

lisinopril, aspirin and Lasix.


I agree with cardiology that ischemic evaluation should be deferred at this time

as patient is a high risk for noncompliance with therapy and will not be a PCI 

candidate, if warranted, due to this.


Augmentin.  D3/5 ABX.  Fever resolved.


Patient has been evaluated by psychiatry and deemed to have capacity to make his

own decisions.  I have discussed with patient about rehab at SNF and he is 

agreeable to it at this time.  Discharge planning aware.  At this time, patient 

is medically stable for discharge pending SNF placement.








- Time


Time Spent with patient: 25-34 minutes

## 2020-06-25 RX ADMIN — HEPARIN SODIUM SCH UNIT: 5000 INJECTION, SOLUTION INTRAVENOUS; SUBCUTANEOUS at 13:51

## 2020-06-25 RX ADMIN — HEPARIN SODIUM SCH UNIT: 5000 INJECTION, SOLUTION INTRAVENOUS; SUBCUTANEOUS at 21:06

## 2020-06-25 RX ADMIN — LISINOPRIL SCH MG: 10 TABLET ORAL at 10:18

## 2020-06-25 RX ADMIN — Medication SCH MG: at 10:18

## 2020-06-25 RX ADMIN — PANTOPRAZOLE SODIUM SCH MG: 20 TABLET, DELAYED RELEASE ORAL at 05:01

## 2020-06-25 RX ADMIN — HEPARIN SODIUM SCH UNIT: 5000 INJECTION, SOLUTION INTRAVENOUS; SUBCUTANEOUS at 05:01

## 2020-06-25 RX ADMIN — ASPIRIN SCH MG: 81 TABLET, COATED ORAL at 10:18

## 2020-06-25 RX ADMIN — AMOXICILLIN AND CLAVULANATE POTASSIUM SCH TAB: 875; 125 TABLET, FILM COATED ORAL at 10:17

## 2020-06-25 RX ADMIN — Medication SCH: at 21:06

## 2020-06-25 RX ADMIN — AMOXICILLIN AND CLAVULANATE POTASSIUM SCH TAB: 875; 125 TABLET, FILM COATED ORAL at 21:06

## 2020-06-25 RX ADMIN — METOPROLOL SUCCINATE SCH MG: 50 TABLET, EXTENDED RELEASE ORAL at 10:17

## 2020-06-25 RX ADMIN — FUROSEMIDE SCH MG: 20 TABLET ORAL at 10:18

## 2020-06-25 RX ADMIN — Medication SCH ML: at 13:52

## 2020-06-25 RX ADMIN — Medication SCH: at 05:01

## 2020-06-25 RX ADMIN — LEVALBUTEROL HYDROCHLORIDE SCH MG: 1.25 SOLUTION RESPIRATORY (INHALATION) at 08:15

## 2020-06-25 RX ADMIN — LEVALBUTEROL HYDROCHLORIDE SCH MG: 1.25 SOLUTION RESPIRATORY (INHALATION) at 16:24

## 2020-06-25 RX ADMIN — LEVALBUTEROL HYDROCHLORIDE SCH MG: 1.25 SOLUTION RESPIRATORY (INHALATION) at 00:08

## 2020-06-25 NOTE — PDOC PROGRESS REPORT
Subjective


Progress Note for:: 06/25/20


Subjective:: 





No adverse events overnight.  No new complaints.  Vital signs been stable.  He 

has been calm and pleasant today.  Appetite is been fair.


Reason For Visit: 


ALCOHOLIC CARDIOMYOPATHY,HEART FAILURE








Physical Exam


Vital Signs: 


                                        











Temp Pulse Resp BP Pulse Ox


 


 97.6 F   83   20   127/88 H  99 


 


 06/25/20 15:33  06/25/20 15:33  06/25/20 15:33  06/25/20 15:33  06/25/20 15:33








                                 Intake & Output











 06/24/20 06/25/20 06/26/20





 06:59 06:59 06:59


 


Intake Total 1200 1030 480


 


Balance 1200 1030 480


 


Weight 65.9 kg 70.1 kg 








General appearance: PRESENT: no acute distress, cooperative, disheveled, thin


Respiratory exam: PRESENT: clear to auscultation ana paula, symmetrical, unlabored.  

ABSENT: accessory muscle use, chest wall tenderness, crackles, prolonged 

expiratory phas, rhonchi, tachypnea, wheezes


Cardiovascular exam: PRESENT: RRR, +S1, +S2


Pulses: PRESENT: normal carotid pulses


Vascular exam: PRESENT: normal capillary refill


GI/Abdominal exam: PRESENT: normal bowel sounds, soft.  ABSENT: distended, 

guarding, rebound, tenderness


Extremities exam: ABSENT: clubbing, pedal edema


Musculoskeletal exam: PRESENT: normal inspection.  ABSENT: deformity


Neurological exam: PRESENT: awake, oriented to person, oriented to place


Psychiatric exam: PRESENT: flat affect


Skin exam: PRESENT: dry, warm





Results


Laboratory Results: 


                                        





                                 06/17/20 09:11 





                                 06/22/20 08:17 





                                        











  06/14/20 06/14/20 06/15/20





  17:46 17:46 01:38


 


Creatine Kinase  289 H  


 


Troponin I   0.025  0.028


 


NT-Pro-B Natriuret Pep   8780 H 














  06/15/20 06/15/20 06/15/20





  01:38 07:35 13:18


 


Creatine Kinase  212 H  


 


Troponin I   0.028  0.024


 


NT-Pro-B Natriuret Pep   














  06/15/20 06/17/20 06/21/20





  19:57 09:11 05:54


 


Creatine Kinase   


 


Troponin I  0.021  


 


NT-Pro-B Natriuret Pep   2400 H  6810 H











Impressions: 


                                        





Chest X-Ray  06/20/20 00:00


IMPRESSION:  Minimal right lung and left basilar opacities.  Unlikely to 

represent aspiration.


 








Head CT  06/21/20 00:00


IMPRESSION:  Right frontal lobe encephalomalacia consistent with sequela of 

previous ischemic injury.  Background of chronic microvascular ischemic changes 

and age related involutional changes.  No acute findings.


EVIDENCE OF ACUTE STROKE: NO.


 














Assessment and Plan





- Diagnosis


(1) Acute systolic (congestive) heart failure


Is this a current diagnosis for this admission?: Yes   


Plan: 


Resolved.  He has been medically optimized.  Cardiology has been following.  

Further ischemic work-up seems to be deferred for now due to concerns over high 

potential for loss to follow-up.








(2) Debility


Is this a current diagnosis for this admission?: Yes   


Plan: 


We are currently working on placement.  Case management is involved.  He is on 

fall precautions but is a high fall risk.








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


Is this a current diagnosis for this admission?: Yes   


Plan: 


Medical optimization.  Cardiology following.








(4) Encephalopathy, metabolic


Is this a current diagnosis for this admission?: Yes   


Plan: 


Resolved.  Mental status back to baseline.








(5) Alcohol dependence


Qualifiers: 


   Substance use status: uncomplicated   Qualified Code(s): F10.20 - Alcohol 

dependence, uncomplicated   


Is this a current diagnosis for this admission?: Yes   


Plan: 


No signs of withdrawal at this time








(6) Homeless


Is this a current diagnosis for this admission?: Yes   


Plan: 


Case management involved for dispositioning, currently working on placement








(7) Tobacco abuse


Is this a current diagnosis for this admission?: Yes   


Plan: 


Tobacco cessation counseling performed and nicotine replacement options 

discussed








- Plan Summary


Summary: 


Discussed with patient about his cardiomyopathy noted on echo with EF of 20 to 

25% and biventricular dilation.  Picture appears to be typical for alcoholic 

cardiomyopathy.  He denies any history of prior CHF.


I have started patient on low-dose lisinopril, Toprol-XL and Lasix.  

Electrolytes in the morning.


He will need to be set up with cardiology for outpatient follow-up prior to 

discharge.


Does not appear to be overtly fluid overloaded at this moment.


CIWA and as needed Ativan as needed.  Will discontinue diazepam this evening.


Alcohol cessation and tobacco cessation counseling performed.





6/17/2020


Patient hypothermic to 94F this morning.  Lactic acid also elevated at 3.1.  

Cool extremities noted.


Chest x-ray showing interstitial opacities possibly pulmonary edema without 

pneumonia.  Prominent JVD noted on exam.  Urinalysis on 614 was negative.  Will 

repeat.


Currently doubt that patient's presentation is due to an infection as he has no 

complaints concerning for infection no other findings.


Patient has been placed on a jessica hugger with improvement to 97F.


Concern for possible low output heart failure especially given his dilated 

cardiomyopathy with biventricular failure.


I have consulted Dr. Diaz


Will consider placing patient on milrinone drip and repeat lactic acid later.  

Patient currently not hypotensive.  Will check liver enzymes and Bnp.


IV Lasix, continue lisinopril and beta-blocker.





6/18/2020


Hypothermia has resolved at this time as well as lactic acidosis.  Extremities 

feel warm today.


Started patient on dobutamine low-dose yesterday as milrinone drip can only be 

done in the ICU.  This seems to have led to resolution of hypothermia lactic 

acidosis.  We will maintain on dopamine drip for today.  Continue lisinopril and

beta-blocker.


Continue to monitor vital signs closely in the IMCU.  EKG in the morning


Cardiology following.





6/19/2020


Dobutamine drip discontinued.  Continue Toprol and lisinopril.  Diuretics 

changed to IV Lasix per Cardiology recommendation.


I believe patient likely has encephalopathy that is likely chronic from his 

chronic alcohol abuse.  There is a very good chance that he has Wernicke's 

encephalopathy.


I do not think he is mental status is as a result of alcohol withdrawal.


I will discontinue Ativan.  I will give some standing diazepam.  I will order 

for Haldol IM as needed.  Redirection if possible.  Currently in restraints.


I have called patient's brother to try to get a better sense of patient's 

baseline mental status but no response.  Left voice message to call back 

tomorrow or before end of shift today.





6/20/2020


Toprol-XL on lisinopril 10 mg daily which I will increase if patient remains 

hypertensive.  IV Lasix.  Cardiology following and recommending ischemic 

evaluation prior to discharge.


Patient certainly has cognitive impairment unlikely chronic encephalopathy from 

his chronic alcohol abuse.  It is very possible that he may have a component of 

Wernicke's especially given his unsteady gait.  


Despite patient's hypotension and tachycardia, patient is not showing any 

tremulousness, damp skin, diaphoresis, agitation or other physical signs sugges

tive of alcohol withdrawal and I do not believe him to be withdrawing at this 

moment.


I will check B12 and folic acid levels.


Continue IV thiamine supplements.


I have asked psychiatry to evaluate patient for capacity.  Also discussed with 

patient's brother.


Physical therapy will need to evaluate patient and help to walk strength.





6/21/2020


Patient appears better today.  He is more calm and off all restraints.  He also 

seems more awake.  I requested a physical therapy work with him today.  Glorai berger awaiting evaluation by psychiatry for capacity assessment.  I do believe 

patient is not withdrawing from alcohol and this is just his baseline mental 

status from chronic alcohol use.  Continue thiamine supplements.


His vitamin B12 and folic acid levels are normal.  TSH and cortisol levels are n

ormal as well.


BMP seems to still be elevated over 6000 but this does not correlate with 

patient's improvement in terms of respiratory status and resolution of lower 

extremity swelling.  We will continue on Toprol XL, lisinopril and IV Lasix will

be continued for today and switch to p.o. Lasix tomorrow.


I have discussed with cardiologist about patient's plan and we have made the 

shared decision to forego stress test at this time as patient is certainly not 

going to be compliant with therapy and as such will not be a candidate for any 

form of PCI even if stress test was positive.


Patient notably spiked a fever earlier.  Chest x-ray does show some opacities in

the right lobe and patient has been noted to be aspirating during this 

admission.  I have started patient on Unasyn for coverage for aspiration 

pneumonia.  Blood cultures and sputum cultures will be obtained.





6/22/2020


Patient has been adequately treated for heart failure secondary to dilated 

cardiomyopathy EF 20 to 25% likely from alcohol abuse.  He seems to be euvolemic

at this time and Lasix has been de-escalated to p.o. 20 mg daily.  Continue 

Toprol-XL and lisinopril. On ASA.


I agree with cardiology that ischemic evaluation should be deferred at this time

as patient is a high risk for noncompliance with therapy and will not be a PCI 

candidate, if warranted, due to this.


DC Unasyn.  Augmentin.  D2/5 ABX.  Fever resolved.


I discussed case with Dr. Mike Wilson yesterday and awaiting capacity 

evaluation as this will significantly affect disposition given his homelessness 

and may need long-term placement at SNF.


As part of capacity evaluation, I ordered head CT which shows focal right 

frontal lobe encephalomalacia likely from a prior ischemic injury as well as 

age-related involutional changes.





6/23/2020


Patient stable from heart failure perspective.  Plan to continue Toprol-XL, 

lisinopril, aspirin and Lasix.


I agree with cardiology that ischemic evaluation should be deferred at this time

as patient is a high risk for noncompliance with therapy and will not be a PCI 

candidate, if warranted, due to this.


Augmentin.  D3/5 ABX.  Fever resolved.


Patient has been evaluated by psychiatry and deemed to have capacity to make his

own decisions.  I have discussed with patient about rehab at SNF and he is 

agreeable to it at this time.  Discharge planning aware.  At this time, patient 

is medically stable for discharge pending SNF placement.








- Time


Time Spent with patient: 15-24 minutes

## 2020-06-26 VITALS — SYSTOLIC BLOOD PRESSURE: 132 MMHG | DIASTOLIC BLOOD PRESSURE: 104 MMHG

## 2020-06-26 RX ADMIN — PANTOPRAZOLE SODIUM SCH MG: 20 TABLET, DELAYED RELEASE ORAL at 05:05

## 2020-06-26 RX ADMIN — HEPARIN SODIUM SCH: 5000 INJECTION, SOLUTION INTRAVENOUS; SUBCUTANEOUS at 14:53

## 2020-06-26 RX ADMIN — METOPROLOL SUCCINATE SCH MG: 50 TABLET, EXTENDED RELEASE ORAL at 09:20

## 2020-06-26 RX ADMIN — AMOXICILLIN AND CLAVULANATE POTASSIUM SCH TAB: 875; 125 TABLET, FILM COATED ORAL at 09:20

## 2020-06-26 RX ADMIN — LEVALBUTEROL HYDROCHLORIDE SCH MG: 1.25 SOLUTION RESPIRATORY (INHALATION) at 08:12

## 2020-06-26 RX ADMIN — FUROSEMIDE SCH MG: 20 TABLET ORAL at 09:20

## 2020-06-26 RX ADMIN — LEVALBUTEROL HYDROCHLORIDE SCH MG: 1.25 SOLUTION RESPIRATORY (INHALATION) at 00:21

## 2020-06-26 RX ADMIN — Medication SCH MG: at 09:20

## 2020-06-26 RX ADMIN — ASPIRIN SCH MG: 81 TABLET, COATED ORAL at 09:20

## 2020-06-26 RX ADMIN — Medication SCH: at 14:53

## 2020-06-26 RX ADMIN — LISINOPRIL SCH MG: 10 TABLET ORAL at 09:20

## 2020-06-26 RX ADMIN — HEPARIN SODIUM SCH UNIT: 5000 INJECTION, SOLUTION INTRAVENOUS; SUBCUTANEOUS at 05:05

## 2020-06-26 RX ADMIN — Medication SCH: at 05:05

## 2020-06-26 RX ADMIN — LEVALBUTEROL HYDROCHLORIDE SCH MG: 1.25 SOLUTION RESPIRATORY (INHALATION) at 16:04

## 2020-06-26 NOTE — PDOC TRANSFER SUMMARY
Impression





- Admit/DC Date/PCP


Admission Date/Primary Care Provider: 


  06/15/20 03:43





  





Discharge Date: 06/26/20





- Discharge Diagnosis


(1) Acute systolic (congestive) heart failure


Is this a current diagnosis for this admission?: Yes   





(2) Debility


Is this a current diagnosis for this admission?: Yes   





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


Is this a current diagnosis for this admission?: Yes   





(4) Encephalopathy, metabolic


Is this a current diagnosis for this admission?: Yes   





(5) Alcohol dependence


Is this a current diagnosis for this admission?: Yes   





(6) Homeless


Is this a current diagnosis for this admission?: Yes   





(7) Tobacco abuse


Is this a current diagnosis for this admission?: Yes   





- Assessment


Summary: 


Discussed with patient about his cardiomyopathy noted on echo with EF of 20 to 

25% and biventricular dilation.  Picture appears to be typical for alcoholic 

cardiomyopathy.  He denies any history of prior CHF.


I have started patient on low-dose lisinopril, Toprol-XL and Lasix.  

Electrolytes in the morning.


He will need to be set up with cardiology for outpatient follow-up prior to 

discharge.


Does not appear to be overtly fluid overloaded at this moment.


CIWA and as needed Ativan as needed.  Will discontinue diazepam this evening.


Alcohol cessation and tobacco cessation counseling performed.





6/17/2020


Patient hypothermic to 94F this morning.  Lactic acid also elevated at 3.1.  

Cool extremities noted.


Chest x-ray showing interstitial opacities possibly pulmonary edema without 

pneumonia.  Prominent JVD noted on exam.  Urinalysis on 614 was negative.  Will 

repeat.


Currently doubt that patient's presentation is due to an infection as he has no 

complaints concerning for infection no other findings.


Patient has been placed on a jessica hugger with improvement to 97F.


Concern for possible low output heart failure especially given his dilated 

cardiomyopathy with biventricular failure.


I have consulted Dr. Diaz


Will consider placing patient on milrinone drip and repeat lactic acid later.  

Patient currently not hypotensive.  Will check liver enzymes and Bnp.


IV Lasix, continue lisinopril and beta-blocker.





6/18/2020


Hypothermia has resolved at this time as well as lactic acidosis.  Extremities 

feel warm today.


Started patient on dobutamine low-dose yesterday as milrinone drip can only be 

done in the ICU.  This seems to have led to resolution of hypothermia lactic 

acidosis.  We will maintain on dopamine drip for today.  Continue lisinopril and

beta-blocker.


Continue to monitor vital signs closely in the IMCU.  EKG in the morning


Cardiology following.





6/19/2020


Dobutamine drip discontinued.  Continue Toprol and lisinopril.  Diuretics 

changed to IV Lasix per Cardiology recommendation.


I believe patient likely has encephalopathy that is likely chronic from his 

chronic alcohol abuse.  There is a very good chance that he has Wernicke's 

encephalopathy.


I do not think he is mental status is as a result of alcohol withdrawal.


I will discontinue Ativan.  I will give some standing diazepam.  I will order 

for Haldol IM as needed.  Redirection if possible.  Currently in restraints.


I have called patient's brother to try to get a better sense of patient's 

baseline mental status but no response.  Left voice message to call back 

tomorrow or before end of shift today.





6/20/2020


Toprol-XL on lisinopril 10 mg daily which I will increase if patient remains 

hypertensive.  IV Lasix.  Cardiology following and recommending ischemic 

evaluation prior to discharge.


Patient certainly has cognitive impairment unlikely chronic encephalopathy from 

his chronic alcohol abuse.  It is very possible that he may have a component of 

Wernicke's especially given his unsteady gait.  


Despite patient's hypotension and tachycardia, patient is not showing any 

tremulousness, damp skin, diaphoresis, agitation or other physical signs 

suggestive of alcohol withdrawal and I do not believe him to be withdrawing at 

this moment.


I will check B12 and folic acid levels.


Continue IV thiamine supplements.


I have asked psychiatry to evaluate patient for capacity.  Also discussed with 

patient's brother.


Physical therapy will need to evaluate patient and help to walk strength.





6/21/2020


Patient appears better today.  He is more calm and off all restraints.  He also 

seems more awake.  I requested a physical therapy work with him today.  

Currently awaiting evaluation by psychiatry for capacity assessment.  I do 

believe patient is not withdrawing from alcohol and this is just his baseline 

mental status from chronic alcohol use.  Continue thiamine supplements.


His vitamin B12 and folic acid levels are normal.  TSH and cortisol levels are 

normal as well.


BMP seems to still be elevated over 6000 but this does not correlate with 

patient's improvement in terms of respiratory status and resolution of lower 

extremity swelling.  We will continue on Toprol XL, lisinopril and IV Lasix will

be continued for today and switch to p.o. Lasix tomorrow.


I have discussed with cardiologist about patient's plan and we have made the 

shared decision to forego stress test at this time as patient is certainly not 

going to be compliant with therapy and as such will not be a candidate for any 

form of PCI even if stress test was positive.


Patient notably spiked a fever earlier.  Chest x-ray does show some opacities in

the right lobe and patient has been noted to be aspirating during this 

admission.  I have started patient on Unasyn for coverage for aspiration 

pneumonia.  Blood cultures and sputum cultures will be obtained.





6/22/2020


Patient has been adequately treated for heart failure secondary to dilated 

cardiomyopathy EF 20 to 25% likely from alcohol abuse.  He seems to be euvolemic

at this time and Lasix has been de-escalated to p.o. 20 mg daily.  Continue 

Toprol-XL and lisinopril. On ASA.


I agree with cardiology that ischemic evaluation should be deferred at this time

as patient is a high risk for noncompliance with therapy and will not be a PCI 

candidate, if warranted, due to this.


DC Unasyn.  Augmentin.  D2/5 ABX.  Fever resolved.


I discussed case with Dr. Mike Wilson yesterday and awaiting capacity 

evaluation as this will significantly affect disposition given his homelessness 

and may need long-term placement at SNF.


As part of capacity evaluation, I ordered head CT which shows focal right 

frontal lobe encephalomalacia likely from a prior ischemic injury as well as 

age-related involutional changes.





6/23/2020


Patient stable from heart failure perspective.  Plan to continue Toprol-XL, 

lisinopril, aspirin and Lasix.


I agree with cardiology that ischemic evaluation should be deferred at this time

as patient is a high risk for noncompliance with therapy and will not be a PCI 

candidate, if warranted, due to this.


Augmentin.  D3/5 ABX.  Fever resolved.


Patient has been evaluated by psychiatry and deemed to have capacity to make his

own decisions.  I have discussed with patient about rehab at SNF and he is 

agreeable to it at this time.  Discharge planning aware.  At this time, patient 

is medically stable for discharge pending SNF placement.








- Additional Information


Resuscitation Status: Full Code


Discharge Diet: Cardiac


Discharge Activity: Activity As Tolerated, Balance Activity w/Rest, Weigh Daily


Referrals: 


[facility] [Outside] - 06/29/20 3:30 pm


MIMI PRICE MD [ACTIVE STAFF] - 


Prescriptions: 


Amoxicillin/Potassium Clav [Augmentin 875-125 Tablet] 1 tab PO Q12 #10 tablet


Home Medications: 








Amoxicillin/Potassium Clav [Augmentin 875-125 Tablet] 1 tab PO Q12 #10 tablet 

06/26/20 


Aspirin [Ecotrin 81 mg EC Tablet] 81 mg PO DAILY  tabec 06/26/20 


Furosemide [Lasix 20 mg Tablet] 20 mg PO DAILY  tablet 06/26/20 


Lisinopril [Prinivil 10 mg Tablet] 10 mg PO DAILY  tablet 06/26/20 


Metoprolol Succinate [Toprol Xl 50 mg Tab.sr] 100 mg PO DAILY  tab.sr.24h 

06/26/20 


Nicotine [Nicoderm 14 mg/24 Hr Transdermal Patch] 1 each TD DAILYP PRN  

patch.td24 06/26/20 


Pantoprazole Sodium [Protonix 20 mg Dr Tablet] 20 mg PO Q6AM  tablet.dr 06/26/20




Thiamine HCl [Thiamine 100 mg Tablet] 100 mg PO DAILY  tablet 06/26/20 











History of Present Illiness


History of Present Illness: 


KIESHA WINN is a 64 year old male with a past medical history of COPD, 

hypertension, tobacco, alcohol and substance abuse who is long-term homeless.  

He presents with 2 days of shortness of breath, nonproductive cough and lower 

extremity swelling.  He denies fever, chest pain nausea vomiting or 

palpitations.  In the emergency department he is found to have an sinus tach

ycardia, uncontrolled hypertension, borderline long QT, new T wave inversions 

and elevated BNP of 9000.  He admits to drinking a minimum of 12 beers per day 

and history of blackouts.  He denies any medication use.








Hospital Course


Hospital Course: 


He was medically optimized and he turned around fairly quickly.  The main reason

he was here so long was because he did not have an adequate safe situation to 

return home to.  He had not been taking his medications and from a mental status

standpoint he was not capable of keeping up with his medications or keeping up 

with his doctors appointments.  He has a brother that takes care of their 

elderly father and the patient cannot be taking care of there because it would 

be too much for the brother to handle.  Case management got involved and we were

able to get his Medicaid switched over to long-term.  After an extended hospital

stay, placement was finally obtained.  He would benefit from continued physical 

therapy.  His labs and examination were reassuring and he was discharged in 

stable condition.





Physical Exam


Vital Signs: 


                                        











Temp Pulse Resp BP Pulse Ox


 


 97.3 F   81   16   132/104 H  96 


 


 06/26/20 07:26  06/26/20 11:27  06/26/20 11:27  06/26/20 11:27  06/26/20 08:12








                                 Intake & Output











 06/25/20 06/26/20 06/27/20





 06:59 06:59 06:59


 


Intake Total 1030 890 240


 


Output Total  300 


 


Balance 1030 590 240


 


Weight 70.1 kg 69.5 kg 








General appearance: PRESENT: no acute distress, cooperative, disheveled, thin


Respiratory exam: PRESENT: clear to auscultation ana paula, symmetrical, unlabored.  

ABSENT: accessory muscle use, chest wall tenderness, crackles, prolonged 

expiratory phas, rhonchi, tachypnea, wheezes


Cardiovascular exam: PRESENT: RRR, +S1, +S2


Pulses: PRESENT: normal carotid pulses


Vascular exam: PRESENT: normal capillary refill


GI/Abdominal exam: PRESENT: normal bowel sounds, soft.  ABSENT: distended, 

guarding, rebound, tenderness


Extremities exam: ABSENT: clubbing, pedal edema


Musculoskeletal exam: PRESENT: normal inspection.  ABSENT: deformity


Neurological exam: PRESENT: awake, oriented to person, oriented to place


Psychiatric exam: PRESENT: flat affect


Skin exam: PRESENT: dry, warm





Results


Laboratory Results: 


                                        











WBC  4.2 10^3/uL (4.0-10.5)   06/17/20  09:11    


 


RBC  4.55 10^6/uL (4.35-5.55)   06/17/20  09:11    


 


Hgb  13.1 g/dL (13.5-17.0)  L  06/17/20  09:11    


 


Hct  39.7 % (37.9-51.0)   06/17/20  09:11    


 


MCV  87 fl (80-97)   06/17/20  09:11    


 


MCH  28.9 pg (27.0-33.4)   06/17/20  09:11    


 


MCHC  33.1 g/dL (32.0-36.0)   06/17/20  09:11    


 


RDW  15.2 % (11.5-14.0)  H  06/17/20  09:11    


 


Plt Count  291 10^3/uL (150-450)   06/17/20  09:11    


 


Lymph % (Auto)  30.1 % (13-45)   06/17/20  09:11    


 


Mono % (Auto)  12.4 % (3-13)   06/17/20  09:11    


 


Eos % (Auto)  2.9 % (0-6)   06/17/20  09:11    


 


Baso % (Auto)  1.2 % (0-2)   06/17/20  09:11    


 


Absolute Neuts (auto)  2.2 10^3/uL (1.7-8.2)   06/17/20  09:11    


 


Absolute Lymphs (auto)  1.3 10^3/uL (0.5-4.7)   06/17/20  09:11    


 


Absolute Monos (auto)  0.5 10^3/uL (0.1-1.4)   06/17/20  09:11    


 


Absolute Eos (auto)  0.1 10^3/uL (0.0-0.6)   06/17/20  09:11    


 


Absolute Basos (auto)  0.1 10^3/uL (0.0-0.2)   06/17/20  09:11    


 


Seg Neutrophils %  53.4 % (42-78)   06/17/20  09:11    


 


Sodium  137.4 mmol/L (137-145)   06/22/20  08:17    


 


Potassium  4.2 mmol/L (3.6-5.0)   06/22/20  08:17    


 


Chloride  103 mmol/L ()   06/22/20  08:17    


 


Carbon Dioxide  23 mmol/L (22-30)   06/22/20  08:17    


 


Anion Gap  11  (5-19)   06/22/20  08:17    


 


BUN  16 mg/dL (7-20)   06/22/20  08:17    


 


Creatinine  0.85 mg/dL (0.52-1.25)   06/22/20  08:17    


 


Est GFR ( Amer)  > 60  (>60)   06/22/20  08:17    


 


Est GFR (MDRD) Non-Af  > 60  (>60)   06/22/20  08:17    


 


Glucose  112 mg/dL ()  H  06/22/20  08:17    


 


POC Glucose  159 mg/dL ()  H  06/21/20  21:07    


 


Lactic Acid  1.6 mmol/L (0.7-2.1)   06/18/20  06:25    


 


Calcium  9.3 mg/dL (8.4-10.2)   06/22/20  08:17    


 


Magnesium  2.1 mg/dL (1.6-2.3)   06/22/20  08:17    


 


Total Bilirubin  1.2 mg/dL (0.2-1.3)   06/19/20  06:32    


 


Direct Bilirubin  0.1 mg/dL (0.0-0.4)   06/19/20  06:32    


 


Neonat Total Bilirubin  Not Reportable   06/19/20  06:32    


 


Neonat Direct Bilirubin  Not Reportable   06/19/20  06:32    


 


Neonat Indirect Bili  Not Reportable   06/19/20  06:32    


 


AST  53 U/L (17-59)   06/19/20  06:32    


 


ALT  35 U/L (<50)   06/19/20  06:32    


 


Alkaline Phosphatase  133 U/L ()  H  06/19/20  06:32    


 


Creatine Kinase  212 U/L ()  H  06/15/20  01:38    


 


Troponin I  0.021 ng/mL  06/15/20  19:57    


 


NT-Pro-B Natriuret Pep  6810 pg/mL (<125)  H  06/21/20  05:54    


 


Total Protein  6.9 g/dL (6.3-8.2)   06/19/20  06:32    


 


Albumin  3.5 g/dL (3.5-5.0)   06/19/20  06:32    


 


Vitamin B12  784.0 pg/mL (239-931)   06/21/20  05:54    


 


Folate  15.60 ng/mL (>2.76)   06/21/20  05:54    


 


TSH  3.62 uIU/mL (0.47-4.68)   06/14/20  17:46    


 


Cortisol AM Sample  17.80 ug/dL (4.46-22.7)   06/17/20  09:11    


 


Urine Color  MANDI   06/14/20  17:46    


 


Urine Appearance  SLIGHTLY-CLOUDY   06/14/20  17:46    


 


Urine pH  6.0  (5.0-9.0)   06/14/20  17:46    


 


Ur Specific Gravity  1.021   06/14/20  17:46    


 


Urine Protein  100 mg/dL (NEGATIVE)  H  06/14/20  17:46    


 


Urine Glucose (UA)  NEGATIVE mg/dL (NEGATIVE)   06/14/20  17:46    


 


Urine Ketones  NEGATIVE mg/dL (NEGATIVE)   06/14/20  17:46    


 


Urine Blood  NEGATIVE  (NEGATIVE)   06/14/20  17:46    


 


Urine Nitrite  NEGATIVE  (NEGATIVE)   06/14/20  17:46    


 


Urine Bilirubin  NEGATIVE  (NEGATIVE)   06/14/20  17:46    


 


Urine Urobilinogen  4.0 mg/dL (<2.0)  H  06/14/20  17:46    


 


Ur Leukocyte Esterase  NEGATIVE  (NEGATIVE)   06/14/20  17:46    


 


Urine WBC (Auto)  1 /HPF  06/14/20  17:46    


 


Urine RBC (Auto)  0 /HPF  06/14/20  17:46    


 


U Hyaline Cast (Auto)  1 /LPF  06/14/20  17:46    


 


Urine Mucus (Auto)  FEW /LPF  06/14/20  17:46    


 


Urine Ascorbic Acid  NEGATIVE  (NEGATIVE)   06/14/20  17:46    


 


Urine Opiates Screen  NEGATIVE   06/14/20  17:46    


 


Urine Methadone Screen  NEGATIVE   06/14/20  17:46    


 


Ur Barbiturates Screen  NEGATIVE   06/14/20  17:46    


 


Ur Phencyclidine Scrn  NEGATIVE   06/14/20  17:46    


 


Ur Amphetamines Screen  NEGATIVE   06/14/20  17:46    


 


U Benzodiazepines Scrn  NEGATIVE   06/14/20  17:46    


 


Urine Cocaine Screen  NEGATIVE   06/14/20  17:46    


 


U Marijuana (THC) Screen  UNCONFIRMED POSITIVE   06/14/20  17:46    


 


Serum Alcohol  10 mg/dL (NONE DETECTED)   06/14/20  17:46    








                                        











  06/14/20 06/15/20 06/15/20





  17:46 01:38 07:35


 


Troponin I  0.025  0.028  0.028


 


NT-Pro-B Natriuret Pep  8780 H  














  06/15/20 06/15/20 06/17/20





  13:18 19:57 09:11


 


Troponin I  0.024  0.021 


 


NT-Pro-B Natriuret Pep    2400 H














  06/21/20





  05:54


 


Troponin I 


 


NT-Pro-B Natriuret Pep  6810 H











Impressions: 


                                        





Chest X-Ray  06/14/20 19:14


IMPRESSION:


 


No evidence of acute cardiopulmonary disease.


 








Chest X-Ray  06/17/20 00:00


IMPRESSION:  New small moderate bilateral pleural effusions


Minimal right basilar airspace disease


 








Chest X-Ray  06/20/20 00:00


IMPRESSION:  Minimal right lung and left basilar opacities.  Unlikely to 

represent aspiration.


 








Head CT  06/21/20 00:00


IMPRESSION:  Right frontal lobe encephalomalacia consistent with sequela of 

previous ischemic injury.  Background of chronic microvascular ischemic changes 

and age related involutional changes.  No acute findings.


EVIDENCE OF ACUTE STROKE: NO.


 














Plan


Time Spent: Greater than 30 Minutes





Stroke


Is this a Stroke Patient?: No





Acute Heart Failure





- **


Is this a Heart Failure Patient?: Yes


Documentation of LVEF assessment?: Yes


LVEF: LVEF Less Than or Equal to 35%


Anticoagulant Therapy: N/A


Discharged on Evidence-Based Beta Blockers: Yes


Discharged on ARNI?: Yes


Discharged on ARB?: N/A-Discharged on ARNI


Discharged on ACEI?: N/A Discharged on ARNI


For LVEF <35%, discharged on Aldosterone Antagonist?: No-document 

contraincations - Was not recommended by cardiology


Reason(s) not discharged on Aldosterone Antagonist: Other - Was not recommended 

by cardiology


Aldosterone Antagonist Reason - Other: Was not recommended by cardiology


Follow-up Appointment scheduled within 7 days?: Yes

## 2020-06-29 ENCOUNTER — HOSPITAL ENCOUNTER (EMERGENCY)
Dept: HOSPITAL 62 - ER | Age: 65
LOS: 1 days | Discharge: LEFT BEFORE DISCHARGE | End: 2020-06-30
Payer: MEDICAID

## 2020-06-29 VITALS — SYSTOLIC BLOOD PRESSURE: 116 MMHG | DIASTOLIC BLOOD PRESSURE: 67 MMHG

## 2020-06-29 DIAGNOSIS — I70.202: Primary | ICD-10-CM

## 2020-06-29 DIAGNOSIS — I10: ICD-10-CM

## 2020-06-29 DIAGNOSIS — I70.201: ICD-10-CM

## 2020-06-29 DIAGNOSIS — Z53.20: ICD-10-CM

## 2020-06-29 DIAGNOSIS — J90: ICD-10-CM

## 2020-06-29 DIAGNOSIS — J44.9: ICD-10-CM

## 2020-06-29 DIAGNOSIS — M79.672: ICD-10-CM

## 2020-06-29 DIAGNOSIS — M79.671: ICD-10-CM

## 2020-06-29 PROCEDURE — 99281 EMR DPT VST MAYX REQ PHY/QHP: CPT

## 2020-06-29 PROCEDURE — 93925 LOWER EXTREMITY STUDY: CPT

## 2020-06-29 PROCEDURE — 71045 X-RAY EXAM CHEST 1 VIEW: CPT

## 2020-06-29 NOTE — RADIOLOGY REPORT (SQ)
EXAM DESCRIPTION:

Bilateral lower extremity arterial duplex exam



CLINICAL HISTORY:

64 years Male, bilat foot pain

TECHNIQUE:

Ultrasound of the arteries of the leg performed with grayscale,

pulsed Doppler, and color Doppler.



COMPARISON: None



FINDINGS:



Right leg (velocities in cm/s):

CFA: Triphasic, 115 cm/s

DFA: To and fro flow. This appears to be related to the Doppler

angle. Velocities 69 cm/s

SFA-proximal: Triphasic, 98.7cm/s

SFA-mid: Triphasic, 98.7cm/s

SFA-distal: Triphasic, 89.3cm/s

Popliteal: Triphasic, 78.9cm/s

Proximal PTA: Triphasic, 114cm/s

Distal PTA: Triphasic, 105cm/s

Proximal HANNAH: Biphasic, 30.2cm/s

Distal HANNAH:  occluded. The dorsalis pedis artery reconstitutes

via collaterals.

Dorsalis pedis: Triphasic, 41.8cm/s

Mid Peroneal: Triphasic, 51.5cm/s

Comment: Doppler waveforms are somewhat multiphasic suggesting

pacemaker or valvular disease. No major branch vessel stenosis or

occlusions.



Left leg (velocities in cm/s):

CFA: Triphasic, 76.4cm/s

DFA: Triphasic, 64.4cm/s

SFA-proximal: Triphasic, 64.4cm/s

SFA-mid: Triphasic, 70.7cm/s

SFA-distal: Pulsatile, 15.9cm/s

Popliteal: Monophasic, 31.9cm/s

Proximal PTA: Monophasic, 64.4cm/s

Distal PTA: Monophasic, 63.8cm/s

Proximal HANNAH: Monophasic, 30.2 cm/s

Distal HANNAH: Minimal flow possibly occluded

Dorsalis pedis: Monophasic, 45.3cm/s

Peroneal: Monophasic, 36.1cm/s

Comment: Extensive plaque in the distal superficial femoral

artery with probable high-grade stenosis. Distal to this the

waveforms are monophasic



IMPRESSION:



1. Triphasic waveforms. There is occlusion of the distal anterior

tibial artery in the right lower extremity.

2. High-grade stenosis in the distal superficial femoral artery.

Distal to this the waveforms are monophasic suggesting that the

stenosis is hemodynamically significant.

## 2020-06-29 NOTE — ER DOCUMENT REPORT
ED Medical Screen (RME)





- General


Chief Complaint: Cough


Stated Complaint: NO CIRCULATION IN ANKLE


Time Seen by Provider: 06/29/20 19:20


Mode of Arrival: Wheelchair


Notes: 





Patient presents complaining of poor circulation to bilateral feet and ankles 

for the past 2 weeks.  Patient complains of foot pain bilaterally.  Feet are 

erythematous and swollen bilaterally.  Patient denies any fever or injury to the

feet.  Patient reports cold symptoms for the past month with shortness of 

breath.  Patient does have a history of hypertension as well as congestive heart

failure.





I have greeted and performed a rapid initial assessment of this patient.  A 

comprehensive ED assessment and evaluation of the patient, analysis of test 

results and completion of the medical decision making process will be conducted 

by additional ED providers.


TRAVEL OUTSIDE OF THE [place] IN LAST 30 DAYS: No





- Related Data


Allergies/Adverse Reactions: 


                                        





No Known Allergies Allergy (Verified 06/14/20 17:38)


   











Past Medical History





- Past Medical History


Cardiac Medical History: Reports: Hx Atrial Fibrillation, Hx Hypertension - 

untreated


   Denies: Hx Congestive Heart Failure


Pulmonary Medical History: Reports: Hx Bronchitis, Hx COPD


Renal/ Medical History: Denies: Hx Peritoneal Dialysis


Psychiatric Medical History: 


   Denies: Hx Depression


Past Surgical History: Reports: Hx Abdominal Surgery - Exploratory in 2017, Hx 

Orthopedic Surgery - Right ACL repair, Hx Tonsillectomy, Other - Homeless





Physical Exam





- Vital signs


Vitals: 





                                        











Temp Pulse Resp BP Pulse Ox


 


 98.6 F   91   16   116/67   100 


 


 06/29/20 19:22  06/29/20 19:22  06/29/20 19:22  06/29/20 19:22  06/29/20 19:22














- General


General appearance: Alert


Notes: 





Rhonchi bilaterally, 2+ bilateral lower extremity edema, 3+ bilateral pedal 

edema, feet erythematous and warm to touch





Course





- Vital Signs


Vital signs: 





                                        











Temp Pulse Resp BP Pulse Ox


 


 98.6 F   91   16   116/67   100 


 


 06/29/20 19:22  06/29/20 19:22  06/29/20 19:22  06/29/20 19:22  06/29/20 19:22

## 2020-06-29 NOTE — RADIOLOGY REPORT (SQ)
CLINICAL INDICATION: cough, sob.



TECHNIQUE: A single portable AP  view was obtained of the chest

at 2019 hours.



COMPARISON: June 20, 2020.



FINDINGS: The cardiomediastinal  silhouette is enlarged but

stable. The lungs definite progressive right pleural effusion and

adjacent airspace disease. Tiny left effusion. Pneumothorax.

Chronic parenchymal lung change.



IMPRESSION: Progressive right pleural effusion and adjacent

airspace disease.

## 2020-06-30 ENCOUNTER — HOSPITAL ENCOUNTER (INPATIENT)
Dept: HOSPITAL 62 - ER | Age: 65
LOS: 3 days | Discharge: HOME | DRG: 292 | End: 2020-07-03
Attending: NURSE PRACTITIONER | Admitting: INTERNAL MEDICINE
Payer: MEDICAID

## 2020-06-30 DIAGNOSIS — F10.27: ICD-10-CM

## 2020-06-30 DIAGNOSIS — I42.0: ICD-10-CM

## 2020-06-30 DIAGNOSIS — I11.0: Primary | ICD-10-CM

## 2020-06-30 DIAGNOSIS — Z59.0: ICD-10-CM

## 2020-06-30 DIAGNOSIS — Z82.49: ICD-10-CM

## 2020-06-30 DIAGNOSIS — Z20.828: ICD-10-CM

## 2020-06-30 DIAGNOSIS — I73.9: ICD-10-CM

## 2020-06-30 DIAGNOSIS — I44.4: ICD-10-CM

## 2020-06-30 DIAGNOSIS — I48.3: ICD-10-CM

## 2020-06-30 DIAGNOSIS — F17.200: ICD-10-CM

## 2020-06-30 DIAGNOSIS — Z83.6: ICD-10-CM

## 2020-06-30 DIAGNOSIS — Z71.6: ICD-10-CM

## 2020-06-30 DIAGNOSIS — J44.9: ICD-10-CM

## 2020-06-30 DIAGNOSIS — I50.43: ICD-10-CM

## 2020-06-30 DIAGNOSIS — Z79.82: ICD-10-CM

## 2020-06-30 DIAGNOSIS — Z79.899: ICD-10-CM

## 2020-06-30 LAB
ADD MANUAL DIFF: NO
ALBUMIN SERPL-MCNC: 3.7 G/DL (ref 3.5–5)
ALP SERPL-CCNC: 174 U/L (ref 38–126)
ANION GAP SERPL CALC-SCNC: 6 MMOL/L (ref 5–19)
AST SERPL-CCNC: 52 U/L (ref 17–59)
BASOPHILS # BLD AUTO: 0.1 10^3/UL (ref 0–0.2)
BASOPHILS NFR BLD AUTO: 0.8 % (ref 0–2)
BILIRUB DIRECT SERPL-MCNC: 0.2 MG/DL (ref 0–0.4)
BILIRUB SERPL-MCNC: 1 MG/DL (ref 0.2–1.3)
BUN SERPL-MCNC: 15 MG/DL (ref 7–20)
CALCIUM: 9.3 MG/DL (ref 8.4–10.2)
CHLORIDE SERPL-SCNC: 106 MMOL/L (ref 98–107)
CO2 SERPL-SCNC: 29 MMOL/L (ref 22–30)
EOSINOPHIL # BLD AUTO: 0.2 10^3/UL (ref 0–0.6)
EOSINOPHIL NFR BLD AUTO: 3 % (ref 0–6)
ERYTHROCYTE [DISTWIDTH] IN BLOOD BY AUTOMATED COUNT: 14.9 % (ref 11.5–14)
ETHANOL SERPL-MCNC: < 10 MG/DL
GLUCOSE SERPL-MCNC: 89 MG/DL (ref 75–110)
HCT VFR BLD CALC: 41.2 % (ref 37.9–51)
HGB BLD-MCNC: 13.3 G/DL (ref 13.5–17)
LYMPHOCYTES # BLD AUTO: 1.7 10^3/UL (ref 0.5–4.7)
LYMPHOCYTES NFR BLD AUTO: 23.1 % (ref 13–45)
MCH RBC QN AUTO: 28.2 PG (ref 27–33.4)
MCHC RBC AUTO-ENTMCNC: 32.3 G/DL (ref 32–36)
MCV RBC AUTO: 87 FL (ref 80–97)
MONOCYTES # BLD AUTO: 0.9 10^3/UL (ref 0.1–1.4)
MONOCYTES NFR BLD AUTO: 12.5 % (ref 3–13)
NEUTROPHILS # BLD AUTO: 4.4 10^3/UL (ref 1.7–8.2)
NEUTS SEG NFR BLD AUTO: 60.6 % (ref 42–78)
PLATELET # BLD: 347 10^3/UL (ref 150–450)
POTASSIUM SERPL-SCNC: 4.2 MMOL/L (ref 3.6–5)
PROT SERPL-MCNC: 7.4 G/DL (ref 6.3–8.2)
RBC # BLD AUTO: 4.73 10^6/UL (ref 4.35–5.55)
TOTAL CELLS COUNTED % (AUTO): 100 %
WBC # BLD AUTO: 7.2 10^3/UL (ref 4–10.5)

## 2020-06-30 PROCEDURE — 84443 ASSAY THYROID STIM HORMONE: CPT

## 2020-06-30 PROCEDURE — 84484 ASSAY OF TROPONIN QUANT: CPT

## 2020-06-30 PROCEDURE — 80053 COMPREHEN METABOLIC PANEL: CPT

## 2020-06-30 PROCEDURE — 80048 BASIC METABOLIC PNL TOTAL CA: CPT

## 2020-06-30 PROCEDURE — 83735 ASSAY OF MAGNESIUM: CPT

## 2020-06-30 PROCEDURE — 96374 THER/PROPH/DIAG INJ IV PUSH: CPT

## 2020-06-30 PROCEDURE — 99284 EMERGENCY DEPT VISIT MOD MDM: CPT

## 2020-06-30 PROCEDURE — 87635 SARS-COV-2 COVID-19 AMP PRB: CPT

## 2020-06-30 PROCEDURE — C9803 HOPD COVID-19 SPEC COLLECT: HCPCS

## 2020-06-30 PROCEDURE — 80307 DRUG TEST PRSMV CHEM ANLYZR: CPT

## 2020-06-30 PROCEDURE — 36415 COLL VENOUS BLD VENIPUNCTURE: CPT

## 2020-06-30 PROCEDURE — 96361 HYDRATE IV INFUSION ADD-ON: CPT

## 2020-06-30 PROCEDURE — 71045 X-RAY EXAM CHEST 1 VIEW: CPT

## 2020-06-30 PROCEDURE — 83036 HEMOGLOBIN GLYCOSYLATED A1C: CPT

## 2020-06-30 PROCEDURE — 96375 TX/PRO/DX INJ NEW DRUG ADDON: CPT

## 2020-06-30 PROCEDURE — 85027 COMPLETE CBC AUTOMATED: CPT

## 2020-06-30 PROCEDURE — 85025 COMPLETE CBC W/AUTO DIFF WBC: CPT

## 2020-06-30 PROCEDURE — 80061 LIPID PANEL: CPT

## 2020-06-30 PROCEDURE — 93005 ELECTROCARDIOGRAM TRACING: CPT

## 2020-06-30 PROCEDURE — 93010 ELECTROCARDIOGRAM REPORT: CPT

## 2020-06-30 RX ADMIN — ASPIRIN SCH MG: 81 TABLET, CHEWABLE ORAL at 22:16

## 2020-06-30 RX ADMIN — ENOXAPARIN SODIUM SCH MG: 80 INJECTION SUBCUTANEOUS at 22:16

## 2020-06-30 RX ADMIN — GUAIFENESIN SCH MG: 600 TABLET, EXTENDED RELEASE ORAL at 22:16

## 2020-06-30 RX ADMIN — Medication PRN MLS/HR: at 19:03

## 2020-06-30 RX ADMIN — MELATONIN SCH MG: 3 TAB ORAL at 22:16

## 2020-06-30 RX ADMIN — ATORVASTATIN CALCIUM SCH MG: 10 TABLET, FILM COATED ORAL at 22:16

## 2020-06-30 SDOH — ECONOMIC STABILITY - HOUSING INSECURITY: HOMELESSNESS: Z59.0

## 2020-06-30 NOTE — PDOC H&P
History of Present Illness


Admission Date/PCP: 


  06/30/20 17:44





  





Patient complains of: bilateral leg pain


History of Present Illness: 


KIESHA WINN is a 64 year old male with a past medical history significant 

for dilated cardiomyopathy, CHF, PAF, PAD, tobacco dependence, alcohol 

dependence with continuous use presented to the emergency department with 

complaint of bilateral lower extremity pain.


Initial vital signs in the emergency department were stable.  Laboratory 

evaluation was essentially unremarkable.  Lower extremity ultrasound revealed 

distal anterior tibial artery and distal superficial femoral artery stenosis.  

Emergency department provider spoke with vascular surgery at [facility]; 

recommended outpatient follow-up.


Upon preparing for discharge, patient was noted to be tachycardic with a heart 

rate in 130s.  Chest imaging was benign.  EKG showed atrial flutter RVR.


Patient was provided 1 L normal saline bolus and referred to the hospital serv

ice for admission and management of stating complaints findings.





Past Medical History


Cardiac Medical History: Reports: Atrial Fibrillation, Congestive Heart Failure 

- Alcohol cardiomyopathy, Hypertension


Pulmonary Medical History: Reports: Bronchitis, Chronic Obstructive Pulmonary 

Disease (COPD)


Neurological Medical History: Reports: None


Endocrine Medical History: Reports: None


Renal/ Medical History: Reports: None


Malignancy Medical History: Reports: None


GI Medical History: Reports: None


Musculoskeltal Medical History: Reports: None


Psychiatric Medical History: Reports: Alcohol Dependency, Tobacco Dependency


   Denies: Depression


Traumatic Medical History: Reports: None


Hematology: Reports: None


Infectious Medical History: Reports: None





Past Surgical History


Past Surgical History: Reports: Orthopedic Surgery - Right ACL repair, T

onsillectomy





Social History


Information Source: Patient


Lives with: Homeless


Smoking Status: Current Every Day Smoker


Electronic Cigarette use?: No


Frequency of Alcohol Use: Heavy


Hx Recreational Drug Use: Yes


Drugs: Marijuana


Hx Prescription Drug Abuse: No





- Advance Directive


Resuscitation Status: Full Code





Family History


Family History: Arthritis, COPD, Hypertension


Parental Family History Reviewed: Yes


Children Family History Reviewed: Yes


Sibling(s) Family History Reviewed.: Yes





Medication/Allergy


Home Medications: 








Amoxicillin/Potassium Clav [Augmentin 875-125 Tablet] 1 tab PO Q12 #10 tablet 

06/26/20 


Aspirin [Ecotrin 81 mg EC Tablet] 81 mg PO DAILY  tabec 06/26/20 


Furosemide [Lasix 20 mg Tablet] 20 mg PO DAILY  tablet 06/26/20 


Lisinopril [Prinivil 10 mg Tablet] 10 mg PO DAILY  tablet 06/26/20 


Metoprolol Succinate [Toprol Xl 50 mg Tab.sr] 100 mg PO DAILY  tab.sr.24h 06 /26/20 


Nicotine [Nicoderm 14 mg/24 Hr Transdermal Patch] 1 each TD DAILYP PRN  

patch.td24 06/26/20 


Pantoprazole Sodium [Protonix 20 mg Dr Tablet] 20 mg PO Q6AM  tablet.dr 06/26/20




Thiamine HCl [Thiamine 100 mg Tablet] 100 mg PO DAILY  tablet 06/26/20 


Hydrocodone/Acetaminophen [Norco 5-325 mg Tablet] 1 tab PO Q6 PRN 3 Days #12 

tablet 06/30/20 








Allergies/Adverse Reactions: 


                                        





No Known Allergies Allergy (Verified 06/14/20 17:38)


   











Review of Systems


Constitutional: ABSENT: chills, fever(s), headache(s), weight gain, weight loss


Eyes: ABSENT: visual disturbances


Ears: ABSENT: hearing changes


Cardiovascular: ABSENT: chest pain, dyspnea on exertion, edema, orthropnea, 

palpitations


Respiratory: PRESENT: cough, dyspnea.  ABSENT: hemoptysis


Gastrointestinal: ABSENT: abdominal pain, constipation, diarrhea, hematemesis, 

hematochezia, nausea, vomiting


Genitourinary: ABSENT: dysuria, hematuria


Musculoskeletal: PRESENT: as per HPI.  ABSENT: joint swelling


Integumentary: ABSENT: rash, wounds


Neurological: ABSENT: abnormal gait, abnormal speech, confusion, dizziness, 

focal weakness, syncope


Psychiatric: ABSENT: anxiety, depression, homidical ideation, suicidal ideation


Endocrine: ABSENT: cold intolerance, heat intolerance, polydipsia, polyuria


Hematologic/Lymphatic: ABSENT: easy bleeding, easy bruising





Physical Exam


Vital Signs: 


                                        











Temp Pulse Resp BP Pulse Ox


 


 97.7 F   129 H  24 H  151/107 H  100 


 


 06/30/20 16:45  06/30/20 16:45  06/30/20 16:45  06/30/20 16:45  06/30/20 16:45








                                 Intake & Output











 06/29/20 06/30/20 07/01/20





 06:59 06:59 06:59


 


Intake Total   1000


 


Balance   1000


 


Weight   68.6 kg











General appearance: PRESENT: no acute distress, disheveled, thin, well-developed


Head exam: PRESENT: atraumatic, normocephalic


Eye exam: PRESENT: conjunctiva pink, EOMI, PERRLA.  ABSENT: scleral icterus


Mouth exam: PRESENT: moist, tongue midline


Teeth exam: PRESENT: poor dentation


Respiratory exam: PRESENT: rhonchi, symmetrical, unlabored, other - Room air.  

ABSENT: rales, wheezes


Cardiovascular exam: PRESENT: RRR.  ABSENT: diastolic murmur, rubs, systolic mur

mur


Pulses: PRESENT: other - Pedal pulses by Doppler


Vascular exam: PRESENT: normal capillary refill


Rectal exam: PRESENT: deferred


Extremities exam: PRESENT: full ROM.  ABSENT: calf tenderness, clubbing, pedal 

edema


Musculoskeletal exam: PRESENT: ambulatory


Neurological exam: PRESENT: alert, awake, oriented to person, oriented to place,

oriented to time, oriented to situation, CN II-XII grossly intact.  ABSENT: 

motor sensory deficit


Psychiatric exam: PRESENT: flat affect, normal mood.  ABSENT: homicidal 

ideation, suicidal ideation


Skin exam: PRESENT: dry, intact, warm.  ABSENT: cyanosis, rash





Results


Laboratory Results: 


                                        





                                 06/30/20 14:17 





                                 06/30/20 14:17 





                                        











  06/30/20 06/30/20





  14:17 14:17


 


WBC  7.2 


 


RBC  4.73 


 


Hgb  13.3 L 


 


Hct  41.2 


 


MCV  87 


 


MCH  28.2 


 


MCHC  32.3 


 


RDW  14.9 H 


 


Plt Count  347 


 


Seg Neutrophils %  60.6 


 


Sodium   141.1


 


Potassium   4.2


 


Chloride   106


 


Carbon Dioxide   29


 


Anion Gap   6


 


BUN   15


 


Creatinine   0.85


 


Est GFR (African Amer)   > 60


 


Glucose   89


 


Calcium   9.3


 


Total Bilirubin   1.0


 


AST   52


 


Alkaline Phosphatase   174 H


 


Total Protein   7.4


 


Albumin   3.7








                                        











  06/30/20





  14:17


 


Troponin I  0.022











Impressions: 


                                        





Chest X-Ray  06/30/20 13:44


IMPRESSION:  NO ACUTE RADIOGRAPHIC FINDING IN THE CHEST.


 














Assessment and Plan





- Diagnosis


(1) Atrial flutter with rapid ventricular response


Is this a current diagnosis for this admission?: Yes   


Plan: 


Patient is admitted to [facility] on continuous cardiac monitoring.


He is placed on full dose Lovenox.


He started on diltiazem drip.


Placed on daily statin and aspirin therapy.


We will check TSH with a.m. lab work.


Cardiology is consulted related to the patient's alcoholic cardiomyopathy with 

LVEF of 20%.








(2) PAD (peripheral artery disease)


Is this a current diagnosis for this admission?: Yes   


Plan: 


 Lower extremity ultrasound revealed distal anterior tibial artery and distal 

superficial femoral artery stenosis.  


Emer[facility] department provider [facility]; 

recommended outpatient follow-up.





Currently on full dose Lovenox for atrial flutter RVR.


We will contact vascular surgery tomorrow to confirm follow-up appointment.








(3) Alcohol dependence


Qualifiers: 


   Substance use status: alcohol-induced persisting dementia   Qualified 

Code(s): F10.27 - Alcohol dependence with alcohol-induced persisting dementia   


Is this a current diagnosis for this admission?: Yes   


Plan: 


Serum EtOH <10


Patient does have a history of withdrawal.





Valium 2 mg p.o. every 4 hours as needed anxiety/agitation/alcohol withdrawal.


Folic acid, thiamine supplementation.


Fall, seizure, aspiration precautions.


Discharge planning consulted.








(4) Tobacco abuse


Is this a current diagnosis for this admission?: Yes   


Plan: 


Smoking cessation encouraged.


Nicotine replacement therapies provided.








(5) Congestive heart failure


Qualifiers: 


   Heart failure type: combined systolic and diastolic 


Is this a current diagnosis for this admission?: Yes   


Plan: 


Echocardiogram (6/2020) shows LVEF 20 to 25%.


Mildly dilatated LV with moderate concentric hypertrophy and grade 3 for 

diastolic dysfunction.





We will consult cardiology.


Daily aspirin and statin therapy.


Cardiac diet.


Daily weights, strict I&O's.








(6) Person under investigation for COVID-19


Is this a current diagnosis for this admission?: Yes   


Plan: 


Patient presented to the emergency department 6/29/2020 with complaint of 

shortness of breath and cough.


He is known to have COPD and is a daily tobacco user.


Chest imaging that visit showed a small right-sided pleural effusion with 

associated airspace disease.


CXR today was clear according to read by pulmonologist, however, do feel that I 

can see a subtle opacity.


Is maintaining oxygen saturations on room air.  Slight rhonchi on exam.


COVID testing was sent out 6/29/2020.  


He is high risk for exposure related to homeless status.





He is placed on full dose Lovenox related to atrial flutter RVR.


We will start vitamin C, vitamin D, melatonin, and zinc supplementation.








- Time


Time Spent with patient: 35 or more minutes


Medications reviewed and adjusted accordingly: Yes


Anticipated discharge: Home

## 2020-06-30 NOTE — RADIOLOGY REPORT (SQ)
EXAM DESCRIPTION:  CHEST SINGLE VIEW



IMAGES COMPLETED DATE/TIME:  6/30/2020 2:16 pm



REASON FOR STUDY:  cough



COMPARISON:  6/29/2020



EXAM PARAMETERS:  NUMBER OF VIEWS: One view.

TECHNIQUE: Single frontal radiographic view of the chest acquired.

RADIATION DOSE: NA

LIMITATIONS: None.



FINDINGS:  LUNGS AND PLEURA: No opacities, masses or pneumothorax. No pleural effusion.

MEDIASTINUM AND HILAR STRUCTURES: No masses.  Contour normal.

HEART AND VASCULAR STRUCTURES: Heart normal in size.  Normal vasculature.

BONES: No acute findings.

HARDWARE: None in the chest.

OTHER: No other significant finding.



IMPRESSION:  NO ACUTE RADIOGRAPHIC FINDING IN THE CHEST.



TECHNICAL DOCUMENTATION:  JOB ID:  [id number]

 2011 [employer]- All Rights Reserved



Reading location - IP/workstation name: [email or web address]

## 2020-06-30 NOTE — ER DOCUMENT REPORT
ED Extremity Problem, Lower





- General


Chief Complaint: Leg Pain


Stated Complaint: LEG PAIN


Time Seen by Provider: 06/30/20 13:05


Mode of Arrival: Ambulatory


Information source: Patient


TRAVEL OUTSIDE OF THE [place] IN LAST 30 DAYS: No





- HPI


Notes: 





Patient presents with bilateral leg pain.  Patient states that this pain is 

constant and severe.  It is aching and throbbing in nature.  It does radiate up 

both legs.  He states he has a known history of "poor circulation".  Patient 

also has a history of alcoholism and homelessness.  He was here yesterday with 

this problem but but eloped.  We did call the family and patient was brought 

back.





- Related Data


Allergies/Adverse Reactions: 


                                        





No Known Allergies Allergy (Verified 06/14/20 17:38)


   











Past Medical History





- General


Information source: Patient





- Social History


Smoking Status: Current Every Day Smoker


Chew tobacco use (# tins/day): No


Frequency of alcohol use: Heavy


Drug Abuse: None


Family History: Arthritis, COPD, Hypertension


Patient has homicidal ideation: No





- Past Medical History


Cardiac Medical History: Reports: Hx Atrial Fibrillation, Hx Hypertension - 

untreated


   Denies: Hx Congestive Heart Failure


Pulmonary Medical History: Reports: Hx Bronchitis, Hx COPD


Renal/ Medical History: Denies: Hx Peritoneal Dialysis


Psychiatric Medical History: 


   Denies: Hx Depression


Past Surgical History: Reports: Hx Abdominal Surgery - Exploratory in 2017, Hx 

Orthopedic Surgery - Right ACL repair, Hx Tonsillectomy, Other - Homeless





Review of Systems





- Review of Systems


Constitutional: denies: Chills, Fever


Cardiovascular: denies: Chest pain, Palpitations


Respiratory: denies: Cough, Short of breath


-: Yes All other systems reviewed and negative





Physical Exam





- Vital signs


Vitals: 





                                        











Temp


 


 97.8 F 


 


 06/30/20 10:25











Interpretation: Normal





- General


General appearance: Appears well, Alert





- HEENT


Head: Normocephalic, Atraumatic


Eyes: Normal


Pupils: PERRL





- Respiratory


Respiratory status: No respiratory distress


Chest status: Nontender


Breath sounds: Normal


Chest palpation: Normal





- Cardiovascular


Rhythm: Regular


Heart sounds: Normal auscultation


Murmur: No





- Abdominal


Inspection: Normal


Distension: No distension


Bowel sounds: Normal


Tenderness: Nontender


Organomegaly: No organomegaly





- Back


Back: Normal, Nontender





- Extremities


General upper extremity: Normal inspection, Nontender, Normal color, Normal ROM,

Normal temperature


General lower extremity: Nontender, Normal ROM, Normal temperature, Normal 

weight bearing, Other - Feet bilaterally have some swelling.  They are 

hyperemic.  They are not significantly tender to palpation.  A pulse can be 

dopplered on the right but it cannot be dopplered on the left.  Patient can flex

and extend all toes without problem.  There are no ulcers or sores or gangrene 

present..  No: Se's sign





- Neurological


Neuro grossly intact: Yes


Cognition: Normal


Orientation: AAOx4


[place] Coma Scale Eye Opening: Spontaneous


[place] Coma Scale Verbal: Oriented


[place] Coma Scale Motor: Obeys Commands


[place] Coma Scale Total: 15


Speech: Normal


Motor strength normal: LUE, RUE, LLE, RLE


Sensory: Normal





- Psychological


Associated symptoms: Normal affect, Normal mood





- Skin


Skin Temperature: Warm


Skin Moisture: Dry


Skin Color: Normal





Course





- Re-evaluation


Re-evalutation: 





06/30/20 16:33


I called and discussed the case with the vascular surgeon, Dr. Styles, at [facility].  He says that given the patient's exam he does not need an 

emergent transfer and that he can see the patient in the office tomorrow.  I am 

currently working on arranging with the brother to have the patient taken to see

them at [facility] tomorrow.  I will give the patient some pain 

medicine for home.





- Vital Signs


Vital signs: 





                                        











Temp Pulse Resp BP Pulse Ox


 


 98.9 F   97   18   128/75 H  96 


 


 06/30/20 10:30  06/30/20 10:30  06/30/20 10:30  06/30/20 10:30  06/30/20 10:30














- Laboratory


Result Diagrams: 


                                 06/30/20 14:17





                                 06/30/20 14:17


Laboratory results interpreted by me: 





                                        











  06/30/20 06/30/20





  14:17 14:17


 


Hgb  13.3 L 


 


RDW  14.9 H 


 


Alkaline Phosphatase   174 H














- Diagnostic Test


Radiology reviewed: Image reviewed, Reports reviewed





Discharge





- Discharge


Clinical Impression: 


 Vascular insufficiency of extremity





Condition: Stable


Disposition: HOME, SELF-CARE


Instructions:  Peripheral Vascular Disease ([facility])


Additional Instructions: 


Please call [facility] at [phone] in am tomorrow to 

schedule an appointment for tomorrow.  they will see you tomorrow.


Prescriptions: 


Hydrocodone/Acetaminophen [Norco 5-325 mg Tablet] 1 tab PO Q6 PRN 3 Days #12 

tablet


 PRN Reason: 


Referrals: 


[facility] [Provider Group] - Follow up tomorrow

## 2020-07-01 LAB
ANION GAP SERPL CALC-SCNC: 7 MMOL/L (ref 5–19)
BUN SERPL-MCNC: 10 MG/DL (ref 7–20)
CALCIUM: 8.7 MG/DL (ref 8.4–10.2)
CHLORIDE SERPL-SCNC: 107 MMOL/L (ref 98–107)
CHOLEST SERPL-MCNC: 136.37 MG/DL (ref 0–200)
CO2 SERPL-SCNC: 25 MMOL/L (ref 22–30)
ERYTHROCYTE [DISTWIDTH] IN BLOOD BY AUTOMATED COUNT: 14.9 % (ref 11.5–14)
GLUCOSE SERPL-MCNC: 131 MG/DL (ref 75–110)
HCT VFR BLD CALC: 39.7 % (ref 37.9–51)
HGB BLD-MCNC: 13.1 G/DL (ref 13.5–17)
LDLC SERPL DIRECT ASSAY-MCNC: 87 MG/DL (ref ?–100)
MCH RBC QN AUTO: 28.3 PG (ref 27–33.4)
MCHC RBC AUTO-ENTMCNC: 33 G/DL (ref 32–36)
MCV RBC AUTO: 86 FL (ref 80–97)
PLATELET # BLD: 312 10^3/UL (ref 150–450)
POTASSIUM SERPL-SCNC: 4 MMOL/L (ref 3.6–5)
RBC # BLD AUTO: 4.62 10^6/UL (ref 4.35–5.55)
TRIGL SERPL-MCNC: 67 MG/DL (ref ?–150)
VLDLC SERPL CALC-MCNC: 13 MG/DL (ref 10–31)
WBC # BLD AUTO: 8.2 10^3/UL (ref 4–10.5)

## 2020-07-01 RX ADMIN — GUAIFENESIN SCH MG: 600 TABLET, EXTENDED RELEASE ORAL at 09:51

## 2020-07-01 RX ADMIN — ASPIRIN SCH MG: 81 TABLET, CHEWABLE ORAL at 21:50

## 2020-07-01 RX ADMIN — PANTOPRAZOLE SODIUM SCH MG: 20 TABLET, DELAYED RELEASE ORAL at 06:38

## 2020-07-01 RX ADMIN — Medication PRN MLS/HR: at 07:02

## 2020-07-01 RX ADMIN — MELATONIN SCH MG: 3 TAB ORAL at 21:50

## 2020-07-01 RX ADMIN — FOLIC ACID SCH MG: 1 TABLET ORAL at 09:53

## 2020-07-01 RX ADMIN — Medication SCH MG: at 09:52

## 2020-07-01 RX ADMIN — GUAIFENESIN SCH MG: 600 TABLET, EXTENDED RELEASE ORAL at 21:50

## 2020-07-01 RX ADMIN — ATORVASTATIN CALCIUM SCH MG: 10 TABLET, FILM COATED ORAL at 21:50

## 2020-07-01 RX ADMIN — ENOXAPARIN SODIUM SCH MG: 80 INJECTION SUBCUTANEOUS at 21:50

## 2020-07-01 RX ADMIN — OXYCODONE HYDROCHLORIDE AND ACETAMINOPHEN SCH MG: 500 TABLET ORAL at 09:53

## 2020-07-01 RX ADMIN — CHOLECALCIFEROL TAB 10 MCG (400 UNIT) SCH UNIT: 10 TAB at 09:55

## 2020-07-01 RX ADMIN — Medication SCH MG: at 09:51

## 2020-07-01 RX ADMIN — ENOXAPARIN SODIUM SCH MG: 80 INJECTION SUBCUTANEOUS at 09:53

## 2020-07-01 RX ADMIN — DOCUSATE SODIUM SCH MG: 100 CAPSULE, LIQUID FILLED ORAL at 09:52

## 2020-07-01 RX ADMIN — DIAZEPAM PRN MG: 2 TABLET ORAL at 09:53

## 2020-07-01 RX ADMIN — OXYCODONE HYDROCHLORIDE AND ACETAMINOPHEN SCH MG: 500 TABLET ORAL at 17:54

## 2020-07-01 NOTE — PDOC PROGRESS REPORT
Subjective


Progress Note for:: 07/01/20


Subjective:: 


KIESHA WINN is a 64 year old male with a past medical history significant 

for dilated cardiomyopathy, CHF, PAF, PAD, tobacco dependence, alcohol 

dependence with continuous use who is admitted 6/30/2020 with atrial flutter 

RVR.





Patient seen on morning rounds.  He was seen at the bed, eating his lunch.  He 

reports that he is feeling well today.  He denies fever, chest pain, 

palpitations, dyspnea, cough, nausea vomiting or diarrhea.


He does asked to speak with a  regarding homeless shelters in the 

area.  Otherwise he has no questions or concerns at this time.


No concerns per nursing.


Reason For Visit: 


A FLUTTER RVR








Physical Exam


Vital Signs: 


                                        











Temp Pulse Resp BP Pulse Ox


 


 98.6 F   72   26 H  144/104 H  91 L


 


 07/01/20 03:30  07/01/20 07:00  07/01/20 03:30  07/01/20 03:30  07/01/20 03:30








                                 Intake & Output











 06/30/20 07/01/20 07/02/20





 06:59 06:59 06:59


 


Intake Total  3612 21


 


Output Total  800 


 


Balance  2812 21


 


Weight  68.6 kg 











General appearance: PRESENT: no acute distress, disheveled, thin, well-developed


Head exam: PRESENT: atraumatic, normocephalic


Eye exam: PRESENT: conjunctiva pink, EOMI, PERRLA.  ABSENT: scleral icterus


Mouth exam: PRESENT: moist, tongue midline


Teeth exam: PRESENT: poor dentation


Respiratory exam: PRESENT: rhonchi, symmetrical, unlabored, other.  ABSENT: 

rales, wheezes


Cardiovascular exam: PRESENT: irregular rhythm, +S1, +S2.  ABSENT: diastolic 

murmur, rubs, systolic murmur, tachycardia


Vascular exam: PRESENT: normal capillary refill


Extremities exam: PRESENT: full ROM, tenderness - BLE, +1 edema - BLE.  ABSENT: 

calf tenderness, clubbing, pedal edema


Musculoskeletal exam: PRESENT: ambulatory


Neurological exam: PRESENT: alert, awake, oriented to person, oriented to place,

oriented to time, oriented to situation, CN II-XII grossly intact.  ABSENT: 

motor sensory deficit


Psychiatric exam: PRESENT: appropriate affect, normal mood.  ABSENT: homicidal 

ideation, suicidal ideation


Skin exam: PRESENT: dry, intact, warm.  ABSENT: cyanosis, rash





Results


Laboratory Results: 


                                        





                                 07/01/20 05:56 





                                 07/01/20 05:56 





                                        











  06/30/20 06/30/20 07/01/20





  14:17 14:17 05:56


 


WBC  7.2   8.2


 


RBC  4.73   4.62


 


Hgb  13.3 L   13.1 L


 


Hct  41.2   39.7


 


MCV  87   86


 


MCH  28.2   28.3


 


MCHC  32.3   33.0


 


RDW  14.9 H   14.9 H


 


Plt Count  347   312


 


Seg Neutrophils %  60.6  


 


Sodium   141.1 


 


Potassium   4.2 


 


Chloride   106 


 


Carbon Dioxide   29 


 


Anion Gap   6 


 


BUN   15 


 


Creatinine   0.85 


 


Est GFR ( Amer)   > 60 


 


Glucose   89 


 


Calcium   9.3 


 


Total Bilirubin   1.0 


 


AST   52 


 


Alkaline Phosphatase   174 H 


 


Total Protein   7.4 


 


Albumin   3.7 


 


Triglycerides   


 


Cholesterol   


 


LDL Cholesterol Direct   


 


VLDL Cholesterol   


 


HDL Cholesterol   


 


TSH   














  07/01/20 07/01/20





  05:56 05:56


 


WBC  


 


RBC  


 


Hgb  


 


Hct  


 


MCV  


 


MCH  


 


MCHC  


 


RDW  


 


Plt Count  


 


Seg Neutrophils %  


 


Sodium  138.5 


 


Potassium  4.0 


 


Chloride  107 


 


Carbon Dioxide  25 


 


Anion Gap  7 


 


BUN  10 


 


Creatinine  0.62 


 


Est GFR (African Amer)  > 60 


 


Glucose  131 H 


 


Calcium  8.7 


 


Total Bilirubin  


 


AST  


 


Alkaline Phosphatase  


 


Total Protein  


 


Albumin  


 


Triglycerides  67 


 


Cholesterol  136.37 


 


LDL Cholesterol Direct  87 


 


VLDL Cholesterol  13.0 


 


HDL Cholesterol  36 L 


 


TSH   2.67








                                        











  06/30/20





  14:17


 


Troponin I  0.022











Impressions: 


                                        





Chest X-Ray  06/30/20 13:44


IMPRESSION:  NO ACUTE RADIOGRAPHIC FINDING IN THE CHEST.


 














Assessment and Plan





- Diagnosis


(1) Atrial flutter with rapid ventricular response


Is this a current diagnosis for this admission?: Yes   


Plan: 


Patient is admitted to [facility] on continuous cardiac monitoring.


He is placed on full dose Lovenox.


He started on diltiazem drip; now rate controlled.  Diltiazem drip was 

discontinued.


Start metoprolol 50 mg twice daily.


Continue on daily statin and aspirin therapy.





Cardiology is consulted; discussed with Dr. Diaz.  Appreciate recommendations.








(2) PAD (peripheral artery disease)


Is this a current diagnosis for this admission?: Yes   


Plan: 


 Lower extremity ultrasound revealed distal anterior tibial artery and distal 

superficial femoral artery stenosis.  


Emergency department provider spoke with vascular surgery at [facility]; 

recommended outpatient follow-up.





Currently on full dose Lovenox for atrial flutter RVR.


We will contact vascular surgery to confirm follow-up appointment prior to d/c.








(3) Alcohol dependence


Qualifiers: 


   Substance use status: alcohol-induced persisting dementia   Qualified 

Code(s): F10.27 - Alcohol dependence with alcohol-induced persisting dementia   


Is this a current diagnosis for this admission?: Yes   


Plan: 


Serum EtOH <10


Patient does have a history of withdrawal.


Magnesium level pending.





Valium 2 mg p.o. every 4 hours as needed anxiety/agitation/alcohol withdrawal.


Folic acid, thiamine supplementation.


Fall, seizure, aspiration precautions.


Discharge planning consulted.








(4) Tobacco abuse


Is this a current diagnosis for this admission?: Yes   


Plan: 


Smoking cessation encouraged.


Nicotine replacement therapies provided.








(5) Congestive heart failure


Qualifiers: 


   Heart failure type: combined systolic and diastolic 


Is this a current diagnosis for this admission?: Yes   


Plan: 


Echocardiogram (6/2020) shows LVEF 20 to 25%.


Mildly dilatated LV with moderate concentric hypertrophy and grade 3 for 

diastolic dysfunction.





Consulted cardiology; appreciate Dr. Diaz's recommendations.


Daily aspirin and statin therapy.


Start metoprolol.


Cardiac diet.


Daily weights, strict I&O's.








(6) Person under investigation for COVID-19


Is this a current diagnosis for this admission?: Yes   


Plan: 


COVID (6/24/20) negative.


COVID (6/29/20) pending.


Patient presented to the emergency department 6/29/2020 with complaint of 

shortness of breath and cough.


He is known to have COPD and is a daily tobacco user.


Chest imaging that visit showed a small right-sided pleural effusion with 

associated airspace disease.


CXR today was clear according to read by pulmonologist, however, do feel that I 

can see a subtle opacity.


Is maintaining oxygen saturations on room air.  Slight rhonchi on exam.





He is high risk for exposure related to homeless status.





He is placed on full dose Lovenox related to atrial flutter RVR.


We will start vitamin C, vitamin D, melatonin, and zinc supplementation.








- Time


Time Spent with patient: 25-34 minutes


Medications reviewed and adjusted accordingly: Yes


Anticipated discharge: Home


Within: within 24 hours

## 2020-07-01 NOTE — EKG REPORT
SEVERITY:- ABNORMAL ECG -

ATRIAL FLUTTER, A-RATE 312

LEFT ANTERIOR FASCICULAR BLOCK

NONSPECIFIC T ABNORMALITIES, DIFFUSE LEADS

BORDERLINE PROLONGED QT INTERVAL

:

Confirmed by: Ellie Cooper MD 01-Jul-2020 02:25:52

## 2020-07-01 NOTE — PDOC CONSULTATION
Consultation


Consult Date: 07/01/20


Attending physician:: JOSE M SINGH


Provider Consulted: MO JALLOH


Consult reason:: Atrial flutter





History of Present Illness


Admission Date/PCP: 


  06/30/20 17:44





  





Patient complains of: Bilateral lower extremity pain


History of Present Illness: 


KIESHA WINN is a 64 year old male


With the following active problems


1.  Dilated cardiomyopathy-likely alcohol induced


2.  Congestive heart failure-systolic


3.  Alcohol dependence


4.  Homelessness





Patient known to me from recent admission to the hospital for congestive heart 

failure symptoms.  Further evaluation revealed that he had profound LV 

dysfunction with ejection fraction estimated at approximately 25%.  He was 

managed medically with intravenous diuretics and heart failure medications.  His

situation is complicated on account of the fact that he is homeless and is 

dependent on alcohol.  He presented back to the emergency room with complaints 

of lower extremity pain.  Vascular studies were performed which showed arterial 

stenosis for which outpatient follow-up was recommended.  At that time heart 

rate was found to be elevated.  EKG done showed atrial flutter and patient was 

admitted to the hospital.  At this time patient is comfortable.  Denies any 

discomfort.





Telemetry shows rate controlled atrial flutter at 91 bpm





Past Medical History


Cardiac Medical History: Reports: Atrial Fibrillation, Congestive Heart Failure 

- Alcohol cardiomyopathy, Hypertension


Pulmonary Medical History: Reports: Bronchitis, Chronic Obstructive Pulmonary 

Disease (COPD)


Neurological Medical History: Reports: None


Endocrine Medical History: Reports: None


Renal/ Medical History: Reports: None


Malignancy Medical History: Reports: None


GI Medical History: Reports: None


Musculoskeltal Medical History: Reports: None


Psychiatric Medical History: Reports: Alcohol Dependency, Tobacco Dependency


   Denies: Depression


Traumatic Medical History: Reports: None


Hematology: Reports: None


Infectious Medical History: Reports: None





Past Surgical History


Past Surgical History: Reports: Orthopedic Surgery - Right ACL repair, 

Tonsillectomy, Other - Homeless





Social History


Lives with: Homeless


Smoking Status: Current Every Day Smoker


Electronic Cigarette use?: No


Frequency of Alcohol Use: Heavy


Hx Recreational Drug Use: Yes


Drugs: Marijuana


Hx Prescription Drug Abuse: No





- Advance Directive


Resuscitation Status: Full Code





Family History


Family History: Arthritis, COPD, Hypertension


Parental Family History Reviewed: No - Unable to obtain


Children Family History Reviewed: NA


Sibling(s) Family History Reviewed.: NA





Medication/Allergy


Home Medications: 








Amoxicillin/Potassium Clav [Augmentin 875-125 Tablet] 1 tab PO Q12 #10 tablet 

06/26/20 


Aspirin [Ecotrin 81 mg EC Tablet] 81 mg PO DAILY  tabec 06/26/20 


Furosemide [Lasix 20 mg Tablet] 20 mg PO DAILY  tablet 06/26/20 


Lisinopril [Prinivil 10 mg Tablet] 10 mg PO DAILY  tablet 06/26/20 


Metoprolol Succinate [Toprol Xl 50 mg Tab.sr] 100 mg PO DAILY  tab.sr.24h 

06/26/20 


Pantoprazole Sodium [Protonix 20 mg Dr Tablet] 20 mg PO Q6AM  tablet.dr 06/26/20




Thiamine HCl [Thiamine 100 mg Tablet] 100 mg PO DAILY  tablet 06/26/20 


Hydrocodone/Acetaminophen [Norco 5-325 mg Tablet] 1 tab PO Q6 PRN 3 Days #12 

tablet 06/30/20 


Nicotine [Nicoderm 14 mg/24 Hr Transdermal Patch] 1 patch TD DAILYP PRN 07/01/20










Allergies/Adverse Reactions: 


                                        





No Known Allergies Allergy (Verified 06/14/20 17:38)


   











Review of Systems


ROS unobtainable: Other - Patient not very cooperative with review of systems.  

Is withdrawn.





Physical Exam


Vital Signs: 


                                        











Temp Pulse Resp BP Pulse Ox


 


 98.6 F   72   26 H  144/104 H  91 L


 


 07/01/20 03:30  07/01/20 07:00  07/01/20 03:30  07/01/20 03:30  07/01/20 03:30








                                 Intake & Output











 06/30/20 07/01/20 07/02/20





 06:59 06:59 06:59


 


Intake Total  3612 21


 


Output Total  800 


 


Balance  2812 21


 


Weight  68.6 kg 











General appearance: PRESENT: no acute distress, well-developed, well-nourished


Head exam: PRESENT: atraumatic, normocephalic


Eye exam: PRESENT: EOMI


Respiratory exam: PRESENT: symmetrical, unlabored


Cardiovascular exam: PRESENT: +S1, +S2 - Rate controlled atrial flutter at 91 

bpm on telemetry.


Rectal exam: PRESENT: deferred


Skin exam: PRESENT: dry, intact





Results


Laboratory Results: 


                                        





                                 07/01/20 05:56 





                                 07/01/20 05:56 





                                        











  06/30/20 06/30/20 07/01/20





  14:17 14:17 05:56


 


WBC  7.2   8.2


 


RBC  4.73   4.62


 


Hgb  13.3 L   13.1 L


 


Hct  41.2   39.7


 


MCV  87   86


 


MCH  28.2   28.3


 


MCHC  32.3   33.0


 


RDW  14.9 H   14.9 H


 


Plt Count  347   312


 


Seg Neutrophils %  60.6  


 


Sodium   141.1 


 


Potassium   4.2 


 


Chloride   106 


 


Carbon Dioxide   29 


 


Anion Gap   6 


 


BUN   15 


 


Creatinine   0.85 


 


Est GFR ( Amer)   > 60 


 


Glucose   89 


 


Calcium   9.3 


 


Total Bilirubin   1.0 


 


AST   52 


 


Alkaline Phosphatase   174 H 


 


Total Protein   7.4 


 


Albumin   3.7 


 


Triglycerides   


 


Cholesterol   


 


LDL Cholesterol Direct   


 


VLDL Cholesterol   


 


HDL Cholesterol   


 


TSH   














  07/01/20 07/01/20





  05:56 05:56


 


WBC  


 


RBC  


 


Hgb  


 


Hct  


 


MCV  


 


MCH  


 


MCHC  


 


RDW  


 


Plt Count  


 


Seg Neutrophils %  


 


Sodium  138.5 


 


Potassium  4.0 


 


Chloride  107 


 


Carbon Dioxide  25 


 


Anion Gap  7 


 


BUN  10 


 


Creatinine  0.62 


 


Est GFR (African Amer)  > 60 


 


Glucose  131 H 


 


Calcium  8.7 


 


Total Bilirubin  


 


AST  


 


Alkaline Phosphatase  


 


Total Protein  


 


Albumin  


 


Triglycerides  67 


 


Cholesterol  136.37 


 


LDL Cholesterol Direct  87 


 


VLDL Cholesterol  13.0 


 


HDL Cholesterol  36 L 


 


TSH   2.67








                                        











  06/30/20





  14:17


 


Troponin I  0.022











EKG Comments: 





Twelve-lead EKG.  6/30/2020.  Independently viewed by me.


Typical atrial flutter 128 bpm predominately 2-1 conduction


Chest x-ray 6/30/2020 no acute radiographic finding





Creatinine 0.85


Troponin 0 0.022








Transthoracic echocardiogram 6/15/2020.


Left ventricular ejection fraction is moderately severely reduced with EF 

estimated at 20 to 25%.  RV dysfunction with moderate reduction of function.


Mild mitral regurgitation mild to moderate tricuspid regurgitation no 

pericardial effusion


Impressions: 


                                        





Chest X-Ray  06/30/20 13:44


IMPRESSION:  NO ACUTE RADIOGRAPHIC FINDING IN THE CHEST.


 














Assessment & Plan





- Diagnosis


(1) Alcohol dependence


Qualifiers: 


   Substance use status: alcohol-induced persisting dementia   Qualified 

Code(s): F10.27 - Alcohol dependence with alcohol-induced persisting dementia   


Is this a current diagnosis for this admission?: Yes   


Plan: 


Watch for delirium tremens


Check magnesium level


Measurement potassium and magnesium are closely regulated especially given 

alcohol dependence which predisposes to electrolyte abnormalities and worsening 

of cardiac arrhythmias.








(2) Atrial flutter with rapid ventricular response


Is this a current diagnosis for this admission?: Yes   


Plan: 


Rate control presently


Would discontinue diltiazem infusion promptly given LV dysfunction


Would prefer the use of beta-blockers and digoxin for rate control given dilated

cardiomyopathy


Would recommend metoprolol for rate control


Given congestive heart failure and atrial arrhythmia systemic anticoagulation 

and recommended but would be difficult to pursue in this case as an outpatient 

given complexity of social situation with alcohol dependence and homelessness.  

His alcohol dependency also makes him a tremendous bleeding risk if he is 

anticoagulated.  It is difficult to reconcile the situation.


Also for the same reasons it is difficult to endorse a rhythm restoration 

strategy which would perhaps have been ideal for this patient








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


Is this a current diagnosis for this admission?: Yes   


Plan: 


Presently fairly well compensated


Would recommend guideline directed medical therapy for congestive heart failure 

and LV dysfunction


Although alcohol is suspected ischemic evaluation is warranted to exclude 

underlying coronary artery disease as cause of cardiomyopathy











- Notes


Notes: 





Rate control measures only at this time.


Not sure if we can pursue systemic anticoagulation as an outpatient


Medications for dilated cardiomyopathy.  Prefer beta-blockers


Try to avoid calcium blockers

## 2020-07-01 NOTE — EKG REPORT
SEVERITY:- ABNORMAL ECG -

SINUS OR ECTOPIC ATRIAL RHYTHM

LEFT ANTERIOR FASCICULAR BLOCK

ABNORMAL T, CONSIDER ISCHEMIA, DIFFUSE LEADS

PROLONGED QT INTERVAL

:

Confirmed by: Ellie Cooper MD 01-Jul-2020 11:13:03

## 2020-07-02 RX ADMIN — FOLIC ACID SCH MG: 1 TABLET ORAL at 09:33

## 2020-07-02 RX ADMIN — Medication SCH MG: at 09:32

## 2020-07-02 RX ADMIN — FUROSEMIDE SCH MG: 20 TABLET ORAL at 09:33

## 2020-07-02 RX ADMIN — ENOXAPARIN SODIUM SCH MG: 80 INJECTION SUBCUTANEOUS at 09:39

## 2020-07-02 RX ADMIN — OXYCODONE HYDROCHLORIDE AND ACETAMINOPHEN SCH MG: 500 TABLET ORAL at 17:13

## 2020-07-02 RX ADMIN — PANTOPRAZOLE SODIUM SCH MG: 20 TABLET, DELAYED RELEASE ORAL at 06:39

## 2020-07-02 RX ADMIN — DOCUSATE SODIUM SCH MG: 100 CAPSULE, LIQUID FILLED ORAL at 09:40

## 2020-07-02 RX ADMIN — METOPROLOL SUCCINATE SCH MG: 50 TABLET, EXTENDED RELEASE ORAL at 09:33

## 2020-07-02 RX ADMIN — OXYCODONE HYDROCHLORIDE AND ACETAMINOPHEN SCH MG: 500 TABLET ORAL at 09:33

## 2020-07-02 RX ADMIN — GUAIFENESIN SCH MG: 600 TABLET, EXTENDED RELEASE ORAL at 22:04

## 2020-07-02 RX ADMIN — GUAIFENESIN SCH MG: 600 TABLET, EXTENDED RELEASE ORAL at 09:33

## 2020-07-02 RX ADMIN — MELATONIN SCH MG: 3 TAB ORAL at 22:04

## 2020-07-02 RX ADMIN — Medication SCH MG: at 09:33

## 2020-07-02 RX ADMIN — ENOXAPARIN SODIUM SCH MG: 80 INJECTION SUBCUTANEOUS at 22:04

## 2020-07-02 RX ADMIN — ASPIRIN SCH MG: 81 TABLET, CHEWABLE ORAL at 22:04

## 2020-07-02 RX ADMIN — CHOLECALCIFEROL TAB 10 MCG (400 UNIT) SCH UNIT: 10 TAB at 09:40

## 2020-07-02 RX ADMIN — ATORVASTATIN CALCIUM SCH MG: 10 TABLET, FILM COATED ORAL at 22:03

## 2020-07-02 RX ADMIN — DIAZEPAM PRN MG: 2 TABLET ORAL at 15:17

## 2020-07-02 RX ADMIN — LISINOPRIL SCH MG: 10 TABLET ORAL at 09:32

## 2020-07-02 NOTE — PDOC PROGRESS REPORT
Subjective


Progress Note for:: 07/02/20


Subjective:: 


KIESHA WINN is a 64 year old male with a past medical history significant 

for dilated cardiomyopathy, CHF, PAF, PAD, tobacco dependence, alcohol 

dependence with continuous use who is admitted 6/30/2020 with atrial flutter 

RVR.





Patient seen on morning rounds.  He was found sitting up to the recliner, 

comfortably on room air.  He reports some anxiety related to his upcoming 

discharge; not certain what to do since the shelters are closed.  He does asked 

to speak with  again.


He denies fever, chest pain, palpitations, dyspnea, cough, nausea vomiting or 

diarrhea.


Otherwise he has no questions or concerns at this time.


Nursing reports that the patient is frequently removing his telemetry leads.  

They also report and the night shift nurse found the telemetry pack inside the 

patient's backpack.


Reason For Visit: 


A FLUTTER RVR








Physical Exam


Vital Signs: 


                                        











Temp Pulse Resp BP Pulse Ox


 


 97.3 F   113 H  20   140/96 H  92 


 


 07/02/20 00:34  07/02/20 07:00  07/02/20 00:34  07/02/20 00:34  07/02/20 00:34








                                 Intake & Output











 07/01/20 07/02/20 07/03/20





 06:59 06:59 06:59


 


Intake Total 3612 495 


 


Output Total 800 300 


 


Balance 2812 195 


 


Weight 68.6 kg 68 kg 











General appearance: PRESENT: no acute distress, disheveled, thin, well-developed


Head exam: PRESENT: atraumatic, normocephalic


Eye exam: PRESENT: conjunctiva pink, EOMI, PERRLA.  ABSENT: scleral icterus


Mouth exam: PRESENT: moist, tongue midline


Teeth exam: PRESENT: poor dentation


Respiratory exam: PRESENT: clear to auscultation ana paula, symmetrical, unlabored.  

ABSENT: rales, rhonchi, wheezes


Cardiovascular exam: PRESENT: irregular rhythm, +S1, +S2.  ABSENT: diastolic 

murmur, rubs, systolic murmur


Vascular exam: PRESENT: normal capillary refill


Extremities exam: PRESENT: full ROM.  ABSENT: calf tenderness, clubbing, pedal 

edema


Musculoskeletal exam: PRESENT: ambulatory


Neurological exam: PRESENT: alert, awake, oriented to person, oriented to place,

oriented to time, oriented to situation, CN II-XII grossly intact.  ABSENT: 

motor sensory deficit


Psychiatric exam: PRESENT: anxious, appropriate affect.  ABSENT: homicidal 

ideation, suicidal ideation


Skin exam: PRESENT: dry, intact, warm.  ABSENT: cyanosis, rash





Results


Laboratory Results: 


                                        





                                 07/01/20 05:56 





                                 07/01/20 05:56 





                                        











  07/01/20





  05:56


 


Magnesium  1.8








                                        











  06/30/20





  14:17


 


Troponin I  0.022











Impressions: 


                                        





Chest X-Ray  06/30/20 13:44


IMPRESSION:  NO ACUTE RADIOGRAPHIC FINDING IN THE CHEST.


 














Assessment and Plan





- Diagnosis


(1) Atrial flutter with rapid ventricular response


Is this a current diagnosis for this admission?: Yes   


Plan: 


Patient is medically stable for discharge; remains in house related to pending 

COVID testing.


He is placed on full dose Lovenox.  Not a candidate for chronic anticoagulation 

related to alcohol dependence with continuous use.


Rate controlled on home dose metoprolol.


Continue on daily statin and aspirin therapy.





Cardiology is consulted; discussed with Dr. Diaz.  Appreciate recommendations.








(2) PAD (peripheral artery disease)


Is this a current diagnosis for this admission?: Yes   


Plan: 


 Lower extremity ultrasound revealed distal anterior tibial artery and distal 

superficial femoral artery stenosis.  


Emergency department provider spoke with vascular surgery at [facility] recommended 

outpatient follow-up.





Currently on full dose Lovenox for atrial flutter RVR.


[facility] vascular surgery clinic information provided; patient to schedule follow-

up appointment upon discharge.








(3) Alcohol dependence


Qualifiers: 


   Substance use status: alcohol-induced persisting dementia   Qualified 

Code(s): F10.27 - Alcohol dependence with alcohol-induced persisting dementia   


Is this a current diagnosis for this admission?: Yes   


Plan: 


Serum EtOH <10


Patient does have a history of withdrawal.


Magnesium level pending.





Valium 2 mg p.o. every 4 hours as needed anxiety/agitation/alcohol withdrawal.


Folic acid, thiamine supplementation.


Fall, seizure, aspiration precautions.


Discharge planning consulted.








(4) Tobacco abuse


Is this a current diagnosis for this admission?: Yes   


Plan: 


Smoking cessation encouraged.


Nicotine replacement therapies provided.








(5) Congestive heart failure


Qualifiers: 


   Heart failure type: combined systolic and diastolic 


Is this a current diagnosis for this admission?: Yes   


Plan: 


Echocardiogram (6/2020) shows LVEF 20 to 25%.


Mildly dilatated LV with moderate concentric hypertrophy and grade 3 for 

diastolic dysfunction.





Consulted cardiology; appreciate Dr. Diaz's recommendations.


Daily aspirin and statin therapy.


Home dose metoprolol and lisinopril


Cardiac diet.


Daily weights, strict I&O's.








(6) Person under investigation for COVID-19


Is this a current diagnosis for this admission?: Yes   


Plan: 


COVID (6/24/20) negative.


COVID (6/29/20) pending.


Patient presented to the emergency department 6/29/2020 with complaint of 

shortness of breath and cough.


He is known to have COPD and is a daily tobacco user.


Chest imaging that visit showed a small right-sided pleural effusion with 

associated airspace disease.


CXR today was clear according to read by pulmonologist, however, do feel that I 

can see a subtle opacity.


Is maintaining oxygen saturations on room air.  Slight rhonchi on exam.





He is high risk for exposure related to homeless status.





He is placed on full dose Lovenox related to atrial flutter RVR.


We will start vitamin C, vitamin D, melatonin, and zinc supplementation.








- Time


Time Spent with patient: 15-24 minutes


Medications reviewed and adjusted accordingly: Yes


Anticipated discharge: Home


Within: within 24 hours

## 2020-07-02 NOTE — PDOC PROGRESS REPORT
Subjective


Progress Note for:: 07/02/20


Subjective:: 





Nurses report the patient has been behaving erratically.  He had put the 

telemetry box in his backpack for example.  He is presently off telemetry.


Reason For Visit: 


A FLUTTER RVR








Physical Exam


Vital Signs: 


                                        











Temp Pulse Resp BP Pulse Ox


 


 97.3 F   99   16   146/115 H  98 


 


 07/02/20 00:34  07/02/20 13:12  07/02/20 13:12  07/02/20 13:12  07/02/20 13:12








                                 Intake & Output











 07/01/20 07/02/20 07/03/20





 06:59 06:59 06:59


 


Intake Total 3612 495 


 


Output Total 800 300 


 


Balance 2812 195 


 


Weight 68.6 kg 68 kg 











General appearance: PRESENT: no acute distress, well-developed, well-nourished


Head exam: PRESENT: atraumatic


Eye exam: PRESENT: EOMI


Mouth exam: PRESENT: moist


Respiratory exam: PRESENT: symmetrical, unlabored


Rectal exam: PRESENT: deferred


Skin exam: PRESENT: dry, intact





Results


Laboratory Results: 


                                        





                                 07/01/20 05:56 





                                 07/01/20 05:56 





                                        











  06/30/20





  14:17


 


Troponin I  0.022











Impressions: 


                                        





Chest X-Ray  06/30/20 13:44


IMPRESSION:  NO ACUTE RADIOGRAPHIC FINDING IN THE CHEST.


 














Assessment & Plan





- Diagnosis


(1) Alcohol dependence


Qualifiers: 


   Substance use status: alcohol-induced persisting dementia   Qualified 

Code(s): F10.27 - Alcohol dependence with alcohol-induced persisting dementia   


Is this a current diagnosis for this admission?: Yes   


Plan: 


Alcohol dependence.  This makes his management quite difficult


Homeless social situation which is also quite challenging to manage








(2) Atrial flutter with rapid ventricular response


Is this a current diagnosis for this admission?: Yes   


Plan: 


Given the fact that patient is homeless and has got alcohol dependency issues it

is difficult to recommend systemic anticoagulation for stroke prophylaxis.


Ideally stroke  prophylaxis is warranted on account of atrial flutter and 

congestive heart failure.


He is also hypertensive





Given the current situation he is a candidate only for rate control strategy 

with beta-blockers preferably on account of LV dysfunction.








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


Is this a current diagnosis for this admission?: Yes   


Plan: 


Congestive heart failure with dilated cardiomyopathy likely secondary to alcohol

abuse although ischemic etiology needs to be excluded


Presently appears to be euvolemic


Continue guideline directed medical therapy as feasible for dilated 

cardiomyopathy and congestive heart failure.

## 2020-07-03 VITALS — SYSTOLIC BLOOD PRESSURE: 144 MMHG | DIASTOLIC BLOOD PRESSURE: 104 MMHG

## 2020-07-03 RX ADMIN — METOPROLOL SUCCINATE SCH MG: 50 TABLET, EXTENDED RELEASE ORAL at 10:06

## 2020-07-03 RX ADMIN — FOLIC ACID SCH MG: 1 TABLET ORAL at 10:06

## 2020-07-03 RX ADMIN — DOCUSATE SODIUM SCH: 100 CAPSULE, LIQUID FILLED ORAL at 10:08

## 2020-07-03 RX ADMIN — LISINOPRIL SCH MG: 10 TABLET ORAL at 10:07

## 2020-07-03 RX ADMIN — Medication SCH MG: at 10:07

## 2020-07-03 RX ADMIN — ENOXAPARIN SODIUM SCH: 80 INJECTION SUBCUTANEOUS at 10:08

## 2020-07-03 RX ADMIN — FUROSEMIDE SCH MG: 20 TABLET ORAL at 10:07

## 2020-07-03 RX ADMIN — PANTOPRAZOLE SODIUM SCH MG: 20 TABLET, DELAYED RELEASE ORAL at 05:27

## 2020-07-03 RX ADMIN — OXYCODONE HYDROCHLORIDE AND ACETAMINOPHEN SCH MG: 500 TABLET ORAL at 10:06

## 2020-07-03 RX ADMIN — CHOLECALCIFEROL TAB 10 MCG (400 UNIT) SCH UNIT: 10 TAB at 10:08

## 2020-07-03 RX ADMIN — GUAIFENESIN SCH MG: 600 TABLET, EXTENDED RELEASE ORAL at 10:06

## 2020-07-03 RX ADMIN — Medication SCH MG: at 10:06

## 2020-07-03 NOTE — PDOC DISCHARGE SUMMARY
Impression





- Admit/DC Date/PCP


Admission Date/Primary Care Provider: 


  06/30/20 17:44





  





Discharge Date: 07/03/20





- Discharge Diagnosis


(1) Atrial flutter with rapid ventricular response


Is this a current diagnosis for this admission?: Yes   





(2) PAD (peripheral artery disease)


Is this a current diagnosis for this admission?: Yes   





(3) Alcohol dependence


Is this a current diagnosis for this admission?: Yes   





(4) Tobacco abuse


Is this a current diagnosis for this admission?: Yes   





(5) Congestive heart failure


Is this a current diagnosis for this admission?: Yes   





(6) Person under investigation for COVID-19


Is this a current diagnosis for this admission?: Yes   





- Additional Information


Resuscitation Status: Full Code


Discharge Diet: Cardiac


Discharge Activity: Activity As Tolerated, Balance Activity w/Rest


Referrals: 


[facility] [Provider Group]


Prescriptions: 


Furosemide [Lasix 20 mg Tablet] 20 mg PO DAILY #30 tablet


Atorvastatin Calcium [Lipitor 10 mg Tablet] 10 mg PO QHS #30 tablet


Hydrocodone/Acetaminophen [Norco 5-325 mg Tablet] 1 tab PO Q6 PRN 3 Days #12 

tablet


 PRN Reason: 


Lisinopril [Prinivil 10 mg Tablet] 10 mg PO DAILY #30 tablet


Metoprolol Succinate [Toprol Xl 50 mg Tab.sr] 100 mg PO DAILY #60 tab.sr.24h


Home Medications: 








Aspirin [Ecotrin 81 mg EC Tablet] 81 mg PO DAILY  tabec 06/26/20 


Pantoprazole Sodium [Protonix 20 mg Dr Tablet] 20 mg PO Q6AM  tablet.dr 06/26/20




Thiamine HCl [Thiamine 100 mg Tablet] 100 mg PO DAILY  tablet 06/26/20 


Hydrocodone/Acetaminophen [Norco 5-325 mg Tablet] 1 tab PO Q6 PRN 3 Days #12 

tablet 06/30/20 


Nicotine [Nicoderm 14 mg/24 Hr Transdermal Patch] 1 patch TD DAILYP PRN 07/01/20




Acetaminophen [Tylenol 325 mg Tablet] 650 mg PO Q4HP PRN  tablet 07/03/20 


Atorvastatin Calcium [Lipitor 10 mg Tablet] 10 mg PO QHS #30 tablet 07/03/20 


Furosemide [Lasix 20 mg Tablet] 20 mg PO DAILY #30 tablet 07/03/20 


Lisinopril [Prinivil 10 mg Tablet] 10 mg PO DAILY #30 tablet 07/03/20 


Metoprolol Succinate [Toprol Xl 50 mg Tab.sr] 100 mg PO DAILY #60 tab.sr.24h 

07/03/20 


Nicotine [Nicoderm 14 mg/24 Hr Transdermal Patch] 1 each TD DAILYP PRN #0 

patch.td24 07/03/20 











History of Present Illiness


History of Present Illness: 


KIESHA WINN is a 64 year old male with a past medical history significant 

for dilated cardiomyopathy, CHF, PAF, PAD, tobacco dependence, alcohol 

dependence with continuous use presented to the emergency department with 

complaint of bilateral lower extremity pain.


Initial vital signs in the emergency department were stable.  Laboratory 

evaluation was essentially unremarkable.  Lower extremity ultrasound revealed 

distal anterior tibial artery and distal superficial femoral artery stenosis.  

Emergency department provider spoke with vascular surgery at [facility]; 

recommended outpatient follow-up.


Upon preparing for discharge, patient was noted to be tachycardic with a heart 

rate in 130s.  Chest imaging was benign.  EKG showed atrial flutter RVR.


Patient was provided 1 L normal saline bolus and referred to the hospital serv

ice for admission and management of stating complaints findings.





Hospital Course


Hospital Course: 


(1) Atrial flutter with rapid ventricular response


Patient was admitted to [facility] on continuous telemetry.


He was initially placed on a diltiazem gtt; this was quickly converted to p.o. 

metoprolol upon obtaining rate control.


He is placed on full dose Lovenox during admission.  Not a candidate for chronic

anticoagulation related to alcohol dependence with continuous use.


Continues to remain rate controlled on home dose metoprolol.


Continues on daily statin and aspirin therapy.





Cardiology is consulted; discussed with Dr. Diaz.  Appreciate recommendations.


Recommend alcohol cessation


Close outpatient follow up.





(2) PAD (peripheral artery disease)


Lower extremity ultrasound revealed distal anterior tibial artery and distal 

superficial femoral artery stenosis.  


Emergency department provider spoke with vascular surgery at [facility] recommended 

outpatient follow-up.





Outpatient follow up.





(3) Alcohol dependence


Serum EtOH <10


Patient does have a history of withdrawal.


Provided Valium 2 mg p.o. every 4 hours as needed anxiety/agitation/alcohol 

withdrawal.


Received Folic acid, thiamine supplementation.


Fall, seizure, aspiration precautions were put in place.


No EtOH withdrawal symptoms this admission.





(4) Tobacco abuse


Smoking cessation encouraged.


Nicotine replacement therapies provided.





(5) Congestive heart failure


Echocardiogram (6/2020) shows LVEF 20 to 25%.


Mildly dilatated LV with moderate concentric hypertrophy and grade 3 for 

diastolic dysfunction.





Consulted cardiology; appreciate Dr. Diaz's recommendations.


Daily aspirin and statin therapy.


Home dose metoprolol and lisinopril


Cardiac diet.





(6) Person under investigation for COVID-19


COVID (6/24/20) negative.


COVID (6/29/20) negative.





Patient presented to the emergency department 6/29/2020 with complaint of 

shortness of breath and cough.


He is known to have COPD and is a daily tobacco user.


Chest imaging that visit showed a small right-sided pleural effusion with 

associated airspace disease.


Repeat CXR was clear according to read by pulmonologist, however, do feel that I

can see a subtle opacity.


Is maintaining oxygen saturations on room air.  Slight rhonchi on exam.





He is high risk for exposure related to homeless status.





While awaiting test results, he was placed on full dose Lovenox, vitamin C, 

vitamin D, melatonin, and zinc supplementation.





Physical Exam


Vital Signs: 


                                        











Temp Pulse Resp BP Pulse Ox


 


 96.5 F L  95   24 H  144/104 H  92 


 


 07/03/20 10:03  07/03/20 10:03  07/03/20 10:03  07/03/20 10:03  07/03/20 10:03








                                 Intake & Output











 07/02/20 07/03/20 07/04/20





 06:59 06:59 06:59


 


Intake Total 495 440 


 


Output Total 300  


 


Balance 195 440 


 


Weight 68 kg 68.3 kg 











General appearance: PRESENT: no acute distress, disheveled, well-developed, 

well-nourished


Head exam: PRESENT: atraumatic, normocephalic


Eye exam: PRESENT: conjunctiva pink, EOMI, PERRLA.  ABSENT: scleral icterus


Ear exam: PRESENT: normal external ear exam


Mouth exam: PRESENT: moist, tongue midline


Teeth exam: PRESENT: poor dentation


Respiratory exam: PRESENT: clear to auscultation ana paula, symmetrical, unlabored.  

ABSENT: rales, rhonchi, wheezes


Cardiovascular exam: PRESENT: irregular rhythm, +S1, +S2.  ABSENT: diastolic 

murmur, rubs, systolic murmur


Vascular exam: PRESENT: normal capillary refill


Extremities exam: PRESENT: full ROM.  ABSENT: calf tenderness, clubbing, pedal 

edema


Musculoskeletal exam: PRESENT: ambulatory


Neurological exam: PRESENT: alert, awake, oriented to person, oriented to place,

oriented to time, oriented to situation, CN II-XII grossly intact.  ABSENT: 

motor sensory deficit


Psychiatric exam: PRESENT: appropriate affect, normal mood.  ABSENT: homicidal 

ideation, suicidal ideation


Skin exam: PRESENT: dry, intact, warm.  ABSENT: cyanosis, rash





Results


Laboratory Results: 


                                        











WBC  8.2 10^3/uL (4.0-10.5)   07/01/20  05:56    


 


RBC  4.62 10^6/uL (4.35-5.55)   07/01/20  05:56    


 


Hgb  13.1 g/dL (13.5-17.0)  L  07/01/20  05:56    


 


Hct  39.7 % (37.9-51.0)   07/01/20  05:56    


 


MCV  86 fl (80-97)   07/01/20  05:56    


 


MCH  28.3 pg (27.0-33.4)   07/01/20  05:56    


 


MCHC  33.0 g/dL (32.0-36.0)   07/01/20  05:56    


 


RDW  14.9 % (11.5-14.0)  H  07/01/20  05:56    


 


Plt Count  312 10^3/uL (150-450)   07/01/20  05:56    


 


Lymph % (Auto)  23.1 % (13-45)   06/30/20  14:17    


 


Mono % (Auto)  12.5 % (3-13)   06/30/20  14:17    


 


Eos % (Auto)  3.0 % (0-6)   06/30/20  14:17    


 


Baso % (Auto)  0.8 % (0-2)   06/30/20  14:17    


 


Absolute Neuts (auto)  4.4 10^3/uL (1.7-8.2)   06/30/20  14:17    


 


Absolute Lymphs (auto)  1.7 10^3/uL (0.5-4.7)   06/30/20  14:17    


 


Absolute Monos (auto)  0.9 10^3/uL (0.1-1.4)   06/30/20  14:17    


 


Absolute Eos (auto)  0.2 10^3/uL (0.0-0.6)   06/30/20  14:17    


 


Absolute Basos (auto)  0.1 10^3/uL (0.0-0.2)   06/30/20  14:17    


 


Seg Neutrophils %  60.6 % (42-78)   06/30/20  14:17    


 


Sodium  138.5 mmol/L (137-145)   07/01/20  05:56    


 


Potassium  4.0 mmol/L (3.6-5.0)   07/01/20  05:56    


 


Chloride  107 mmol/L ()   07/01/20  05:56    


 


Carbon Dioxide  25 mmol/L (22-30)   07/01/20  05:56    


 


Anion Gap  7  (5-19)   07/01/20  05:56    


 


BUN  10 mg/dL (7-20)   07/01/20  05:56    


 


Creatinine  0.62 mg/dL (0.52-1.25)   07/01/20  05:56    


 


Est GFR ( Amer)  > 60  (>60)   07/01/20  05:56    


 


Est GFR (MDRD) Non-Af  > 60  (>60)   07/01/20  05:56    


 


Glucose  131 mg/dL ()  H  07/01/20  05:56    


 


Hemoglobin A1c %  6.4 % (4.7-6.0)  H  07/01/20  05:56    


 


Calcium  8.7 mg/dL (8.4-10.2)   07/01/20  05:56    


 


Magnesium  1.8 mg/dL (1.6-2.3)   07/01/20  05:56    


 


Total Bilirubin  1.0 mg/dL (0.2-1.3)   06/30/20  14:17    


 


Direct Bilirubin  0.2 mg/dL (0.0-0.4)   06/30/20  14:17    


 


Neonat Total Bilirubin  Not Reportable   06/30/20  14:17    


 


Neonat Direct Bilirubin  Not Reportable   06/30/20  14:17    


 


Neonat Indirect Bili  Not Reportable   06/30/20  14:17    


 


AST  52 U/L (17-59)   06/30/20  14:17    


 


ALT  40 U/L (<50)   06/30/20  14:17    


 


Alkaline Phosphatase  174 U/L ()  H  06/30/20  14:17    


 


Troponin I  0.022 ng/mL  06/30/20  14:17    


 


Total Protein  7.4 g/dL (6.3-8.2)   06/30/20  14:17    


 


Albumin  3.7 g/dL (3.5-5.0)   06/30/20  14:17    


 


Triglycerides  67 mg/dL (<150)   07/01/20  05:56    


 


Cholesterol  136.37 mg/dL (0-200)   07/01/20  05:56    


 


LDL Cholesterol Direct  87 mg/dL (<100)   07/01/20  05:56    


 


VLDL Cholesterol  13.0 mg/dL (10-31)   07/01/20  05:56    


 


HDL Cholesterol  36 mg/dL (>40)  L  07/01/20  05:56    


 


TSH  2.67 uIU/mL (0.47-4.68)   07/01/20  05:56    


 


Serum Alcohol  < 10 mg/dL (NONE DETECTED)   06/30/20  14:17    


 


COVID-19 Source  NASOPHARYNGEAL   06/30/20  21:37    


 


COVID-19 (ABIGAIL)  NOT DETECTED   06/30/20  21:37    








                                        











  06/30/20





  14:17


 


Troponin I  0.022











Impressions: 


                                        





Chest X-Ray  06/30/20 13:44


IMPRESSION:  NO ACUTE RADIOGRAPHIC FINDING IN THE CHEST.


 














Plan


Plan of Treatment: 


Patient is discharged to home, in stable condition.


As well as follow-up with his primary care provider within 1 week.


Contact the [facility] on Monday to schedule his follow-up 

appointment.


Take his medications as prescribed.


Eat a cardiac diet.


Do not drink alcohol or smoke tobacco products.


Return to the emergency department as needed for concerning symptoms.


Time Spent: Greater than 30 Minutes





Stroke


Is this a Stroke Patient?: No





Acute Heart Failure





- **


Is this a Heart Failure Patient?: Yes


Documentation of LVEF assessment?: Yes


LVEF: LVEF Less Than or Equal to 35%


Anticoagulant Therapy: N/A


Reason(s) not Discharged on Anticoagulant Therapy: Risk for bleeding, 

Uncooperative/unreliable patient


Discharged on Evidence-Based Beta Blockers: Yes


Discharged on ARNI?: No-Document Contraindications


Reason(s) not discharged on ARNI: Other


ARNI Reason - Other: uninsured


Discharged on ARB?: No-document contraindications


Reason(s) not Discharged on ARB: Other


ARB Reason - Other: on ACE


Discharged on ACEI?: Yes


For LVEF <35%, discharged on Aldosterone Antagonist?: No-document 

contraincations


Reason(s) not discharged on Aldosterone Antagonist: Other


Aldosterone Antagonist Reason - Other: medications per cardiology


Follow-up Appointment scheduled within 7 days?: Yes

## 2020-07-16 ENCOUNTER — HOSPITAL ENCOUNTER (EMERGENCY)
Dept: HOSPITAL 62 - ER | Age: 65
LOS: 1 days | Discharge: HOME | End: 2020-07-17
Payer: SELF-PAY

## 2020-07-16 DIAGNOSIS — M79.671: ICD-10-CM

## 2020-07-16 DIAGNOSIS — J90: Primary | ICD-10-CM

## 2020-07-16 DIAGNOSIS — M79.672: ICD-10-CM

## 2020-07-16 DIAGNOSIS — J44.9: ICD-10-CM

## 2020-07-16 DIAGNOSIS — I48.91: ICD-10-CM

## 2020-07-16 DIAGNOSIS — R60.9: ICD-10-CM

## 2020-07-16 DIAGNOSIS — I11.0: ICD-10-CM

## 2020-07-16 DIAGNOSIS — I50.9: ICD-10-CM

## 2020-07-16 DIAGNOSIS — F17.210: ICD-10-CM

## 2020-07-16 PROCEDURE — 71045 X-RAY EXAM CHEST 1 VIEW: CPT

## 2020-07-16 PROCEDURE — 99284 EMERGENCY DEPT VISIT MOD MDM: CPT

## 2020-07-16 PROCEDURE — 93005 ELECTROCARDIOGRAM TRACING: CPT

## 2020-07-16 PROCEDURE — 93010 ELECTROCARDIOGRAM REPORT: CPT

## 2020-07-16 PROCEDURE — 80053 COMPREHEN METABOLIC PANEL: CPT

## 2020-07-16 PROCEDURE — 81001 URINALYSIS AUTO W/SCOPE: CPT

## 2020-07-16 PROCEDURE — 85025 COMPLETE CBC W/AUTO DIFF WBC: CPT

## 2020-07-16 PROCEDURE — 36415 COLL VENOUS BLD VENIPUNCTURE: CPT

## 2020-07-17 VITALS — SYSTOLIC BLOOD PRESSURE: 127 MMHG | DIASTOLIC BLOOD PRESSURE: 70 MMHG

## 2020-07-17 LAB
ADD MANUAL DIFF: NO
ALBUMIN SERPL-MCNC: 3.9 G/DL (ref 3.5–5)
ALP SERPL-CCNC: 158 U/L (ref 38–126)
ANION GAP SERPL CALC-SCNC: 9 MMOL/L (ref 5–19)
APPEARANCE UR: CLEAR
APTT PPP: YELLOW S
AST SERPL-CCNC: 55 U/L (ref 17–59)
BASOPHILS # BLD AUTO: 0.1 10^3/UL (ref 0–0.2)
BASOPHILS NFR BLD AUTO: 1 % (ref 0–2)
BILIRUB DIRECT SERPL-MCNC: 0.2 MG/DL (ref 0–0.4)
BILIRUB SERPL-MCNC: 1.2 MG/DL (ref 0.2–1.3)
BILIRUB UR QL STRIP: NEGATIVE
BUN SERPL-MCNC: 12 MG/DL (ref 7–20)
CALCIUM: 9.2 MG/DL (ref 8.4–10.2)
CHLORIDE SERPL-SCNC: 99 MMOL/L (ref 98–107)
CO2 SERPL-SCNC: 27 MMOL/L (ref 22–30)
EOSINOPHIL # BLD AUTO: 0.2 10^3/UL (ref 0–0.6)
EOSINOPHIL NFR BLD AUTO: 3 % (ref 0–6)
ERYTHROCYTE [DISTWIDTH] IN BLOOD BY AUTOMATED COUNT: 15.2 % (ref 11.5–14)
GLUCOSE SERPL-MCNC: 83 MG/DL (ref 75–110)
GLUCOSE UR STRIP-MCNC: NEGATIVE MG/DL
HCT VFR BLD CALC: 41.3 % (ref 37.9–51)
HGB BLD-MCNC: 13.6 G/DL (ref 13.5–17)
KETONES UR STRIP-MCNC: NEGATIVE MG/DL
LYMPHOCYTES # BLD AUTO: 1.5 10^3/UL (ref 0.5–4.7)
LYMPHOCYTES NFR BLD AUTO: 26.4 % (ref 13–45)
MCH RBC QN AUTO: 28.1 PG (ref 27–33.4)
MCHC RBC AUTO-ENTMCNC: 33 G/DL (ref 32–36)
MCV RBC AUTO: 85 FL (ref 80–97)
MONOCYTES # BLD AUTO: 0.8 10^3/UL (ref 0.1–1.4)
MONOCYTES NFR BLD AUTO: 13.2 % (ref 3–13)
NEUTROPHILS # BLD AUTO: 3.3 10^3/UL (ref 1.7–8.2)
NEUTS SEG NFR BLD AUTO: 56.4 % (ref 42–78)
NITRITE UR QL STRIP: NEGATIVE
PH UR STRIP: 6 [PH] (ref 5–9)
PLATELET # BLD: 267 10^3/UL (ref 150–450)
POTASSIUM SERPL-SCNC: 4 MMOL/L (ref 3.6–5)
PROT SERPL-MCNC: 7.5 G/DL (ref 6.3–8.2)
PROT UR STRIP-MCNC: 30 MG/DL
RBC # BLD AUTO: 4.85 10^6/UL (ref 4.35–5.55)
SP GR UR STRIP: 1.01
TOTAL CELLS COUNTED % (AUTO): 100 %
UROBILINOGEN UR-MCNC: 4 MG/DL (ref ?–2)
WBC # BLD AUTO: 5.8 10^3/UL (ref 4–10.5)

## 2020-07-17 NOTE — ER DOCUMENT REPORT
Entered by CLAUDIA SOLORZANO SCRIBE  07/17/20 0712 





Acting as scribe for:MARY ELLEN DE LA VEGA MD





ED Extremity Problem, Lower





- General


Chief Complaint: Skin Problem


Stated Complaint: FOOT PAIN


Time Seen by Provider: 07/17/20 07:12


Mode of Arrival: Ambulatory


Information source: Patient


Notes: 





This alcoholic 64-year-old male patient presents to the emergency department 

today with complaints of bilateral foot swelling and discomfort.  Patient also 

mentions that he is out of his blood pressure medication.  Upon review of 

pharmacy records, this patient was prescribed a 30-day supply of medications on 

7/3/2020 from this hospital.  These medications include 20 mg of furosemide, 

lisinopril 10 mg, and metoprolol  mg a day.





TRAVEL OUTSIDE OF THE [place] IN LAST 30 DAYS: No





- Related Data


Allergies/Adverse Reactions: 


                                        





No Known Allergies Allergy (Verified 06/14/20 17:38)


   








Home Medications: metoprolol.  lisinopril





Past Medical History





- General


Information source: Patient





- Social History


Smoking Status: Current Every Day Smoker


Cigarette use (# per day): Yes


Frequency of alcohol use: Heavy


Drug Abuse: None


Family History: Arthritis, COPD, Hypertension


Patient has homicidal ideation: No





- Past Medical History


Cardiac Medical History: Reports: Hx Atrial Fibrillation, Hx Congestive Heart 

Failure - Alcohol cardiomyopathy, Hx Hypertension


Pulmonary Medical History: Reports: Hx Bronchitis, Hx COPD


Past Surgical History: Reports: Hx Abdominal Surgery - Exploratory in 2017, Hx 

Orthopedic Surgery - Right ACL repair, Hx Tonsillectomy, Other - Homeless





Review of Systems





- Review of Systems


Constitutional: No symptoms reported


EENT: No symptoms reported


Cardiovascular: No symptoms reported


Respiratory: No symptoms reported


Gastrointestinal: No symptoms reported


Genitourinary: No symptoms reported


Male Genitourinary: No symptoms reported


Musculoskeletal: See HPI, Ankle swelling


Skin: See HPI


Hematologic/Lymphatic: No symptoms reported


Neurological/Psychological: No symptoms reported


-: Yes All other systems reviewed and negative





Physical Exam





- Vital signs


Vitals: 


                                        











Temp Pulse Resp BP Pulse Ox


 


 99.0 F   95   20   147/105 H  100 


 


 07/16/20 22:38  07/16/20 22:38  07/16/20 22:38  07/16/20 22:38  07/16/20 22:38














- Notes


Notes: 





Physical Exam:


 


General: Initially sleeping, disheveled appearance.


 


HEENT: Normocephalic. Atraumatic. PERRL. Extraocular movements intact. 

Oropharynx clear.


 


Neck: Supple. Non-tender.


 


Respiratory: No respiratory distress. Rhonchi with forced cough.


 


Cardiovascular: Regular rate and rhythm. 


 


Abdominal: Normal Inspection. Non-tender. No distension. Normal Bowel Sounds. 


 


Back: No gross abnormalities. 


 


Extremities: Moves all four extremities.


Upper extremities: Normal inspection. Normal ROM.  


Lower extremities: See skin exam.  Pedal edema bilaterally.


 


Neurological: Normal cognition. AAOx4. Normal speech.  


 


Psychological: Normal affect. Normal Mood. 


 


Skin: There is skin breakdown between the toes on both feet.





Course





- Re-evaluation


Re-evalutation: 





07/17/20 10:32


Patient does have rhonchi and congested cough.  White blood cell count is not 

elevated.   When I asked the patient about when he started with his congested 

cough, he states he has had it most of his whole life.  He does report that he 

picked up his prescriptions, but has run out of the medicine, and states he 

needs to go back by [facility] to get a refill.


The chest x-ray today shows a right lower lobe effusion, this had cleared on the

last chest x-ray on 6/30/2020, but was seen the week prior to that while he was 

hospitalized.














- Vital Signs


Vital signs: 


                                        











Temp Pulse Resp BP Pulse Ox


 


 98.5 F   74   16   127/70 H  98 


 


 07/17/20 10:23  07/17/20 10:23  07/17/20 10:23  07/17/20 10:23  07/17/20 10:23














- Laboratory


Result Diagrams: 


                                 07/17/20 02:15





                                 07/17/20 02:15


Laboratory results interpreted by me: 


                                        











  07/17/20 07/17/20 07/17/20





  02:15 02:15 07:35


 


RDW  15.2 H  


 


Mono % (Auto)  13.2 H  


 


Sodium   135.3 L 


 


Alkaline Phosphatase   158 H 


 


Urine Protein    30 H


 


Urine Urobilinogen    4.0 H














- Diagnostic Test


Radiology reviewed: Image reviewed, Reports reviewed - Chest x-ray shows a 

moderate opacityeffusion in the lateral right lower hemithorax.  This is a new 

finding compared to 6/30/2020, but was present on chest x-rays earlier during 

the last admission.





- EKG Interpretation by Me


EKG shows normal: Sinus rhythm, Axis, Intervals, QRS Complexes, ST-T Waves


Rate: Normal - 91


Rhythm: NSR


P Waves: LAE


When compared to previous EKG there are: Changes noted - Most recent comparison 

from 7/1/2020, showed the patient to be in atrial flutter and a 3:1 block at 

that time.





Discharge





- Discharge


Clinical Impression: 


 Peripheral edema, Pleural effusion





Condition: Stable


Disposition: HOME, SELF-CARE


Additional Instructions: 


Edema, Peripheral





     You have swelling in your legs. This is called peripheral edema. It can be 

caused by "leaky capillaries," inflammation, disease of the leg veins, or excess

salt and water in your body. Edema may be a sign of heart, kidney, or liver 

disease. A medical evaluation can determine if there is a serious underlying 

cause for your edema.


     Avoid prolonged standing. If you must sit for a long time, occasionally get

up and walk around or elevate your legs. Support stockings can be helpful in 

limiting swelling. Often diuretic or water pills are used to remove excess salt 

and water from your body.


     Call the doctor or return if you develop increased swelling, pain, or 

redness, shortness of breath, chest pain, or any other significant change.





*************************************************

**************************************************************************





Keep your feet clean.  Be sure to clean between your toes when you wash your 

feet.


Wear socks when you are wearing shoes or boots.


Elevate your feet all the time to help reduce the swelling.


Take the medications you were prescribed when you were discharged from the 

hospital on July 3, 2020.


     You were prescribed a 30-day supply of medication 14 days ago.  You should 

have plenty of your medication left.


Follow-up with a local primary care provider to manage your medical problems.








RETURN TO THE EMERGENCY ROOM IF ANY NEW OR WORSENING SYMPTOMS.














I personally performed the services described in the documentation, reviewed and

edited the documentation which was dictated to the scribe in my presence, and it

accurately records my words and actions.

## 2020-07-17 NOTE — RADIOLOGY REPORT (SQ)
EXAM DESCRIPTION:

XR CHEST 1 VIEW



COMPLETED DATE/TME:  07/17/2020 07:22



CLINICAL HISTORY:

64 years Male, Peripheral edema with congestion



COMPARISON:

6/30/20



NUMBER OF VIEWS/TECHNIQUE:

1/AP 



FINDINGS:



Moderate opacity-effusion of the lateral right lower hemithorax.

Moderate biapical opacities. Small scattered groundglass opacity,

predominantly left midlung. Mildly enlarged cardiac silhouette

size.   No pneumothorax. Stable bony thorax.



IMPRESSION:

Moderate opacity-effusion of the lateral right lower hemithorax.

Interval worsening.

## 2020-07-17 NOTE — EKG REPORT
SEVERITY:- ABNORMAL ECG -

SINUS RHYTHM

MULTIPLE ATRIAL PREMATURE COMPLEXES

PROBABLE LEFT ATRIAL ABNORMALITY

INCOMPLETE RIGHT BUNDLE BRANCH BLOCK

:

Confirmed by: Mikal Diaz MD 17-Jul-2020 13:02:20

## 2020-09-20 ENCOUNTER — HOSPITAL ENCOUNTER (INPATIENT)
Dept: HOSPITAL 62 - ER | Age: 65
LOS: 26 days | Discharge: HOME | DRG: 175 | End: 2020-10-16
Attending: INTERNAL MEDICINE | Admitting: INTERNAL MEDICINE
Payer: MEDICAID

## 2020-09-20 DIAGNOSIS — E87.1: ICD-10-CM

## 2020-09-20 DIAGNOSIS — I48.0: ICD-10-CM

## 2020-09-20 DIAGNOSIS — Z59.0: ICD-10-CM

## 2020-09-20 DIAGNOSIS — J91.8: ICD-10-CM

## 2020-09-20 DIAGNOSIS — Z82.61: ICD-10-CM

## 2020-09-20 DIAGNOSIS — D69.6: ICD-10-CM

## 2020-09-20 DIAGNOSIS — Z23: ICD-10-CM

## 2020-09-20 DIAGNOSIS — F10.27: ICD-10-CM

## 2020-09-20 DIAGNOSIS — I50.22: ICD-10-CM

## 2020-09-20 DIAGNOSIS — N39.0: ICD-10-CM

## 2020-09-20 DIAGNOSIS — Z82.5: ICD-10-CM

## 2020-09-20 DIAGNOSIS — I11.0: ICD-10-CM

## 2020-09-20 DIAGNOSIS — T46.5X6A: ICD-10-CM

## 2020-09-20 DIAGNOSIS — J96.01: ICD-10-CM

## 2020-09-20 DIAGNOSIS — K70.30: ICD-10-CM

## 2020-09-20 DIAGNOSIS — Z91.128: ICD-10-CM

## 2020-09-20 DIAGNOSIS — I49.5: ICD-10-CM

## 2020-09-20 DIAGNOSIS — E16.2: ICD-10-CM

## 2020-09-20 DIAGNOSIS — R78.81: ICD-10-CM

## 2020-09-20 DIAGNOSIS — B95.62: ICD-10-CM

## 2020-09-20 DIAGNOSIS — Z82.49: ICD-10-CM

## 2020-09-20 DIAGNOSIS — E87.5: ICD-10-CM

## 2020-09-20 DIAGNOSIS — R91.1: ICD-10-CM

## 2020-09-20 DIAGNOSIS — I26.92: Primary | ICD-10-CM

## 2020-09-20 DIAGNOSIS — J44.9: ICD-10-CM

## 2020-09-20 DIAGNOSIS — I73.9: ICD-10-CM

## 2020-09-20 DIAGNOSIS — K70.31: ICD-10-CM

## 2020-09-20 DIAGNOSIS — Z20.828: ICD-10-CM

## 2020-09-20 DIAGNOSIS — B96.1: ICD-10-CM

## 2020-09-20 DIAGNOSIS — I42.6: ICD-10-CM

## 2020-09-20 DIAGNOSIS — R13.14: ICD-10-CM

## 2020-09-20 LAB
ADD MANUAL DIFF: NO
ALBUMIN SERPL-MCNC: 3.7 G/DL (ref 3.5–5)
ALP SERPL-CCNC: 162 U/L (ref 38–126)
ANION GAP SERPL CALC-SCNC: 16 MMOL/L (ref 5–19)
AST SERPL-CCNC: 164 U/L (ref 17–59)
BASE EXCESS BLDV CALC-SCNC: -7.9 MMOL/L
BASOPHILS # BLD AUTO: 0.1 10^3/UL (ref 0–0.2)
BASOPHILS NFR BLD AUTO: 1.2 % (ref 0–2)
BILIRUB DIRECT SERPL-MCNC: 1.5 MG/DL (ref 0–0.4)
BILIRUB SERPL-MCNC: 3 MG/DL (ref 0.2–1.3)
BUN SERPL-MCNC: 23 MG/DL (ref 7–20)
CALCIUM: 9.3 MG/DL (ref 8.4–10.2)
CHLORIDE SERPL-SCNC: 101 MMOL/L (ref 98–107)
CO2 SERPL-SCNC: 19 MMOL/L (ref 22–30)
EOSINOPHIL # BLD AUTO: 0.1 10^3/UL (ref 0–0.6)
EOSINOPHIL NFR BLD AUTO: 1.5 % (ref 0–6)
ERYTHROCYTE [DISTWIDTH] IN BLOOD BY AUTOMATED COUNT: 19.1 % (ref 11.5–14)
ETHANOL SERPL-MCNC: < 10 MG/DL
GLUCOSE SERPL-MCNC: 74 MG/DL (ref 75–110)
HCO3 BLDV-SCNC: 17.7 MMOL/L (ref 20–32)
HCT VFR BLD CALC: 45.7 % (ref 37.9–51)
HGB BLD-MCNC: 15 G/DL (ref 13.5–17)
LYMPHOCYTES # BLD AUTO: 2.1 10^3/UL (ref 0.5–4.7)
LYMPHOCYTES NFR BLD AUTO: 34.2 % (ref 13–45)
MCH RBC QN AUTO: 28.2 PG (ref 27–33.4)
MCHC RBC AUTO-ENTMCNC: 32.7 G/DL (ref 32–36)
MCV RBC AUTO: 86 FL (ref 80–97)
MONOCYTES # BLD AUTO: 1.2 10^3/UL (ref 0.1–1.4)
MONOCYTES NFR BLD AUTO: 19.6 % (ref 3–13)
NEUTROPHILS # BLD AUTO: 2.7 10^3/UL (ref 1.7–8.2)
NEUTS SEG NFR BLD AUTO: 43.5 % (ref 42–78)
PCO2 BLDV: 36.3 MMHG (ref 35–63)
PH BLDV: 7.31 [PH] (ref 7.3–7.42)
PLATELET # BLD: 197 10^3/UL (ref 150–450)
POTASSIUM SERPL-SCNC: 4.9 MMOL/L (ref 3.6–5)
PROT SERPL-MCNC: 7.4 G/DL (ref 6.3–8.2)
RBC # BLD AUTO: 5.31 10^6/UL (ref 4.35–5.55)
TOTAL CELLS COUNTED % (AUTO): 100 %
TROPONIN I SERPL-MCNC: 0.14 NG/ML
WBC # BLD AUTO: 6.2 10^3/UL (ref 4–10.5)

## 2020-09-20 PROCEDURE — 80061 LIPID PANEL: CPT

## 2020-09-20 PROCEDURE — 87077 CULTURE AEROBIC IDENTIFY: CPT

## 2020-09-20 PROCEDURE — 99291 CRITICAL CARE FIRST HOUR: CPT

## 2020-09-20 PROCEDURE — 32555 ASPIRATE PLEURA W/ IMAGING: CPT

## 2020-09-20 PROCEDURE — 71045 X-RAY EXAM CHEST 1 VIEW: CPT

## 2020-09-20 PROCEDURE — 89050 BODY FLUID CELL COUNT: CPT

## 2020-09-20 PROCEDURE — 82150 ASSAY OF AMYLASE: CPT

## 2020-09-20 PROCEDURE — 93976 VASCULAR STUDY: CPT

## 2020-09-20 PROCEDURE — 83615 LACTATE (LD) (LDH) ENZYME: CPT

## 2020-09-20 PROCEDURE — 87449 NOS EACH ORGANISM AG IA: CPT

## 2020-09-20 PROCEDURE — 82042 OTHER SOURCE ALBUMIN QUAN EA: CPT

## 2020-09-20 PROCEDURE — 87088 URINE BACTERIA CULTURE: CPT

## 2020-09-20 PROCEDURE — 49083 ABD PARACENTESIS W/IMAGING: CPT

## 2020-09-20 PROCEDURE — 71275 CT ANGIOGRAPHY CHEST: CPT

## 2020-09-20 PROCEDURE — 93010 ELECTROCARDIOGRAM REPORT: CPT

## 2020-09-20 PROCEDURE — 87324 CLOSTRIDIUM AG IA: CPT

## 2020-09-20 PROCEDURE — 80307 DRUG TEST PRSMV CHEM ANLYZR: CPT

## 2020-09-20 PROCEDURE — C9803 HOPD COVID-19 SPEC COLLECT: HCPCS

## 2020-09-20 PROCEDURE — 84300 ASSAY OF URINE SODIUM: CPT

## 2020-09-20 PROCEDURE — 87150 DNA/RNA AMPLIFIED PROBE: CPT

## 2020-09-20 PROCEDURE — 83880 ASSAY OF NATRIURETIC PEPTIDE: CPT

## 2020-09-20 PROCEDURE — 87186 SC STD MICRODIL/AGAR DIL: CPT

## 2020-09-20 PROCEDURE — 90686 IIV4 VACC NO PRSV 0.5 ML IM: CPT

## 2020-09-20 PROCEDURE — 93005 ELECTROCARDIOGRAM TRACING: CPT

## 2020-09-20 PROCEDURE — 85027 COMPLETE CBC AUTOMATED: CPT

## 2020-09-20 PROCEDURE — 82140 ASSAY OF AMMONIA: CPT

## 2020-09-20 PROCEDURE — 93306 TTE W/DOPPLER COMPLETE: CPT

## 2020-09-20 PROCEDURE — 80076 HEPATIC FUNCTION PANEL: CPT

## 2020-09-20 PROCEDURE — 96372 THER/PROPH/DIAG INJ SC/IM: CPT

## 2020-09-20 PROCEDURE — 84100 ASSAY OF PHOSPHORUS: CPT

## 2020-09-20 PROCEDURE — 85730 THROMBOPLASTIN TIME PARTIAL: CPT

## 2020-09-20 PROCEDURE — 85610 PROTHROMBIN TIME: CPT

## 2020-09-20 PROCEDURE — 82803 BLOOD GASES ANY COMBINATION: CPT

## 2020-09-20 PROCEDURE — 82746 ASSAY OF FOLIC ACID SERUM: CPT

## 2020-09-20 PROCEDURE — 84484 ASSAY OF TROPONIN QUANT: CPT

## 2020-09-20 PROCEDURE — 83930 ASSAY OF BLOOD OSMOLALITY: CPT

## 2020-09-20 PROCEDURE — 84157 ASSAY OF PROTEIN OTHER: CPT

## 2020-09-20 PROCEDURE — 80048 BASIC METABOLIC PNL TOTAL CA: CPT

## 2020-09-20 PROCEDURE — 90471 IMMUNIZATION ADMIN: CPT

## 2020-09-20 PROCEDURE — P9047 ALBUMIN (HUMAN), 25%, 50ML: HCPCS

## 2020-09-20 PROCEDURE — 74018 RADEX ABDOMEN 1 VIEW: CPT

## 2020-09-20 PROCEDURE — G0008 ADMIN INFLUENZA VIRUS VAC: HCPCS

## 2020-09-20 PROCEDURE — 80053 COMPREHEN METABOLIC PANEL: CPT

## 2020-09-20 PROCEDURE — 87205 SMEAR GRAM STAIN: CPT

## 2020-09-20 PROCEDURE — 83735 ASSAY OF MAGNESIUM: CPT

## 2020-09-20 PROCEDURE — 82533 TOTAL CORTISOL: CPT

## 2020-09-20 PROCEDURE — 87070 CULTURE OTHR SPECIMN AEROBIC: CPT

## 2020-09-20 PROCEDURE — 99292 CRITICAL CARE ADDL 30 MIN: CPT

## 2020-09-20 PROCEDURE — 83986 ASSAY PH BODY FLUID NOS: CPT

## 2020-09-20 PROCEDURE — 82945 GLUCOSE OTHER FLUID: CPT

## 2020-09-20 PROCEDURE — 85025 COMPLETE CBC W/AUTO DIFF WBC: CPT

## 2020-09-20 PROCEDURE — 87040 BLOOD CULTURE FOR BACTERIA: CPT

## 2020-09-20 PROCEDURE — 82947 ASSAY GLUCOSE BLOOD QUANT: CPT

## 2020-09-20 PROCEDURE — 82962 GLUCOSE BLOOD TEST: CPT

## 2020-09-20 PROCEDURE — 36415 COLL VENOUS BLD VENIPUNCTURE: CPT

## 2020-09-20 PROCEDURE — 87075 CULTR BACTERIA EXCEPT BLOOD: CPT

## 2020-09-20 PROCEDURE — 94640 AIRWAY INHALATION TREATMENT: CPT

## 2020-09-20 PROCEDURE — 36573 INSJ PICC RS&I 5 YR+: CPT

## 2020-09-20 PROCEDURE — 83935 ASSAY OF URINE OSMOLALITY: CPT

## 2020-09-20 PROCEDURE — 74230 X-RAY XM SWLNG FUNCJ C+: CPT

## 2020-09-20 PROCEDURE — 76705 ECHO EXAM OF ABDOMEN: CPT

## 2020-09-20 PROCEDURE — 81001 URINALYSIS AUTO W/SCOPE: CPT

## 2020-09-20 PROCEDURE — 96374 THER/PROPH/DIAG INJ IV PUSH: CPT

## 2020-09-20 PROCEDURE — 87635 SARS-COV-2 COVID-19 AMP PRB: CPT

## 2020-09-20 PROCEDURE — 96361 HYDRATE IV INFUSION ADD-ON: CPT

## 2020-09-20 PROCEDURE — 87086 URINE CULTURE/COLONY COUNT: CPT

## 2020-09-20 SDOH — ECONOMIC STABILITY - HOUSING INSECURITY: HOMELESSNESS: Z59.0

## 2020-09-20 NOTE — RADIOLOGY REPORT (SQ)
EXAM DESCRIPTION: 



XR CHEST 1 VIEW



COMPLETED DATE/TME:  09/20/2020 20:34



CLINICAL HISTORY: 



64 years, Male, shortness of breath



COMPARISON:

July 17, 2020



NUMBER OF VIEWS:

1



TECHNIQUE:

Portable AP upright view the chest was obtained at 8:46 PM.



LIMITATIONS:

None.



FINDINGS:



The heart size is within normal limits for technique.  Lungs

appear clear of active disease.  There is mild biapical

subpleural scarring as before.  Previously noted right pleural

effusion has resolved.  There is no evidence of pneumothorax.



IMPRESSION:



No acute abnormality as above.

 



copyright 2011 [employer]- All Rights Reserved

## 2020-09-20 NOTE — ER DOCUMENT REPORT
ED Respiratory Problem





- General


Chief Complaint: Shortness Of Breath


Stated Complaint: SHORTNESS OF BREATH


Time Seen by Provider: 09/20/20 20:20


Notes: 


Patient is a 64-year-old male that comes to the emergency department for chief 

complaint of difficulty breathing.  Patient states that he has had worsening 

shortness of breath for 1 week and tonight it became much worse so he called the

ambulance.  EMS reports that patient had initial oxygen saturation of 87% on 

room air, patient was placed on 4 L nasal cannula with good improvement.  Louann

ent does state he feels significantly improved.  Patient denies notable cough, 

denies fever, denies chest pain, denies any other complaints except the 

shortness of breath.  Patient has a history of smoking, COPD, alcohol abuse, and

hypertension reportedly.  He states he supposed to take "my meds for blood 

pressure but they got stolen and I have not been taking them".  Patient denies 

alcohol dependence or withdrawals.





TRAVEL OUTSIDE OF THE [place] IN LAST 30 DAYS: No





- Related Data


Allergies/Adverse Reactions: 


                                        





No Known Allergies Allergy (Verified 06/14/20 17:38)


   











Past Medical History





- General


Information source: Patient





- Social History


Smoking Status: Smoker,Current Status Unk


Chew tobacco use (# tins/day): No


Frequency of alcohol use: Social


Drug Abuse: None


Lives with: Family


Family History: Arthritis, COPD, Hypertension


Patient has homicidal ideation: No





- Past Medical History


Cardiac Medical History: Reports: Hx Atrial Fibrillation, Hx Congestive Heart 

Failure - Alcohol cardiomyopathy, Hx Hypertension


Pulmonary Medical History: Reports: Hx Bronchitis, Hx COPD


Renal/ Medical History: Denies: Hx Peritoneal Dialysis


Psychiatric Medical History: 


   Denies: Hx Depression


Past Surgical History: Reports: Hx Abdominal Surgery - Exploratory in 2017, Hx 

Orthopedic Surgery - Right ACL repair, Hx Tonsillectomy, Other - Homeless





Review of Systems





- Review of Systems


Constitutional: No symptoms reported


EENT: No symptoms reported


Cardiovascular: See HPI


Respiratory: See HPI


Gastrointestinal: No symptoms reported


Genitourinary: No symptoms reported


Male Genitourinary: No symptoms reported


Musculoskeletal: No symptoms reported


Skin: No symptoms reported


Hematologic/Lymphatic: No symptoms reported


Neurological/Psychological: No symptoms reported





Physical Exam





- Vital signs


Vitals: 


                                        











Temp


 


 97.8 F 


 


 09/20/20 19:08














- Notes


Notes: 





GENERAL: Patient somewhat frail and chronically ill-appearing, he also appears 

to be in mild distress


HEAD: Normocephalic, atraumatic.


EYES: Pupils equal, round, and reactive to light. Extraocular movements intact.


ENT: Oral mucosa moist, tongue midline. Oropharynx unremarkable. Airway patent.


NECK: Full range of motion. Supple. Trachea midline. No lymphadenopathy.


LUNGS: Decreased breath sounds bilaterally but no wheezes, rales, rhonchi.  

Intermittent tachypnea noted.  No labored breathing on my exam.


HEART: Tachycardic, normal rhythm, no murmur


ABDOMEN: Soft unremarkable abdomen: Minimal distention, bowel sounds present 

throughout.  No guarding or rigidity.


EXTREMITIES: Moves all 4 extremities spontaneously. No edema, normal radial and 

dorsalis pedis pulses bilaterally. No cyanosis.


BACK: no cervical, thoracic, lumbar midline tenderness. No saddle anesthesia, 

normal distal neurovascular exam. Moves all extremities in full range of motion.


NEUROLOGICAL: Alert and oriented x3. Normal speech. Cranial nerves II through 

XII grossly intact. Strength 5/5 in all extremities. 


PSYCH: Normal affect, normal mood.


SKIN: Warm, dry, normal turgor. No rashes or lesions noted.





Course





- Re-evaluation


Re-evalutation: 


Patient is a poor historian.  Patient tachycardic at 140 on my evaluation 

although this appears sinus rhythm on the monitor, lungs actually appear clear. 

Initially patient was tachypneic in the 40s although when I discussed 

potentially placing him on BiPAP because of his hypoxia, tachypnea, labored 

breathing, patient suddenly started having respirations in the 20s and his work 

of breathing reduced.  Patient has clear lungs.  Patient remained on 4 L nasal 

cannula this time, he is started receiving a bolus of fluids, work-up pending.





On review of records it was noted that patient has a history of atrial 

fibrillation, alcoholic cardiomyopathy.  Patient's tachycardia could be 

secondary to alcohol withdrawal, he is also off his regular beta-blocker, 

however I am concerned that there is a different etiology because of his initial

hypoxia.  Chest x-ray was reviewed and unremarkable with no acute findings.  CBC

nonspecific, chemistry shows some elevated bilirubin and mildly elevated LFTs 

most likely from alcohol abuse history, bicarbonate is slightly low and is 

altered on venous blood gas but blood gas otherwise unremarkable.  Troponin is 

0.141, BNP is more elevated than usual but still has been elevated in the past. 

Patient has not had any chest pain.  At this point I feel a CT is necessary to 

evaluate patient's tachycardia, hypoxia, shortness of breath.  I did discuss 

with Dr. De Jesus, he does recommend CTA, he does recommend 5 mg IV Valium in ca

se patient has withdrawal component, I did give this to the patient, patient 

relaxed with this but did not have any change in tachycardia.





CTA shows bilateral pulmonary emboli, filling defects in the main pulmonary 

artery which is concerning for developing saddle emboli, no current saddle 

emboli.  I did call and speak to radiology about what I believe is a dictation 

error (initially read clot in the coronary artery, I spoke with Dr. Roblero, 

there will be an addendum).  Patient will be placed on heparin drip, patient 

will require admission to the hospital, I discussed with patient in details, he 

states understanding and agreement.  Updated Dr. De Jesus, recommends hospitalist

admission. 





I spoke with Dr. Weiland, he will accept patient for admission.  He requests 

that we change heparin drip to Lovenox subcutaneous now at 1 mg/kg.





09/21/20 


Dr. Weiland called back, he states he is concerned because of patient's level 

persistent tachycardia with overall clinical picture, he recommends that I speak

to Dr. Cooper, cardiology on-call, if he recommends hospitalist admission he

will take it, if he recommends ICU admission or transfer he recommends we go 

with his recommendation.





I spoke with Dr. Cooper, discussed details.  He recommends ICU admission.





09/21/20 


I discussed with Chau Curiel, NP on call for the ICU, she states that she will

accept the patient to the ICU under Dr. De Los Santos. 





- Vital Signs


Vital signs: 


                                        











Temp Pulse Resp BP Pulse Ox


 


 97.8 F      24 H  136/119 H  89 L


 


 09/21/20 04:35     09/21/20 04:05  09/21/20 04:05  09/21/20 03:11














- Laboratory


Result Diagrams: 


                                 09/20/20 22:02





                                 09/20/20 22:02


Laboratory results interpreted by me: 


                                        











  09/20/20 09/20/20 09/20/20





  22:02 22:02 22:02


 


RDW  19.1 H  


 


Mono % (Auto)  19.6 H  


 


PT   


 


VBG HCO3   


 


Sodium   136.3 L 


 


Carbon Dioxide   19 L 


 


BUN   23 H 


 


Glucose   74 L 


 


Total Bilirubin   3.0 H 


 


Direct Bilirubin   1.5 H 


 


AST   164 H 


 


Alkaline Phosphatase   162 H 


 


NT-Pro-B Natriuret Pep    72231 H


 


Urine Protein   


 


Urine Blood   


 


Urine Urobilinogen   














  09/20/20 09/21/20 09/21/20





  22:02 01:39 02:05


 


RDW   


 


Mono % (Auto)   


 


PT   22.4 H 


 


VBG HCO3  17.7 L  


 


Sodium   


 


Carbon Dioxide   


 


BUN   


 


Glucose   


 


Total Bilirubin   


 


Direct Bilirubin   


 


AST   


 


Alkaline Phosphatase   


 


NT-Pro-B Natriuret Pep   


 


Urine Protein    >=500 H


 


Urine Blood    SMALL H


 


Urine Urobilinogen    4.0 H














- EKG Interpretation by Me


Additional EKG results interpreted by me: 


EKG shows sinus tachycardia at a rate of 139, left axis deviation, T wave 

inversion in lead II and T wave flattening in lateral leads but no T wave 

inversions or ST segment changes in consecutive leads.  QTC of 475.





Critical Care Note





- Critical Care Note


Total time excluding time spent on procedures (mins): 40 - Hypoxia, tachycardia,

pulmonary embolism


Comments: 


Please allow 40 minutes of critical care time for evaluation of this critically 

ill patient with multiple pulmonary emboli, tachycardia, hypoxia.  Interventions

including oxygen, anticoagulation, time spent performing multiple re-

evaluations, time spent discussing with cardiology specialty, hospitalist, and 

for ICU admission.





Discharge





- Discharge


Clinical Impression: 


 Bilateral pulmonary embolism, Tachycardia, Hypoxia, Shortness of breath





Condition: Fair


Disposition: ADMITTED AS INPATIENT


Admitting Provider: Magali (Intensivist)


Unit Admitted: ICU

## 2020-09-21 LAB
ADD MANUAL MICROSCOPIC: YES
APPEARANCE UR: (no result)
APPEARANCE UR: CLEAR
APTT BLD: 186.1 SEC (ref 23.5–35.8)
APTT BLD: 34.6 SEC (ref 23.5–35.8)
APTT PPP: (no result) S
APTT PPP: YELLOW S
ARTERIAL BLOOD FIO2: (no result)
ARTERIAL BLOOD FIO2: (no result)
ARTERIAL BLOOD H2CO3: 0.46 MMOL/L (ref 1.05–1.35)
ARTERIAL BLOOD H2CO3: 0.49 MMOL/L (ref 1.05–1.35)
ARTERIAL BLOOD H2CO3: 0.95 MMOL/L (ref 1.05–1.35)
ARTERIAL BLOOD HCO3: 10.5 MMOL/L (ref 20–24)
ARTERIAL BLOOD HCO3: 12.8 MMOL/L (ref 20–24)
ARTERIAL BLOOD HCO3: 19.8 MMOL/L (ref 20–24)
ARTERIAL BLOOD PCO2: 15.4 MMHG (ref 35–45)
ARTERIAL BLOOD PCO2: 16.4 MMHG (ref 35–45)
ARTERIAL BLOOD PCO2: 31.4 MMHG (ref 35–45)
ARTERIAL BLOOD PH: 7.42 (ref 7.35–7.45)
ARTERIAL BLOOD PH: 7.42 (ref 7.35–7.45)
ARTERIAL BLOOD PH: 7.54 (ref 7.35–7.45)
ARTERIAL BLOOD PO2: 122.4 MMHG (ref 80–100)
ARTERIAL BLOOD PO2: 157.9 MMHG (ref 80–100)
ARTERIAL BLOOD PO2: 207.5 MMHG (ref 80–100)
ARTERIAL BLOOD TOTAL CO2: 11 MMOL/L (ref 23–27)
ARTERIAL BLOOD TOTAL CO2: 13.3 MMOL/L (ref 23–27)
ARTERIAL BLOOD TOTAL CO2: 20.7 MMOL/L (ref 23–27)
BARBITURATES UR QL SCN: NEGATIVE
BASE EXCESS BLDA CALC-SCNC: -10.7 MMOL/L
BASE EXCESS BLDA CALC-SCNC: -3.6 MMOL/L
BASE EXCESS BLDA CALC-SCNC: -6.2 MMOL/L
BILIRUB UR QL STRIP: NEGATIVE
BILIRUB UR QL STRIP: NEGATIVE
GLUCOSE UR STRIP-MCNC: NEGATIVE MG/DL
GLUCOSE UR STRIP-MCNC: NEGATIVE MG/DL
HYALINE CASTS #/AREA URNS LPF: (no result) /LPF
INR PPP: 1.96
INR PPP: 2.35
KETONES UR STRIP-MCNC: NEGATIVE MG/DL
KETONES UR STRIP-MCNC: NEGATIVE MG/DL
METHADONE UR QL SCN: NEGATIVE
NITRITE UR QL STRIP: NEGATIVE
NITRITE UR QL STRIP: NEGATIVE
PCP UR QL SCN: NEGATIVE
PH UR STRIP: 5 [PH] (ref 5–9)
PH UR STRIP: 6 [PH] (ref 5–9)
PROT UR STRIP-MCNC: 30 MG/DL
PROT UR STRIP-MCNC: >=500 MG/DL
PROTHROMBIN TIME: 22.4 SEC (ref 11.4–15.4)
PROTHROMBIN TIME: 25.7 SEC (ref 11.4–15.4)
RBC #/AREA URNS HPF: (no result) /HPF
SAO2 % BLDA: 98.6 % (ref 94–98)
SAO2 % BLDA: 99.1 % (ref 94–98)
SAO2 % BLDA: 99.6 % (ref 94–98)
SP GR UR STRIP: 1.02
SP GR UR STRIP: 1.04
URINE AMPHETAMINES SCREEN: NEGATIVE
URINE BENZODIAZEPINES SCREEN: NEGATIVE
URINE COCAINE SCREEN: NEGATIVE
URINE MARIJUANA (THC) SCREEN: (no result)
UROBILINOGEN UR-MCNC: 4 MG/DL (ref ?–2)
UROBILINOGEN UR-MCNC: NEGATIVE MG/DL (ref ?–2)
WBC #/AREA URNS HPF: (no result) /HPF

## 2020-09-21 PROCEDURE — 3E03317 INTRODUCTION OF OTHER THROMBOLYTIC INTO PERIPHERAL VEIN, PERCUTANEOUS APPROACH: ICD-10-PCS

## 2020-09-21 RX ADMIN — Medication SCH: at 14:43

## 2020-09-21 RX ADMIN — FUROSEMIDE SCH: 10 INJECTION, SOLUTION INTRAMUSCULAR; INTRAVENOUS at 10:09

## 2020-09-21 RX ADMIN — HEPARIN SODIUM PRN MLS/HR: 10000 INJECTION, SOLUTION INTRAVENOUS at 04:27

## 2020-09-21 RX ADMIN — LISINOPRIL SCH MG: 10 TABLET ORAL at 10:48

## 2020-09-21 RX ADMIN — HEPARIN SODIUM PRN MLS/HR: 10000 INJECTION, SOLUTION INTRAVENOUS at 22:13

## 2020-09-21 RX ADMIN — FUROSEMIDE SCH MG: 10 INJECTION, SOLUTION INTRAMUSCULAR; INTRAVENOUS at 08:22

## 2020-09-21 RX ADMIN — Medication SCH: at 22:35

## 2020-09-21 RX ADMIN — Medication SCH ML: at 06:30

## 2020-09-21 RX ADMIN — IPRATROPIUM BROMIDE AND ALBUTEROL SULFATE PRN ML: 2.5; .5 SOLUTION RESPIRATORY (INHALATION) at 23:20

## 2020-09-21 RX ADMIN — PANTOPRAZOLE SODIUM SCH MG: 40 TABLET, DELAYED RELEASE ORAL at 06:28

## 2020-09-21 NOTE — CRITICAL CARE ADMISSION REPORT
HPI


Date:: 09/21/20


Time:: 05:30


Reason for ICU Reason:: pulmonary embolism


Admission Date/Time & PCP: 


Admission Date/Time: 09/21/20 02:36





 Primary Care Provider: ARACELY COLÓN MD








HPI: 





Mr. Wallace Alfaro is a 64-year-old -American male.  With a past medical

history of atrial flutter, alcohol induced dilated cardiomyopathy, CHF, 

hypertension, peripheral vascular disease and COPD.  He presented to the ED with

complaints of shortness of breath for 1 week, last evening his symptoms became 

more severe and EMS was called.  Upon their arrival his O2 saturation was 87% on

room air, he was placed on 4 L nasal cannula with improvement in his O2 

saturations to 98%.  Work-up in the ED consisted of a CTA of the chest which 

revealed defects within the main pulmonary artery concerning for developing 

saddle emboli.  There is also filling defects within the segmental arteries 

consistent with pulmonary artery emboli.  Of note he had a recent admission in 

July for a flutter RVR at which time he was not started on anticoagulation given

the fact that he is homeless and has alcohol dependency issues.  He was ordered 

beta-blockers for rate control and LV dysfunction.  He states he has not been 

taking these as these were stolen.  He also had an admission June 29, 2020 for 

pulseless right lower extremity a lower extremity ultrasound at that time showed

occlusion of the distal anterior tibial artery in the right lower extremity and 

high-grade stenosis in the distal superficial femoral artery.  He is admitted to

the ICU for close observation.  Heparin drip started.





- Diagnosis/Plan


(1) Bilateral pulmonary embolism


Is this a current diagnosis for this admission?: Yes   


Plan: 


Heparin drip per protocol


Will need long-term anticoagulation on discharge, although this may be difficult

given patient's living situation.


TTE today to assess for right heart strain.








(2) Acute systolic (congestive) heart failure


Is this a current diagnosis for this admission?: Yes   


Plan: 


Chest x-ray consistent with pulmonary edema


Pro BNP 12,700


will order guideline directed therapy








(3) Atrial flutter


Qualifiers: 


   Atrial flutter type: unspecified   Qualified Code(s): I48.92 - Unspecified 

atrial flutter   


Is this a current diagnosis for this admission?: No   


Plan: 


Currently in sinus tachycardia


Will reorder beta-blocker











Past Medical History


Cardiac Medical History: Reports: Atrial Fibrillation, Congestive Heart Failure 

- Alcohol cardiomyopathy, Hypertension


Pulmonary Medical History: Reports: Bronchitis, Chronic Obstructive Pulmonary 

Disease (COPD)


Psychiatric Medical History: 


   Denies: Depression





Past Surgical History


Past Surgical History: Reports: Orthopedic Surgery - Right ACL repair, 

Tonsillectomy, Other - Homeless





Social/Family History





- Social History


Lives with: Family


Smoking Status: Smoker,Current Status Unk


Frequency of Alcohol Use: Heavy


Hx Recreational Drug Use: Yes


Drugs: Marijuana


Hx Prescription Drug Abuse: No





- Medication/Allergies


Home Medications: 








Aspirin [Ecotrin 81 mg EC Tablet] 81 mg PO DAILY  tabec 06/26/20 


Pantoprazole Sodium [Protonix 20 mg Dr Tablet] 20 mg PO Q6AM  tablet.dr 06/26/20




Thiamine HCl [Thiamine 100 mg Tablet] 100 mg PO DAILY  tablet 06/26/20 


Hydrocodone/Acetaminophen [Norco 5-325 mg Tablet] 1 tab PO Q6 PRN 3 Days #12 

tablet 06/30/20 


Nicotine [Nicoderm 14 mg/24 Hr Transdermal Patch] 1 patch TD DAILYP PRN 07/01/20




Acetaminophen [Tylenol 325 mg Tablet] 650 mg PO Q4HP PRN  tablet 07/03/20 


Atorvastatin Calcium [Lipitor 10 mg Tablet] 10 mg PO QHS #30 tablet 07/03/20 


Furosemide [Lasix 20 mg Tablet] 20 mg PO DAILY #30 tablet 07/03/20 


Lisinopril [Prinivil 10 mg Tablet] 10 mg PO DAILY #30 tablet 07/03/20 


Metoprolol Succinate [Toprol Xl 50 mg Tab.sr] 100 mg PO DAILY #60 tab.sr.24h 

07/03/20 


Nicotine [Nicoderm 14 mg/24 Hr Transdermal Patch] 1 each TD DAILYP PRN #0 

patch.td24 07/03/20 








Allergies/Adverse Reactions: 


                                        





No Known Allergies Allergy (Verified 06/14/20 17:38)


   











Review of Systems


ROS unobtainable: Due to mental status


All systems: reviewed and no additional remarkable complaints except as stated


Respiratory: PRESENT: dyspnea





Physical Exam


Vital Signs: 


                                        











Temp Pulse Resp BP Pulse Ox


 


 94.6 F L  136 H  37 H  137/117 H  100 


 


 09/21/20 05:30  09/21/20 05:44  09/21/20 06:00  09/21/20 05:47  09/21/20 05:46








                                 Intake & Output











 09/19/20 09/20/20 09/21/20





 06:59 06:59 06:59


 


Intake Total   500


 


Balance   500


 


Weight   75.1 kg








                                  Weight/Height





Weight                           75.1 kg


Height                           6 in








General appearance: PRESENT: mild distress


Head exam: PRESENT: atraumatic, normocephalic


Eye exam: PRESENT: PERRLA


Mouth exam: PRESENT: moist


Teeth exam: PRESENT: poor dentation


Neck exam: PRESENT: full ROM


Respiratory exam: PRESENT: wheezes


Cardiovascular exam: PRESENT: +S1, +S2, tachycardia


Pulses: PRESENT: normal radial pulses


GI/Abdominal exam: PRESENT: normal bowel sounds


Extremities exam: PRESENT: +1 edema


Musculoskeletal exam: PRESENT: full ROM


Neurological exam: PRESENT: alert, oriented to person, oriented to place, 

oriented to time, oriented to situation





Laboratory/Radiographs


Laboratory Results: 


                                        





                                 09/20/20 22:02 





                                 09/20/20 22:02 





                                        











  09/20/20 09/20/20 09/20/20





  22:02 22:02 22:02


 


WBC  6.2  


 


RBC  5.31  


 


Hgb  15.0  


 


Hct  45.7  


 


MCV  86  


 


MCH  28.2  


 


MCHC  32.7  


 


RDW  19.1 H  


 


Plt Count  197  


 


Seg Neutrophils %  43.5  


 


Carbonic Acid   


 


HCO3/H2CO3 Ratio   


 


ABG pH   


 


ABG pCO2   


 


ABG pO2   


 


ABG HCO3   


 


ABG O2 Saturation   


 


ABG Base Excess   


 


VBG pH    7.31


 


VBG pCO2    36.3


 


VBG HCO3    17.7 L


 


VBG Base Excess    -7.9


 


FiO2   


 


Sodium   136.3 L 


 


Potassium   4.9 


 


Chloride   101 


 


Carbon Dioxide   19 L 


 


Anion Gap   16 


 


BUN   23 H 


 


Creatinine   1.00 


 


Est GFR ( Amer)   > 60 


 


Glucose   74 L 


 


Calcium   9.3 


 


Total Bilirubin   3.0 H 


 


AST   164 H 


 


Alkaline Phosphatase   162 H 


 


Total Protein   7.4 


 


Albumin   3.7 


 


Urine Color   


 


Urine Appearance   


 


Urine pH   


 


Ur Specific Gravity   


 


Urine Protein   


 


Urine Glucose (UA)   


 


Urine Ketones   


 


Urine Blood   


 


Urine Nitrite   


 


Ur Leukocyte Esterase   


 


Urine RBC (Auto)   














  09/21/20 09/21/20





  02:05 05:25


 


WBC  


 


RBC  


 


Hgb  


 


Hct  


 


MCV  


 


MCH  


 


MCHC  


 


RDW  


 


Plt Count  


 


Seg Neutrophils %  


 


Carbonic Acid   0.95 L


 


HCO3/H2CO3 Ratio   20:1


 


ABG pH   7.42


 


ABG pCO2   31.4 L


 


ABG pO2   157.9 H


 


ABG HCO3   19.8 L


 


ABG O2 Saturation   99.1 H


 


ABG Base Excess   -3.6


 


VBG pH  


 


VBG pCO2  


 


VBG HCO3  


 


VBG Base Excess  


 


FiO2   4 L


 


Sodium  


 


Potassium  


 


Chloride  


 


Carbon Dioxide  


 


Anion Gap  


 


BUN  


 


Creatinine  


 


Est GFR (African Amer)  


 


Glucose  


 


Calcium  


 


Total Bilirubin  


 


AST  


 


Alkaline Phosphatase  


 


Total Protein  


 


Albumin  


 


Urine Color  MANDI 


 


Urine Appearance  CLOUDY 


 


Urine pH  5.0 


 


Ur Specific Gravity  1.038 


 


Urine Protein  >=500 H 


 


Urine Glucose (UA)  NEGATIVE 


 


Urine Ketones  NEGATIVE 


 


Urine Blood  SMALL H 


 


Urine Nitrite  NEGATIVE 


 


Ur Leukocyte Esterase  NEGATIVE 


 


Urine RBC (Auto)  0 








                                        











  09/20/20





  22:02


 


Troponin I  0.141


 


NT-Pro-B Natriuret Pep  61978 H











Impressions: 


                                        





Chest X-Ray  09/20/20 20:34


IMPRESSION:


 


No acute abnormality as above.


 


 


copyright 2011 [employer]- All Rights Reserved


 








Chest/Abdomen CTA  09/20/20 22:55


IMPRESSION: 


There are filling defects within the main pulmonary artery


concerning for developing saddle emboli. There are also filling


defects seen within the segmental arteries consistent with


pulmonary artery emboli.


 


 


There is a groundglass nodule at the right upper lobe measuring


up to 12 mm which will need interval follow-up within three


months.


There are trace pleural effusions with some overlying airspace


opacities which are favored to represent atelectasis, but may


also represent some mild edema or infection.


 











All labs, radiographs, diagnostic studies and EKGs were personally reviewed: Yes


In addition, reports of radiographic and diagnostic studies were read: Yes





Critical Time


Critical Time (minutes): 65


-: 


The care of a critically ill patient is dynamic.  This note represents a static 

moment in the admission process. Orders and treatments may be given 

simultaneously and urgently, and time is not representative of the treatment 

process.





This patient requires Critical Care secondary to life threatening organ or limb 

dysfunction.  Without Critical Care services, the patient is at risk for 

increased mortality and morbidity.

## 2020-09-21 NOTE — RADIOLOGY REPORT (SQ)
CT ANGIOGRAM CHEST WITH IV CONTRAST: 9/21/2020 12:04 AM CDT



HISTORY: 64-year old patient with dyspnea, tachycardia.



TECHNIQUE:  Postcontrast CT through the chest was performed per

protocol for CT angiography.  3D Multiplanar reformations were

performed at the workstation. Reconstructed sagittal and coronal

images were also obtained through the chest. This exam was

performed according to our departmental dose-optimization

program, which includes automated exposure control, adjustment of

the mA and/or KV according to the patient's size and/or use of

iterative reconstruction technique.



COMPARISON: None available



FINDINGS: The heart size is enlarged. No large pericardial

effusion is seen.



No significant mediastinal, supraclavicular, or axillary

lymphadenopathy is seen. 



The thoracic aorta is within normal limits of size. 



There are strands of hypodensity within the main coronary artery

concerning for thrombus. There is some filling defect extending

into the right lower lobe and left upper lobe segmental arteries.

These are concerning for pulmonary artery emboli. The right

ventricle does not appear to be significantly enlarged. 



The main pulmonary artery is within normal limits in size.



The thyroid gland is unremarkable. 



The central tracheobronchial tree is patent. 



No focal consolidative airspace opacity is seen.



Moderate centrilobular and paraseptal emphysematous changes are

present, most pronounced within the apices.



There are trace bilateral effusions, right greater than left.



There is no evidence of a pneumothorax.



There is a groundglass nodule at the right upper lobe near the

minor fissure measuring up to 1.2 cm on image 37 of 130. There is

subpleural nodularity over the left upper lobe measuring up to 1

cm on image 20 of 130.



The bones demonstrate no suspicious lytic or blastic lesion.

Multilevel degenerative changes seen within the thoracic spine.



There is elevation of the right hemidiaphragm.



There is trace free fluid within the upper abdomen. The

visualized esophagus is patulous.



IMPRESSION: 

There are filling defects within the main pulmonary artery

concerning for developing saddle emboli. There are also filling

defects seen within the segmental arteries consistent with

pulmonary artery emboli.





There is a groundglass nodule at the right upper lobe measuring

up to 12 mm which will need interval follow-up within three

months.

There are trace pleural effusions with some overlying airspace

opacities which are favored to represent atelectasis, but may

also represent some mild edema or infection.

## 2020-09-21 NOTE — PDOC CRITICAL CARE PROG REPORT
General


Resuscitation Status: Full Code


Events in the past 12 to 24 Hours:: 





Update


The patient developed worsening dyspnea early this afternoon.


He became tachypneic and his 02 sat went down into the 70s.


It became difficult to discern his 02 sat so an ABG was drawn. it showed a Pa02 

of 204 on a 100% NRM.


The patient had had a Pa02 of about 150 early this morning on 3 l02 nasal.


He has conformed PE by CT scan. In addition, the patient dropped his BP some to 

about 100 systolic. his systolic was 20-30 pts higher a few hours ago.


I decided to give the patient TPA based on this change in status. Shortly before

this change in status the patient had reverted for A flutter with a HR in the 

130s to asinus brendan and than NSR.


The patient was queried about various bleeding risks ( CVA in past, aneurysym, 

gi bleed) and he answered that he had not. I explained thepotoential risks of 

bleeding with TPA to thepatient has well.


I told him there was about 6-8% chance of bleeding and about 1-2 % chance of a 

an intracrainial hemorrhage. the patient  appeared to iunderstand the  risk and 

agreed to go forward with sytemic TPA. His heparin was put on hold in the 

interim.


Out facility does npt use EKOs and or have mechanical thrombectomy available 

etc.,


I t did not appear reasonable or asafe to transfer thepatient to another 

facility at this time.


Reason for ICU Addmission:: pulmonary embolism





Physical Exam


Vital Signs: 


                                        











Temp Pulse Resp BP Pulse Ox


 


 97.5 F   70   24 H  96/70 L  97 


 


 09/21/20 12:00  09/21/20 16:45  09/21/20 16:45  09/21/20 16:45  09/21/20 16:45








                                 Intake & Output











 09/20/20 09/21/20 09/22/20





 06:59 06:59 06:59


 


Intake Total  500 71


 


Output Total   425


 


Balance  500 -354


 


Weight  75.1 kg 








                                  Weight/Height





Weight                           75.1 kg


Height                           6 in











Laboratory/Radiographs


Laboratory Results: 


                                        





                                 09/20/20 22:02 





                                 09/20/20 22:02 





                                        











  09/20/20 09/20/20 09/20/20





  22:02 22:02 22:02


 


WBC  6.2  


 


RBC  5.31  


 


Hgb  15.0  


 


Hct  45.7  


 


MCV  86  


 


MCH  28.2  


 


MCHC  32.7  


 


RDW  19.1 H  


 


Plt Count  197  


 


Seg Neutrophils %  43.5  


 


Carbonic Acid   


 


HCO3/H2CO3 Ratio   


 


ABG pH   


 


ABG pCO2   


 


ABG pO2   


 


ABG HCO3   


 


ABG O2 Saturation   


 


ABG Base Excess   


 


VBG pH    7.31


 


VBG pCO2    36.3


 


VBG HCO3    17.7 L


 


VBG Base Excess    -7.9


 


FiO2   


 


Sodium   136.3 L 


 


Potassium   4.9 


 


Chloride   101 


 


Carbon Dioxide   19 L 


 


Anion Gap   16 


 


BUN   23 H 


 


Creatinine   1.00 


 


Est GFR ( Amer)   > 60 


 


Glucose   74 L 


 


Calcium   9.3 


 


Total Bilirubin   3.0 H 


 


AST   164 H 


 


Alkaline Phosphatase   162 H 


 


Total Protein   7.4 


 


Albumin   3.7 


 


Urine Color   


 


Urine Appearance   


 


Urine pH   


 


Ur Specific Gravity   


 


Urine Protein   


 


Urine Glucose (UA)   


 


Urine Ketones   


 


Urine Blood   


 


Urine Nitrite   


 


Ur Leukocyte Esterase   


 


Urine RBC (Auto)   














  09/21/20 09/21/20 09/21/20





  02:05 05:25 09:45


 


WBC   


 


RBC   


 


Hgb   


 


Hct   


 


MCV   


 


MCH   


 


MCHC   


 


RDW   


 


Plt Count   


 


Seg Neutrophils %   


 


Carbonic Acid   0.95 L 


 


HCO3/H2CO3 Ratio   20:1 


 


ABG pH   7.42 


 


ABG pCO2   31.4 L 


 


ABG pO2   157.9 H 


 


ABG HCO3   19.8 L 


 


ABG O2 Saturation   99.1 H 


 


ABG Base Excess   -3.6 


 


VBG pH   


 


VBG pCO2   


 


VBG HCO3   


 


VBG Base Excess   


 


FiO2   4 L 


 


Sodium   


 


Potassium   


 


Chloride   


 


Carbon Dioxide   


 


Anion Gap   


 


BUN   


 


Creatinine   


 


Est GFR (African Amer)   


 


Glucose   


 


Calcium   


 


Total Bilirubin   


 


AST   


 


Alkaline Phosphatase   


 


Total Protein   


 


Albumin   


 


Urine Color  MANDI   YELLOW


 


Urine Appearance  CLOUDY   CLEAR


 


Urine pH  5.0   6.0


 


Ur Specific Gravity  1.038   1.017


 


Urine Protein  >=500 H   30 H


 


Urine Glucose (UA)  NEGATIVE   NEGATIVE


 


Urine Ketones  NEGATIVE   NEGATIVE


 


Urine Blood  SMALL H   NEGATIVE


 


Urine Nitrite  NEGATIVE   NEGATIVE


 


Ur Leukocyte Esterase  NEGATIVE   NEGATIVE


 


Urine RBC (Auto)  0  














  09/21/20





  14:32


 


WBC 


 


RBC 


 


Hgb 


 


Hct 


 


MCV 


 


MCH 


 


MCHC 


 


RDW 


 


Plt Count 


 


Seg Neutrophils % 


 


Carbonic Acid  0.46 L


 


HCO3/H2CO3 Ratio  27:1


 


ABG pH  7.54 H


 


ABG pCO2  15.4 L*


 


ABG pO2  207.5 H


 


ABG HCO3  12.8 L


 


ABG O2 Saturation  99.6 H


 


ABG Base Excess  -6.2


 


VBG pH 


 


VBG pCO2 


 


VBG HCO3 


 


VBG Base Excess 


 


FiO2  15%


 


Sodium 


 


Potassium 


 


Chloride 


 


Carbon Dioxide 


 


Anion Gap 


 


BUN 


 


Creatinine 


 


Est GFR (African Amer) 


 


Glucose 


 


Calcium 


 


Total Bilirubin 


 


AST 


 


Alkaline Phosphatase 


 


Total Protein 


 


Albumin 


 


Urine Color 


 


Urine Appearance 


 


Urine pH 


 


Ur Specific Gravity 


 


Urine Protein 


 


Urine Glucose (UA) 


 


Urine Ketones 


 


Urine Blood 


 


Urine Nitrite 


 


Ur Leukocyte Esterase 


 


Urine RBC (Auto) 








                                        











  09/20/20





  22:02


 


Troponin I  0.141


 


NT-Pro-B Natriuret Pep  23576 H











Impressions: 


                                        





Chest X-Ray  09/20/20 20:34


IMPRESSION:


 


No acute abnormality as above.


 


 


copyright 2011 [employer]- All Rights Reserved


 








Chest/Abdomen CTA  09/20/20 22:55


IMPRESSION: 


There are filling defects within the main pulmonary artery


concerning for developing saddle emboli. There are also filling


defects seen within the segmental arteries consistent with


pulmonary artery emboli.


 


 


There is a groundglass nodule at the right upper lobe measuring


up to 12 mm which will need interval follow-up within three


months.


There are trace pleural effusions with some overlying airspace


opacities which are favored to represent atelectasis, but may


also represent some mild edema or infection.


 














Assessment and Plan





- Diagnosis


(1) Atrial flutter


Qualifiers: 


   Atrial flutter type: unspecified   Qualified Code(s): I48.92 - Unspecified 

atrial flutter   


Is this a current diagnosis for this admission?: No   





(2) Bilateral pulmonary embolism


Is this a current diagnosis for this admission?: Yes   





(3) Alcohol dependence


Qualifiers: 


   Substance use status: alcohol-induced persisting dementia   Qualified C

ode(s): F10.27 - Alcohol dependence with alcohol-induced persisting dementia   


Is this a current diagnosis for this admission?: Yes   





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


Is this a current diagnosis for this admission?: Yes   





Critical Time


Critical Time (minutes): 20


Level of Care: ICU


-: 


1.  The care of a critical patient is a dynamic process.  This note is a 

representative synopsis but static in nature.  The timeframe for treatments 

given in order is not necessarily the actual time these treatments may have been

done.





2.  This patient requires critical care secondary to ongoing requirements for 

therapy not offered or safe outside the critical care environment.  Transfer to 

a lower level of care will result in altered life or limb morbidity and 

mortality.





3.  Multidisciplinary rounds completed.





4.  ABCDE bundle addressed.

## 2020-09-21 NOTE — PDOC CONSULTATION
Consultation


Consult Date: 09/21/20


Provider Consulted: MO JALLOH


Consult reason:: Tachycardia





History of Present Illness


Admission Date/PCP: 


  09/21/20 02:36





  ARACELY COLÓN MD





Patient complains of: No complaints.  Patient is listless


History of Present Illness: 


KIESHA WINN is a 64 year old male


With the following active problems


1.  Homelessness


2.  Alcohol dependence


3.  LV dysfunction


4.  Dilated cardiomyopathy


5.  Atrial flutter





Patient known to me from previous visits with admission to the hospital with 

acute decompensated congestive heart failure and respiratory distress.  He was 

noticed to have atrial flutter at that time.  He was rate controlled.  Due to 

his homelessness and significant social issues adequate follow-up and further 

testing could not be arranged.


He is being admitted this time with respiratory distress.  History is quite 

limited.  Review of systems cannot be obtained due to altered mental status.  He

was noted to have tachycardia on the monitor which subsequently corrected.  He 

was continued on metoprolol 50 mg long-acting formulation.  Because of diagnosis

of saddle pulmonary embolism he was started on systemic anticoagulation





Review of systems cannot be obtained due to mental status


Patient is known to be homeless and alcoholic.





Past Medical History


Cardiac Medical History: Reports: Atrial Fibrillation, Congestive Heart Failure 

- Alcohol cardiomyopathy, Hypertension


Pulmonary Medical History: Reports: Bronchitis, Chronic Obstructive Pulmonary 

Disease (COPD)


Psychiatric Medical History: 


   Denies: Depression





Past Surgical History


Past Surgical History: Reports: Orthopedic Surgery - Right ACL repair, 

Tonsillectomy, Other - Homeless





Social History


Lives with: Family


Smoking Status: Smoker,Current Status Unk


Electronic Cigarette use?: No


Frequency of Alcohol Use: Heavy


Hx Recreational Drug Use: Yes


Drugs: Marijuana


Hx Prescription Drug Abuse: No





- Advance Directive


Resuscitation Status: Full Code





Family History


Family History: Arthritis, COPD, Hypertension


Parental Family History Reviewed: No - Unable to obtain due to mental status


Children Family History Reviewed: NA


Sibling(s) Family History Reviewed.: NA





Medication/Allergy


Home Medications: 








Furosemide [Lasix 20 mg Tablet] 20 mg PO DAILY #30 tablet 07/03/20 


Lisinopril [Prinivil 10 mg Tablet] 10 mg PO DAILY #30 tablet 07/03/20 


Metoprolol Succinate [Toprol Xl 50 mg Tab.sr] 100 mg PO DAILY #60 tab.sr.24h 

07/03/20 








Allergies/Adverse Reactions: 


                                        





No Known Allergies Allergy (Verified 06/14/20 17:38)


   











Review of Systems


ROS unobtainable: Due to mental status





Physical Exam


Vital Signs: 


                                        











Temp Pulse Resp BP Pulse Ox


 


 97.5 F   66   13   102/88 H  81 L


 


 09/21/20 12:00  09/21/20 14:00  09/21/20 14:32  09/21/20 14:32  09/21/20 14:32








                                 Intake & Output











 09/20/20 09/21/20 09/22/20





 06:59 06:59 06:59


 


Intake Total  500 71


 


Output Total   300


 


Balance  500 -229


 


Weight  75.1 kg 











General appearance: PRESENT: no acute distress, disheveled, thin, well-developed


Head exam: PRESENT: atraumatic, normocephalic


Eye exam: PRESENT: EOMI


Respiratory exam: PRESENT: decreased breath sounds, prolonged expiratory phas, 

symmetrical


Cardiovascular exam: PRESENT: +S1, +S2, systolic murmur


Pulses: PRESENT: normal radial pulses


GI/Abdominal exam: PRESENT: distended, soft


Rectal exam: PRESENT: deferred


Extremities exam: PRESENT: +1 edema


Neurological exam: PRESENT: altered


Skin exam: PRESENT: dry, intact





Results


Laboratory Results: 


                                        





                                 09/20/20 22:02 





                                 09/20/20 22:02 





                                        











  09/20/20 09/20/20 09/20/20





  22:02 22:02 22:02


 


WBC  6.2  


 


RBC  5.31  


 


Hgb  15.0  


 


Hct  45.7  


 


MCV  86  


 


MCH  28.2  


 


MCHC  32.7  


 


RDW  19.1 H  


 


Plt Count  197  


 


Seg Neutrophils %  43.5  


 


Carbonic Acid   


 


HCO3/H2CO3 Ratio   


 


ABG pH   


 


ABG pCO2   


 


ABG pO2   


 


ABG HCO3   


 


ABG O2 Saturation   


 


ABG Base Excess   


 


VBG pH    7.31


 


VBG pCO2    36.3


 


VBG HCO3    17.7 L


 


VBG Base Excess    -7.9


 


FiO2   


 


Sodium   136.3 L 


 


Potassium   4.9 


 


Chloride   101 


 


Carbon Dioxide   19 L 


 


Anion Gap   16 


 


BUN   23 H 


 


Creatinine   1.00 


 


Est GFR ( Amer)   > 60 


 


Glucose   74 L 


 


Calcium   9.3 


 


Total Bilirubin   3.0 H 


 


AST   164 H 


 


Alkaline Phosphatase   162 H 


 


Total Protein   7.4 


 


Albumin   3.7 


 


Urine Color   


 


Urine Appearance   


 


Urine pH   


 


Ur Specific Gravity   


 


Urine Protein   


 


Urine Glucose (UA)   


 


Urine Ketones   


 


Urine Blood   


 


Urine Nitrite   


 


Ur Leukocyte Esterase   


 


Urine RBC (Auto)   














  09/21/20 09/21/20 09/21/20





  02:05 05:25 09:45


 


WBC   


 


RBC   


 


Hgb   


 


Hct   


 


MCV   


 


MCH   


 


MCHC   


 


RDW   


 


Plt Count   


 


Seg Neutrophils %   


 


Carbonic Acid   0.95 L 


 


HCO3/H2CO3 Ratio   20:1 


 


ABG pH   7.42 


 


ABG pCO2   31.4 L 


 


ABG pO2   157.9 H 


 


ABG HCO3   19.8 L 


 


ABG O2 Saturation   99.1 H 


 


ABG Base Excess   -3.6 


 


VBG pH   


 


VBG pCO2   


 


VBG HCO3   


 


VBG Base Excess   


 


FiO2   4 L 


 


Sodium   


 


Potassium   


 


Chloride   


 


Carbon Dioxide   


 


Anion Gap   


 


BUN   


 


Creatinine   


 


Est GFR (African Amer)   


 


Glucose   


 


Calcium   


 


Total Bilirubin   


 


AST   


 


Alkaline Phosphatase   


 


Total Protein   


 


Albumin   


 


Urine Color  MANDI   YELLOW


 


Urine Appearance  CLOUDY   CLEAR


 


Urine pH  5.0   6.0


 


Ur Specific Gravity  1.038   1.017


 


Urine Protein  >=500 H   30 H


 


Urine Glucose (UA)  NEGATIVE   NEGATIVE


 


Urine Ketones  NEGATIVE   NEGATIVE


 


Urine Blood  SMALL H   NEGATIVE


 


Urine Nitrite  NEGATIVE   NEGATIVE


 


Ur Leukocyte Esterase  NEGATIVE   NEGATIVE


 


Urine RBC (Auto)  0  














  09/21/20





  14:32


 


WBC 


 


RBC 


 


Hgb 


 


Hct 


 


MCV 


 


MCH 


 


MCHC 


 


RDW 


 


Plt Count 


 


Seg Neutrophils % 


 


Carbonic Acid  0.46 L


 


HCO3/H2CO3 Ratio  27:1


 


ABG pH  7.54 H


 


ABG pCO2  15.4 L*


 


ABG pO2  207.5 H


 


ABG HCO3  12.8 L


 


ABG O2 Saturation  99.6 H


 


ABG Base Excess  -6.2


 


VBG pH 


 


VBG pCO2 


 


VBG HCO3 


 


VBG Base Excess 


 


FiO2  15%


 


Sodium 


 


Potassium 


 


Chloride 


 


Carbon Dioxide 


 


Anion Gap 


 


BUN 


 


Creatinine 


 


Est GFR (African Amer) 


 


Glucose 


 


Calcium 


 


Total Bilirubin 


 


AST 


 


Alkaline Phosphatase 


 


Total Protein 


 


Albumin 


 


Urine Color 


 


Urine Appearance 


 


Urine pH 


 


Ur Specific Gravity 


 


Urine Protein 


 


Urine Glucose (UA) 


 


Urine Ketones 


 


Urine Blood 


 


Urine Nitrite 


 


Ur Leukocyte Esterase 


 


Urine RBC (Auto) 








                                        











  09/20/20





  22:02


 


Troponin I  0.141


 


NT-Pro-B Natriuret Pep  23453 H











EKG Comments: 





Twelve-lead EKG 9/20/2020.  Independently viewed by me probable atrial 

tachycardia 139 bpm,  ms





CTA chest 9/20/2020.


Saddle pulmonary embolus is suspected.  Trace pleural effusions





Transthoracic echocardiogram 6/15/2020


Left ventricle ejection fraction 20 to 25%


RV systolic function is moderately reduced


Mild mitral regurgitation


Mild to moderate tricuspid regurgitation


Minimal pericardial effusion





Troponin 0 0.101


Impressions: 


                                        





Chest X-Ray  09/20/20 20:34


IMPRESSION:


 


No acute abnormality as above.


 


 


copyright 2011 [employer]- All Rights Reserved


 








Chest/Abdomen CTA  09/20/20 22:55


IMPRESSION: 


There are filling defects within the main pulmonary artery


concerning for developing saddle emboli. There are also filling


defects seen within the segmental arteries consistent with


pulmonary artery emboli.


 


 


There is a groundglass nodule at the right upper lobe measuring


up to 12 mm which will need interval follow-up within three


months.


There are trace pleural effusions with some overlying airspace


opacities which are favored to represent atelectasis, but may


also represent some mild edema or infection.


 














Assessment & Plan





- Diagnosis


(1) Bilateral pulmonary embolism


Is this a current diagnosis for this admission?: Yes   


Plan: 


Saddle pulmonary is loosening suspected.


Patient is on systemic anticoagulation with intravenous heparin per protocol


Patient also has history of atrial arrhythmia-atrial flutter as well as atrial 

fibrillation.


Systemic anticoagulation is warranted given history of congestive heart failure.

 For the moment intravenous heparin per protocol would suffice.








(2) Tachycardia


Is this a current diagnosis for this admission?: Yes   


Plan: 


Patient was noted to have probable SVT likely atrial tachycardia earlier.  His 

previous symptoms have included atrial flutter.


At the time of my evaluation patient is maintaining sinus rhythm at 61 bpm.  

There are clearly discernible P waves on the telemetry monitor


Would recommend continuing beta-blocker both for congestive heart failure and LV

dysfunction as well as to suppress triggers for atrial arrhythmias including 

atrial flutter and atrial fibrillation as well as atrial tachycardia


Systemic anticoagulation to be continued








(3) Alcohol dependence


Qualifiers: 


   Substance use status: alcohol-induced persisting dementia   Qualified 

Code(s): F10.27 - Alcohol dependence with alcohol-induced persisting dementia   


Is this a current diagnosis for this admission?: Yes   


Plan: 


This complicates his management.  Homelessness also complicates his care.








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


Is this a current diagnosis for this admission?: Yes   


Plan: 


Very likely dilated cardiomyopathy secondary to chronic alcohol abuse


He however has not had an ischemic evaluation


Given complexity of the situation including homelessness and alcohol abuse I 

doubt that this can be arranged in the near future.


Supportive care with beta-blockers and ACE inhibitors when feasible.

## 2020-09-21 NOTE — EKG REPORT
SEVERITY:- ABNORMAL ECG -

SINUS OR ECTOPIC ATRIAL TACHYCARDIA, also consider A FLUTTER WITH 2:1CONDUCTION

LEFT ANTERIOR FASCICULAR BLOCK

NONSPECIFIC T ABNORMALITIES, LATERAL LEADS

:

Confirmed by: Dejon Rodríguez 21-Sep-2020 00:25:15

## 2020-09-21 NOTE — PDOC CRITICAL CARE PROG REPORT
General


Date:: 09/21/20


ICU Day:: 2


Hospital Day:: 2


Resuscitation Status: Full Code


Events in the past 12 to 24 Hours:: 





The aptient appears to be in no apparent distress.


his respiratory rate ios <20 and there is no use of accessory resp. mm.


His pulse ox is in the mid to high 90s on 3 l02 nasal.


BP is elevated at about 130/100.





Reason for ICU Addmission:: pulmonary embolism





Physical Exam


Vital Signs: 


                                        











Temp Pulse Resp BP Pulse Ox


 


 94.6 F L  64   21 H  140/115 H  96 


 


 09/21/20 05:30  09/21/20 10:00  09/21/20 10:17  09/21/20 10:17  09/21/20 10:00








                                 Intake & Output











 09/20/20 09/21/20 09/22/20





 06:59 06:59 06:59


 


Intake Total  500 71


 


Output Total   300


 


Balance  500 -229


 


Weight  75.1 kg 








                                  Weight/Height





Weight                           75.1 kg


Height                           6 in








General appearance: PRESENT: no acute distress, disheveled - patient is 

malodorous


Head exam: PRESENT: atraumatic, normocephalic


Eye exam: PRESENT: conjunctiva pink, PERRLA


Ear exam: PRESENT: normal external ear exam


Mouth exam: PRESENT: moist, neck supple


Neck exam: PRESENT: full ROM


Respiratory exam: PRESENT: clear to auscultation ana paula.  ABSENT: accessory muscle 

use


Cardiovascular exam: PRESENT: RRR, +S1, +S2, tachycardia


Pulses: PRESENT: normal dorsalis pedis pul


GI/Abdominal exam: PRESENT: normal bowel sounds, soft.  ABSENT: tenderness


Rectal exam: PRESENT: deferred


Extremities exam: PRESENT: pedal edema.  ABSENT: calf tenderness, joint swelling


Musculoskeletal exam: PRESENT: normal inspection


Neurological exam: PRESENT: alert, oriented to person, oriented to place, 

oriented to time


Psychiatric exam: PRESENT: appropriate affect


Skin exam: PRESENT: normal color





Laboratory/Radiographs


Laboratory Results: 


                                        





                                 09/20/20 22:02 





                                 09/20/20 22:02 





                                        











  09/20/20 09/20/20 09/20/20





  22:02 22:02 22:02


 


WBC  6.2  


 


RBC  5.31  


 


Hgb  15.0  


 


Hct  45.7  


 


MCV  86  


 


MCH  28.2  


 


MCHC  32.7  


 


RDW  19.1 H  


 


Plt Count  197  


 


Seg Neutrophils %  43.5  


 


Carbonic Acid   


 


HCO3/H2CO3 Ratio   


 


ABG pH   


 


ABG pCO2   


 


ABG pO2   


 


ABG HCO3   


 


ABG O2 Saturation   


 


ABG Base Excess   


 


VBG pH    7.31


 


VBG pCO2    36.3


 


VBG HCO3    17.7 L


 


VBG Base Excess    -7.9


 


FiO2   


 


Sodium   136.3 L 


 


Potassium   4.9 


 


Chloride   101 


 


Carbon Dioxide   19 L 


 


Anion Gap   16 


 


BUN   23 H 


 


Creatinine   1.00 


 


Est GFR ( Amer)   > 60 


 


Glucose   74 L 


 


Calcium   9.3 


 


Total Bilirubin   3.0 H 


 


AST   164 H 


 


Alkaline Phosphatase   162 H 


 


Total Protein   7.4 


 


Albumin   3.7 


 


Urine Color   


 


Urine Appearance   


 


Urine pH   


 


Ur Specific Gravity   


 


Urine Protein   


 


Urine Glucose (UA)   


 


Urine Ketones   


 


Urine Blood   


 


Urine Nitrite   


 


Ur Leukocyte Esterase   


 


Urine RBC (Auto)   














  09/21/20 09/21/20 09/21/20





  02:05 05:25 09:45


 


WBC   


 


RBC   


 


Hgb   


 


Hct   


 


MCV   


 


MCH   


 


MCHC   


 


RDW   


 


Plt Count   


 


Seg Neutrophils %   


 


Carbonic Acid   0.95 L 


 


HCO3/H2CO3 Ratio   20:1 


 


ABG pH   7.42 


 


ABG pCO2   31.4 L 


 


ABG pO2   157.9 H 


 


ABG HCO3   19.8 L 


 


ABG O2 Saturation   99.1 H 


 


ABG Base Excess   -3.6 


 


VBG pH   


 


VBG pCO2   


 


VBG HCO3   


 


VBG Base Excess   


 


FiO2   4 L 


 


Sodium   


 


Potassium   


 


Chloride   


 


Carbon Dioxide   


 


Anion Gap   


 


BUN   


 


Creatinine   


 


Est GFR (African Amer)   


 


Glucose   


 


Calcium   


 


Total Bilirubin   


 


AST   


 


Alkaline Phosphatase   


 


Total Protein   


 


Albumin   


 


Urine Color  MANDI   YELLOW


 


Urine Appearance  CLOUDY   CLEAR


 


Urine pH  5.0   6.0


 


Ur Specific Gravity  1.038   1.017


 


Urine Protein  >=500 H   30 H


 


Urine Glucose (UA)  NEGATIVE   NEGATIVE


 


Urine Ketones  NEGATIVE   NEGATIVE


 


Urine Blood  SMALL H   NEGATIVE


 


Urine Nitrite  NEGATIVE   NEGATIVE


 


Ur Leukocyte Esterase  NEGATIVE   NEGATIVE


 


Urine RBC (Auto)  0  








                                        











  09/20/20





  22:02


 


Troponin I  0.141


 


NT-Pro-B Natriuret Pep  88635 H











Impressions: 


                                        





Chest X-Ray  09/20/20 20:34


IMPRESSION:


 


No acute abnormality as above.


 


 


copyright 2011 [employer]- All Rights Reserved


 








Chest/Abdomen CTA  09/20/20 22:55


IMPRESSION: 


There are filling defects within the main pulmonary artery


concerning for developing saddle emboli. There are also filling


defects seen within the segmental arteries consistent with


pulmonary artery emboli.


 


 


There is a groundglass nodule at the right upper lobe measuring


up to 12 mm which will need interval follow-up within three


months.


There are trace pleural effusions with some overlying airspace


opacities which are favored to represent atelectasis, but may


also represent some mild edema or infection.


 














Assessment and Plan





- Diagnosis


(1) Atrial flutter


Qualifiers: 


   Atrial flutter type: unspecified   Qualified Code(s): I48.92 - Unspecified 

atrial flutter   


Is this a current diagnosis for this admission?: No   


Plan: 


The patient appears to be in Atrial flutter with heart rate of 130-140.


Unclear how long he has been in this rhythm.


His recore from a previous admission cites PAF.


Cardiology to see the patiewnt. He is on full dose anticoagultion at this time








(2) Bilateral pulmonary embolism


Is this a current diagnosis for this admission?: Yes   


Plan: 


The patient does not appear to be hemodynamically compromised. he is maintaining

his BP. He requires low flow 02.


Not likely that he needs TPA at this time. We will be getting an nECHO to assess

PAP and Lv fn. and right ventricle size.











(3) Alcohol dependence


Qualifiers: 


   Substance use status: alcohol-induced persisting dementia   Qualified 

Code(s): F10.27 - Alcohol dependence with alcohol-induced persisting dementia   


Is this a current diagnosis for this admission?: Yes   


Plan: 


The patient carries a diagnoisis of alcohol related dialted cardiomyopathy.


He is showing no signs of acute witrhdrawl at this time.











Critical Time


Critical Time (minutes): 25


Level of Care: ICU


-: 


1.  The care of a critical patient is a dynamic process.  This note is a 

representative synopsis but static in nature.  The timeframe for treatments 

given in order is not necessarily the actual time these treatments may have been

done.





2.  This patient requires critical care secondary to ongoing requirements for 

therapy not offered or safe outside the critical care environment.  Transfer to 

a lower level of care will result in altered life or limb morbidity and 

mortality.





3.  Multidisciplinary rounds completed.





4.  ABCDE bundle addressed.

## 2020-09-22 LAB
ADD MANUAL DIFF: NO
ALBUMIN SERPL-MCNC: 3.7 G/DL (ref 3.5–5)
ALP SERPL-CCNC: 152 U/L (ref 38–126)
ANION GAP SERPL CALC-SCNC: 20 MMOL/L (ref 5–19)
ARTERIAL BLOOD FIO2: (no result)
ARTERIAL BLOOD H2CO3: 0.67 MMOL/L (ref 1.05–1.35)
ARTERIAL BLOOD HCO3: 12.8 MMOL/L (ref 20–24)
ARTERIAL BLOOD PCO2: 22.1 MMHG (ref 35–45)
ARTERIAL BLOOD PH: 7.38 (ref 7.35–7.45)
ARTERIAL BLOOD PO2: 78.8 MMHG (ref 80–100)
ARTERIAL BLOOD TOTAL CO2: 13.4 MMOL/L (ref 23–27)
AST SERPL-CCNC: 391 U/L (ref 17–59)
BASE EXCESS BLDA CALC-SCNC: -9.9 MMOL/L
BASOPHILS # BLD AUTO: 0 10^3/UL (ref 0–0.2)
BASOPHILS NFR BLD AUTO: 0.4 % (ref 0–2)
BILIRUB DIRECT SERPL-MCNC: 1.8 MG/DL (ref 0–0.4)
BILIRUB SERPL-MCNC: 3.5 MG/DL (ref 0.2–1.3)
BUN SERPL-MCNC: 26 MG/DL (ref 7–20)
CALCIUM: 9 MG/DL (ref 8.4–10.2)
CHLORIDE SERPL-SCNC: 101 MMOL/L (ref 98–107)
CHOLEST SERPL-MCNC: 150.29 MG/DL (ref 0–200)
CO2 SERPL-SCNC: 17 MMOL/L (ref 22–30)
EOSINOPHIL # BLD AUTO: 0 10^3/UL (ref 0–0.6)
EOSINOPHIL NFR BLD AUTO: 0.6 % (ref 0–6)
ERYTHROCYTE [DISTWIDTH] IN BLOOD BY AUTOMATED COUNT: 19.4 % (ref 11.5–14)
GLUCOSE SERPL-MCNC: 89 MG/DL (ref 75–110)
HCT VFR BLD CALC: 46.6 % (ref 37.9–51)
HGB BLD-MCNC: 15.3 G/DL (ref 13.5–17)
INR PPP: 2.81
LDLC SERPL DIRECT ASSAY-MCNC: 113 MG/DL (ref ?–100)
LYMPHOCYTES # BLD AUTO: 1.6 10^3/UL (ref 0.5–4.7)
LYMPHOCYTES NFR BLD AUTO: 24.1 % (ref 13–45)
MCH RBC QN AUTO: 28.1 PG (ref 27–33.4)
MCHC RBC AUTO-ENTMCNC: 32.8 G/DL (ref 32–36)
MCV RBC AUTO: 86 FL (ref 80–97)
MONOCYTES # BLD AUTO: 0.8 10^3/UL (ref 0.1–1.4)
MONOCYTES NFR BLD AUTO: 11.4 % (ref 3–13)
NEUTROPHILS # BLD AUTO: 4.3 10^3/UL (ref 1.7–8.2)
NEUTS SEG NFR BLD AUTO: 63.5 % (ref 42–78)
PHOSPHATE SERPL-MCNC: 5.3 MG/DL (ref 2.5–4.5)
PLATELET # BLD: 201 10^3/UL (ref 150–450)
POTASSIUM SERPL-SCNC: 4.5 MMOL/L (ref 3.6–5)
PROT SERPL-MCNC: 7.3 G/DL (ref 6.3–8.2)
PROTHROMBIN TIME: 29.5 SEC (ref 11.4–15.4)
RBC # BLD AUTO: 5.44 10^6/UL (ref 4.35–5.55)
SAO2 % BLDA: 95.7 % (ref 94–98)
TOTAL CELLS COUNTED % (AUTO): 100 %
TRIGL SERPL-MCNC: 62 MG/DL (ref ?–150)
VLDLC SERPL CALC-MCNC: 12 MG/DL (ref 10–31)
WBC # BLD AUTO: 6.8 10^3/UL (ref 4–10.5)

## 2020-09-22 PROCEDURE — B24BZZZ ULTRASONOGRAPHY OF HEART WITH AORTA: ICD-10-PCS | Performed by: INTERNAL MEDICINE

## 2020-09-22 RX ADMIN — Medication SCH: at 05:55

## 2020-09-22 RX ADMIN — HEPARIN SODIUM PRN MLS/HR: 10000 INJECTION, SOLUTION INTRAVENOUS at 23:28

## 2020-09-22 RX ADMIN — SODIUM PHOSPHATE, MONOBASIC, MONOHYDRATE PRN GM: 276; 142 INJECTION, SOLUTION INTRAVENOUS at 23:25

## 2020-09-22 RX ADMIN — SODIUM PHOSPHATE, MONOBASIC, MONOHYDRATE PRN GM: 276; 142 INJECTION, SOLUTION INTRAVENOUS at 05:54

## 2020-09-22 RX ADMIN — SODIUM PHOSPHATE, MONOBASIC, MONOHYDRATE PRN GM: 276; 142 INJECTION, SOLUTION INTRAVENOUS at 16:03

## 2020-09-22 RX ADMIN — SODIUM PHOSPHATE, MONOBASIC, MONOHYDRATE PRN GM: 276; 142 INJECTION, SOLUTION INTRAVENOUS at 12:29

## 2020-09-22 RX ADMIN — LINEZOLID SCH MLS/HR: 600 INJECTION, SOLUTION INTRAVENOUS at 18:02

## 2020-09-22 RX ADMIN — SODIUM PHOSPHATE, MONOBASIC, MONOHYDRATE PRN GM: 276; 142 INJECTION, SOLUTION INTRAVENOUS at 05:45

## 2020-09-22 RX ADMIN — DEXTROSE PRN MLS/HR: 10 SOLUTION INTRAVENOUS at 10:23

## 2020-09-22 RX ADMIN — Medication SCH: at 13:15

## 2020-09-22 RX ADMIN — SODIUM PHOSPHATE, MONOBASIC, MONOHYDRATE PRN GM: 276; 142 INJECTION, SOLUTION INTRAVENOUS at 18:50

## 2020-09-22 RX ADMIN — SODIUM CHLORIDE, SODIUM LACTATE, POTASSIUM CHLORIDE, AND CALCIUM CHLORIDE PRN MLS/HR: .6; .31; .03; .02 INJECTION, SOLUTION INTRAVENOUS at 10:26

## 2020-09-22 RX ADMIN — DEXTROSE PRN MLS/HR: 10 SOLUTION INTRAVENOUS at 16:00

## 2020-09-22 RX ADMIN — LISINOPRIL SCH: 10 TABLET ORAL at 10:24

## 2020-09-22 RX ADMIN — DEXTROSE PRN MLS/HR: 10 SOLUTION INTRAVENOUS at 12:18

## 2020-09-22 RX ADMIN — LINEZOLID SCH MLS/HR: 600 INJECTION, SOLUTION INTRAVENOUS at 06:19

## 2020-09-22 RX ADMIN — SODIUM PHOSPHATE, MONOBASIC, MONOHYDRATE PRN GM: 276; 142 INJECTION, SOLUTION INTRAVENOUS at 23:39

## 2020-09-22 RX ADMIN — Medication SCH: at 21:12

## 2020-09-22 RX ADMIN — DEXTROSE PRN MLS/HR: 10 SOLUTION INTRAVENOUS at 05:45

## 2020-09-22 RX ADMIN — SODIUM PHOSPHATE, MONOBASIC, MONOHYDRATE PRN GM: 276; 142 INJECTION, SOLUTION INTRAVENOUS at 00:03

## 2020-09-22 RX ADMIN — PANTOPRAZOLE SODIUM SCH MG: 40 TABLET, DELAYED RELEASE ORAL at 05:55

## 2020-09-22 RX ADMIN — FUROSEMIDE SCH: 10 INJECTION, SOLUTION INTRAMUSCULAR; INTRAVENOUS at 10:24

## 2020-09-22 NOTE — EKG REPORT
SEVERITY:- ABNORMAL ECG -

ECTOPIC ATRIAL VERSUS JUNCTIONAL RHYTHM

LEFT ANTERIOR FASCICULAR BLOCK

BORDERLINE T ABNORMALITIES, LATERAL LEADS

BORDERLINE PROLONGED QT INTERVAL

:

Confirmed by: Ellie Cooper MD 22-Sep-2020 08:33:28

## 2020-09-22 NOTE — XCELERA REPORT
[address] [address]

                               Tel: [phone]

                               Fax: [phone]



                      Transthoracic Echocardiogram Report

_______________________________________________________________________________



Name: KIESHA WINN

MRN: [id number]                           Age: 64 yrs

Gender: Male                              : 1955

Patient Status: Inpatient                 Patient Location: ICU^1^A

Account #: [id number]

Study Date: 2020 08:03 AM

Accession #: [id number]

_______________________________________________________________________________



Height: 72 in        Weight: 165 lb        BSA: 2.0 m2

_______________________________________________________________________________

Procedure: A complete two-dimensional transthoracic echocardiogram was

performed (2D, M-mode, spectral and color flow Doppler). The study was

technically adequate with some images being suboptimal in quality.

Reason For Study: PE, Right heart strain, dialated cardiomyopathy

Previous Evaluation: A previous study was performed on 06/15/2020 LVEF 20-25%.

History: PE

CHF.



Ordering Physician: LARA DREW

Performed By: Aurelia Brock

_______________________________________________________________________________



Interpretation Summary

Left ventricular systolic function is severely reduced.

The Ejection Fraction estimate is 20-25%

The right ventricle is severely dilated.

The right ventricular systolic function is severely reduced.

There is a moderate amount of mitral regurgitation

There is no aortic valve stenosis

Small pericardial effusion.

There are no echocardiographic indications of cardiac tamponade.



MMode/2D Measurements & Calculations



RVDd: 2.9 cm        LVIDd: 5.4 cm      FS: 9.0 %          Ao root diam: 3.7 cm

IVSd: 0.99 cm       LVIDs: 4.9 cm      EDV(Teich):        Ao root area:

                    LVPWd: 1.1 cm      140.8 ml           11.0 cm2

                                       ESV(Teich):

                                       113.1 ml

                                       EF(Teich): 19.7 %

        _______________________________________________________________

EDV(MOD-sp4):       SV(MOD-sp4):

105.3 ml            30.6 ml

ESV(MOD-sp4):

74.7 ml

EF(MOD-sp4): 29.0 %



Doppler Measurements & Calculations

MV E max yony:      MV dec slope:       Ao V2 max:           LV V1 max P.0 cm/sec                            63.6 cm/sec          0.54 mmHg

MV A max yony:      477.1 cm/sec2       Ao max P.6 mmHg  LV V1 max:

26.2 cm/sec        MV dec time:                             36.9 cm/sec

                   0.15 sec

MV E/A: 2.7

        _______________________________________________________________

MR max yony:        PA V2 max:          PI max yony:          TR max yony:

393.4 cm/sec       35.6 cm/sec         129.5 cm/sec         127.5 cm/sec

MR max PG:         PA max P.51 mmHgPI max P.7 mmHg  TR max P.9 mmHg                              PI dec slope:        6.5 mmHg

                                       192.4 cm/sec2





Left Ventricle

The left ventricle is borderline dilated. There is mild to moderate concentric

left ventricular hypertrophy. Left ventricular systolic function is severely

reduced. The Ejection Fraction estimate is 20-25%. Doppler measurements

suggest reversible restrictive left ventricular relaxation, which is

associated with grade III/IV or moderate diastolic dysfunction.



Right Ventricle

The right ventricle is severely dilated. The right ventricular systolic

function is severely reduced.



Atria

The right atrium is moderate to severely dilated. The left atrial size is

normal. The interatrial septum is intact with no evidence for an atrial septal

defect. There is no Doppler evidence for an interatrial shunt.



Mitral Valve

There is no mitral valve stenosis. There is a moderate amount of mitral

regurgitation.





Aortic Valve

The aortic valve is trileaflet. The aortic valve is normal in structure and

function. The aortic valve opens well. There is no aortic valve stenosis. No

aortic regurgitation is present.



Tricuspid Valve

The tricuspid valve is normal in structure and function. There is a trace

amount of tricuspid regurgitation. Tricuspid regurgitation jet envelope not

well defined to measure RV systolic pressure accurately.



Pulmonic Valve

The pulmonic valve is normal in structure and function. There is a mild amount

of pulmonic regurgitation.



Great Vessels

The aortic root is normal size. The inferior vena cava appeared normal and

decreased > 50% with respiration (RAP 5-10 mmHg).





Effusions

Small pericardial effusion. There are no echocardiographic indications of

cardiac tamponade. Moderate size left pleural effusion.



_______________________________________________________________________________



_______________________________________________________________________________

Electronically signed by:      Mikal Diaz      on 2020 03:18 PM



CC: LARA DREW Anil

## 2020-09-22 NOTE — PDOC PROGRESS REPORT
Subjective


Progress Note for:: 09/22/20


Subjective:: 





Patient remains listless.  Overnight events were reviewed with the nurse


Patient had episodes of bradycardia.  He also appeared to have intermittent 

atrial flutter.  Episodes of high-grade AV block were also noticed on telemetry.

 In my discussion with the nurse it appears that patient may have been bearing 

down to go to the bathroom.





Even when I was in the room patient appeared to develop apneic spells which 

correlated to declining heart rate.  I suspect he is having apneic spells or 

increased vagal tone on account of pulmonary embolism that may be driving these 

bradycardia episodes of heart block episodes.  His baseline EKG is without 

significant conduction disease.


Reason For Visit: 


BILATERAL PE








Physical Exam


Vital Signs: 


                                        











Temp Pulse Resp BP Pulse Ox


 


 97.3 F   59 L  25 H  79/64 L  87 L


 


 09/22/20 14:00  09/22/20 14:12  09/22/20 14:24  09/22/20 14:24  09/22/20 14:24








                                 Intake & Output











 09/21/20 09/22/20 09/23/20





 06:59 06:59 06:59


 


Intake Total 


 


Output Total  745 120


 


Balance 500 -434 2201


 


Weight 75.1 kg 75.1 kg 











General appearance: PRESENT: no acute distress, cooperative, disheveled, 

well-developed


Head exam: PRESENT: atraumatic, normocephalic


Eye exam: PRESENT: conjunctiva pale, EOMI


Mouth exam: PRESENT: moist


Respiratory exam: PRESENT: crackles, decreased breath sounds, symmetrical, 

unlabored


Cardiovascular exam: PRESENT: RRR, +S1, +S2


GI/Abdominal exam: PRESENT: distended, soft


Rectal exam: PRESENT: deferred


Neurological exam: PRESENT: altered


Skin exam: PRESENT: dry, intact





Results


Laboratory Results: 


                                        





                                 09/22/20 03:55 





                                 09/22/20 03:55 





                                        











  09/21/20 09/22/20 09/22/20





  20:13 03:55 03:55


 


WBC   6.8 


 


RBC   5.44 


 


Hgb   15.3 


 


Hct   46.6 


 


MCV   86 


 


MCH   28.1 


 


MCHC   32.8 


 


RDW   19.4 H 


 


Plt Count   201 


 


Seg Neutrophils %   63.5 


 


Carbonic Acid  0.49 L  


 


HCO3/H2CO3 Ratio  21:1  


 


ABG pH  7.42  


 


ABG pCO2  16.4 L*  


 


ABG pO2  122.4 H  


 


ABG HCO3  10.5 L  


 


ABG O2 Saturation  98.6 H  


 


ABG Base Excess  -10.7  


 


FiO2  21%  


 


Sodium    137.5


 


Potassium    4.5


 


Chloride    101


 


Carbon Dioxide    17 L


 


Anion Gap    20 H


 


BUN    26 H


 


Creatinine    1.55 H


 


Est GFR ( Amer)    55 L


 


Glucose    89


 


Calcium    9.0


 


Phosphorus    5.3 H


 


Magnesium    1.8


 


Total Bilirubin    3.5 H


 


AST    391 H


 


Alkaline Phosphatase    152 H


 


Total Protein    7.3


 


Albumin    3.7


 


Triglycerides    62


 


Cholesterol    150.29


 


LDL Cholesterol Direct    113 H


 


VLDL Cholesterol    12.0


 


HDL Cholesterol    18 L














  09/22/20





  04:16


 


WBC 


 


RBC 


 


Hgb 


 


Hct 


 


MCV 


 


MCH 


 


MCHC 


 


RDW 


 


Plt Count 


 


Seg Neutrophils % 


 


Carbonic Acid  0.67 L


 


HCO3/H2CO3 Ratio  19:1


 


ABG pH  7.38


 


ABG pCO2  22.1 L


 


ABG pO2  78.8 L


 


ABG HCO3  12.8 L


 


ABG O2 Saturation  95.7


 


ABG Base Excess  -9.9


 


FiO2  21%


 


Sodium 


 


Potassium 


 


Chloride 


 


Carbon Dioxide 


 


Anion Gap 


 


BUN 


 


Creatinine 


 


Est GFR (African Amer) 


 


Glucose 


 


Calcium 


 


Phosphorus 


 


Magnesium 


 


Total Bilirubin 


 


AST 


 


Alkaline Phosphatase 


 


Total Protein 


 


Albumin 


 


Triglycerides 


 


Cholesterol 


 


LDL Cholesterol Direct 


 


VLDL Cholesterol 


 


HDL Cholesterol 








                                        





09/20/20 22:02   Blood   Blood Culture (PCR) - Final


                            Staphylococcus Species





                                        











  09/20/20





  22:02


 


Troponin I  0.141


 


NT-Pro-B Natriuret Pep  14668 H











EKG Comments: 





Telemetry strips were reviewed


These show sinus rhythm


Low amplitude inverted P waves are noted.  This could be due to lead placement.





2 other strips were reviewed which showed profound bradycardia but appeared to 

have high-grade AV block.  These may have been but profound vagal episodes


Impressions: 


                                        





Chest X-Ray  09/20/20 20:34


IMPRESSION:


 


No acute abnormality as above.


 


 


copyright 2011 [employer]- All Rights Reserved


 








Chest/Abdomen CTA  09/20/20 22:55


IMPRESSION: 


There are filling defects within the main pulmonary artery


concerning for developing saddle emboli. There are also filling


defects seen within the segmental arteries consistent with


pulmonary artery emboli.


 


 


There is a groundglass nodule at the right upper lobe measuring


up to 12 mm which will need interval follow-up within three


months.


There are trace pleural effusions with some overlying airspace


opacities which are favored to represent atelectasis, but may


also represent some mild edema or infection.


 














Assessment & Plan





- Diagnosis


(1) Bilateral pulmonary embolism


Is this a current diagnosis for this admission?: Yes   


Plan: 


Supportive care per intensive care unit.


Lytic therapy is being pursued.











(2) Tachycardia


Is this a current diagnosis for this admission?: Yes   


Plan: 


Strips were reviewed.  Presently maintaining sinus rhythm


Episodes of junctional rhythm appear to be sinus rhythm.











(3) Alcohol dependence


Qualifiers: 


   Substance use status: alcohol-induced persisting dementia   Qualified 

Code(s): F10.27 - Alcohol dependence with alcohol-induced persisting dementia   


Is this a current diagnosis for this admission?: Yes   


Plan: 


This does complicate his care goals and objectives.








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


Is this a current diagnosis for this admission?: Yes   


Plan: 


Dilated cardiomyopathy likely multifactorial but predominantly probably due to 

alcohol


Medical therapy is currently limited due to ongoing therapy for pulmonary embol

ism


He is also been dealing with episodes of bradycardia which are probably related 

to increased vagal tone due to hypoxia and pulmonary embolism.


There is no urgent indication to pursue cardiac pacing at the moment.


Would be reasonable to avoid beta-blocker temporarily.

## 2020-09-22 NOTE — PDOC CRITICAL CARE PROG REPORT
General


Date:: 09/22/20


ICU Day:: 2


Hospital Day:: 2


Resuscitation Status: Full Code


Events in the past 12 to 24 Hours:: 





Update


The patient developed worsening dyspnea early this afternoon.


He became tachypneic and his 02 sat went down into the 70s.


It became difficult to discern his 02 sat so an ABG was drawn. it showed a Pa02 

of 204 on a 100% NRM.


The patient had had a Pa02 of about 150 early this morning on 3 l02 nasal.


He has conformed PE by CT scan. In addition, the patient dropped his BP some to 

about 100 systolic. his systolic was 20-30 pts higher a few hours ago.


I decided to give the patient TPA based on this change in status. Shortly before

this change in status the patient had reverted for A flutter with a HR in the 

130s to asinus brendan and than NSR.


The patient was queried about various bleeding risks ( CVA in past, aneurysym, 

gi bleed) and he answered that he had not. I explained thepotoential risks of 

bleeding with TPA to thepatient has well.


I told him there was about 6-8% chance of bleeding and about 1-2 % chance of a 

an intracrainial hemorrhage. the patient  appeared to iunderstand the  risk and 

agreed to go forward with sytemic TPA. His heparin was put on hold in the 

interim.


Out facility does npt use EKOs and or have mechanical thrombectomy available 

etc.,


I t did not appear reasonable or asafe to transfer thepatient to another 

facility at this time.





9/22


The patient had a change in status during the early afternoon.


'He developed sinus brendan after converting spontaneously from a rapid a -flutter

with a 2:1 block.


His pulse oximetry would dip down at times to the 70s and thepatient became 

quite tachypneic. his A-a gradient appeared to have increaed very much at the 

time ( >400).


 We decided to give thepatient systemic TPA on the bassof those findings. The 

patient received 10% up front and the remaining 90 mg over 2 hours.


The patient tolerated the infusion. His heparin drip was stopped and than 

reastarted late that night


Pulse ox remains in the 90s.


Reason for ICU Addmission:: pulmonary embolism





Physical Exam


Vital Signs: 


                                        











Temp Pulse Resp BP Pulse Ox


 


 97.9 F   69   19   94/62 L  100 


 


 09/22/20 10:00  09/22/20 10:12  09/22/20 10:12  09/22/20 10:12  09/22/20 10:12








                                 Intake & Output











 09/21/20 09/22/20 09/23/20





 06:59 06:59 06:59


 


Intake Total 


 


Output Total  745 90


 


Balance 500 -434 1210


 


Weight 75.1 kg 75.1 kg 








                                  Weight/Height





Weight                           75.1 kg


Height                           6 in








General appearance: PRESENT: no acute distress, disheveled, thin


Head exam: PRESENT: normocephalic


Eye exam: PRESENT: conjunctiva pink


Ear exam: PRESENT: normal external ear exam


Mouth exam: PRESENT: moist, neck supple


Neck exam: PRESENT: full ROM.  ABSENT: JVD, tenderness, thyromegaly


Respiratory exam: PRESENT: symmetrical, unlabored.  ABSENT: accessory muscle use


Cardiovascular exam: PRESENT: irregular rhythm, +S1, +S2


Pulses: PRESENT: normal carotid pulses


GI/Abdominal exam: PRESENT: hypoactive bowel sounds, normal bowel sounds, soft


Rectal exam: PRESENT: deferred


Extremities exam: PRESENT: calf tenderness


Neurological exam: PRESENT: alert, awake, oriented to time, CN II-XII grossly 

intact


Psychiatric exam: PRESENT: appropriate affect, flat affect


Skin exam: PRESENT: normal color





Laboratory/Radiographs


Laboratory Results: 


                                        





                                 09/22/20 03:55 





                                 09/22/20 03:55 





                                        











  09/21/20 09/21/20 09/22/20





  14:32 20:13 03:55


 


WBC    6.8


 


RBC    5.44


 


Hgb    15.3


 


Hct    46.6


 


MCV    86


 


MCH    28.1


 


MCHC    32.8


 


RDW    19.4 H


 


Plt Count    201


 


Seg Neutrophils %    63.5


 


Carbonic Acid  0.46 L  0.49 L 


 


HCO3/H2CO3 Ratio  27:1  21:1 


 


ABG pH  7.54 H  7.42 


 


ABG pCO2  15.4 L*  16.4 L* 


 


ABG pO2  207.5 H  122.4 H 


 


ABG HCO3  12.8 L  10.5 L 


 


ABG O2 Saturation  99.6 H  98.6 H 


 


ABG Base Excess  -6.2  -10.7 


 


FiO2  15%  21% 


 


Sodium   


 


Potassium   


 


Chloride   


 


Carbon Dioxide   


 


Anion Gap   


 


BUN   


 


Creatinine   


 


Est GFR (African Amer)   


 


Glucose   


 


Calcium   


 


Phosphorus   


 


Magnesium   


 


Total Bilirubin   


 


AST   


 


Alkaline Phosphatase   


 


Total Protein   


 


Albumin   


 


Triglycerides   


 


Cholesterol   


 


LDL Cholesterol Direct   


 


VLDL Cholesterol   


 


HDL Cholesterol   














  09/22/20 09/22/20





  03:55 04:16


 


WBC  


 


RBC  


 


Hgb  


 


Hct  


 


MCV  


 


MCH  


 


MCHC  


 


RDW  


 


Plt Count  


 


Seg Neutrophils %  


 


Carbonic Acid   0.67 L


 


HCO3/H2CO3 Ratio   19:1


 


ABG pH   7.38


 


ABG pCO2   22.1 L


 


ABG pO2   78.8 L


 


ABG HCO3   12.8 L


 


ABG O2 Saturation   95.7


 


ABG Base Excess   -9.9


 


FiO2   21%


 


Sodium  137.5 


 


Potassium  4.5 


 


Chloride  101 


 


Carbon Dioxide  17 L 


 


Anion Gap  20 H 


 


BUN  26 H 


 


Creatinine  1.55 H 


 


Est GFR (African Amer)  55 L 


 


Glucose  89 


 


Calcium  9.0 


 


Phosphorus  5.3 H 


 


Magnesium  1.8 


 


Total Bilirubin  3.5 H 


 


AST  391 H 


 


Alkaline Phosphatase  152 H 


 


Total Protein  7.3 


 


Albumin  3.7 


 


Triglycerides  62 


 


Cholesterol  150.29 


 


LDL Cholesterol Direct  113 H 


 


VLDL Cholesterol  12.0 


 


HDL Cholesterol  18 L 








                                        





09/20/20 22:02   Blood   Blood Culture (PCR) - Final


                            Staphylococcus Species





                                        











  09/20/20





  22:02


 


Troponin I  0.141


 


NT-Pro-B Natriuret Pep  50055 H











Impressions: 


                                        





Chest X-Ray  09/20/20 20:34


IMPRESSION:


 


No acute abnormality as above.


 


 


copyright 2011 [employer]- All Rights Reserved


 








Chest/Abdomen CTA  09/20/20 22:55


IMPRESSION: 


There are filling defects within the main pulmonary artery


concerning for developing saddle emboli. There are also filling


defects seen within the segmental arteries consistent with


pulmonary artery emboli.


 


 


There is a groundglass nodule at the right upper lobe measuring


up to 12 mm which will need interval follow-up within three


months.


There are trace pleural effusions with some overlying airspace


opacities which are favored to represent atelectasis, but may


also represent some mild edema or infection.


 














Assessment and Plan





- Diagnosis


(1) Atrial flutter


Qualifiers: 


   Atrial flutter type: unspecified   Qualified Code(s): I48.92 - Unspecified 

atrial flutter   


Is this a current diagnosis for this admission?: No   


Plan: 


The patient appears to be in Atrial flutter with heart rate of 130-140.


Unclear how long he has been in this rhythm.


His recore from a previous admission cites PAF.


Cardiology to see the patiewnt. He is on full dose anticoagultion at this time








(2) Bilateral pulmonary embolism


Is this a current diagnosis for this admission?: Yes   


Plan: 


The patient does not appear to be hemodynamically compromised. he is maintaining

his BP. He requires low flow 02.


Not likely that he needs TPA at this time. We will be getting an nECHO to assess

PAP and Lv fn. and right ventricle size.





9/22


Patient was treated witj systemic TPA yesterday.


He is back on Heparin at this time


His work of breathing and 02 saturations aappear better.


No obvious bleeding complications.








(3) Alcohol dependence


Qualifiers: 


   Substance use status: alcohol-induced persisting dementia   Qualified Code(s

): F10.27 - Alcohol dependence with alcohol-induced persisting dementia   


Is this a current diagnosis for this admission?: Yes   


Plan: 


The patient carries a diagnosis of alcohol related dilated cardiomyopathy.


He is showing no signs of acute withdrawl at this time.





9/22


AST is elevated from the time of admission.


Alk phos and bili are slightly elevated as well.


Will continue to monitor them for now.








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


Is this a current diagnosis for this admission?: Yes   





(5) PAD (peripheral artery disease)


Is this a current diagnosis for this admission?: No   


Plan: 


Patient has known occlusive diseases since 6/29.


He had a partial occlusionin theright anterior tibial artery and more critical 

blockage in the distal right femoral artery.








(6) Sick sinus syndrome


Is this a current diagnosis for this admission?: Yes   


Plan: 


I catherine atkinson is reason to bleieve the patient has a SSS,. His gheart rate 

vacillates quite a bot between 30 and 130.


Whe he spontaneously converted yesterday he slowed ointo the 30s. We have stopo

ped his bnetas blocker at this time. cardiology to see the patient








Critical Time


Critical Time (minutes): 35


Level of Care: ICU


-: 


1.  The care of a critical patient is a dynamic process.  This note is a repr

esentative synopsis but static in nature.  The timeframe for treatments given in

order is not necessarily the actual time these treatments may have been done.





2.  This patient requires critical care secondary to ongoing requirements for 

therapy not offered or safe outside the critical care environment.  Transfer to 

a lower level of care will result in altered life or limb morbidity and 

mortality.





3.  Multidisciplinary rounds completed.





4.  ABCDE bundle addressed.

## 2020-09-23 LAB
ADD MANUAL DIFF: NO
ALBUMIN SERPL-MCNC: 2.9 G/DL (ref 3.5–5)
ALP SERPL-CCNC: 103 U/L (ref 38–126)
ANION GAP SERPL CALC-SCNC: 15 MMOL/L (ref 5–19)
APTT BLD: 57.5 SEC (ref 23.5–35.8)
ARTERIAL BLOOD FIO2: (no result)
ARTERIAL BLOOD H2CO3: 0.85 MMOL/L (ref 1.05–1.35)
ARTERIAL BLOOD HCO3: 14.5 MMOL/L (ref 20–24)
ARTERIAL BLOOD PCO2: 28.3 MMHG (ref 35–45)
ARTERIAL BLOOD PH: 7.33 (ref 7.35–7.45)
ARTERIAL BLOOD PO2: 81.7 MMHG (ref 80–100)
ARTERIAL BLOOD TOTAL CO2: 15.3 MMOL/L (ref 23–27)
AST SERPL-CCNC: 1981 U/L (ref 17–59)
BASE EXCESS BLDA CALC-SCNC: -9.9 MMOL/L
BASOPHILS # BLD AUTO: 0 10^3/UL (ref 0–0.2)
BASOPHILS NFR BLD AUTO: 0.7 % (ref 0–2)
BILIRUB DIRECT SERPL-MCNC: 2 MG/DL (ref 0–0.4)
BILIRUB SERPL-MCNC: 3.5 MG/DL (ref 0.2–1.3)
BUN SERPL-MCNC: 28 MG/DL (ref 7–20)
CALCIUM: 7.9 MG/DL (ref 8.4–10.2)
CHLORIDE SERPL-SCNC: 99 MMOL/L (ref 98–107)
CO2 SERPL-SCNC: 18 MMOL/L (ref 22–30)
EOSINOPHIL # BLD AUTO: 0 10^3/UL (ref 0–0.6)
EOSINOPHIL NFR BLD AUTO: 0.4 % (ref 0–6)
ERYTHROCYTE [DISTWIDTH] IN BLOOD BY AUTOMATED COUNT: 19.1 % (ref 11.5–14)
GLUCOSE SERPL-MCNC: 93 MG/DL (ref 75–110)
HCT VFR BLD CALC: 39.8 % (ref 37.9–51)
HGB BLD-MCNC: 13 G/DL (ref 13.5–17)
INR PPP: 3.51
LYMPHOCYTES # BLD AUTO: 1.1 10^3/UL (ref 0.5–4.7)
LYMPHOCYTES NFR BLD AUTO: 17.3 % (ref 13–45)
MCH RBC QN AUTO: 28 PG (ref 27–33.4)
MCHC RBC AUTO-ENTMCNC: 32.7 G/DL (ref 32–36)
MCV RBC AUTO: 86 FL (ref 80–97)
MONOCYTES # BLD AUTO: 0.7 10^3/UL (ref 0.1–1.4)
MONOCYTES NFR BLD AUTO: 11.2 % (ref 3–13)
NEUTROPHILS # BLD AUTO: 4.6 10^3/UL (ref 1.7–8.2)
NEUTS SEG NFR BLD AUTO: 70.4 % (ref 42–78)
PHOSPHATE SERPL-MCNC: 4.4 MG/DL (ref 2.5–4.5)
PLATELET # BLD: 166 10^3/UL (ref 150–450)
POTASSIUM SERPL-SCNC: 3.9 MMOL/L (ref 3.6–5)
PROT SERPL-MCNC: 6.2 G/DL (ref 6.3–8.2)
PROTHROMBIN TIME: 35 SEC (ref 11.4–15.4)
RBC # BLD AUTO: 4.65 10^6/UL (ref 4.35–5.55)
SAO2 % BLDA: 95.5 % (ref 94–98)
TOTAL CELLS COUNTED % (AUTO): 100 %
WBC # BLD AUTO: 6.5 10^3/UL (ref 4–10.5)

## 2020-09-23 RX ADMIN — PANTOPRAZOLE SODIUM SCH: 40 TABLET, DELAYED RELEASE ORAL at 05:22

## 2020-09-23 RX ADMIN — DEXTROSE PRN MLS/HR: 10 SOLUTION INTRAVENOUS at 12:06

## 2020-09-23 RX ADMIN — LINEZOLID SCH MLS/HR: 600 INJECTION, SOLUTION INTRAVENOUS at 05:22

## 2020-09-23 RX ADMIN — FLUDROCORTISONE ACETATE SCH MG: 0.1 TABLET ORAL at 03:53

## 2020-09-23 RX ADMIN — Medication SCH: at 13:26

## 2020-09-23 RX ADMIN — DOPAMINE HYDROCHLORIDE PRN MLS/HR: 320 INJECTION, SOLUTION INTRAVENOUS at 12:06

## 2020-09-23 RX ADMIN — LINEZOLID SCH MLS/HR: 600 INJECTION, SOLUTION INTRAVENOUS at 17:15

## 2020-09-23 RX ADMIN — DEXTROSE PRN MLS/HR: 10 SOLUTION INTRAVENOUS at 23:45

## 2020-09-23 RX ADMIN — DOBUTAMINE IN DEXTROSE PRN MLS/HR: 200 INJECTION, SOLUTION INTRAVENOUS at 01:12

## 2020-09-23 RX ADMIN — SODIUM CHLORIDE, SODIUM LACTATE, POTASSIUM CHLORIDE, AND CALCIUM CHLORIDE PRN MLS/HR: .6; .31; .03; .02 INJECTION, SOLUTION INTRAVENOUS at 12:06

## 2020-09-23 RX ADMIN — SODIUM PHOSPHATE, MONOBASIC, MONOHYDRATE PRN GM: 276; 142 INJECTION, SOLUTION INTRAVENOUS at 00:08

## 2020-09-23 RX ADMIN — Medication SCH: at 05:22

## 2020-09-23 RX ADMIN — HEPARIN SODIUM PRN MLS/HR: 10000 INJECTION, SOLUTION INTRAVENOUS at 05:25

## 2020-09-23 RX ADMIN — Medication SCH: at 23:13

## 2020-09-23 RX ADMIN — SODIUM CHLORIDE, SODIUM LACTATE, POTASSIUM CHLORIDE, AND CALCIUM CHLORIDE PRN MLS/HR: .6; .31; .03; .02 INJECTION, SOLUTION INTRAVENOUS at 23:45

## 2020-09-23 RX ADMIN — FUROSEMIDE SCH: 10 INJECTION, SOLUTION INTRAMUSCULAR; INTRAVENOUS at 09:49

## 2020-09-23 RX ADMIN — LISINOPRIL SCH: 10 TABLET ORAL at 09:17

## 2020-09-23 RX ADMIN — DEXTROSE PRN MLS/HR: 10 SOLUTION INTRAVENOUS at 02:09

## 2020-09-23 RX ADMIN — SODIUM PHOSPHATE, MONOBASIC, MONOHYDRATE PRN GM: 276; 142 INJECTION, SOLUTION INTRAVENOUS at 03:56

## 2020-09-23 NOTE — PD[facility] CRITICAL CARE PROG REPORT
General


Date:: 09/23/20


ICU Day:: 3


Hospital Day:: 3


Resuscitation Status: Full Code


Events in the past 12 to 24 Hours:: 





Update


The patient developed worsening dyspnea early this afternoon.


He became tachypneic and his 02 sat went down into the 70s.


It became difficult to discern his 02 sat so an ABG was drawn. it showed a Pa02 

of 204 on a 100% NRM.


The patient had had a Pa02 of about 150 early this morning on 3 l02 nasal.


He has conformed PE by CT scan. In addition, the patient dropped his BP some to 

about 100 systolic. his systolic was 20-30 pts higher a few hours ago.


I decided to give the patient TPA based on this change in status. Shortly before

this change in status the patient had reverted for A flutter with a HR in the 

130s to asinus brendan and than NSR.


The patient was queried about various bleeding risks ( CVA in past, aneurysym, 

gi bleed) and he answered that he had not. I explained thepotoential risks of 

bleeding with TPA to thepatient has well.


I told him there was about 6-8% chance of bleeding and about 1-2 % chance of a 

an intracrainial hemorrhage. the patient  appeared to iunderstand the  risk and 

agreed to go forward with sytemic TPA. His heparin was put on hold in the 

interim.


Out facility does npt use EKOs and or have mechanical thrombectomy available 

etc.,


I t did not appear reasonable or asafe to transfer thepatient to another 

facility at this time.





9/22


The patient had a change in status during the early afternoon.


'He developed sinus brendan after converting spontaneously from a rapid a -flutter

with a 2:1 block.


His pulse oximetry would dip down at times to the 70s and thepatient became 

quite tachypneic. his A-a gradient appeared to have increaed very much at the 

time ( >400).


 We decided to give thepatient systemic TPA on the bassof those findings. The 

patient received 10% up front and the remaining 90 mg over 2 hours.


The patient tolerated the infusion. His heparin drip was stopped and than 

reastarted late that night


Pulse ox remains in the 90s.





9/23


The patient continues to do poorly.


His bp is running low.


He appears listless.


His urine output is quite poor in the last several hours.


He continues to have brief bradycardic events.


His pulse oximetry remains stable.





Reason for ICU Addmission:: pulmonary embolism





Physical Exam


Vital Signs: 


                                        











Temp Pulse Resp BP Pulse Ox


 


 96.3 F L  61   28 H  76/61 L  97 


 


 09/23/20 07:59  09/23/20 07:59  09/23/20 07:59  09/23/20 07:59  09/23/20 07:59








                                 Intake & Output











 09/22/20 09/23/20 09/24/20





 06:59 06:59 06:59


 


Intake Total 311 3607 


 


Output Total 745 655 15


 


Balance -434 2952 -15


 


Weight 75.1 kg 74.6 kg 








                                  Weight/Height





Weight                           74.6 kg


Height                           6 in








General appearance: PRESENT: disheveled, mild distress - The patient's level of 

alertness comes and goes. he can't really do much for himself.


Head exam: PRESENT: atraumatic, normocephalic


Eye exam: PRESENT: PERRLA.  ABSENT: scleral icterus


Ear exam: PRESENT: normal external ear exam


Respiratory exam: ABSENT: accessory muscle use


Cardiovascular exam: PRESENT: irregular rhythm - The oatient continues to have 

bradycardic spells where hsi HR goes into the 30s and than bounces back 15-30 

seconds later.


Pulses: PRESENT: +1 pedal pulses bilateral


GI/Abdominal exam: PRESENT: normal bowel sounds, soft


Extremities exam: ABSENT: calf tenderness


Neurological exam: PRESENT: altered - he talsk very little. For themost part he 

moans. he appear able to move all extr.


Psychiatric exam: PRESENT: anxious, unusual affect





Laboratory/Radiographs


Laboratory Results: 


                                        





                                 09/23/20 04:21 





                                 09/23/20 06:04 





                                        











  09/22/20 09/22/20 09/23/20





  18:00 18:59 02:50


 


WBC   


 


RBC   


 


Hgb   


 


Hct   


 


MCV   


 


MCH   


 


MCHC   


 


RDW   


 


Plt Count   


 


Seg Neutrophils %   


 


Carbonic Acid    0.85 L


 


HCO3/H2CO3 Ratio    17:1


 


ABG pH    7.33 L


 


ABG pCO2    28.3 L


 


ABG pO2    81.7


 


ABG HCO3    14.5 L


 


ABG O2 Saturation    95.5


 


ABG Base Excess    -9.9


 


FiO2    4L


 


Sodium   


 


Potassium   


 


Chloride   


 


Carbon Dioxide   


 


Anion Gap   


 


BUN   


 


Creatinine   


 


Est GFR ( Amer)   


 


Est GFR (Non-Af Amer)   


 


Glucose  Cancelled  108 


 


Calcium   


 


Phosphorus   


 


Magnesium   


 


Total Bilirubin   


 


AST   


 


Alkaline Phosphatase   


 


Total Protein   


 


Albumin   














  09/23/20 09/23/20 09/23/20





  04:21 04:21 06:04


 


WBC  6.5  


 


RBC  4.65  


 


Hgb  13.0 L D  


 


Hct  39.8  


 


MCV  86  


 


MCH  28.0  


 


MCHC  32.7  


 


RDW  19.1 H  


 


Plt Count  166  


 


Seg Neutrophils %  70.4  


 


Carbonic Acid   


 


HCO3/H2CO3 Ratio   


 


ABG pH   


 


ABG pCO2   


 


ABG pO2   


 


ABG HCO3   


 


ABG O2 Saturation   


 


ABG Base Excess   


 


FiO2   


 


Sodium   Cancelled  131.6 L


 


Potassium   Cancelled  3.9


 


Chloride   Cancelled  99


 


Carbon Dioxide   Cancelled  18 L


 


Anion Gap   Cancelled  15


 


BUN   Cancelled  28 H


 


Creatinine   Cancelled  1.61 H


 


Est GFR ( Amer)   Cancelled  53 L


 


Est GFR (Non-Af Amer)   Cancelled 


 


Glucose   Cancelled  93


 


Calcium   Cancelled  7.9 L


 


Phosphorus   Cancelled  4.4


 


Magnesium   Cancelled  1.6


 


Total Bilirubin   Cancelled  3.5 H


 


AST   Cancelled  1981 H


 


Alkaline Phosphatase   Cancelled  103


 


Total Protein   Cancelled  6.2 L


 


Albumin   Cancelled  2.9 L








                                        





09/20/20 22:02   Blood   Blood Culture (PCR) - Final


                            Staphylococcus Species





                                        











  09/20/20





  22:02


 


Troponin I  0.141


 


NT-Pro-B Natriuret Pep  74265 H











Impressions: 


                                        





Chest X-Ray  09/20/20 20:34


IMPRESSION:


 


No acute abnormality as above.


 


 


copyright 2011 [employer]- All Rights Reserved


 








Chest/Abdomen CTA  09/20/20 22:55


IMPRESSION: 


There are filling defects within the main pulmonary artery


concerning for developing saddle emboli. There are also filling


defects seen within the segmental arteries consistent with


pulmonary artery emboli.


 


 


There is a groundglass nodule at the right upper lobe measuring


up to 12 mm which will need interval follow-up within three


months.


There are trace pleural effusions with some overlying airspace


opacities which are favored to represent atelectasis, but may


also represent some mild edema or infection.


 














Assessment and Plan





- Diagnosis


(1) Atrial flutter


Qualifiers: 


   Atrial flutter type: unspecified   Qualified Code(s): I48.92 - Unspecified 

atrial flutter   


Is this a current diagnosis for this admission?: No   


Plan: 


The patient appears to be in Atrial flutter with heart rate of 130-140.


Unclear how long he has been in this rhythm.


His recore from a previous admission cites PAF.


Cardiology to see the patient. He is on full dose anticoagulation at this time





9/23


For the most part he is in a nSR. At times he has short periods of flutter or 

gets profoundly bradycardic.


Dr. Diaz feelks he has enhanced vagal tone on the bsis of his large PE. 








(2) Bilateral pulmonary embolism


Is this a current diagnosis for this admission?: Yes   


Plan: 


The patient does not appear to be hemodynamically compromised. he is maintaining

his BP. He requires low flow 02.


Not likely that he needs TPA at this time. We will be getting an nECHO to assess

PAP and Lv fn. and right ventricle size.





9/22


Patient was treated witj systemic TPA yesterday.


He is back on Heparin at this time


His work of breathing and 02 saturations aappear better.


No obvious bleeding complications.





9/23


The patient had a huge PE.


He remains hemodynamically a bit precarious since than.


His BP is running low presently. He is likely to needd pressors.


He is already on dobutamine at 5 mcg/kg/min.


He received TPA yesterday and is back onheparin at this time.








(3) Alcohol dependence


Qualifiers: 


   Substance use status: alcohol-induced persisting dementia   Qualified 

Code(s): F10.27 - Alcohol dependence with alcohol-induced persisting dementia   


Is this a current diagnosis for this admission?: Yes   


Plan: 


The patient carries a diagnosis of alcohol related dilated cardiomyopathy.


He is showing no signs of acute withdrawl at this time.





9/22


AST is elevated from the time of admission.


Alk phos and bili are slightly elevated as well.


Will continue to monitor them for now.








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


Is this a current diagnosis for this admission?: Yes   


Plan: 


The patient has a known history of cardiomyopathy. According to Dr. Diaz he 

has severe bialteral ventricular dysfn.


he is on FDobutamine for inottropic support. he may need additional dopamine or 

levo given hsi low bp.








(5) PAD (peripheral artery disease)


Is this a current diagnosis for this admission?: No   


Plan: 


Patient has known occlusive diseases since 6/29.


He had a partial occlusionin theright anterior tibial artery and more critical 

blockage in the distal right femoral artery.








(6) Sick sinus syndrome


Is this a current diagnosis for this admission?: Yes   


Plan: 


I adalbertoclive demetrio is reason to bleieve the patient has a SSS,. His gheart rate 

vacillates quite a bot between 30 and 130.


Whe he spontaneously converted yesterday he slowed ointo the 30s. We have 

stopoped his bnetas blocker at this time. cardiology to see the patient





Plan Summary: 





It appears likely that the patient is not going to do well.


he is in a low flow state. 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 He is not a suitable

dialysis candidate even in the short term. I will try to talk to patient about 

hius code status if he


appears able to have that conversation.


Prognosis appears poor.








Critical Time


Critical Time (minutes): 35


Level of Care: ICU


-: 


1.  The care of a critical patient is a dynamic process.  This note is a 

representative synopsis but static in nature.  The timeframe for treatments 

given in order is not necessarily the actual time these treatments may have been

done.





2.  This patient requires critical care secondary to ongoing requirements for 

therapy not offered or safe outside the critical care environment.  Transfer to 

a lower level of care will result in altered life or limb morbidity and 

mortality.





3.  Multidisciplinary rounds completed.





4.  ABCDE bundle addressed.

## 2020-09-23 NOTE — PDOC PROGRESS REPORT
Subjective


Progress Note for:: 09/23/20


Subjective:: 





Patient seen and examined today.  Nurse at bedside.  No family member present.  

He continues to do very poorly.  Overnight he continued to have diminished urine

output and has been hypotensive.


Reason For Visit: 


BILATERAL PE








Physical Exam


Vital Signs: 


                                        











Temp Pulse Resp BP Pulse Ox


 


 96.3 F L  57 L  30 H  72/61 L  97 


 


 09/23/20 10:00  09/23/20 10:00  09/23/20 10:00  09/23/20 10:00  09/23/20 07:59








                                 Intake & Output











 09/22/20 09/23/20 09/24/20





 06:59 06:59 06:59


 


Intake Total 311 3607 57


 


Output Total 745 655 30


 


Balance -434 2952 27


 


Weight 75.1 kg 74.6 kg 74.6 kg











General appearance: PRESENT: cooperative, disheveled, thin, well-developed


Head exam: PRESENT: atraumatic, normocephalic


Eye exam: PRESENT: EOMI


Mouth exam: PRESENT: moist


Respiratory exam: PRESENT: decreased breath sounds, symmetrical, unlabored


Cardiovascular exam: PRESENT: RRR, +S1, +S2, systolic murmur


Pulses: PRESENT: normal radial pulses


GI/Abdominal exam: PRESENT: soft


Rectal exam: PRESENT: deferred


Neurological exam: PRESENT: altered


Skin exam: PRESENT: dry, intact





Results


Laboratory Results: 


                                        





                                 09/23/20 04:21 





                                 09/23/20 06:04 





                                        











  09/22/20 09/22/20 09/23/20





  18:00 18:59 02:50


 


WBC   


 


RBC   


 


Hgb   


 


Hct   


 


MCV   


 


MCH   


 


MCHC   


 


RDW   


 


Plt Count   


 


Seg Neutrophils %   


 


Carbonic Acid    0.85 L


 


HCO3/H2CO3 Ratio    17:1


 


ABG pH    7.33 L


 


ABG pCO2    28.3 L


 


ABG pO2    81.7


 


ABG HCO3    14.5 L


 


ABG O2 Saturation    95.5


 


ABG Base Excess    -9.9


 


FiO2    4L


 


Sodium   


 


Potassium   


 


Chloride   


 


Carbon Dioxide   


 


Anion Gap   


 


BUN   


 


Creatinine   


 


Est GFR ( Amer)   


 


Est GFR (Non-Af Amer)   


 


Glucose  Cancelled  108 


 


Calcium   


 


Phosphorus   


 


Magnesium   


 


Total Bilirubin   


 


AST   


 


Alkaline Phosphatase   


 


Total Protein   


 


Albumin   














  09/23/20 09/23/20 09/23/20





  04:21 04:21 06:04


 


WBC  6.5  


 


RBC  4.65  


 


Hgb  13.0 L D  


 


Hct  39.8  


 


MCV  86  


 


MCH  28.0  


 


MCHC  32.7  


 


RDW  19.1 H  


 


Plt Count  166  


 


Seg Neutrophils %  70.4  


 


Carbonic Acid   


 


HCO3/H2CO3 Ratio   


 


ABG pH   


 


ABG pCO2   


 


ABG pO2   


 


ABG HCO3   


 


ABG O2 Saturation   


 


ABG Base Excess   


 


FiO2   


 


Sodium   Cancelled  131.6 L


 


Potassium   Cancelled  3.9


 


Chloride   Cancelled  99


 


Carbon Dioxide   Cancelled  18 L


 


Anion Gap   Cancelled  15


 


BUN   Cancelled  28 H


 


Creatinine   Cancelled  1.61 H


 


Est GFR ( Amer)   Cancelled  53 L


 


Est GFR (Non-Af Amer)   Cancelled 


 


Glucose   Cancelled  93


 


Calcium   Cancelled  7.9 L


 


Phosphorus   Cancelled  4.4


 


Magnesium   Cancelled  1.6


 


Total Bilirubin   Cancelled  3.5 H


 


AST   Cancelled  1981 H


 


Alkaline Phosphatase   Cancelled  103


 


Total Protein   Cancelled  6.2 L


 


Albumin   Cancelled  2.9 L








                                        





09/20/20 22:02   Blood   Blood Culture (PCR) - Final


                            Staphylococcus Species





                                        











  09/20/20





  22:02


 


Troponin I  0.141


 


NT-Pro-B Natriuret Pep  66550 H











EKG Comments: 





Telemetry shows sinus rhythm at 64 bpm


Impressions: 


                                        





Chest X-Ray  09/20/20 20:34


IMPRESSION:


 


No acute abnormality as above.


 


 


copyright 2011 [employer]- All Rights Reserved


 








Chest/Abdomen CTA  09/20/20 22:55


IMPRESSION: 


There are filling defects within the main pulmonary artery


concerning for developing saddle emboli. There are also filling


defects seen within the segmental arteries consistent with


pulmonary artery emboli.


 


 


There is a groundglass nodule at the right upper lobe measuring


up to 12 mm which will need interval follow-up within three


months.


There are trace pleural effusions with some overlying airspace


opacities which are favored to represent atelectasis, but may


also represent some mild edema or infection.


 














Assessment & Plan





- Diagnosis


(1) Bilateral pulmonary embolism


Is this a current diagnosis for this admission?: Yes   


Plan: 


Supportive care per intensive care unit.


Intravenous heparin.  Supportive care.  RV dysfunction is manifest likely 

multifactorial of the pulmonary embolism does not improve prognosis








(2) Tachycardia


Is this a current diagnosis for this admission?: Yes   


Plan: 


Presently patient is maintaining sinus rhythm.


Episodes of bradycardia probably due to increased vagal tone on account of 

respiratory distress/apneic episodes as well as increased vagal tone that is pr

obably driven by large clot burden in the pulmonary artery.











(3) Alcohol dependence


Qualifiers: 


   Substance use status: alcohol-induced persisting dementia   Qualified 

Code(s): F10.27 - Alcohol dependence with alcohol-induced persisting dementia   


Is this a current diagnosis for this admission?: Yes   


Plan: 


This does complicate his care goals and objectives.








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


Is this a current diagnosis for this admission?: Yes   


Plan: 


Dilated cardiomyopathy likely multifactorial but predominantly probably due to 

alcohol


Medical therapy is currently limited due to ongoing therapy for pulmonary 

embolism


Diminished urine output


May require a trial of inotrope now with hypotension pressors are also indicated

probably


Overall prognosis is poor with severe biventricular dysfunction with significant

hemodynamic insult on account of pulmonary embolism with large clot burden.

## 2020-09-24 LAB
ABSOLUTE LYMPHOCYTES# (MANUAL): 0.1 10^3/UL (ref 0.5–4.7)
ABSOLUTE MONOCYTES # (MANUAL): 0.1 10^3/UL (ref 0.1–1.4)
ADD MANUAL DIFF: YES
ALBUMIN SERPL-MCNC: 2.8 G/DL (ref 3.5–5)
ALP SERPL-CCNC: 110 U/L (ref 38–126)
ANION GAP SERPL CALC-SCNC: 8 MMOL/L (ref 5–19)
ANISOCYTOSIS BLD QL SMEAR: (no result)
APPEARANCE UR: (no result)
APTT BLD: 52.7 SEC (ref 23.5–35.8)
APTT PPP: (no result) S
AST SERPL-CCNC: 1653 U/L (ref 17–59)
BASOPHILS NFR BLD MANUAL: 0 % (ref 0–2)
BILIRUB DIRECT SERPL-MCNC: 2.3 MG/DL (ref 0–0.4)
BILIRUB SERPL-MCNC: 3.8 MG/DL (ref 0.2–1.3)
BILIRUB UR QL STRIP: NEGATIVE
BUN SERPL-MCNC: 26 MG/DL (ref 7–20)
CALCIUM: 7.8 MG/DL (ref 8.4–10.2)
CHLORIDE SERPL-SCNC: 97 MMOL/L (ref 98–107)
CO2 SERPL-SCNC: 22 MMOL/L (ref 22–30)
EOSINOPHIL NFR BLD MANUAL: 0 % (ref 0–6)
ERYTHROCYTE [DISTWIDTH] IN BLOOD BY AUTOMATED COUNT: 18.8 % (ref 11.5–14)
GLUCOSE SERPL-MCNC: 126 MG/DL (ref 75–110)
GLUCOSE UR STRIP-MCNC: NEGATIVE MG/DL
HCT VFR BLD CALC: 39.6 % (ref 37.9–51)
HGB BLD-MCNC: 13.2 G/DL (ref 13.5–17)
INR PPP: 2.18
KETONES UR STRIP-MCNC: NEGATIVE MG/DL
MCH RBC QN AUTO: 27.8 PG (ref 27–33.4)
MCHC RBC AUTO-ENTMCNC: 33.4 G/DL (ref 32–36)
MCV RBC AUTO: 83 FL (ref 80–97)
MONOCYTES % (MANUAL): 4 % (ref 3–13)
NITRITE UR QL STRIP: NEGATIVE
OVALOCYTES BLD QL SMEAR: SLIGHT
PH UR STRIP: 6 [PH] (ref 5–9)
PHOSPHATE SERPL-MCNC: 2.8 MG/DL (ref 2.5–4.5)
PLATELET # BLD: 202 10^3/UL (ref 150–450)
PLATELET COMMENT: ADEQUATE
POIKILOCYTOSIS BLD QL SMEAR: SLIGHT
POTASSIUM SERPL-SCNC: 3.5 MMOL/L (ref 3.6–5)
PROT SERPL-MCNC: 5.9 G/DL (ref 6.3–8.2)
PROT UR STRIP-MCNC: 100 MG/DL
PROTHROMBIN TIME: 24.3 SEC (ref 11.4–15.4)
RBC # BLD AUTO: 4.76 10^6/UL (ref 4.35–5.55)
SEGMENTED NEUTROPHILS % (MAN): 92 % (ref 42–78)
SP GR UR STRIP: 1.02
TOTAL CELLS COUNTED BLD: 100
UROBILINOGEN UR-MCNC: 2 MG/DL (ref ?–2)
VARIANT LYMPHS NFR BLD MANUAL: 4 % (ref 13–45)
WBC # BLD AUTO: 3.6 10^3/UL (ref 4–10.5)

## 2020-09-24 RX ADMIN — Medication SCH: at 13:00

## 2020-09-24 RX ADMIN — LINEZOLID SCH MLS/HR: 600 INJECTION, SOLUTION INTRAVENOUS at 17:01

## 2020-09-24 RX ADMIN — HEPARIN SODIUM PRN MLS/HR: 10000 INJECTION, SOLUTION INTRAVENOUS at 04:18

## 2020-09-24 RX ADMIN — Medication SCH: at 05:51

## 2020-09-24 RX ADMIN — DOPAMINE HYDROCHLORIDE PRN MLS/HR: 320 INJECTION, SOLUTION INTRAVENOUS at 13:52

## 2020-09-24 RX ADMIN — FLUDROCORTISONE ACETATE SCH MG: 0.1 TABLET ORAL at 09:25

## 2020-09-24 RX ADMIN — LISINOPRIL SCH: 10 TABLET ORAL at 09:00

## 2020-09-24 RX ADMIN — SODIUM PHOSPHATE, MONOBASIC, MONOHYDRATE PRN GM: 276; 142 INJECTION, SOLUTION INTRAVENOUS at 14:56

## 2020-09-24 RX ADMIN — SODIUM CHLORIDE, SODIUM LACTATE, POTASSIUM CHLORIDE, AND CALCIUM CHLORIDE PRN MLS/HR: .6; .31; .03; .02 INJECTION, SOLUTION INTRAVENOUS at 11:31

## 2020-09-24 RX ADMIN — LINEZOLID SCH MLS/HR: 600 INJECTION, SOLUTION INTRAVENOUS at 05:52

## 2020-09-24 RX ADMIN — Medication SCH ML: at 22:00

## 2020-09-24 RX ADMIN — PANTOPRAZOLE SODIUM SCH: 40 TABLET, DELAYED RELEASE ORAL at 05:51

## 2020-09-24 RX ADMIN — DOBUTAMINE IN DEXTROSE PRN MLS/HR: 200 INJECTION, SOLUTION INTRAVENOUS at 02:18

## 2020-09-24 NOTE — PDOC CRITICAL CARE PROG REPORT
General


Date:: 09/24/20


ICU Day:: 4


Hospital Day:: 4


Resuscitation Status: Full Code


Events in the past 12 to 24 Hours:: 





Update


The patient developed worsening dyspnea early this afternoon.


He became tachypneic and his 02 sat went down into the 70s.


It became difficult to discern his 02 sat so an ABG was drawn. it showed a Pa02 

of 204 on a 100% NRM.


The patient had had a Pa02 of about 150 early this morning on 3 l02 nasal.


He has conformed PE by CT scan. In addition, the patient dropped his BP some to 

about 100 systolic. his systolic was 20-30 pts higher a few hours ago.


I decided to give the patient TPA based on this change in status. Shortly before

this change in status the patient had reverted for A flutter with a HR in the 

130s to asinus brendan and than NSR.


The patient was queried about various bleeding risks ( CVA in past, aneurysym, 

gi bleed) and he answered that he had not. I explained thepotoential risks of 

bleeding with TPA to thepatient has well.


I told him there was about 6-8% chance of bleeding and about 1-2 % chance of a 

an intracrainial hemorrhage. the patient  appeared to iunderstand the  risk and 

agreed to go forward with sytemic TPA. His heparin was put on hold in the 

interim.


Out facility does npt use EKOs and or have mechanical thrombectomy available 

etc.,


I t did not appear reasonable or asafe to transfer thepatient to another 

facility at this time.





9/22


The patient had a change in status during the early afternoon.


'He developed sinus brendan after converting spontaneously from a rapid a -flutter

with a 2:1 block.


His pulse oximetry would dip down at times to the 70s and the patient became 

quite tachypneic. his A-a gradient appeared to have increaed very much at the 

time ( >400).


 We decided to give thepatient systemic TPA on the bassof those findings. The 

patient received 10% up front and the remaining 90 mg over 2 hours.


The patient tolerated the infusion. His heparin drip was stopped and than 

restarted late that night


Pulse ox remains in the 90s.





9/23


The patient continues to do poorly.


His bp is running low.


He appears listless.


His urine output is quite poor in the last several hours.


He continues to have brief bradycardic events.


His pulse oximetry remains stable.





9/24


The patient appears more alert and less uncomfortable. 


Not tachypneic and not utilizing accessory resp. mm.


The patient is more engaged.


02 sat is is in the high 90s on 4 l 2 nasal.


He had some difficulties on his bedside swallow eval. We are going to prceed 

with modified barium swallow.


We are going to try to get off dopamine and dobutrex as well.








Reason for ICU Addmission:: pulmonary embolism





Physical Exam


Vital Signs: 


                                        











Temp Pulse Resp BP Pulse Ox


 


 97.9 F   80   20   109/85   97 


 


 09/23/20 16:00  09/24/20 07:00  09/24/20 08:40  09/24/20 08:40  09/24/20 08:40








                                 Intake & Output











 09/23/20 09/24/20 09/25/20





 06:59 06:59 06:59


 


Intake Total 3907 1731 21


 


Output Total 655 1550 165


 


Balance 3252 181 -144


 


Weight 74.6 kg 75.7 kg 








                                  Weight/Height





Weight                           75.7 kg


Height                           6 ft 3 in








General appearance: PRESENT: no acute distress


Head exam: PRESENT: atraumatic, normocephalic


Eye exam: PRESENT: conjunctiva pink


Mouth exam: PRESENT: neck supple


Neck exam: ABSENT: JVD, tenderness, thyromegaly, tracheal deviation


Respiratory exam: ABSENT: accessory muscle use, retraction, tachypnea


Cardiovascular exam: PRESENT: RRR, +S1, +S2


GI/Abdominal exam: PRESENT: normal bowel sounds, soft


Rectal exam: ABSENT: black stool, laceration, tenderness


Extremities exam: ABSENT: joint swelling, pedal edema


Musculoskeletal exam: PRESENT: full ROM


Psychiatric exam: PRESENT: flat affect - Patient does not ask questions or try 

to respond much though he appears to understand reaosnably well.





Laboratory/Radiographs


Laboratory Results: 


                                        





                                 09/24/20 05:35 





                                 09/24/20 05:35 





                                        











  09/24/20 09/24/20 09/24/20





  05:35 05:35 09:15


 


WBC  3.6 L  


 


RBC  4.76  


 


Hgb  13.2 L  


 


Hct  39.6  


 


MCV  83  


 


MCH  27.8  


 


MCHC  33.4  


 


RDW  18.8 H  


 


Plt Count  202  


 


Seg Neutrophils %  Not Reportable  


 


Sodium   126.9 L 


 


Potassium   3.5 L 


 


Chloride   97 L 


 


Carbon Dioxide   22 


 


Anion Gap   8 


 


BUN   26 H 


 


Creatinine   1.18 


 


Est GFR ( Amer)   > 60 


 


Glucose   126 H 


 


Calcium   7.8 L 


 


Phosphorus   2.8 


 


Magnesium   1.3 L 


 


Total Bilirubin   3.8 H 


 


AST   1653 H 


 


Alkaline Phosphatase   110 


 


Total Protein   5.9 L 


 


Albumin   2.8 L 


 


Urine Color    MANDI


 


Urine Appearance    SLIGHTLY-CLOUDY


 


Urine pH    6.0


 


Ur Specific Gravity    1.016


 


Urine Protein    100 H


 


Urine Glucose (UA)    NEGATIVE


 


Urine Ketones    NEGATIVE


 


Urine Blood    LARGE H


 


Urine Nitrite    NEGATIVE


 


Ur Leukocyte Esterase    TRACE H


 


Urine WBC (Auto)    29


 


Urine RBC (Auto)    72








                                        





09/20/20 22:02   Blood   Blood Culture (PCR) - Final


                            Staphylococcus Species





                                        











  09/20/20





  22:02


 


Troponin I  0.141


 


NT-Pro-B Natriuret Pep  93502 H











Impressions: 


                                        





Chest X-Ray  09/20/20 20:34


IMPRESSION:


 


No acute abnormality as above.


 


 


copyright 2011 [employer]- All Rights Reserved


 








Chest/Abdomen CTA  09/20/20 22:55


IMPRESSION: 


There are filling defects within the main pulmonary artery


concerning for developing saddle emboli. There are also filling


defects seen within the segmental arteries consistent with


pulmonary artery emboli.


 


 


There is a groundglass nodule at the right upper lobe measuring


up to 12 mm which will need interval follow-up within three


months.


There are trace pleural effusions with some overlying airspace


opacities which are favored to represent atelectasis, but may


also represent some mild edema or infection.


 














Assessment and Plan





- Diagnosis


(1) Atrial flutter


Qualifiers: 


   Atrial flutter type: unspecified   Qualified Code(s): I48.92 - Unspecified 

atrial flutter   


Is this a current diagnosis for this admission?: No   


Plan: 


The patient appears to be in Atrial flutter with heart rate of 130-140.


Unclear how long he has been in this rhythm.


His recore from a previous admission cites PAF.


Cardiology to see the patient. He is on full dose anticoagulation at this time





9/23


For the most part he is in a nSR. At times he has short periods of flutter or 

gets profoundly bradycardic.


Dr. Diaz feelks he has enhanced vagal tone on the bsis of his large PE. 








(2) Bilateral pulmonary embolism


Is this a current diagnosis for this admission?: Yes   


Plan: 


The patient does not appear to be hemodynamically compromised. he is maintaining

his BP. He requires low flow 02.


Not likely that he needs TPA at this time. We will be getting an nECHO to assess

PAP and Lv fn. and right ventricle size.





9/22


Patient was treated witj systemic TPA yesterday.


He is back on Heparin at this time


His work of breathing and 02 saturations aappear better.


No obvious bleeding complications.





9/23


The patient had a huge PE.


He remains hemodynamically a bit precarious since than.


His BP is running low presently. He is likely to needd pressors.


He is already on dobutamine at 5 mcg/kg/min.


He received TPA yesterday and is back onheparin at this time.





9/24


Can probaly take off heparin and start on po agent shortly. will await swallow 

test.


Coumadin would not be a good choice for hime given the necessary f/u.


He appears to be more hemodynamically stable and having kess breaducardia at 

this time.


Oxygennationis better as well as previously noted.








(3) Alcohol dependence


Qualifiers: 


   Substance use status: alcohol-induced persisting dementia   Qualified 

Code(s): F10.27 - Alcohol dependence with alcohol-induced persisting dementia   


Is this a current diagnosis for this admission?: Yes   


Plan: 


The patient carries a diagnosis of alcohol related dilated cardiomyopathy.


He is showing no signs of acute withdrawl at this time.





9/22


AST is elevated from the time of admission.


Alk phos and bili are slightly elevated as well.


Will continue to monitor them for now.








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


Is this a current diagnosis for this admission?: Yes   


Plan: 


The patient has a known history of cardiomyopathy. According to Dr. Diaz he 

has severe bialteral ventricular dysfn.


he is on Dobutamine for inotropic support. he may need additional dopamine or 

levo given hsi low bp.





9/24


Recent ECHO showed a LVEF of about 20%. In addition, the RV was dialted and 

showed poor contractilty.








(5) PAD (peripheral artery disease)


Is this a current diagnosis for this admission?: No   


Plan: 


Patient has known occlusive diseases since 6/29.


He had a partial occlusionin theright anterior tibial artery and more critical b

lockage in the distal right femoral artery.








(6) Sick sinus syndrome


Is this a current diagnosis for this admission?: Yes   


Plan: 


I beleiev etherjoaquin is reason to bleieve the patient has a SSS,. His gheart rate 

vacillates quite a bot between 30 and 130.


Whe he spontaneously converted yesterday he slowed ointo the 30s. We have 

stopoped his bnetas blocker at this time. cardiology to see the patient





Plan Summary: 





It appears likely that the patient is not going to do well.


he is in a low flow state. 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 He is not a suitable

dialysis candidate even in the short term. I will try to talk to patient about 

hius code status if he


appears able to have that conversation.


Prognosis appears poor.








9/24


The patient's brother was here yesterday. they are not particualrly close. I 

ddid explain that thepatient was quite ill and not likely to do well even in the

short term.


I did speak to the patient about resucitation etc., and if he needed intubation 

or resuscitation he remains onboard.





Critical Time


Critical Time (minutes): 25


Level of Care: ICU


-: 


1.  The care of a critical patient is a dynamic process.  This note is a 

representative synopsis but static in nature.  The timeframe for treatments 

given in order is not necessarily the actual time these treatments may have been

done.





2.  This patient requires critical care secondary to ongoing requirements for 

therapy not offered or safe outside the critical care environment.  Transfer to 

a lower level of care will result in altered life or limb morbidity and 

mortality.





3.  Multidisciplinary rounds completed.





4.  ABCDE bundle addressed.

## 2020-09-25 RX ADMIN — Medication SCH ML: at 21:35

## 2020-09-25 RX ADMIN — SODIUM CHLORIDE TAB 1 GM SCH GM: 1 TAB at 17:28

## 2020-09-25 RX ADMIN — SODIUM CHLORIDE TAB 1 GM SCH GM: 1 TAB at 17:29

## 2020-09-25 RX ADMIN — LINEZOLID SCH MLS/HR: 600 INJECTION, SOLUTION INTRAVENOUS at 18:48

## 2020-09-25 RX ADMIN — Medication SCH: at 13:11

## 2020-09-25 RX ADMIN — LINEZOLID SCH MLS/HR: 600 INJECTION, SOLUTION INTRAVENOUS at 06:33

## 2020-09-25 RX ADMIN — APIXABAN SCH MG: 5 TABLET, FILM COATED ORAL at 17:28

## 2020-09-25 RX ADMIN — PANTOPRAZOLE SODIUM SCH: 40 TABLET, DELAYED RELEASE ORAL at 06:21

## 2020-09-25 RX ADMIN — Medication SCH ML: at 06:33

## 2020-09-25 RX ADMIN — APIXABAN SCH MG: 5 TABLET, FILM COATED ORAL at 10:34

## 2020-09-25 RX ADMIN — FLUDROCORTISONE ACETATE SCH: 0.1 TABLET ORAL at 09:21

## 2020-09-25 RX ADMIN — LISINOPRIL SCH: 10 TABLET ORAL at 09:21

## 2020-09-25 NOTE — RADIOLOGY REPORT (SQ)
EXAM DESCRIPTION:  COOKIE SWALLOW



IMAGES COMPLETED DATE/TIME:  9/25/2020 9:43 am



REASON FOR STUDY:  Diffculty swallowing



COMPARISON:  None.



TECHNIQUE:  Videofluoroscopic swallowing examination was performed in conjunction with speech patholo
gy.  Videofluoroscopic imaging was obtained and reviewed and these are the findings:



RADIATION DOSE:  Fluoro time 4.9 minutes

1 images saved to PACS.



LIMITATIONS:  None



FINDINGS:  The patient was brought into the fluoro room and placed upright on a modified barium swall
ow chair.  The patient was then given multiple consistencies mixed with barium to swallow under live 
fluoroscopic video guidance.  According to the Speech Pathologist there was deep laryngeal penetratio
n with trace aspiration seen with thin barium.  All other consistencies were swallowed without incide
nt.  Mild cricopharyngeus hypertrophy with what appears to be the beginning of a small Zenker's diver
ticulum.  Intermittent lack of epiglottic inversion noted. Please refer to the speech pathology repor
t for further details.



IMPRESSION:  DEEP LARYNGEAL PENETRATION WITH TRACE ASPIRATION SEEN WITH THIN BARIUM.  . PLEASE SEE SP
EECH PATHOLOGIST REPORT FOR OTHER FINDINGS AND RECOMMENDATIONS.



COMMENT:  None

Quality :  Final reports for procedures using fluoroscopy that document radiation exposure edwin
hawk, or exposure time and number of fluorographic images (if radiation exposure indices are not avail
able)



TECHNICAL DOCUMENTATION:  JOB ID: [id number]

 2011 [employer]- All Rights Reserved



Reading location - IP/workstation name: [email or web address]

## 2020-09-25 NOTE — PDOC CRITICAL CARE PROG REPORT
General


Date:: 09/25/20


ICU Day:: 5


Hospital Day:: 5


Resuscitation Status: Full Code


Events in the past 12 to 24 Hours:: 





Update


The patient developed worsening dyspnea early this afternoon.


He became tachypneic and his 02 sat went down into the 70s.


It became difficult to discern his 02 sat so an ABG was drawn. it showed a Pa02 

of 204 on a 100% NRM.


The patient had had a Pa02 of about 150 early this morning on 3 l02 nasal.


He has conformed PE by CT scan. In addition, the patient dropped his BP some to 

about 100 systolic. his systolic was 20-30 pts higher a few hours ago.


I decided to give the patient TPA based on this change in status. Shortly before

this change in status the patient had reverted for A flutter with a HR in the 

130s to asinus brendan and than NSR.


The patient was queried about various bleeding risks ( CVA in past, aneurysym, 

gi bleed) and he answered that he had not. I explained thepotoential risks of 

bleeding with TPA to thepatient has well.


I told him there was about 6-8% chance of bleeding and about 1-2 % chance of a 

an intracrainial hemorrhage. the patient  appeared to iunderstand the  risk and 

agreed to go forward with sytemic TPA. His heparin was put on hold in the 

interim.


Out facility does npt use EKOs and or have mechanical thrombectomy available 

etc.,


I t did not appear reasonable or asafe to transfer thepatient to another 

facility at this time.





9/22


The patient had a change in status during the early afternoon.


'He developed sinus brendan after converting spontaneously from a rapid a -flutter

with a 2:1 block.


His pulse oximetry would dip down at times to the 70s and the patient became 

quite tachypneic. his A-a gradient appeared to have increaed very much at the 

time ( >400).


 We decided to give thepatient systemic TPA on the bassof those findings. The 

patient received 10% up front and the remaining 90 mg over 2 hours.


The patient tolerated the infusion. His heparin drip was stopped and than 

restarted late that night


Pulse ox remains in the 90s.





9/23


The patient continues to do poorly.


His bp is running low.


He appears listless.


His urine output is quite poor in the last several hours.


He continues to have brief bradycardic events.


His pulse oximetry remains stable.





9/24


The patient appears more alert and less uncomfortable. 


Not tachypneic and not utilizing accessory resp. mm.


The patient is more engaged.


02 sat is is in the high 90s on 4 l 2 nasal.


He had some difficulties on his bedside swallow eval. We are going to prceed 

with modified barium swallow.


We are going to try to get off dopamine and dobutrex as well.





9/25


The patient has appeared to have turned the corner. He is iff dobutrex and 

dopamine. He is awake and communicative.





His respiratory status has much improved.


He has developed a bit of hyponatremia.


His fluid balance is grossly positive the last few days. I hesitate to order 

lasix as his Bp is rather soft. I may try to obtain salt tablets for the pa

tient.


His cortisol level was normal.


Urine Na + was 10 so it appears that the patient's kidneys are trying to 

preserve Na+.





Reason for ICU Addmission:: pulmonary embolism





Physical Exam


Vital Signs: 


                                        











Temp Pulse Resp BP Pulse Ox


 


 96.6 F L  75   28 H  101/88 H  100 


 


 09/25/20 10:00  09/25/20 07:00  09/25/20 10:31  09/25/20 10:00  09/25/20 10:30








                                 Intake & Output











 09/24/20 09/25/20 09/26/20





 06:59 06:59 06:59


 


Intake Total 1731 1270 391


 


Output Total 1550 895 110


 


Balance 181 375 281


 


Weight 75.7 kg 80.4 kg 








                                  Weight/Height





Weight                           80.4 kg


Height                           6 ft 3 in








General appearance: PRESENT: cooperative, disheveled


Head exam: PRESENT: atraumatic, normocephalic


Eye exam: PRESENT: conjunctiva pink


Mouth exam: PRESENT: moist, neck supple


Neck exam: PRESENT: full ROM.  ABSENT: meningismus


Respiratory exam: ABSENT: accessory muscle use


Cardiovascular exam: PRESENT: RRR


Pulses: PRESENT: +1 pedal pulses bilateral


GI/Abdominal exam: PRESENT: normal bowel sounds, soft


Rectal exam: PRESENT: deferred


Extremities exam: ABSENT: joint swelling


Musculoskeletal exam: PRESENT: full ROM


Neurological exam: PRESENT: alert, awake, oriented to person, oriented to place,

oriented to situation


Psychiatric exam: PRESENT: flat affect





Laboratory/Radiographs


Laboratory Results: 


                                        





                                 09/24/20 05:35 





                                 09/24/20 05:35 





                                        











  09/24/20 09/24/20





  09:15 09:15


 


Ur Specific Gravity   Cancelled


 


Urine Osmolality  442 








                                        





09/20/20 22:02   Blood   Blood Culture (PCR) - Final


                            Staphylococcus Species


09/20/20 22:02   Blood   Blood Culture - Final


                            Staphylococcus Epidermidis





                                        











  09/20/20





  22:02


 


Troponin I  0.141


 


NT-Pro-B Natriuret Pep  78547 H











Impressions: 


                                        





Chest X-Ray  09/20/20 20:34


IMPRESSION:


 


No acute abnormality as above.


 


 


copyright 2011 [employer]- All Rights Reserved


 








Chest/Abdomen CTA  09/20/20 22:55


IMPRESSION: 


There are filling defects within the main pulmonary artery


concerning for developing saddle emboli. There are also filling


defects seen within the segmental arteries consistent with


pulmonary artery emboli.


 


 


There is a groundglass nodule at the right upper lobe measuring


up to 12 mm which will need interval follow-up within three


months.


There are trace pleural effusions with some overlying airspace


opacities which are favored to represent atelectasis, but may


also represent some mild edema or infection.


 








Modified Barium Swallow  09/25/20 00:00


IMPRESSION:  DEEP LARYNGEAL PENETRATION WITH TRACE ASPIRATION SEEN WITH THIN 

BARIUM.  . PLEASE SEE SPEECH PATHOLOGIST REPORT FOR OTHER FINDINGS AND 

RECOMMENDATIONS.


 














Assessment and Plan





- Diagnosis


(1) Atrial flutter


Qualifiers: 


   Atrial flutter type: unspecified   Qualified Code(s): I48.92 - Unspecified 

atrial flutter   


Is this a current diagnosis for this admission?: No   


Plan: 


The patient appears to be in Atrial flutter with heart rate of 130-140.


Unclear how long he has been in this rhythm.


His recore from a previous admission cites PAF.


Cardiology to see the patient. He is on full dose anticoagulation at this time





9/23


For the most part he is in a nSR. At times he has short periods of flutter or 

gets profoundly bradycardic.


Dr. Diaz feelks he has enhanced vagal tone on the bsis of his large PE.





9/25


Has been in NSR without significant pauses or slwing the past 48 hours 








(2) Bilateral pulmonary embolism


Is this a current diagnosis for this admission?: Yes   


Plan: 


The patient does not appear to be hemodynamically compromised. he is maintaining

his BP. He requires low flow 02.


Not likely that he needs TPA at this time. We will be getting an nECHO to assess

PAP and Lv fn. and right ventricle size.





9/22


Patient was treated witj systemic TPA yesterday.


He is back on Heparin at this time


His work of breathing and 02 saturations aappear better.


No obvious bleeding complications.





9/23


The patient had a huge PE.


He remains hemodynamically a bit precarious since than.


His BP is running low presently. He is likely to needd pressors.


He is already on dobutamine at 5 mcg/kg/min.


He received TPA yesterday and is back onheparin at this time.





9/24


Can probaly take off heparin and start on po agent shortly. will await swallow 

test.


Coumadin would not be a good choice for hime given the necessary f/u.


He appears to be more hemodynamically stable and having kess breaducardia at 

this time.


Oxygennation is better as well as previously noted.


We are stopping heparin and switching over to Eliquis at this time.








(3) Alcohol dependence


Qualifiers: 


   Substance use status: alcohol-induced persisting dementia   Qualified 

Code(s): F10.27 - Alcohol dependence with alcohol-induced persisting dementia   


Is this a current diagnosis for this admission?: Yes   


Plan: 


The patient carries a diagnosis of alcohol related dilated cardiomyopathy.


He is showing no signs of acute withdrawl at this time.





9/22


AST is elevated from the time of admission.


Alk phos and bili are slightly elevated as well.


Will continue to monitor them for now.








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


Is this a current diagnosis for this admission?: Yes   


Plan: 


The patient has a known history of cardiomyopathy. According to Dr. Diaz he 

has severe bialteral ventricular dysfn.


he is on Dobutamine for inotropic support. he may need additional dopamine or 

levo given hsi low bp.





9/24


Recent ECHO showed a LVEF of about 20%. In addition, the RV was dialted and 

showed poor contractilty.








(5) PAD (peripheral artery disease)


Is this a current diagnosis for this admission?: No   


Plan: 


Patient has known occlusive diseases since 6/29.


He had a partial occlusionin theright anterior tibial artery and more critical 

blockage in the distal right femoral artery.








(6) Sick sinus syndrome


Is this a current diagnosis for this admission?: Yes   


Plan: 


I catherine atkinson is reason to bleieve the patient has a SSS,. His gheart rate 

vacillates quite a bot between 30 and 130.


Whe he spontaneously converted yesterday he slowed ointo the 30s. We have 

stopoped his bnetas blocker at this time. cardiology to see the patient








Critical Time


Critical Time (minutes): 25


Level of Care: ICU


-: 


1.  The care of a critical patient is a dynamic process.  This note is a 

representative synopsis but static in nature.  The timeframe for treatments 

given in order is not necessarily the actual time these treatments may have been

done.





2.  This patient requires critical care secondary to ongoing requirements for 

therapy not offered or safe outside the critical care environment.  Transfer to 

a lower level of care will result in altered life or limb morbidity and 

mortality.





3.  Multidisciplinary rounds completed.





4.  ABCDE bundle addressed.

## 2020-09-25 NOTE — ST INP MODIFIED BARIUM SWALLOW
Medical Diagnosis





- Medical Diagnoses


Medical Diagnosis Description & ICD-10 Code(s): bilateral PE





- ICD-10 Tx Diagnosis Coding


(1) Dysphagia


ICD-10 Code(s): R13.10 - DYSPHAGIA, UNSPECIFIED   





(2) Bilateral pulmonary embolism


ICD-10 Code(s): I26.99 - OTHER PULMONARY EMBOLISM WITHOUT ACUTE COR PULMONALE   





[facility] [place]





- General


Date: 09/25/20


Date of Onset: 09/24/20





- History


-: Medical - per EMR: patient admitted 9/21 with bilateral pleural embolism. 

Prior medical history includes atrial flutter, alcohol induced dilated 

cardiomyopathy, CHF, HTN, PVD, COPD. Recently, nursing reports that the patient 

has had some wet, gurgly voice sounds with PO intake.


Medications: Medications Reviewed


Allergies: No known allergies





- Subjective


Current Nutritional Means: PO


Current PO Diet: Regular


Current Symptoms: Coughing, Wet/gurgly voice


Pain: Patient reports, 0/5





- Objective


Assessment: Upright, Left Lateral





- Food Trials


Food Trials Used: Thin liquids, Pureed, Regular


The Patient: Was Able to Self Feed





- Assessment


Labial Function: Impaired - mildly reduced seal


Lingual Function: Within Functional Limits


Mandibular Function: Within Functional Limits


Dentition: Partial


Velo-Pharyngeal Function: Unremarkable





- Pharyngeal Stage


Initiation of Pharyngeal Stage: Normal


Decreased Laryngeal Elevation: Yes - mild


Reduced Velo-Pharyngeal Closure: no


Reduced Pressure Generation: Yes


Reduced Tongue Base Retraction: No


Pre-Swallowing Pooling in Valleculae: Moderate


Pre-Swallowing Pooling in Pyriforms: Mild


Reduced Thyro-Hyiod Approximation: Yes - mild


Reduced Epiglottic Excursion: Yes - possibly due to osteophytes


Reduced Pharyngeal Peristalsis: No


Multiple Swallows With: Cleared w/ Liquid Assist


Post Swallow Residuals in Valleculae: Significant - with regular solids


Post Swallow Residuals in Pyriforms: Mild - with all textures


Post Swallow Residuals: throughout pharynx





- Esophageal Stage


Cricophageal Function: Normal - while cricopharyngeal function is witin normal 

limits, there is evidence of development of possible Zenker's diverticulum or 

bar from posterior wall at level of approximately C6


Cervical Osteophytes Noted: Yes - C3-4





- Impression/Summary


Laryngeal Penetration: Yes - deep penetration seen with thin liquids


Tracheal Aspiration: yes - aspiraton clearly seen x1 with thin liquid


Compensatory Strategies: fully upright, head up for PO


Risk of Aspiration: Moderate





- Recommendations


Solid Diet Recommendations: Regular


Liquid Diet Recommendations: Thin


Strict Aspitarion Precautions: Yes


Recommended Techniques: Fully Upright During Meal, Small Bites and Sips, 

Alternate Bites/Sips


Other Recommendations: Patient demonstrated some retrograde movement of barium 

after the swallow, apparently from below the UES, back into pharyngeal cavity 

after the swallow consistently initially. This was then seen to enter laryngeal 

vestibule and be aspirated. When cued patient to lift head up higher, this was 

not seen to happen again.





- Time


Total Time: 30


Total Timed Minutes: 30

## 2020-09-26 LAB
ANION GAP SERPL CALC-SCNC: 10 MMOL/L (ref 5–19)
ARTERIAL BLOOD FIO2: (no result)
ARTERIAL BLOOD H2CO3: 0.88 MMOL/L (ref 1.05–1.35)
ARTERIAL BLOOD HCO3: 17.1 MMOL/L (ref 20–24)
ARTERIAL BLOOD PCO2: 29.3 MMHG (ref 35–45)
ARTERIAL BLOOD PH: 7.39 (ref 7.35–7.45)
ARTERIAL BLOOD PO2: 77.8 MMHG (ref 80–100)
ARTERIAL BLOOD TOTAL CO2: 18 MMOL/L (ref 23–27)
BASE EXCESS BLDA CALC-SCNC: -6.3 MMOL/L
BUN SERPL-MCNC: 19 MG/DL (ref 7–20)
CALCIUM: 7.7 MG/DL (ref 8.4–10.2)
CHLORIDE SERPL-SCNC: 98 MMOL/L (ref 98–107)
CO2 SERPL-SCNC: 20 MMOL/L (ref 22–30)
GLUCOSE SERPL-MCNC: 127 MG/DL (ref 75–110)
POTASSIUM SERPL-SCNC: 4 MMOL/L (ref 3.6–5)
SAO2 % BLDA: 95.5 % (ref 94–98)

## 2020-09-26 RX ADMIN — LINEZOLID SCH MLS/HR: 600 INJECTION, SOLUTION INTRAVENOUS at 05:29

## 2020-09-26 RX ADMIN — Medication SCH ML: at 21:47

## 2020-09-26 RX ADMIN — SODIUM CHLORIDE TAB 1 GM SCH GM: 1 TAB at 11:08

## 2020-09-26 RX ADMIN — Medication SCH ML: at 05:29

## 2020-09-26 RX ADMIN — LISINOPRIL SCH MG: 10 TABLET ORAL at 11:08

## 2020-09-26 RX ADMIN — APIXABAN SCH MG: 5 TABLET, FILM COATED ORAL at 17:40

## 2020-09-26 RX ADMIN — PANTOPRAZOLE SODIUM SCH MG: 40 TABLET, DELAYED RELEASE ORAL at 05:30

## 2020-09-26 RX ADMIN — APIXABAN SCH MG: 5 TABLET, FILM COATED ORAL at 11:07

## 2020-09-26 RX ADMIN — IPRATROPIUM BROMIDE AND ALBUTEROL SULFATE PRN ML: 2.5; .5 SOLUTION RESPIRATORY (INHALATION) at 09:15

## 2020-09-26 RX ADMIN — SODIUM CHLORIDE TAB 1 GM SCH GM: 1 TAB at 17:40

## 2020-09-26 RX ADMIN — FLUDROCORTISONE ACETATE SCH MG: 0.1 TABLET ORAL at 11:08

## 2020-09-26 RX ADMIN — Medication SCH: at 16:14

## 2020-09-26 RX ADMIN — SODIUM CHLORIDE TAB 1 GM SCH: 1 TAB at 16:15

## 2020-09-26 RX ADMIN — LINEZOLID SCH MLS/HR: 600 INJECTION, SOLUTION INTRAVENOUS at 17:42

## 2020-09-26 NOTE — PDOC PROGRESS REPORT
Subjective


Progress Note for:: 09/26/20


Subjective:: 





Patient admitted to the hospital directly to the ICU on 9/21/2020 for treatment 

of acute pulmonary embolism with saddle embolism noted on CT.  Initially was 

hemodynamically stable.  However later that day he began to become 

hemodynamically unstable and became hypotensive and bradycardic likely secondary

to massive pulmonary embolism.  He received TPA in the ICU.  Also noted hypoxic.

 He was later started on a heparin drip and transitioned to Eliquis.  In the 

ICU, was also started on linezolid after blood cultures came back positive for 

coagulase-negative staph in 1 blood culture sets.  Evaluated for dysphagia.  

Also evaluated by cardiologist giving he has history of severe alcoholism and 

dilated cardiomyopathy and was experiencing episodes of bradycardia.  His 

bradycardic episodes were thought to be secondary to increased vagal tone 

secondary to massive thrombus burden.  He was transferred out of the ICU today. 

This morning he was noted to be hypoxic in the 60s on pulse oximetry.  We did 

get a pulse oximeter for his forehead and ABG which showed that the level of 

hypoxia was not actually depicted in his fingertips.  He does have history of 

peripheral arterial disease.  I did confirm with the intensivist that they also 

have trouble getting good pleths on his pulse ox from his fingers.  His abdomen 

is distended.


Reason For Visit: 


BILATERAL PE








Physical Exam


Vital Signs: 


                                        











Temp Pulse Resp BP Pulse Ox


 


 98.0 F   103 H  24 H  135/98 H  99 


 


 09/26/20 11:24  09/26/20 11:24  09/26/20 11:24  09/26/20 11:24  09/26/20 11:24








                                 Intake & Output











 09/25/20 09/26/20 09/27/20





 06:59 06:59 06:59


 


Intake Total 1270 991 225


 


Output Total 895 1010 150


 


Balance 375 -19 75


 


Weight 80.4 kg 80 kg 











General appearance: PRESENT: no acute distress, cooperative


Neck exam: ABSENT: JVD


Respiratory exam: PRESENT: tachypnea, unlabored.  ABSENT: accessory muscle use, 

symmetrical, wheezes


Cardiovascular exam: PRESENT: +S1, +S2, tachycardia.  ABSENT: irregular rhythm


GI/Abdominal exam: PRESENT: distended, firm, soft, tenderness.  ABSENT: 

guarding, hernia, rebound, rigid


Extremities exam: PRESENT: pedal edema


Neurological exam: PRESENT: alert, awake





Results


Laboratory Results: 


                                        





                                 09/24/20 05:35 





                                 09/26/20 04:29 





                                        











  09/26/20 09/26/20





  04:29 09:10


 


Carbonic Acid   0.88 L


 


HCO3/H2CO3 Ratio   19:1


 


ABG pH   7.39


 


ABG pCO2   29.3 L


 


ABG pO2   77.8 L


 


ABG HCO3   17.1 L


 


ABG O2 Saturation   95.5


 


ABG Base Excess   -6.3


 


FiO2   3L


 


Sodium  128.1 L 


 


Potassium  4.0 


 


Chloride  98 


 


Carbon Dioxide  20 L 


 


Anion Gap  10 


 


BUN  19 


 


Creatinine  0.90 


 


Est GFR (African Amer)  > 60 


 


Glucose  127 H 


 


Calcium  7.7 L 








                                        





09/20/20 23:05   Blood   Blood Culture - Final


                            NO GROWTH IN 5 DAYS





                                        











  09/20/20





  22:02


 


Troponin I  0.141


 


NT-Pro-B Natriuret Pep  67615 H











Impressions: 


                                        





Chest X-Ray  09/20/20 20:34


IMPRESSION:


 


No acute abnormality as above.


 


 


copyright 2011 [employer]- All Rights Reserved


 








Chest/Abdomen CTA  09/20/20 22:55


IMPRESSION: 


There are filling defects within the main pulmonary artery


concerning for developing saddle emboli. There are also filling


defects seen within the segmental arteries consistent with


pulmonary artery emboli.


 


 


There is a groundglass nodule at the right upper lobe measuring


up to 12 mm which will need interval follow-up within three


months.


There are trace pleural effusions with some overlying airspace


opacities which are favored to represent atelectasis, but may


also represent some mild edema or infection.


 








Modified Barium Swallow  09/25/20 00:00


IMPRESSION:  DEEP LARYNGEAL PENETRATION WITH TRACE ASPIRATION SEEN WITH THIN 

BARIUM.  . PLEASE SEE SPEECH PATHOLOGIST REPORT FOR OTHER FINDINGS AND 

RECOMMENDATIONS.


 














Assessment and Plan





- Diagnosis


(1) Acute massive pulmonary embolism


Is this a current diagnosis for this admission?: Yes   


Plan: 


Saddle embolus extending into the right and left lower lobes.  Noted hemodynamic

instability with hypotension and bradycardic episodes.


Status post TPA administered on 9/21/2020


Currently on Eliquis


Monitor on telemetry


Likely has DVT in his legs as he does have leg swelling as well.








(2) Positive blood culture


Is this a current diagnosis for this admission?: Yes   


Plan: 


1 blood culture sets was noted positive for coagulase-negative methicillin-

resistant staph.  Currently on linezolid.  I suspect this to be a contaminant 

but we will continue for a few more days to finish a 7-day course for bacteremia

treatment.


Check repeat blood cultures








(3) Hyponatremia


Is this a current diagnosis for this admission?: Yes   


Plan: 


Sodium is 128 today.  Continue sodium chloride tablets follow-up same new labs 

including urine sodium, osmolarity and serum osmolarity. 


Monitor metabolic panel closely








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


Is this a current diagnosis for this admission?: Yes   


Plan: 


TTE showing biventricular failure with dilated cardiomyopathy, EF of 20 to 25% 

and grade 3 diastolic dysfunction of the LV.


Diuretics on hold for now.  We will plan to resume Toprol soon now that 

bradycardia seems to have resolved.  Continue lisinopril.








(5) Sick sinus syndrome


Is this a current diagnosis for this admission?: Yes   


Plan: 


Bradycardic episodes were thought to be secondary to increased vagal tone from 

thrombus burden.  Reviewing his chart, it does not seem that he has had any 

recurrence of the bradycardia since 9/23/2020 after receiving TPA on 9/21/2020.








(6) Dysphagia


Qualifiers: 


   Dysphagia type: pharyngoesophageal phase   Qualified Code(s): R13.14 - 

Dysphagia, pharyngoesophageal phase   


Is this a current diagnosis for this admission?: Yes   


Plan: 


Had noted aspiration with thin liquids on MBS which resolved when upright with 

chin kept up.  Speech pathologist however recommends continuation of regular 

diet with thin liquids but prompting patient to sit upright with chin kept 

upright.








(7) Paroxysmal atrial fibrillation


Is this a current diagnosis for this admission?: Yes   


Plan: 


sinus rhythm.  His beta-blocker was on hold due to episodes of bradycardia.  

Will resume at some point soon.  Monitor on telemetry.








(8) Acute respiratory failure with hypoxia


Is this a current diagnosis for this admission?: Yes   


Plan: 


Secondary to pulmonary embolism.  Currently adequate on 2 to 3 L nasal cannula. 

Patient does have significant PAD and this may affect pulse oximeter readings 

with fingertips.  ABG does confirm adequate oxygenation on 3 L nasal cannula.








(9) Abdominal distention


Is this a current diagnosis for this admission?: Yes   


Plan: 


Firm but not rigid.  May be constipated.  Check KUB.








(10) Alcohol dependence


Qualifiers: 


   Substance use status: alcohol-induced persisting dementia   Qualified 

Code(s): F10.27 - Alcohol dependence with alcohol-induced persisting dementia   


Is this a current diagnosis for this admission?: Yes   


Plan: 


Mild transaminitis.  Monitor for withdrawal.  He has been in the hospital for 

about 6 days now so he may be getting out of the withdrawal window at this 

point.








(11) Pulmonary nodule


Is this a current diagnosis for this admission?: Yes   


Plan: 


Noted on CAT scan.  Follow-up imaging recommended in 3 months but given phani gilbert's homelessness and noncompliance, it is very unlikely he will follow-up.








(12) Homeless


Is this a current diagnosis for this admission?: Yes   


Plan: 


Physical therapy.  Patient will likely need long-term placement at SNF.  We will

get discharge planning involved.








- Time


Time Spent with patient: 35 or more minutes


Anticipated Discharge Disposition: Skilled Nursing Facility


Anticipated Discharge Timeframe: Undetermined

## 2020-09-26 NOTE — RADIOLOGY REPORT (SQ)
EXAM DESCRIPTION:  KUB/ABDOMEN (SINGLE VIEW)



IMAGES COMPLETED DATE/TIME:  9/26/2020 1:04 pm



REASON FOR STUDY:  abdominal distention



COMPARISON:  None.



NUMBER OF VIEWS:  One view.



TECHNIQUE:  Supine radiographic image of the abdomen acquired.



LIMITATIONS:  None.



FINDINGS:  BOWEL GAS PATTERN: There are mildly dilated small bowel loops throughout the abdomen, milka
uring up to 3.3 cm.  Possible developing ileus, or early obstruction cannot be excluded.

CALCIFICATIONS: Right mid abdominal calcifications versus enteric contents.

SOFT TISSUES: No gross mass or suggestion of organomegaly.

HARDWARE: None in the abdomen.

BONES: No acute fracture.  Symphysis pubis fusion hardware.

OTHER: No other significant finding.



IMPRESSION:  There are mildly dilated small bowel loops throughout the abdomen, measuring up to 3.3 c
m. Possible developing ileus, or early obstruction cannot be excluded.



COMMENT:   The findings were sent to the Radiology Results Communication Center at 14:19 on 9/26/2020
 to be communicated to a licensed caregiver.



TECHNICAL DOCUMENTATION:  JOB ID:  [id number]

[id number]

 2011 [employer]- All Rights Reserved



Reading location - IP/workstation name: [email or web address]

## 2020-09-27 LAB
ALBUMIN SERPL-MCNC: 2.3 G/DL (ref 3.5–5)
ALP SERPL-CCNC: 131 U/L (ref 38–126)
ANION GAP SERPL CALC-SCNC: 9 MMOL/L (ref 5–19)
AST SERPL-CCNC: 284 U/L (ref 17–59)
BILIRUB DIRECT SERPL-MCNC: 3 MG/DL (ref 0–0.4)
BILIRUB SERPL-MCNC: 4.7 MG/DL (ref 0.2–1.3)
BUN SERPL-MCNC: 13 MG/DL (ref 7–20)
C DIFFICILE GDH: NEGATIVE
CALCIUM: 7.7 MG/DL (ref 8.4–10.2)
CHLORIDE SERPL-SCNC: 98 MMOL/L (ref 98–107)
CO2 SERPL-SCNC: 22 MMOL/L (ref 22–30)
ERYTHROCYTE [DISTWIDTH] IN BLOOD BY AUTOMATED COUNT: 19.7 % (ref 11.5–14)
GLUCOSE SERPL-MCNC: 106 MG/DL (ref 75–110)
HCT VFR BLD CALC: 45.7 % (ref 37.9–51)
HGB BLD-MCNC: 15.1 G/DL (ref 13.5–17)
MCH RBC QN AUTO: 27.8 PG (ref 27–33.4)
MCHC RBC AUTO-ENTMCNC: 33.1 G/DL (ref 32–36)
MCV RBC AUTO: 84 FL (ref 80–97)
PHOSPHATE SERPL-MCNC: 2.1 MG/DL (ref 2.5–4.5)
PLATELET # BLD: 164 10^3/UL (ref 150–450)
POTASSIUM SERPL-SCNC: 4.4 MMOL/L (ref 3.6–5)
PROT SERPL-MCNC: 5.3 G/DL (ref 6.3–8.2)
RBC # BLD AUTO: 5.44 10^6/UL (ref 4.35–5.55)
WBC # BLD AUTO: 9.4 10^3/UL (ref 4–10.5)

## 2020-09-27 RX ADMIN — MAGNESIUM OXIDE TAB 400 MG (241.3 MG ELEMENTAL MG) SCH MG: 400 (241.3 MG) TAB at 18:56

## 2020-09-27 RX ADMIN — CEFTRIAXONE SCH MLS/HR: 2 INJECTION, SOLUTION INTRAVENOUS at 15:13

## 2020-09-27 RX ADMIN — LISINOPRIL SCH: 10 TABLET ORAL at 10:38

## 2020-09-27 RX ADMIN — LINEZOLID SCH MLS/HR: 600 INJECTION, SOLUTION INTRAVENOUS at 05:28

## 2020-09-27 RX ADMIN — PANTOPRAZOLE SODIUM SCH MG: 40 TABLET, DELAYED RELEASE ORAL at 05:58

## 2020-09-27 RX ADMIN — Medication SCH ML: at 05:58

## 2020-09-27 RX ADMIN — APIXABAN SCH MG: 5 TABLET, FILM COATED ORAL at 18:56

## 2020-09-27 RX ADMIN — DOCUSATE SODIUM SCH: 100 CAPSULE, LIQUID FILLED ORAL at 09:51

## 2020-09-27 RX ADMIN — Medication SCH MG: at 15:12

## 2020-09-27 RX ADMIN — APIXABAN SCH MG: 5 TABLET, FILM COATED ORAL at 10:42

## 2020-09-27 RX ADMIN — LINEZOLID SCH MLS/HR: 600 INJECTION, SOLUTION INTRAVENOUS at 18:56

## 2020-09-27 RX ADMIN — SODIUM CHLORIDE TAB 1 GM SCH GM: 1 TAB at 10:42

## 2020-09-27 RX ADMIN — POLYETHYLENE GLYCOL 3350 SCH: 17 POWDER, FOR SOLUTION ORAL at 10:38

## 2020-09-27 RX ADMIN — Medication SCH ML: at 21:55

## 2020-09-27 RX ADMIN — Medication SCH ML: at 15:12

## 2020-09-27 RX ADMIN — MAGNESIUM OXIDE TAB 400 MG (241.3 MG ELEMENTAL MG) SCH MG: 400 (241.3 MG) TAB at 15:13

## 2020-09-27 NOTE — PDOC PROGRESS REPORT
Subjective


Progress Note for:: 09/27/20


Subjective:: 





Patient has had a few loose bowel movements yesterday.  He actually had a large 

sized amounts which was mostly liquid.  His KUB yesterday had showed signs 

concerning for ileus but patient certainly does not have ileus as he has had 

bowel movements or may be this may simply just be resolving.  He did have 

dilated bowel loops.  He feels comfortable at this time.  Abdomen is less 

distended.  He denies any shortness of breath.  He has been eating his meals.


Reason For Visit: 


BILATERAL PE








Physical Exam


Vital Signs: 


                                        











Temp Pulse Resp BP Pulse Ox


 


 97.4 F   96   18   91/57 L  100 


 


 09/27/20 07:28  09/27/20 07:32  09/27/20 07:28  09/27/20 07:32  09/27/20 07:28








                                 Intake & Output











 09/26/20 09/27/20 09/28/20





 06:59 06:59 06:59


 


Intake Total 991 1047 


 


Output Total 1010 1125 


 


Balance -19 -78 


 


Weight 80 kg 86.4 kg 











General appearance: PRESENT: no acute distress, cooperative, disheveled.  

ABSENT: morbidly obese, severe distress


Neck exam: ABSENT: JVD


Respiratory exam: PRESENT: symmetrical, unlabored.  ABSENT: tachypnea, wheezes


Cardiovascular exam: PRESENT: RRR, +S1, +S2.  ABSENT: tachycardia


GI/Abdominal exam: PRESENT: distended - Mild at this point improved from before,

soft.  ABSENT: rebound, rigid, tenderness


Extremities exam: PRESENT: pedal edema, +1 edema - Right more than left


Neurological exam: PRESENT: alert, awake, oriented to person, oriented to place,

oriented to time


Psychiatric exam: ABSENT: agitated, anxious





Results


Laboratory Results: 


                                        





                                 09/27/20 05:42 





                                 09/27/20 08:28 





                                        











  09/26/20 09/26/20 09/27/20





  13:22 18:15 05:42


 


WBC    9.4


 


RBC    5.44


 


Hgb    15.1


 


Hct    45.7


 


MCV    84


 


MCH    27.8


 


MCHC    33.1


 


RDW    19.7 H


 


Plt Count    164


 


Sodium   


 


Potassium   


 


Chloride   


 


Carbon Dioxide   


 


Anion Gap   


 


BUN   


 


Creatinine   


 


Est GFR ( Amer)   


 


Est GFR (Non-Af Amer)   


 


Glucose   


 


Serum Osmolality  271 L  


 


Calcium   


 


Phosphorus   


 


Magnesium   


 


Total Bilirubin   


 


AST   


 


Alkaline Phosphatase   


 


Total Protein   


 


Albumin   


 


Urine Osmolality   243 L 














  09/27/20 09/27/20





  05:42 08:28


 


WBC  


 


RBC  


 


Hgb  


 


Hct  


 


MCV  


 


MCH  


 


MCHC  


 


RDW  


 


Plt Count  


 


Sodium  Cancelled  129.1 L


 


Potassium  Cancelled  4.4


 


Chloride  Cancelled  98


 


Carbon Dioxide  Cancelled  22


 


Anion Gap  Cancelled  9


 


BUN  Cancelled  13


 


Creatinine  Cancelled  0.78


 


Est GFR ( Amer)  Cancelled  > 60


 


Est GFR (Non-Af Amer)  Cancelled 


 


Glucose  Cancelled  106


 


Serum Osmolality  


 


Calcium  Cancelled  7.7 L


 


Phosphorus  Cancelled  2.1 L


 


Magnesium  Cancelled  1.5 L


 


Total Bilirubin  Cancelled  4.7 H


 


AST  Cancelled  284 H


 


Alkaline Phosphatase  Cancelled  131 H


 


Total Protein  Cancelled  5.3 L


 


Albumin  Cancelled  2.3 L


 


Urine Osmolality  








                                        











  09/20/20





  22:02


 


Troponin I  0.141


 


NT-Pro-B Natriuret Pep  03046 H











Impressions: 


                                        





Chest X-Ray  09/20/20 20:34


IMPRESSION:


 


No acute abnormality as above.


 


 


copyright 2011 [employer]- All Rights Reserved


 








Chest/Abdomen CTA  09/20/20 22:55


IMPRESSION: 


There are filling defects within the main pulmonary artery


concerning for developing saddle emboli. There are also filling


defects seen within the segmental arteries consistent with


pulmonary artery emboli.


 


 


There is a groundglass nodule at the right upper lobe measuring


up to 12 mm which will need interval follow-up within three


months.


There are trace pleural effusions with some overlying airspace


opacities which are favored to represent atelectasis, but may


also represent some mild edema or infection.


 








Modified Barium Swallow  09/25/20 00:00


IMPRESSION:  DEEP LARYNGEAL PENETRATION WITH TRACE ASPIRATION SEEN WITH THIN 

BARIUM.  . PLEASE SEE SPEECH PATHOLOGIST REPORT FOR OTHER FINDINGS AND 

RECOMMENDATIONS.


 








KUB X-Ray  09/26/20 00:00


IMPRESSION:  There are mildly dilated small bowel loops throughout the abdomen, 

measuring up to 3.3 cm. Possible developing ileus, or early obstruction cannot 

be excluded.


 














Assessment and Plan





- Diagnosis


(1) Acute massive pulmonary embolism


Is this a current diagnosis for this admission?: Yes   


Plan: 


Saddle embolus extending into the right and left lower lobes.  Noted hemodynamic

instability with hypotension and bradycardic episodes.


Status post TPA administered on 9/21/2020


Continue Eliquis


Monitor on telemetry


Likely has DVT in his legs as he does have leg swelling as well.








(2) Bacteremia


Is this a current diagnosis for this admission?: Yes   


Plan: 


Initially blood cultures positive for MR Coag neg Staph and only 1 blood culture

set which may be a contaminant but has been treated with linezolid.  I will 

finish up 7-day course which should end tomorrow.





Repeat blood cultures on 9/26/2020 showing gram-negative bacteremia.  Uncertain 

about the source but will check a urine culture and start Ceftriaxone.








(3) Hyponatremia


Is this a current diagnosis for this admission?: Yes   


Plan: 


Urine osmolarity and urine sodium are low and suggest hyponatremia secondary to 

low caloric nutritional intake.


Patient seems to be eating better now.


We will start to decrease sodium chloride dose


We will give him a small infusion of normal saline today and encourage p.o. 

intake


Sodium levels will be monitored








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


Is this a current diagnosis for this admission?: Yes   


Plan: 


TTE showing biventricular failure with dilated cardiomyopathy, EF of 20 to 25% a

nd grade 3 diastolic dysfunction of the LV.


Currently not in acute heart failure


Diuretics on hold for now.  


We will plan to resume Toprol soon now that bradycardia seems to have resolved. 

Lisinopril on hold due to soft blood pressure.








(5) Sick sinus syndrome


Is this a current diagnosis for this admission?: Yes   


Plan: 


Bradycardic episodes were thought to be secondary to increased vagal tone from 

thrombus burden.  Reviewing his chart, it does not seem that he has had any 

recurrence of the bradycardia since 9/23/2020 after receiving TPA on 9/21/2020.








(6) Dysphagia


Qualifiers: 


   Dysphagia type: pharyngoesophageal phase   Qualified Code(s): R13.14 - 

Dysphagia, pharyngoesophageal phase   


Is this a current diagnosis for this admission?: Yes   


Plan: 


Had noted aspiration with thin liquids on MBS which resolved when upright with 

chin kept up.  Speech pathologist however recommends continuation of regular d

iet with thin liquids but prompting patient to sit upright with chin kept 

upright.








(7) Paroxysmal atrial fibrillation


Is this a current diagnosis for this admission?: Yes   


Plan: 


sinus rhythm.  His beta-blocker was on hold due to episodes of bradycardia.  

Will resume at some point soon.  Monitor on telemetry.








(8) Acute respiratory failure with hypoxia


Is this a current diagnosis for this admission?: Yes   


Plan: 


Secondary to pulmonary embolism.  Currently adequate on 2 to 3 L nasal cannula. 

Patient does have significant PAD and this may affect pulse oximeter readings 

with fingertips.








(9) Abdominal distention


Is this a current diagnosis for this admission?: Yes   


Plan: 


KUB shows dilated bowel loops suggestive of ileus.  Ileus seems to be resolving 

as patient has had several bowel movements mostly liquidy but very good size.  

Distention is improving.  Will monitor








(10) Alcohol dependence


Qualifiers: 


   Substance use status: alcohol-induced persisting dementia   Qualified 

Code(s): F10.27 - Alcohol dependence with alcohol-induced persisting dementia   


Is this a current diagnosis for this admission?: Yes   


Plan: 


Transaminitis is improved.  Transaminitis likely secondary to alcoholic 

hepatitis.  Currently no evidence of withdrawal and he should be out of the 

withdrawal window at this point








(11) Pulmonary nodule


Is this a current diagnosis for this admission?: Yes   


Plan: 


Noted on CAT scan.  Follow-up imaging recommended in 3 months but given 

patient's homelessness and noncompliance, it is very unlikely he will follow-up.








(12) Homeless


Is this a current diagnosis for this admission?: Yes   


Plan: 


Physical therapy.  Patient will likely need long-term placement at SNF.  

Discharge planning working on this








- Time


Time Spent with patient: 15-24 minutes


Anticipated Discharge Disposition: Skilled Nursing Facility


Anticipated Discharge Timeframe: within 48 hours

## 2020-09-28 LAB
ANION GAP SERPL CALC-SCNC: 12 MMOL/L (ref 5–19)
BUN SERPL-MCNC: 12 MG/DL (ref 7–20)
CALCIUM: 8.1 MG/DL (ref 8.4–10.2)
CHLORIDE SERPL-SCNC: 99 MMOL/L (ref 98–107)
CO2 SERPL-SCNC: 21 MMOL/L (ref 22–30)
FOLATE SERPL-MCNC: 6.45 NG/ML (ref 2.76–?)
GLUCOSE SERPL-MCNC: 102 MG/DL (ref 75–110)
PHOSPHATE SERPL-MCNC: 2.2 MG/DL (ref 2.5–4.5)
POTASSIUM SERPL-SCNC: 4.5 MMOL/L (ref 3.6–5)

## 2020-09-28 RX ADMIN — APIXABAN SCH MG: 5 TABLET, FILM COATED ORAL at 10:21

## 2020-09-28 RX ADMIN — Medication SCH ML: at 05:33

## 2020-09-28 RX ADMIN — Medication SCH ML: at 21:35

## 2020-09-28 RX ADMIN — MAGNESIUM SULFATE IN DEXTROSE SCH MLS/HR: 10 INJECTION, SOLUTION INTRAVENOUS at 10:19

## 2020-09-28 RX ADMIN — CEFTRIAXONE SCH MLS/HR: 2 INJECTION, SOLUTION INTRAVENOUS at 10:19

## 2020-09-28 RX ADMIN — Medication SCH MG: at 10:20

## 2020-09-28 RX ADMIN — PANTOPRAZOLE SODIUM SCH MG: 40 TABLET, DELAYED RELEASE ORAL at 05:33

## 2020-09-28 RX ADMIN — POLYETHYLENE GLYCOL 3350 SCH: 17 POWDER, FOR SOLUTION ORAL at 10:08

## 2020-09-28 RX ADMIN — METOPROLOL SUCCINATE SCH MG: 25 TABLET, EXTENDED RELEASE ORAL at 10:20

## 2020-09-28 RX ADMIN — Medication SCH: at 14:53

## 2020-09-28 RX ADMIN — MAGNESIUM OXIDE TAB 400 MG (241.3 MG ELEMENTAL MG) SCH MG: 400 (241.3 MG) TAB at 17:38

## 2020-09-28 RX ADMIN — LINEZOLID SCH MLS/HR: 600 INJECTION, SOLUTION INTRAVENOUS at 17:38

## 2020-09-28 RX ADMIN — DOCUSATE SODIUM SCH: 100 CAPSULE, LIQUID FILLED ORAL at 10:08

## 2020-09-28 RX ADMIN — APIXABAN SCH MG: 5 TABLET, FILM COATED ORAL at 17:39

## 2020-09-28 RX ADMIN — MAGNESIUM SULFATE IN DEXTROSE SCH MLS/HR: 10 INJECTION, SOLUTION INTRAVENOUS at 12:03

## 2020-09-28 RX ADMIN — MAGNESIUM OXIDE TAB 400 MG (241.3 MG ELEMENTAL MG) SCH MG: 400 (241.3 MG) TAB at 10:20

## 2020-09-28 RX ADMIN — LINEZOLID SCH MLS/HR: 600 INJECTION, SOLUTION INTRAVENOUS at 05:31

## 2020-09-28 NOTE — PDOC PROGRESS REPORT
Subjective


Progress Note for:: 09/28/20


Subjective:: 





Patient had on the bowel movement today.  He was mostly liquid-annalee but with some

formed stool.  He denies any nausea vomiting.  States that he feels like he 

still needs to go a little bit more in terms of bowel movement.


Reason For Visit: 


BILATERAL PE








Physical Exam


Vital Signs: 


                                        











Temp Pulse Resp BP Pulse Ox


 


 97.4 F   91   22 H  123/100 H  89 L


 


 09/28/20 16:57  09/28/20 16:57  09/28/20 12:23  09/28/20 16:57  09/28/20 07:36








                                 Intake & Output











 09/27/20 09/28/20 09/29/20





 06:59 06:59 06:59


 


Intake Total 1047 3305 1290


 


Output Total 1125 300 


 


Balance -78 3005 1290


 


Weight 86.4 kg 86.2 kg 











General appearance: PRESENT: no acute distress, cooperative


Neck exam: ABSENT: JVD


Respiratory exam: PRESENT: symmetrical, unlabored.  ABSENT: tachypnea, wheezes


Cardiovascular exam: PRESENT: +S1, +S2


GI/Abdominal exam: PRESENT: distended, firm, soft.  ABSENT: rebound, rigid, 

tenderness


Neurological exam: PRESENT: alert, awake





Results


Laboratory Results: 


                                        





                                 09/27/20 05:42 





                                 09/28/20 05:00 





                                        











  09/28/20





  05:00


 


Sodium  132.2 L


 


Potassium  4.5


 


Chloride  99


 


Carbon Dioxide  21 L


 


Anion Gap  12


 


BUN  12


 


Creatinine  0.71


 


Est GFR (African Amer)  > 60


 


Glucose  102


 


Calcium  8.1 L


 


Phosphorus  2.2 L


 


Magnesium  1.5 L


 


Folate  6.45








                                        





09/26/20 13:22   Blood   Blood Culture - Final


                            Klebsiella Pneumoniae





                                        











  09/20/20





  22:02


 


Troponin I  0.141


 


NT-Pro-B Natriuret Pep  35388 H











Impressions: 


                                        





Chest X-Ray  09/20/20 20:34


IMPRESSION:


 


No acute abnormality as above.


 


 


copyright 2011 [employer]- All Rights Reserved


 








Chest/Abdomen CTA  09/20/20 22:55


IMPRESSION: 


There are filling defects within the main pulmonary artery


concerning for developing saddle emboli. There are also filling


defects seen within the segmental arteries consistent with


pulmonary artery emboli.


 


 


There is a groundglass nodule at the right upper lobe measuring


up to 12 mm which will need interval follow-up within three


months.


There are trace pleural effusions with some overlying airspace


opacities which are favored to represent atelectasis, but may


also represent some mild edema or infection.


 








Modified Barium Swallow  09/25/20 00:00


IMPRESSION:  DEEP LARYNGEAL PENETRATION WITH TRACE ASPIRATION SEEN WITH THIN 

BARIUM.  . PLEASE SEE SPEECH PATHOLOGIST REPORT FOR OTHER FINDINGS AND 

RECOMMENDATIONS.


 








KUB X-Ray  09/26/20 00:00


IMPRESSION:  There are mildly dilated small bowel loops throughout the abdomen, 

measuring up to 3.3 cm. Possible developing ileus, or early obstruction cannot 

be excluded.


 














Assessment and Plan





- Diagnosis


(1) Acute massive pulmonary embolism


Is this a current diagnosis for this admission?: Yes   


Plan: 


Saddle embolus extending into the right and left lower lobes.  Noted hemodynamic

instability with hypotension and bradycardic episodes.


Status post TPA administered on 9/21/2020


Continue Eliquis


Monitor on telemetry


Likely has DVT in his legs as he does have leg swelling as well.








(2) Bacteremia


Is this a current diagnosis for this admission?: Yes   


Plan: 


Initially blood cultures positive for MR Coag neg Staph and only 1 blood culture

set which may be a contaminant but has been treated with linezolid.  I will 

finish up 7-day course which should end tomorrow.





Repeat blood cultures on 9/26/2020 showing gram-negative bacteremia likely 

secondary to UTI as urine culture is also growing gram-negative kayode.


Continue ceftriaxone








(3) Hyponatremia


Is this a current diagnosis for this admission?: Yes   


Plan: 


Urine osmolarity and urine sodium are low and suggest hyponatremia secondary to 

low caloric nutritional intake.


Patient seems to be eating better now.


Sodium chloride tablets discontinued yesterday afternoon and sodium actually 

continues to improve.  Will monitor sodium.








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


Is this a current diagnosis for this admission?: Yes   





(5) Sick sinus syndrome


Is this a current diagnosis for this admission?: Yes   





(6) Dysphagia


Qualifiers: 


   Dysphagia type: pharyngoesophageal phase   Qualified Code(s): R13.14 - 

Dysphagia, pharyngoesophageal phase   


Is this a current diagnosis for this admission?: Yes   





(7) Paroxysmal atrial fibrillation


Is this a current diagnosis for this admission?: Yes   





(8) Acute respiratory failure with hypoxia


Is this a current diagnosis for this admission?: Yes   





(9) Abdominal distention


Is this a current diagnosis for this admission?: Yes   





(10) Alcohol dependence


Qualifiers: 


   Substance use status: alcohol-induced persisting dementia   Qualified 

Code(s): F10.27 - Alcohol dependence with alcohol-induced persisting dementia   


Is this a current diagnosis for this admission?: Yes   





(11) Pulmonary nodule


Is this a current diagnosis for this admission?: Yes   





(12) Homeless


Is this a current diagnosis for this admission?: Yes   





- Time


Time Spent with patient: Less than 15 minutes


Anticipated Discharge Disposition: Skilled Nursing Facility


Anticipated Discharge Timeframe: within 48 hours

## 2020-09-29 LAB
ANION GAP SERPL CALC-SCNC: 9 MMOL/L (ref 5–19)
BUN SERPL-MCNC: 11 MG/DL (ref 7–20)
CALCIUM: 8 MG/DL (ref 8.4–10.2)
CHLORIDE SERPL-SCNC: 96 MMOL/L (ref 98–107)
CO2 SERPL-SCNC: 23 MMOL/L (ref 22–30)
GLUCOSE SERPL-MCNC: 103 MG/DL (ref 75–110)
POTASSIUM SERPL-SCNC: 4.8 MMOL/L (ref 3.6–5)

## 2020-09-29 RX ADMIN — CEFTRIAXONE SCH MLS/HR: 2 INJECTION, SOLUTION INTRAVENOUS at 09:20

## 2020-09-29 RX ADMIN — ACETAMINOPHEN PRN MG: 325 TABLET ORAL at 21:57

## 2020-09-29 RX ADMIN — PANTOPRAZOLE SODIUM SCH MG: 40 TABLET, DELAYED RELEASE ORAL at 06:08

## 2020-09-29 RX ADMIN — Medication SCH: at 13:31

## 2020-09-29 RX ADMIN — Medication SCH MG: at 09:20

## 2020-09-29 RX ADMIN — POLYETHYLENE GLYCOL 3350 SCH: 17 POWDER, FOR SOLUTION ORAL at 09:21

## 2020-09-29 RX ADMIN — DOCUSATE SODIUM SCH: 100 CAPSULE, LIQUID FILLED ORAL at 09:21

## 2020-09-29 RX ADMIN — MAGNESIUM OXIDE TAB 400 MG (241.3 MG ELEMENTAL MG) SCH MG: 400 (241.3 MG) TAB at 17:16

## 2020-09-29 RX ADMIN — APIXABAN SCH MG: 5 TABLET, FILM COATED ORAL at 09:20

## 2020-09-29 RX ADMIN — METOPROLOL SUCCINATE SCH MG: 25 TABLET, EXTENDED RELEASE ORAL at 09:20

## 2020-09-29 RX ADMIN — Medication SCH ML: at 06:08

## 2020-09-29 RX ADMIN — APIXABAN SCH MG: 5 TABLET, FILM COATED ORAL at 17:16

## 2020-09-29 RX ADMIN — Medication SCH ML: at 21:54

## 2020-09-29 RX ADMIN — MAGNESIUM OXIDE TAB 400 MG (241.3 MG ELEMENTAL MG) SCH MG: 400 (241.3 MG) TAB at 09:20

## 2020-09-29 NOTE — RADIOLOGY REPORT (SQ)
EXAM DESCRIPTION:  KUB/ABDOMEN (SINGLE VIEW)



IMAGES COMPLETED DATE/TIME:  9/29/2020 8:10 am



REASON FOR STUDY:  abdominal distention, constipation?



COMPARISON:  9/26/2020



NUMBER OF VIEWS:  One view.



TECHNIQUE:   Supine radiographic image of the abdomen acquired.



LIMITATIONS:  None.



FINDINGS:  BOWEL GAS PATTERN: Normal bowel gas pattern. No dilated loops.  Minimal residual barium wi
thin a couple of diverticula in the right colon.

CALCIFICATIONS: No suspicious calcifications.

SOFT TISSUES: No gross mass or suggestion of organomegaly.

HARDWARE: None in the abdomen.

BONES: No acute fracture. No worrisome bone lesions.

OTHER: No other significant finding.



IMPRESSION:  Nonobstructive bowel gas pattern.



TECHNICAL DOCUMENTATION:  JOB ID:  [id number]

 2011 [employer]- All Rights Reserved



Reading location - IP/workstation name: [email or web address]

## 2020-09-29 NOTE — PDOC PROGRESS REPORT
Subjective


Progress Note for:: 09/29/20


Subjective:: 





Patient has no complaints today.  Denies any shortness of breath.  Hard on the 

bowel movement today.


Reason For Visit: 


BILATERAL PE








Physical Exam


Vital Signs: 


                                        











Temp Pulse Resp BP Pulse Ox


 


 98.3 F   98   18   133/90 H  97 


 


 09/29/20 12:38  09/29/20 12:38  09/29/20 12:38  09/29/20 12:38  09/29/20 12:38








                                 Intake & Output











 09/28/20 09/29/20 09/30/20





 06:59 06:59 06:59


 


Intake Total 3305 1490 50


 


Output Total 300  0


 


Balance 3005 1490 50


 


Weight 86.2 kg  87.6 kg











General appearance: PRESENT: no acute distress, cooperative


Neck exam: ABSENT: JVD


Respiratory exam: PRESENT: symmetrical, unlabored.  ABSENT: tachypnea, wheezes


Cardiovascular exam: PRESENT: RRR, +S1, +S2.  ABSENT: tachycardia


GI/Abdominal exam: PRESENT: distended - very mild at this point, firm - 

Significantly improved and certainly not rigid, soft.  ABSENT: guarding, hernia,

rebound, rigid, tenderness


Neurological exam: PRESENT: alert, awake


Psychiatric exam: ABSENT: agitated, anxious


Focused psych exam: ABSENT: pressured speech


Skin exam: ABSENT: jaundice





Results


Laboratory Results: 


                                        





                                 09/27/20 05:42 





                                 09/29/20 06:53 





                                        











  09/29/20 09/29/20





  04:59 06:53


 


Sodium  Cancelled  128.0 L


 


Potassium  Cancelled  4.8


 


Chloride  Cancelled  96 L


 


Carbon Dioxide  Cancelled  23


 


Anion Gap  Cancelled  9


 


BUN  Cancelled  11


 


Creatinine  Cancelled  0.70


 


Est GFR ( Amer)  Cancelled  > 60


 


Est GFR (Non-Af Amer)  Cancelled 


 


Glucose  Cancelled  103


 


Calcium  Cancelled  8.0 L








                                        











  09/20/20





  22:02


 


Troponin I  0.141


 


NT-Pro-B Natriuret Pep  90543 H











Impressions: 


                                        





Chest X-Ray  09/20/20 20:34


IMPRESSION:


 


No acute abnormality as above.


 


 


copyright 2011 [employer]- All Rights Reserved


 








Chest/Abdomen CTA  09/20/20 22:55


IMPRESSION: 


There are filling defects within the main pulmonary artery


concerning for developing saddle emboli. There are also filling


defects seen within the segmental arteries consistent with


pulmonary artery emboli.


 


 


There is a groundglass nodule at the right upper lobe measuring


up to 12 mm which will need interval follow-up within three


months.


There are trace pleural effusions with some overlying airspace


opacities which are favored to represent atelectasis, but may


also represent some mild edema or infection.


 








Modified Barium Swallow  09/25/20 00:00


IMPRESSION:  DEEP LARYNGEAL PENETRATION WITH TRACE ASPIRATION SEEN WITH THIN 

BARIUM.  . PLEASE SEE SPEECH PATHOLOGIST REPORT FOR OTHER FINDINGS AND 

RECOMMENDATIONS.


 








KUB X-Ray  09/29/20 00:00


IMPRESSION:  Nonobstructive bowel gas pattern.


 














Assessment and Plan





- Diagnosis


(1) Acute massive pulmonary embolism


Is this a current diagnosis for this admission?: Yes   


Plan: 


Saddle embolus extending into the right and left lower lobes.  Noted hemodynamic

instability with hypotension and bradycardic episodes.


Status post TPA administered on 9/21/2020


Continue Eliquis 10 mg bid till Oct 1 then 5mg bid 


Likely has DVT in his legs as he does have leg swelling as well.








(2) Bacteremia


Is this a current diagnosis for this admission?: Yes   


Plan: 


Initially blood cultures positive for MR Coag neg Staph and only 1 blood culture

set-completed 7 days of linezolid though possibly could be contaminant.


Repeat blood cultures on 9/26/2020 showing Klebsiella likely secondary to 

complicated UTI as urine culture is also growing GNR.


Initially on ceftriaxone both switched to Bactrim p.o. for 7 more days.








(3) Hyponatremia


Is this a current diagnosis for this admission?: Yes   


Plan: 


Urine osmolarity and urine sodium are low and suggest hyponatremia secondary to 

low caloric nutritional intake.


Patient seems to be eating better now.  Monitor








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


Is this a current diagnosis for this admission?: Yes   


Plan: 


TTE showing biventricular failure with dilated cardiomyopathy, EF of 20 to 25% 

and grade 3 diastolic dysfunction of the LV.


Currently not in acute heart failure


Diuretics on hold for now.  


Toprol-XL 25 mg daily.  Lisinopril 5 mg daily.








(5) Sick sinus syndrome


Is this a current diagnosis for this admission?: Yes   


Plan: 


Bradycardic episodes were thought to be secondary to increased vagal tone from 

thrombus burden.  Reviewing his chart, it does not seem that he has had any 

recurrence of the bradycardia since 9/23/2020 after receiving TPA on 9/21/2020.








(6) Dysphagia


Qualifiers: 


   Dysphagia type: pharyngoesophageal phase   Qualified Code(s): R13.14 - 

Dysphagia, pharyngoesophageal phase   


Is this a current diagnosis for this admission?: Yes   


Plan: 


Had noted aspiration with thin liquids on MBS which resolved when upright with 

chin kept up.  Speech pathologist however recommends continuation of regular 

diet with thin liquids but prompting patient to sit upright with chin kept 

upright.  He is doing okay with this.








(7) Paroxysmal atrial fibrillation


Is this a current diagnosis for this admission?: Yes   


Plan: 


Sinus rhythm currently








(8) Acute respiratory failure with hypoxia


Is this a current diagnosis for this admission?: Yes   


Plan: 


Secondary to pulmonary embolism.  Was actually on room air this morning with 

adequate O2 saturation.








(9) Abdominal distention


Is this a current diagnosis for this admission?: Yes   


Plan: 


Repeat KUB is improved.  We will continue stool softeners








(10) Alcohol dependence


Qualifiers: 


   Substance use status: alcohol-induced persisting dementia   Qualified 

Code(s): F10.27 - Alcohol dependence with alcohol-induced persisting dementia   


Is this a current diagnosis for this admission?: Yes   


Plan: 


Transaminitis likely secondary to alcoholic hepatitis which has significantly 

improved.  Currently no evidence of withdrawal and he should be out of the 

withdrawal window at this point








(11) Pulmonary nodule


Is this a current diagnosis for this admission?: Yes   


Plan: 


Noted on CAT scan.  Follow-up imaging recommended in 3 months but given 

patient's homelessness and noncompliance, it is very unlikely he will follow-up.








(12) Homeless


Is this a current diagnosis for this admission?: Yes   


Plan: 


Physical therapy.  Patient will likely need long-term placement at SNF.  

Discharge planning working on this








- Time


Time Spent with patient: 15-24 minutes


Anticipated Discharge Disposition: Skilled Nursing Facility


Anticipated Discharge Timeframe: within 24 hours - or when bed is available

## 2020-09-30 LAB
ANION GAP SERPL CALC-SCNC: 7 MMOL/L (ref 5–19)
ANION GAP SERPL CALC-SCNC: 9 MMOL/L (ref 5–19)
BUN SERPL-MCNC: 10 MG/DL (ref 7–20)
BUN SERPL-MCNC: 11 MG/DL (ref 7–20)
CALCIUM: 7.6 MG/DL (ref 8.4–10.2)
CALCIUM: 8 MG/DL (ref 8.4–10.2)
CHLORIDE SERPL-SCNC: 97 MMOL/L (ref 98–107)
CHLORIDE SERPL-SCNC: 98 MMOL/L (ref 98–107)
CO2 SERPL-SCNC: 22 MMOL/L (ref 22–30)
CO2 SERPL-SCNC: 24 MMOL/L (ref 22–30)
GLUCOSE SERPL-MCNC: 72 MG/DL (ref 75–110)
GLUCOSE SERPL-MCNC: 78 MG/DL (ref 75–110)
PHOSPHATE SERPL-MCNC: 1.7 MG/DL (ref 2.5–4.5)
PHOSPHATE SERPL-MCNC: 2.6 MG/DL (ref 2.5–4.5)
POTASSIUM SERPL-SCNC: 4.5 MMOL/L (ref 3.6–5)
POTASSIUM SERPL-SCNC: 5 MMOL/L (ref 3.6–5)

## 2020-09-30 RX ADMIN — MAGNESIUM SULFATE IN DEXTROSE SCH MLS/HR: 10 INJECTION, SOLUTION INTRAVENOUS at 11:08

## 2020-09-30 RX ADMIN — PANTOPRAZOLE SODIUM SCH MG: 40 TABLET, DELAYED RELEASE ORAL at 05:09

## 2020-09-30 RX ADMIN — Medication SCH MG: at 09:26

## 2020-09-30 RX ADMIN — NICOTINE SCH EACH: 14 PATCH, EXTENDED RELEASE TOPICAL at 09:27

## 2020-09-30 RX ADMIN — Medication SCH ML: at 05:09

## 2020-09-30 RX ADMIN — APIXABAN SCH MG: 5 TABLET, FILM COATED ORAL at 17:19

## 2020-09-30 RX ADMIN — MAGNESIUM SULFATE IN DEXTROSE SCH MLS/HR: 10 INJECTION, SOLUTION INTRAVENOUS at 09:27

## 2020-09-30 RX ADMIN — MAGNESIUM OXIDE TAB 400 MG (241.3 MG ELEMENTAL MG) SCH MG: 400 (241.3 MG) TAB at 17:19

## 2020-09-30 RX ADMIN — SULFAMETHOXAZOLE AND TRIMETHOPRIM SCH TAB: 800; 160 TABLET ORAL at 09:26

## 2020-09-30 RX ADMIN — SULFAMETHOXAZOLE AND TRIMETHOPRIM SCH TAB: 800; 160 TABLET ORAL at 22:05

## 2020-09-30 RX ADMIN — Medication SCH: at 14:04

## 2020-09-30 RX ADMIN — METOPROLOL SUCCINATE SCH MG: 25 TABLET, EXTENDED RELEASE ORAL at 09:27

## 2020-09-30 RX ADMIN — DOCUSATE SODIUM SCH MG: 100 CAPSULE, LIQUID FILLED ORAL at 09:26

## 2020-09-30 RX ADMIN — Medication SCH: at 22:04

## 2020-09-30 RX ADMIN — APIXABAN SCH MG: 5 TABLET, FILM COATED ORAL at 09:26

## 2020-09-30 RX ADMIN — MAGNESIUM OXIDE TAB 400 MG (241.3 MG ELEMENTAL MG) SCH MG: 400 (241.3 MG) TAB at 09:26

## 2020-09-30 NOTE — PDOC PROGRESS REPORT
Subjective


Progress Note for:: 09/30/20


Subjective:: 





Patient is doing well today.  He denies any shortness of breath.  He is eating 

his meals.  Having regular bowel movements.


Reason For Visit: 


BILATERAL PE








Physical Exam


Vital Signs: 


                                        











Temp Pulse Resp BP Pulse Ox


 


 98.1 F   81   15   105/74   100 


 


 09/30/20 08:08  09/30/20 07:36  09/30/20 07:36  09/30/20 07:36  09/30/20 07:36








                                 Intake & Output











 09/29/20 09/30/20 10/01/20





 06:59 06:59 06:59


 


Intake Total 1490 1720 100


 


Output Total  125 


 


Balance 1490 1595 100


 


Weight  91.5 kg 











General appearance: PRESENT: no acute distress, cooperative


Neck exam: ABSENT: JVD


Respiratory exam: PRESENT: symmetrical, unlabored.  ABSENT: clear to 

auscultation ana paula, tachypnea, wheezes


Cardiovascular exam: PRESENT: RRR, +S1, +S2.  ABSENT: tachycardia


GI/Abdominal exam: PRESENT: soft.  ABSENT: rebound, rigid, tenderness


Neurological exam: PRESENT: alert, awake, oriented to person, oriented to place,

oriented to time


Psychiatric exam: ABSENT: agitated, anxious





Results


Laboratory Results: 


                                        





                                 09/27/20 05:42 





                                 09/30/20 06:29 





                                        











  09/30/20





  06:29


 


Sodium  127.1 L


 


Potassium  4.5


 


Chloride  98


 


Carbon Dioxide  22


 


Anion Gap  7


 


BUN  11


 


Creatinine  0.70


 


Est GFR (African Amer)  > 60


 


Glucose  78


 


Calcium  7.6 L


 


Phosphorus  1.7 L


 


Magnesium  1.5 L








                                        





09/27/20 14:35   Clean Catch Midstream   Urine Culture - Final


                            Klebsiella Pneumoniae





                                        











  09/20/20





  22:02


 


Troponin I  0.141


 


NT-Pro-B Natriuret Pep  25966 H











Impressions: 


                                        





Chest X-Ray  09/20/20 20:34


IMPRESSION:


 


No acute abnormality as above.


 


 


copyright 2011 [employer]- All Rights Reserved


 








Chest/Abdomen CTA  09/20/20 22:55


IMPRESSION: 


There are filling defects within the main pulmonary artery


concerning for developing saddle emboli. There are also filling


defects seen within the segmental arteries consistent with


pulmonary artery emboli.


 


 


There is a groundglass nodule at the right upper lobe measuring


up to 12 mm which will need interval follow-up within three


months.


There are trace pleural effusions with some overlying airspace


opacities which are favored to represent atelectasis, but may


also represent some mild edema or infection.


 








Modified Barium Swallow  09/25/20 00:00


IMPRESSION:  DEEP LARYNGEAL PENETRATION WITH TRACE ASPIRATION SEEN WITH THIN 

BARIUM.  . PLEASE SEE SPEECH PATHOLOGIST REPORT FOR OTHER FINDINGS AND 

RECOMMENDATIONS.


 








KUB X-Ray  09/29/20 00:00


IMPRESSION:  Nonobstructive bowel gas pattern.


 














Assessment and Plan





- Diagnosis


(1) Acute massive pulmonary embolism


Is this a current diagnosis for this admission?: Yes   


Plan: 


Saddle embolus extending into the right and left lower lobes.  Noted hemodynamic

instability with hypotension and bradycardic episodes.


Status post TPA administered on 9/21/2020


Continue Eliquis 10 mg bid till Oct 1 then 5mg bid 


Likely has DVT in his legs as he does have leg swelling as well.








(2) Bacteremia


Is this a current diagnosis for this admission?: Yes   


Plan: 


Initially blood cultures positive for MR Yanceyg neg Staph and only 1 blood culture

set-completed 7 days of linezolid though possibly could be contaminant.


Repeat blood cultures on 9/26/2020 showing Klebsiella secondary to complicated 

UTI.


Initially on ceftriaxone. Now on Bactrim p.o. for 6 more days.








(3) Hyponatremia


Is this a current diagnosis for this admission?: Yes   


Plan: 


Urine osmolarity and urine sodium are low and suggest hyponatremia secondary to 

low caloric nutritional intake.


Patient seems to be eating better now.  


I will give some normal saline today and recheck BMP.








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


Is this a current diagnosis for this admission?: Yes   


Plan: 


TTE showing biventricular failure with dilated cardiomyopathy, EF of 20 to 25% 

and grade 3 diastolic dysfunction of the LV.


Currently not in acute heart failure


Diuretics on hold for now.  


Toprol-XL 25 mg daily.  Lisinopril 5 mg daily.








(5) Sick sinus syndrome


Is this a current diagnosis for this admission?: Yes   


Plan: 


Bradycardic episodes were thought to be secondary to increased vagal tone from 

thrombus burden.  Reviewing his chart, it does not seem that he has had any 

recurrence of the bradycardia since 9/23/2020 after receiving TPA on 9/21/2020.








(6) Dysphagia


Qualifiers: 


   Dysphagia type: pharyngoesophageal phase   Qualified Code(s): R13.14 - 

Dysphagia, pharyngoesophageal phase   


Is this a current diagnosis for this admission?: Yes   


Plan: 


Had noted aspiration with thin liquids on MBS which resolved when upright with 

chin kept up.  Speech pathologist however recommends continuation of regular 

diet with thin liquids but prompting patient to sit upright with chin kept 

upright.  He is doing okay with this.








(7) Paroxysmal atrial fibrillation


Is this a current diagnosis for this admission?: Yes   


Plan: 


Sinus rhythm currently








(8) Acute respiratory failure with hypoxia


Is this a current diagnosis for this admission?: Yes   


Plan: 


Secondary to pulmonary embolism.  Checked O2 saturation today myself patient is 

100% when placed on room air.  Instructed staff to leave patient on room air 

unless he desaturates below 91%.








(9) Abdominal distention


Is this a current diagnosis for this admission?: Yes   


Plan: 


Repeat KUB is improved.  We will continue stool softeners.  Patient now having 

regular bowel movements.








(10) Alcohol dependence


Qualifiers: 


   Substance use status: alcohol-induced persisting dementia   Qualified 

Code(s): F10.27 - Alcohol dependence with alcohol-induced persisting dementia   


Is this a current diagnosis for this admission?: Yes   


Plan: 


Transaminitis likely secondary to alcoholic hepatitis which has significantly 

improved.  Currently no evidence of withdrawal and he is out of the withdrawal 

window at this point








(11) Pulmonary nodule


Is this a current diagnosis for this admission?: Yes   


Plan: 


Noted on CAT scan.  Follow-up imaging recommended in 3 months but given 

patient's homelessness and noncompliance, it is very unlikely he will follow-up.








(12) Homeless


Is this a current diagnosis for this admission?: Yes   


Plan: 


Physical therapy.  Patient will likely need long-term placement at SNF.  

Discharge planning working on this








- Time


Time Spent with patient: 15-24 minutes


Anticipated Discharge Disposition: Skilled Nursing Facility


Anticipated Discharge Timeframe: when bed available

## 2020-10-01 LAB
ANION GAP SERPL CALC-SCNC: 9 MMOL/L (ref 5–19)
BUN SERPL-MCNC: 9 MG/DL (ref 7–20)
CALCIUM: 7.8 MG/DL (ref 8.4–10.2)
CHLORIDE SERPL-SCNC: 98 MMOL/L (ref 98–107)
CO2 SERPL-SCNC: 23 MMOL/L (ref 22–30)
GLUCOSE SERPL-MCNC: 109 MG/DL (ref 75–110)
PHOSPHATE SERPL-MCNC: 2.2 MG/DL (ref 2.5–4.5)
POTASSIUM SERPL-SCNC: 4.8 MMOL/L (ref 3.6–5)

## 2020-10-01 RX ADMIN — APIXABAN SCH MG: 5 TABLET, FILM COATED ORAL at 09:27

## 2020-10-01 RX ADMIN — Medication SCH ML: at 05:50

## 2020-10-01 RX ADMIN — Medication SCH ML: at 22:08

## 2020-10-01 RX ADMIN — SULFAMETHOXAZOLE AND TRIMETHOPRIM SCH TAB: 800; 160 TABLET ORAL at 22:08

## 2020-10-01 RX ADMIN — PANTOPRAZOLE SODIUM SCH MG: 40 TABLET, DELAYED RELEASE ORAL at 05:45

## 2020-10-01 RX ADMIN — Medication SCH: at 13:17

## 2020-10-01 RX ADMIN — APIXABAN SCH MG: 5 TABLET, FILM COATED ORAL at 17:30

## 2020-10-01 RX ADMIN — MAGNESIUM OXIDE TAB 400 MG (241.3 MG ELEMENTAL MG) SCH MG: 400 (241.3 MG) TAB at 17:30

## 2020-10-01 RX ADMIN — LISINOPRIL SCH MG: 10 TABLET ORAL at 09:26

## 2020-10-01 RX ADMIN — SULFAMETHOXAZOLE AND TRIMETHOPRIM SCH TAB: 800; 160 TABLET ORAL at 09:27

## 2020-10-01 RX ADMIN — METOPROLOL SUCCINATE SCH MG: 25 TABLET, EXTENDED RELEASE ORAL at 09:28

## 2020-10-01 RX ADMIN — DOCUSATE SODIUM SCH: 100 CAPSULE, LIQUID FILLED ORAL at 09:26

## 2020-10-01 RX ADMIN — Medication SCH MG: at 09:27

## 2020-10-01 RX ADMIN — MAGNESIUM OXIDE TAB 400 MG (241.3 MG ELEMENTAL MG) SCH MG: 400 (241.3 MG) TAB at 09:27

## 2020-10-01 RX ADMIN — NICOTINE SCH EACH: 14 PATCH, EXTENDED RELEASE TOPICAL at 09:25

## 2020-10-01 NOTE — PDOC PROGRESS REPORT
Subjective


Progress Note for:: 10/01/20


Reason For Visit: 


BILATERAL PE








Physical Exam


Vital Signs: 


                                        











Temp Pulse Resp BP Pulse Ox


 


 97.3 F   84   20   119/93 H  100 


 


 10/01/20 10:00  10/01/20 07:48  10/01/20 07:48  10/01/20 07:48  10/01/20 07:48








                                 Intake & Output











 09/30/20 10/01/20 10/02/20





 06:59 06:59 06:59


 


Intake Total 1720 2230 500


 


Output Total 125  


 


Balance 1595 2230 500


 


Weight 91.5 kg 89.5 kg 











General appearance: PRESENT: no acute distress, disheveled


Head exam: PRESENT: atraumatic, normocephalic


Eye exam: PRESENT: conjunctiva pink, EOMI, PERRLA.  ABSENT: scleral icterus


Ear exam: PRESENT: normal external ear exam


Mouth exam: PRESENT: moist, tongue midline


Neck exam: ABSENT: carotid bruit, JVD, lymphadenopathy, thyromegaly


Respiratory exam: PRESENT: clear to auscultation ana paula.  ABSENT: rales, rhonchi, 

wheezes


Cardiovascular exam: PRESENT: RRR, +S1, +S2.  ABSENT: diastolic murmur, rubs, 

systolic murmur


Pulses: PRESENT: normal dorsalis pedis pul


Vascular exam: PRESENT: normal capillary refill


GI/Abdominal exam: PRESENT: normal bowel sounds, soft.  ABSENT: distended, 

guarding, mass, organolmegaly, rebound, tenderness


Rectal exam: PRESENT: deferred


Extremities exam: PRESENT: full ROM, other - swelling.  ABSENT: clubbing, pedal 

edema


Neurological exam: PRESENT: alert, awake, oriented to person, oriented to place,

oriented to time, oriented to situation, CN II-XII grossly intact.  ABSENT: 

motor sensory deficit


Psychiatric exam: PRESENT: appropriate affect, normal mood.  ABSENT: homicidal 

ideation, suicidal ideation


Skin exam: PRESENT: dry, intact, warm.  ABSENT: cyanosis, rash





Results


Laboratory Results: 


                                        





                                 09/27/20 05:42 





                                 10/01/20 05:23 





                                        











  09/30/20 10/01/20





  15:39 05:23


 


Sodium  130.0 L  129.9 L


 


Potassium  5.0  4.8


 


Chloride  97 L  98


 


Carbon Dioxide  24  23


 


Anion Gap  9  9


 


BUN  10  9


 


Creatinine  0.69  0.79


 


Est GFR ( Amer)  > 60  > 60


 


Glucose  72 L  109


 


Calcium  8.0 L  7.8 L


 


Phosphorus  2.6  2.2 L


 


Magnesium   1.8








                                        





09/26/20 13:06   Blood   Blood Culture - Final


                            NO GROWTH IN 5 DAYS





                                        











  09/20/20





  22:02


 


Troponin I  0.141


 


NT-Pro-B Natriuret Pep  [id number] H











Impressions: 


                                        





Chest X-Ray  09/20/20 20:34


IMPRESSION:


 


No acute abnormality as above.


 


 


copyright 2011 [employer]- All Rights Reserved


 








Chest/Abdomen CTA  09/20/20 22:55


IMPRESSION: 


There are filling defects within the main pulmonary artery


concerning for developing saddle emboli. There are also filling


defects seen within the segmental arteries consistent with


pulmonary artery emboli.


 


 


There is a groundglass nodule at the right upper lobe measuring


up to 12 mm which will need interval follow-up within three


months.


There are trace pleural effusions with some overlying airspace


opacities which are favored to represent atelectasis, but may


also represent some mild edema or infection.


 








Modified Barium Swallow  09/25/20 00:00


IMPRESSION:  DEEP LARYNGEAL PENETRATION WITH TRACE ASPIRATION SEEN WITH THIN 

BARIUM.  . PLEASE SEE SPEECH PATHOLOGIST REPORT FOR OTHER FINDINGS AND 

RECOMMENDATIONS.


 








KUB X-Ray  09/29/20 00:00


IMPRESSION:  Nonobstructive bowel gas pattern.


 














Assessment and Plan





- Diagnosis


(1) Acute massive pulmonary embolism


Is this a current diagnosis for this admission?: Yes   





(2) Acute respiratory failure with hypoxia


Is this a current diagnosis for this admission?: Yes   





(3) Bacteremia


Is this a current diagnosis for this admission?: Yes   





(4) Bilateral pulmonary embolism


Is this a current diagnosis for this admission?: Yes   





(5) Dysphagia


Qualifiers: 


   Dysphagia type: pharyngoesophageal phase   Qualified Code(s): R13.14 - 

Dysphagia, pharyngoesophageal phase   


Is this a current diagnosis for this admission?: Yes   





(6) Hyponatremia


Is this a current diagnosis for this admission?: Yes   





(7) Pulmonary nodule


Is this a current diagnosis for this admission?: Yes   





(8) Sick sinus syndrome


Is this a current diagnosis for this admission?: Yes   





(9) Alcohol dependence


Qualifiers: 


   Substance use status: alcohol-induced persisting dementia   Qualified 

Code(s): F10.27 - Alcohol dependence with alcohol-induced persisting dementia   


Is this a current diagnosis for this admission?: Yes   





(10) Congestive heart failure


Qualifiers: 


   Heart failure type: combined systolic and diastolic 


Is this a current diagnosis for this admission?: Yes   





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


Is this a current diagnosis for this admission?: Yes   





- Plan Summary


Summary: 


Acute pulmonary embolism status post TPA on 921.  Patient is currently on 

Eliquis 10 mg twice daily and then 5 mg twice daily from October 2


Bacteremia, initially with coag negative staph which was thought to be 

contaminant although he completed 7 days of linezolid.  Repeat blood cultures on

926 grew Klebsiella secondary to complicated UTI.  He was treated with 

ceftriaxone now on Bactrim for 5 more days.


Hyponatremia likely nutritional


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 

failure with dilated cardiomyopathy, ejection fraction of 20 to 25% and grade 3 

diastolic dysfunction.  Patient is euvolemic


Sick sinus syndrome possibly related to underlying cardiomyopathy and PE.  He 

has had no further recurrence since September 23


Proximal atrial fibrillation currently in sinus.  This is likely secondary to 

his massive PE


Alcohol dependency with alcoholic hepatitis and transaminitis.  No evidence of 

withdrawal


Pulmonary nodule as noted on CT scan follow-up imaging in 3 months recommended


Homelessness still awaiting discharge planning to finalize situation








- Time


Time Spent with patient: 15-24 minutes


Anticipated Discharge Disposition: Home, Self Care


Anticipated Discharge Timeframe: when bed available

## 2020-10-02 LAB
ADD MANUAL DIFF: NO
ALBUMIN SERPL-MCNC: 3.1 G/DL (ref 3.5–5)
ALP SERPL-CCNC: 208 U/L (ref 38–126)
ANION GAP SERPL CALC-SCNC: 10 MMOL/L (ref 5–19)
AST SERPL-CCNC: 102 U/L (ref 17–59)
BASOPHILS # BLD AUTO: 0 10^3/UL (ref 0–0.2)
BASOPHILS NFR BLD AUTO: 0.8 % (ref 0–2)
BILIRUB DIRECT SERPL-MCNC: 1.7 MG/DL (ref 0–0.4)
BILIRUB SERPL-MCNC: 2.6 MG/DL (ref 0.2–1.3)
BUN SERPL-MCNC: 9 MG/DL (ref 7–20)
CALCIUM: 8.1 MG/DL (ref 8.4–10.2)
CHLORIDE SERPL-SCNC: 99 MMOL/L (ref 98–107)
CO2 SERPL-SCNC: 21 MMOL/L (ref 22–30)
EOSINOPHIL # BLD AUTO: 0 10^3/UL (ref 0–0.6)
EOSINOPHIL NFR BLD AUTO: 0.4 % (ref 0–6)
ERYTHROCYTE [DISTWIDTH] IN BLOOD BY AUTOMATED COUNT: 19.5 % (ref 11.5–14)
GLUCOSE SERPL-MCNC: 92 MG/DL (ref 75–110)
HCT VFR BLD CALC: 43.3 % (ref 37.9–51)
HGB BLD-MCNC: 14.6 G/DL (ref 13.5–17)
INR PPP: 1.4
LYMPHOCYTES # BLD AUTO: 0.9 10^3/UL (ref 0.5–4.7)
LYMPHOCYTES NFR BLD AUTO: 14.5 % (ref 13–45)
MCH RBC QN AUTO: 28 PG (ref 27–33.4)
MCHC RBC AUTO-ENTMCNC: 33.7 G/DL (ref 32–36)
MCV RBC AUTO: 83 FL (ref 80–97)
MONOCYTES # BLD AUTO: 0.4 10^3/UL (ref 0.1–1.4)
MONOCYTES NFR BLD AUTO: 6.6 % (ref 3–13)
NEUTROPHILS # BLD AUTO: 4.9 10^3/UL (ref 1.7–8.2)
NEUTS SEG NFR BLD AUTO: 77.7 % (ref 42–78)
PLATELET # BLD: 160 10^3/UL (ref 150–450)
POTASSIUM SERPL-SCNC: 4.8 MMOL/L (ref 3.6–5)
PROT SERPL-MCNC: 6.7 G/DL (ref 6.3–8.2)
PROTHROMBIN TIME: 17.3 SEC (ref 11.4–15.4)
RBC # BLD AUTO: 5.21 10^6/UL (ref 4.35–5.55)
TOTAL CELLS COUNTED % (AUTO): 100 %
WBC # BLD AUTO: 6.3 10^3/UL (ref 4–10.5)

## 2020-10-02 RX ADMIN — Medication SCH ML: at 21:26

## 2020-10-02 RX ADMIN — CEFEPIME HYDROCHLORIDE SCH MLS/HR: 1 INJECTION, SOLUTION INTRAVENOUS at 11:22

## 2020-10-02 RX ADMIN — Medication SCH: at 09:05

## 2020-10-02 RX ADMIN — NICOTINE SCH EACH: 14 PATCH, EXTENDED RELEASE TOPICAL at 09:07

## 2020-10-02 RX ADMIN — Medication SCH ML: at 05:21

## 2020-10-02 RX ADMIN — SULFAMETHOXAZOLE AND TRIMETHOPRIM SCH TAB: 800; 160 TABLET ORAL at 09:05

## 2020-10-02 RX ADMIN — CEFEPIME HYDROCHLORIDE SCH MLS/HR: 1 INJECTION, SOLUTION INTRAVENOUS at 21:25

## 2020-10-02 RX ADMIN — MAGNESIUM OXIDE TAB 400 MG (241.3 MG ELEMENTAL MG) SCH: 400 (241.3 MG) TAB at 17:37

## 2020-10-02 RX ADMIN — SULFAMETHOXAZOLE AND TRIMETHOPRIM SCH TAB: 800; 160 TABLET ORAL at 21:25

## 2020-10-02 RX ADMIN — MAGNESIUM OXIDE TAB 400 MG (241.3 MG ELEMENTAL MG) SCH MG: 400 (241.3 MG) TAB at 09:06

## 2020-10-02 RX ADMIN — ACETAMINOPHEN PRN MG: 325 TABLET ORAL at 07:52

## 2020-10-02 RX ADMIN — ACETAMINOPHEN PRN MG: 160 SOLUTION ORAL at 15:10

## 2020-10-02 RX ADMIN — LISINOPRIL SCH MG: 10 TABLET ORAL at 09:05

## 2020-10-02 RX ADMIN — PANTOPRAZOLE SODIUM SCH MG: 40 TABLET, DELAYED RELEASE ORAL at 05:21

## 2020-10-02 RX ADMIN — Medication SCH: at 13:24

## 2020-10-02 RX ADMIN — DOCUSATE SODIUM SCH: 100 CAPSULE, LIQUID FILLED ORAL at 09:06

## 2020-10-02 RX ADMIN — METOPROLOL SUCCINATE SCH: 25 TABLET, EXTENDED RELEASE ORAL at 09:06

## 2020-10-02 NOTE — RADIOLOGY REPORT (SQ)
EXAM DESCRIPTION:  CHEST SINGLE VIEW



IMAGES COMPLETED DATE/TIME:  10/2/2020 8:13 am



REASON FOR STUDY:  shortness of breath



COMPARISON:  CTA chest, 9/21/2020.  Chest radiograph, 9/20/2020



EXAM PARAMETERS:  NUMBER OF VIEWS: One view.

TECHNIQUE: Single frontal radiographic view of the chest acquired.

RADIATION DOSE: NA

LIMITATIONS: None.



FINDINGS:  LUNGS AND PLEURA: Interval development of small to moderate bilateral pleural effusions wi
th compressive atelectasis at the lung bases.  No pneumothorax.  Biapical pleural and parenchymal sca
rring is stable.

MEDIASTINUM AND HILAR STRUCTURES: No masses.  Contour normal.

HEART AND VASCULAR STRUCTURES: Moderate cardiomegaly.  No pulmonary vascular congestion.

BONES: No acute findings.

HARDWARE: None in the chest.

OTHER: No other significant finding.



IMPRESSION:  Interval development of small to moderate bilateral pleural effusions with compressive a
telectasis at the lung bases.



TECHNICAL DOCUMENTATION:  JOB ID:  [id number]

 2011 [employer]- All Rights Reserved



Reading location - IP/workstation name: [email or web address]

## 2020-10-02 NOTE — PDOC PROGRESS REPORT
Subjective


Progress Note for:: 10/02/20


Subjective:: 








And noted to be dyspneic this morning during rounds.  A chest x-ray was ordered 

which shows interval development of small to moderate bilateral pleural effusion

with compressive atelectasis at the lung bases. 


Reason For Visit: 


BILATERAL PE








Physical Exam


Vital Signs: 


                                        











Temp Pulse Resp BP Pulse Ox


 


 97.3 F   78   18   133/98 H  100 


 


 10/02/20 08:53  10/02/20 08:03  10/02/20 08:03  10/02/20 08:03  10/02/20 03:42








                                 Intake & Output











 10/01/20 10/02/20 10/03/20





 06:59 06:59 06:59


 


Intake Total 2230 980 


 


Output Total  400 


 


Balance 2230 580 


 


Weight 89.5 kg 90.4 kg 











General appearance: PRESENT: disheveled, thin


Head exam: PRESENT: atraumatic


Teeth exam: PRESENT: poor dentation


Neck exam: ABSENT: JVD


Respiratory exam: PRESENT: crackles, rales, rhonchi, tachypnea


Cardiovascular exam: PRESENT: RRR, +S1, +S2


GI/Abdominal exam: PRESENT: normal bowel sounds


Rectal exam: PRESENT: deferred


Extremities exam: PRESENT: other - swelling


Musculoskeletal exam: PRESENT: ambulatory


Neurological exam: PRESENT: alert, awake, oriented to time, oriented to 

situation


Psychiatric exam: PRESENT: appropriate affect





Results


Laboratory Results: 


                                        





                                 09/27/20 05:42 





                                 10/01/20 05:23 





                                        





09/26/20 13:06   Blood   Blood Culture - Final


                            NO GROWTH IN 5 DAYS





                                        











  09/20/20





  22:02


 


Troponin I  0.141


 


NT-Pro-B Natriuret Pep  [id number] H











Impressions: 


                                        





Chest/Abdomen CTA  09/20/20 22:55


IMPRESSION: 


There are filling defects within the main pulmonary artery


concerning for developing saddle emboli. There are also filling


defects seen within the segmental arteries consistent with


pulmonary artery emboli.


 


 


There is a groundglass nodule at the right upper lobe measuring


up to 12 mm which will need interval follow-up within three


months.


There are trace pleural effusions with some overlying airspace


opacities which are favored to represent atelectasis, but may


also represent some mild edema or infection.


 








Modified Barium Swallow  09/25/20 00:00


IMPRESSION:  DEEP LARYNGEAL PENETRATION WITH TRACE ASPIRATION SEEN WITH THIN 

BARIUM.  . PLEASE SEE SPEECH PATHOLOGIST REPORT FOR OTHER FINDINGS AND 

RECOMMENDATIONS.


 








KUB X-Ray  09/29/20 00:00


IMPRESSION:  Nonobstructive bowel gas pattern.


 








Chest X-Ray  10/02/20 00:00


IMPRESSION:  Interval development of small to moderate bilateral pleural 

effusions with compressive atelectasis at the lung bases.


 














Assessment and Plan





- Diagnosis


(1) Acute massive pulmonary embolism


Is this a current diagnosis for this admission?: Yes   





(2) Acute respiratory failure with hypoxia


Is this a current diagnosis for this admission?: Yes   





(3) Bacteremia


Is this a current diagnosis for this admission?: Yes   





(4) Bilateral pulmonary embolism


Is this a current diagnosis for this admission?: Yes   





(5) Dysphagia


Qualifiers: 


   Dysphagia type: pharyngoesophageal phase   Qualified Code(s): R13.14 - Dyspha

alma, pharyngoesophageal phase   


Is this a current diagnosis for this admission?: Yes   





(6) Hyponatremia


Is this a current diagnosis for this admission?: Yes   





(7) Pulmonary nodule


Is this a current diagnosis for this admission?: Yes   





(8) Sick sinus syndrome


Is this a current diagnosis for this admission?: Yes   





(9) Alcohol dependence


Qualifiers: 


   Substance use status: alcohol-induced persisting dementia   Qualified 

Code(s): F10.27 - Alcohol dependence with alcohol-induced persisting dementia   


Is this a current diagnosis for this admission?: Yes   





(10) Congestive heart failure


Qualifiers: 


   Heart failure type: combined systolic and diastolic 


Is this a current diagnosis for this admission?: Yes   





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


Is this a current diagnosis for this admission?: Yes   





(12) Pleural effusion


Is this a current diagnosis for this admission?: Yes   


Plan: 


Bilateral, likely leading to his acute respiratory distress.  It is unclear what

the etiology is.  Patient has multiple potential etiologies including his 

massive PE, as well as a possible pneumonia although clinically he does not 

appear to have pneumonia.  Have ordered a thoracentesis although is on apixaban 

and unclear if this can be done today.  His oxygenation is about 95% on 3 L of 

oxygen.  We will go ahead and monitor patient closely.  Labs are currently 

pending and all this will be reviewed








- Plan Summary


Summary: 


Acute pulmonary embolism status post TPA on 921.  Patient is currently on 

Eliquis 10 mg twice daily and then 5 mg twice daily from October 2


Bacteremia, initially with coag negative staph which was thought to be 

contaminant although he completed 7 days of linezolid.  Repeat blood cultures on

926 grew Klebsiella secondary to complicated UTI.  He was treated with 

ceftriaxone now on Bactrim for 5 more days.


Hyponatremia likely nutritional


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 

failure with dilated cardiomyopathy, ejection fraction of 20 to 25% and grade 3 

diastolic dysfunction.  Patient is euvolemic


Sick sinus syndrome possibly related to underlying cardiomyopathy and PE.  He 

has had no further recurrence since September 23


Proximal atrial fibrillation currently in sinus.  This is likely secondary to 

his massive PE


Alcohol dependency with alcoholic hepatitis and transaminitis.  No evidence of 

withdrawal


Pulmonary nodule as noted on CT scan follow-up imaging in 3 months recommended


Homelessness still awaiting discharge planning to finalize situation





Patient is not stable for discharge at this time.  There is also concern about 

possible aspiration because apparently had been swallowing poorly and so a 

speech therapy evaluation is been obtained while patient has been changed to 

n.p.o.


I will change his antibiotic to cefepime empirically while awaiting further 

information and studies.








- Time


Time Spent with patient: 35 or more minutes


Anticipated Discharge Disposition: Skilled Nursing Facility


Anticipated Discharge Timeframe: when bed available





- Inpatient Certification


Based on my medical assessment, after consideration of the patient's comorbiditi

es, presenting symptoms, or acuity I expect that the services needed warrant 

INPATIENT care.: Yes

## 2020-10-03 LAB
ADD MANUAL DIFF: NO
APTT BLD: 37.2 SEC (ref 23.5–35.8)
BASOPHILS # BLD AUTO: 0 10^3/UL (ref 0–0.2)
BASOPHILS NFR BLD AUTO: 0.6 % (ref 0–2)
EOSINOPHIL # BLD AUTO: 0.2 10^3/UL (ref 0–0.6)
EOSINOPHIL NFR BLD AUTO: 2.3 % (ref 0–6)
ERYTHROCYTE [DISTWIDTH] IN BLOOD BY AUTOMATED COUNT: 19.6 % (ref 11.5–14)
HCT VFR BLD CALC: 49.5 % (ref 37.9–51)
HGB BLD-MCNC: 16.6 G/DL (ref 13.5–17)
INR PPP: 1.37
LYMPHOCYTES # BLD AUTO: 0.8 10^3/UL (ref 0.5–4.7)
LYMPHOCYTES NFR BLD AUTO: 12 % (ref 13–45)
MCH RBC QN AUTO: 27.8 PG (ref 27–33.4)
MCHC RBC AUTO-ENTMCNC: 33.6 G/DL (ref 32–36)
MCV RBC AUTO: 83 FL (ref 80–97)
MONOCYTES # BLD AUTO: 0.3 10^3/UL (ref 0.1–1.4)
MONOCYTES NFR BLD AUTO: 3.8 % (ref 3–13)
NEUTROPHILS # BLD AUTO: 5.6 10^3/UL (ref 1.7–8.2)
NEUTS SEG NFR BLD AUTO: 81.3 % (ref 42–78)
PLATELET # BLD: 140 10^3/UL (ref 150–450)
PROTHROMBIN TIME: 17 SEC (ref 11.4–15.4)
RBC # BLD AUTO: 5.97 10^6/UL (ref 4.35–5.55)
TOTAL CELLS COUNTED % (AUTO): 100 %
WBC # BLD AUTO: 6.8 10^3/UL (ref 4–10.5)

## 2020-10-03 RX ADMIN — MAGNESIUM OXIDE TAB 400 MG (241.3 MG ELEMENTAL MG) SCH MG: 400 (241.3 MG) TAB at 18:32

## 2020-10-03 RX ADMIN — Medication SCH ML: at 22:21

## 2020-10-03 RX ADMIN — NICOTINE SCH EACH: 14 PATCH, EXTENDED RELEASE TOPICAL at 11:01

## 2020-10-03 RX ADMIN — Medication SCH MG: at 11:00

## 2020-10-03 RX ADMIN — SULFAMETHOXAZOLE AND TRIMETHOPRIM SCH TAB: 800; 160 TABLET ORAL at 11:01

## 2020-10-03 RX ADMIN — SULFAMETHOXAZOLE AND TRIMETHOPRIM SCH TAB: 800; 160 TABLET ORAL at 22:09

## 2020-10-03 RX ADMIN — DEXTROSE AND SODIUM CHLORIDE PRN MLS/HR: 5; .45 INJECTION, SOLUTION INTRAVENOUS at 11:08

## 2020-10-03 RX ADMIN — ACETAMINOPHEN PRN MG: 160 SOLUTION ORAL at 08:56

## 2020-10-03 RX ADMIN — CEFEPIME HYDROCHLORIDE SCH MLS/HR: 1 INJECTION, SOLUTION INTRAVENOUS at 11:01

## 2020-10-03 RX ADMIN — LISINOPRIL SCH MG: 10 TABLET ORAL at 11:01

## 2020-10-03 RX ADMIN — PANTOPRAZOLE SODIUM SCH MG: 40 TABLET, DELAYED RELEASE ORAL at 05:28

## 2020-10-03 RX ADMIN — BACITRACIN SCH APPLIC: 500 OINTMENT TOPICAL at 18:36

## 2020-10-03 RX ADMIN — Medication SCH: at 13:01

## 2020-10-03 RX ADMIN — Medication SCH ML: at 05:28

## 2020-10-03 RX ADMIN — ACETAMINOPHEN PRN MG: 160 SOLUTION ORAL at 05:27

## 2020-10-03 RX ADMIN — HEPARIN SODIUM PRN MLS/HR: 10000 INJECTION, SOLUTION INTRAVENOUS at 15:00

## 2020-10-03 RX ADMIN — MAGNESIUM OXIDE TAB 400 MG (241.3 MG ELEMENTAL MG) SCH MG: 400 (241.3 MG) TAB at 11:01

## 2020-10-03 RX ADMIN — BACITRACIN SCH APPLIC: 500 OINTMENT TOPICAL at 14:00

## 2020-10-03 RX ADMIN — CEFEPIME HYDROCHLORIDE SCH MLS/HR: 1 INJECTION, SOLUTION INTRAVENOUS at 22:09

## 2020-10-03 RX ADMIN — DEXTROSE AND SODIUM CHLORIDE PRN MLS/HR: 5; .45 INJECTION, SOLUTION INTRAVENOUS at 22:10

## 2020-10-03 RX ADMIN — METOPROLOL SUCCINATE SCH MG: 25 TABLET, EXTENDED RELEASE ORAL at 11:00

## 2020-10-03 NOTE — PDOC PROGRESS REPORT
Subjective


Progress Note for:: 10/03/20


Subjective:: 








Patient breathing is much improved today.  He his complain about being hungry.  

He has been n.p.o. since yesterday due to his dysphagia which is thought to be 

mechanical.  Patient is unable to persistently cooperate with the recommended 

positioning of his neck so have opted to keep him n.p.o. for now.


Reason For Visit: 


BILATERAL PE








Physical Exam


Vital Signs: 


                                        











Temp Pulse Resp BP Pulse Ox


 


 97.2 F   103 H  16   125/90 H  100 


 


 10/03/20 11:14  10/03/20 11:14  10/03/20 11:14  10/03/20 11:14  10/03/20 07:29








                                 Intake & Output











 10/02/20 10/03/20 10/04/20





 06:59 06:59 06:59


 


Intake Total 980 425 50


 


Output Total 400  


 


Balance 580 425 50


 


Weight 90.4 kg 90.4 kg 











General appearance: PRESENT: no acute distress, thin


Head exam: PRESENT: atraumatic


Eye exam: PRESENT: conjunctiva pink, EOMI, PERRLA.  ABSENT: scleral icterus


Mouth exam: PRESENT: moist, tongue midline


Neck exam: ABSENT: carotid bruit, JVD, lymphadenopathy, thyromegaly


Respiratory exam: PRESENT: crackles, rales, rhonchi, unlabored.  ABSENT: wheezes


Cardiovascular exam: PRESENT: RRR, +S1, +S2.  ABSENT: diastolic murmur, rubs, 

systolic murmur


Vascular exam: PRESENT: normal capillary refill


GI/Abdominal exam: PRESENT: distended, normal bowel sounds, soft.  ABSENT: 

guarding, mass, organolmegaly, rebound, tenderness


Rectal exam: PRESENT: deferred


Gentrourinary exam: PRESENT: other - small skin cut/abrasion on penis with 

swelling of the glns, improved, no dischrges noted


Extremities exam: PRESENT: full ROM.  ABSENT: calf tenderness, clubbing, pedal 

edema


Neurological exam: PRESENT: alert, awake, oriented to person, oriented to place,

oriented to time, oriented to situation, CN II-XII grossly intact.  ABSENT: 

motor sensory deficit


Psychiatric exam: PRESENT: normal mood.  ABSENT: homicidal ideation, suicidal 

ideation


Skin exam: PRESENT: dry, warm.  ABSENT: cyanosis, rash





Results


Laboratory Results: 


                                        





                                 10/03/20 12:15 





                                 10/02/20 10:33 





                                        











  10/03/20





  12:15


 


WBC  6.8


 


RBC  5.97 H


 


Hgb  16.6


 


Hct  49.5


 


MCV  83


 


MCH  27.8


 


MCHC  33.6


 


RDW  19.6 H


 


Plt Count  140 L


 


Seg Neutrophils %  81.3 H








                                        











  09/20/20





  22:02


 


Troponin I  0.141


 


NT-Pro-B Natriuret Pep  50791 H











Impressions: 


                                        





Chest/Abdomen CTA  09/20/20 22:55


IMPRESSION: 


There are filling defects within the main pulmonary artery


concerning for developing saddle emboli. There are also filling


defects seen within the segmental arteries consistent with


pulmonary artery emboli.


 


 


There is a groundglass nodule at the right upper lobe measuring


up to 12 mm which will need interval follow-up within three


months.


There are trace pleural effusions with some overlying airspace


opacities which are favored to represent atelectasis, but may


also represent some mild edema or infection.


 








Modified Barium Swallow  09/25/20 00:00


IMPRESSION:  DEEP LARYNGEAL PENETRATION WITH TRACE ASPIRATION SEEN WITH THIN 

BARIUM.  . PLEASE SEE SPEECH PATHOLOGIST REPORT FOR OTHER FINDINGS AND 

RECOMMENDATIONS.


 








KUB X-Ray  09/29/20 00:00


IMPRESSION:  Nonobstructive bowel gas pattern.


 








Chest X-Ray  10/02/20 00:00


IMPRESSION:  Interval development of small to moderate bilateral pleural 

effusions with compressive atelectasis at the lung bases.


 














Assessment and Plan





- Diagnosis


(1) Acute massive pulmonary embolism


Is this a current diagnosis for this admission?: Yes   





(2) Acute respiratory failure with hypoxia


Is this a current diagnosis for this admission?: Yes   





(3) Bacteremia


Is this a current diagnosis for this admission?: Yes   





(4) Bilateral pulmonary embolism


Is this a current diagnosis for this admission?: Yes   





(5) Dysphagia


Qualifiers: 


   Dysphagia type: pharyngoesophageal phase   Qualified Code(s): R13.14 - 

Dysphagia, pharyngoesophageal phase   


Is this a current diagnosis for this admission?: Yes   





(6) Hyponatremia


Is this a current diagnosis for this admission?: Yes   





(7) Pulmonary nodule


Is this a current diagnosis for this admission?: Yes   





(8) Sick sinus syndrome


Is this a current diagnosis for this admission?: Yes   





(9) Alcohol dependence


Qualifiers: 


   Substance use status: alcohol-induced persisting dementia   Qualified 

Code(s): F10.27 - Alcohol dependence with alcohol-induced persisting dementia   


Is this a current diagnosis for this admission?: Yes   





(10) Congestive heart failure


Qualifiers: 


   Heart failure type: combined systolic and diastolic 


Is this a current diagnosis for this admission?: Yes   





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


Is this a current diagnosis for this admission?: Yes   





(12) Pleural effusion


Is this a current diagnosis for this admission?: Yes   





- Plan Summary


Summary: 


Acute pulmonary embolism status post TPA on 921.  Patient is currently on 

Eliquis 10 mg twice daily and then 5 mg twice daily from October 2


Bacteremia, initially with coag negative staph which was thought to be 

contaminant although he completed 7 days of linezolid.  Repeat blood cultures on

926 grew Klebsiella secondary to complicated UTI.  He was treated with 

ceftriaxone now on Bactrim for 5 more days.


Hyponatremia likely nutritional


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 

failure with dilated cardiomyopathy, ejection fraction of 20 to 25% and grade 3 

diastolic dysfunction.  Patient is euvolemic


Sick sinus syndrome possibly related to underlying cardiomyopathy and PE.  He 

has had no further recurrence since September 23


Proximal atrial fibrillation currently in sinus.  This is likely secondary to 

his massive PE


Alcohol dependency with alcoholic hepatitis and transaminitis.  No evidence of 

withdrawal


Pulmonary nodule as noted on CT scan follow-up imaging in 3 months recommended


Homelessness still awaiting discharge planning to finalize situation





Patient is not stable for discharge at this time.  There is also concern about 

possible aspiration because apparently had been swallowing poorly and so a 

speech therapy evaluation is been obtained while patient has been changed to 

n.p.o.


I will change his antibiotic to cefepime empirically while awaiting further 

information and studies.





10/3/2020


Patient is currently scheduled to have a thoracentesis on October 5.  He had 

been on Eliquis but this was discontinued, or held in anticipation of the 

thoracentesis.  Because patient had his large clot burden and he actually had to

have TPA and his stent leaving unprotected over the next 48 to 72 hours and as 

such I have started him on heparin drip with no bolus this will be discontinued 

hopefully on night of October 4











- Time


Time Spent with patient: 25-34 minutes


Medications reviewed and adjusted accordingly: Yes


Anticipated Discharge Disposition: Skilled Nursing Facility


Anticipated Discharge Timeframe: within 72 hours

## 2020-10-04 LAB
ADD MANUAL DIFF: NO
ALBUMIN SERPL-MCNC: 2.2 G/DL (ref 3.5–5)
ALP SERPL-CCNC: 133 U/L (ref 38–126)
ANION GAP SERPL CALC-SCNC: 8 MMOL/L (ref 5–19)
AST SERPL-CCNC: 67 U/L (ref 17–59)
BASOPHILS # BLD AUTO: 0.1 10^3/UL (ref 0–0.2)
BASOPHILS NFR BLD AUTO: 1.2 % (ref 0–2)
BILIRUB DIRECT SERPL-MCNC: 1.3 MG/DL (ref 0–0.4)
BILIRUB SERPL-MCNC: 2.1 MG/DL (ref 0.2–1.3)
BUN SERPL-MCNC: 11 MG/DL (ref 7–20)
CALCIUM: 7.9 MG/DL (ref 8.4–10.2)
CHLORIDE SERPL-SCNC: 99 MMOL/L (ref 98–107)
CO2 SERPL-SCNC: 19 MMOL/L (ref 22–30)
EOSINOPHIL # BLD AUTO: 0.2 10^3/UL (ref 0–0.6)
EOSINOPHIL NFR BLD AUTO: 4.1 % (ref 0–6)
ERYTHROCYTE [DISTWIDTH] IN BLOOD BY AUTOMATED COUNT: 19.7 % (ref 11.5–14)
GLUCOSE SERPL-MCNC: 76 MG/DL (ref 75–110)
HCT VFR BLD CALC: 42.9 % (ref 37.9–51)
HGB BLD-MCNC: 14.5 G/DL (ref 13.5–17)
LYMPHOCYTES # BLD AUTO: 1.4 10^3/UL (ref 0.5–4.7)
LYMPHOCYTES NFR BLD AUTO: 32.8 % (ref 13–45)
MCH RBC QN AUTO: 27.8 PG (ref 27–33.4)
MCHC RBC AUTO-ENTMCNC: 33.8 G/DL (ref 32–36)
MCV RBC AUTO: 82 FL (ref 80–97)
MONOCYTES # BLD AUTO: 0.4 10^3/UL (ref 0.1–1.4)
MONOCYTES NFR BLD AUTO: 8.6 % (ref 3–13)
NEUTROPHILS # BLD AUTO: 2.3 10^3/UL (ref 1.7–8.2)
NEUTS SEG NFR BLD AUTO: 53.3 % (ref 42–78)
PLATELET # BLD: 126 10^3/UL (ref 150–450)
POTASSIUM SERPL-SCNC: 5.4 MMOL/L (ref 3.6–5)
PROT SERPL-MCNC: 5 G/DL (ref 6.3–8.2)
RBC # BLD AUTO: 5.22 10^6/UL (ref 4.35–5.55)
TOTAL CELLS COUNTED % (AUTO): 100 %
WBC # BLD AUTO: 4.4 10^3/UL (ref 4–10.5)

## 2020-10-04 RX ADMIN — SODIUM CHLORIDE PRN MLS/HR: 9 INJECTION, SOLUTION INTRAVENOUS at 09:15

## 2020-10-04 RX ADMIN — Medication SCH: at 05:36

## 2020-10-04 RX ADMIN — MAGNESIUM OXIDE TAB 400 MG (241.3 MG ELEMENTAL MG) SCH MG: 400 (241.3 MG) TAB at 18:34

## 2020-10-04 RX ADMIN — PANTOPRAZOLE SODIUM SCH MG: 40 TABLET, DELAYED RELEASE ORAL at 05:37

## 2020-10-04 RX ADMIN — CEFEPIME HYDROCHLORIDE SCH MLS/HR: 1 INJECTION, SOLUTION INTRAVENOUS at 09:17

## 2020-10-04 RX ADMIN — BACITRACIN SCH APPLIC: 500 OINTMENT TOPICAL at 18:34

## 2020-10-04 RX ADMIN — Medication SCH: at 21:39

## 2020-10-04 RX ADMIN — Medication SCH MG: at 09:16

## 2020-10-04 RX ADMIN — NICOTINE SCH EACH: 14 PATCH, EXTENDED RELEASE TOPICAL at 09:16

## 2020-10-04 RX ADMIN — MAGNESIUM OXIDE TAB 400 MG (241.3 MG ELEMENTAL MG) SCH MG: 400 (241.3 MG) TAB at 09:16

## 2020-10-04 RX ADMIN — HEPARIN SODIUM PRN MLS/HR: 10000 INJECTION, SOLUTION INTRAVENOUS at 16:52

## 2020-10-04 RX ADMIN — ACETAMINOPHEN PRN MG: 160 SOLUTION ORAL at 12:50

## 2020-10-04 RX ADMIN — BACITRACIN SCH APPLIC: 500 OINTMENT TOPICAL at 09:17

## 2020-10-04 RX ADMIN — ACETAMINOPHEN PRN MG: 160 SOLUTION ORAL at 16:52

## 2020-10-04 RX ADMIN — METOPROLOL SUCCINATE SCH MG: 25 TABLET, EXTENDED RELEASE ORAL at 09:16

## 2020-10-04 RX ADMIN — Medication SCH: at 13:19

## 2020-10-04 RX ADMIN — CEFEPIME HYDROCHLORIDE SCH MLS/HR: 1 INJECTION, SOLUTION INTRAVENOUS at 21:39

## 2020-10-04 RX ADMIN — LISINOPRIL SCH MG: 10 TABLET ORAL at 09:16

## 2020-10-04 RX ADMIN — SULFAMETHOXAZOLE AND TRIMETHOPRIM SCH TAB: 800; 160 TABLET ORAL at 09:16

## 2020-10-04 NOTE — PDOC PROGRESS REPORT
Subjective


Progress Note for:: 10/04/20


Subjective:: 








Patient breathing is much improved today.  He his complain about being hungry.  

He has been n.p.o. since yesterday due to his dysphagia which is thought to be 

mechanical.  Patient is unable to persistently cooperate with the recommended 

positioning of his neck so have opted to keep him n.p.o. for now.





10/4


Patient appears to be in a slightly better mood today.  He understands why he 

has been kept n.p.o.  The penile swelling is better.


Reason For Visit: 


BILATERAL PE








Physical Exam


Vital Signs: 


                                        











Temp Pulse Resp BP Pulse Ox


 


 97.4 F   82   18   133/98 H  98 


 


 10/04/20 11:30  10/04/20 11:30  10/04/20 11:30  10/04/20 11:30  10/04/20 11:30








                                 Intake & Output











 10/03/20 10/04/20 10/05/20





 06:59 06:59 06:59


 


Intake Total 


 


Output Total  0 200


 


Balance 425 992 850


 


Weight 90.4 kg 90.4 kg 











General appearance: PRESENT: no acute distress, thin


Mouth exam: PRESENT: tongue midline


Teeth exam: PRESENT: poor dentation


Neck exam: PRESENT: full ROM


Respiratory exam: PRESENT: crackles, rhonchi, unlabored


GI/Abdominal exam: PRESENT: distended, firm


Rectal exam: PRESENT: deferred


Gentrourinary exam: PRESENT: lesions - small wound on penile shaft, other - 

glans swelling improved


Extremities exam: PRESENT: other - 3plus, anasarca


Neurological exam: PRESENT: awake, oriented to person, oriented to place


Psychiatric exam: PRESENT: appropriate affect





Results


Laboratory Results: 


                                        





                                 10/04/20 04:41 





                                 10/04/20 04:41 





                                        











  10/04/20 10/04/20





  04:41 04:41


 


WBC  4.4 


 


RBC  5.22 


 


Hgb  14.5  D 


 


Hct  42.9 


 


MCV  82 


 


MCH  27.8 


 


MCHC  33.8 


 


RDW  19.7 H 


 


Plt Count  126 L 


 


Seg Neutrophils %  53.3 


 


Sodium   125.9 L


 


Potassium   5.4 H


 


Chloride   99


 


Carbon Dioxide   19 L


 


Anion Gap   8


 


BUN   11


 


Creatinine   0.86


 


Est GFR (African Amer)   > 60


 


Glucose   76


 


Calcium   7.9 L


 


Total Bilirubin   2.1 H


 


AST   67 H


 


Alkaline Phosphatase   133 H


 


Total Protein   5.0 L


 


Albumin   2.2 L








                                        











  09/20/20





  22:02


 


Troponin I  0.141


 


NT-Pro-B Natriuret Pep  46640 H











Impressions: 


                                        





Chest/Abdomen CTA  09/20/20 22:55


IMPRESSION: 


There are filling defects within the main pulmonary artery


concerning for developing saddle emboli. There are also filling


defects seen within the segmental arteries consistent with


pulmonary artery emboli.


 


 


There is a groundglass nodule at the right upper lobe measuring


up to 12 mm which will need interval follow-up within three


months.


There are trace pleural effusions with some overlying airspace


opacities which are favored to represent atelectasis, but may


also represent some mild edema or infection.


 








Modified Barium Swallow  09/25/20 00:00


IMPRESSION:  DEEP LARYNGEAL PENETRATION WITH TRACE ASPIRATION SEEN WITH THIN 

BARIUM.  . PLEASE SEE SPEECH PATHOLOGIST REPORT FOR OTHER FINDINGS AND RECOMMEND

ATIONS.


 








KUB X-Ray  09/29/20 00:00


IMPRESSION:  Nonobstructive bowel gas pattern.


 








Chest X-Ray  10/02/20 00:00


IMPRESSION:  Interval development of small to moderate bilateral pleural 

effusions with compressive atelectasis at the lung bases.


 














Assessment and Plan





- Diagnosis


(1) Acute massive pulmonary embolism


Is this a current diagnosis for this admission?: Yes   


Plan: 


Saddle embolus extending into the right and left lower lobes.  Noted hemodynamic

instability with hypotension and bradycardic episodes.


Status post TPA administered on 9/21/2020


Continue Eliquis 10 mg bid till Oct 1 then 5mg bid 


Likely has DVT in his legs as he does have leg swelling as well.








(2) Acute respiratory failure with hypoxia


Is this a current diagnosis for this admission?: Yes   





(3) Bacteremia


Is this a current diagnosis for this admission?: Yes   





(4) Bilateral pulmonary embolism


Is this a current diagnosis for this admission?: Yes   





(5) Dysphagia


Qualifiers: 


   Dysphagia type: pharyngoesophageal phase   Qualified Code(s): R13.14 - 

Dysphagia, pharyngoesophageal phase   


Is this a current diagnosis for this admission?: Yes   





(6) Hyponatremia


Is this a current diagnosis for this admission?: Yes   





(7) Pulmonary nodule


Is this a current diagnosis for this admission?: Yes   





(8) Sick sinus syndrome


Is this a current diagnosis for this admission?: Yes   





(9) Alcohol dependence


Qualifiers: 


   Substance use status: alcohol-induced persisting dementia   Qualified 

Code(s): F10.27 - Alcohol dependence with alcohol-induced persisting dementia   


Is this a current diagnosis for this admission?: Yes   





(10) Congestive heart failure


Qualifiers: 


   Heart failure type: combined systolic and diastolic 


Is this a current diagnosis for this admission?: Yes   





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


Is this a current diagnosis for this admission?: Yes   





(12) Pleural effusion


Is this a current diagnosis for this admission?: Yes   





- Plan Summary


Summary: 


Acute pulmonary embolism status post TPA on 921.  Patient is currently on Eliq

uis 10 mg twice daily and then 5 mg twice daily from October 2


Bacteremia, initially with coag negative staph which was thought to be 

contaminant although he completed 7 days of linezolid.  Repeat blood cultures on

926 grew Klebsiella secondary to complicated UTI.  He was treated with 

ceftriaxone now on Bactrim for 5 more days.


Hyponatremia likely nutritional


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 

failure with dilated cardiomyopathy, ejection fraction of 20 to 25% and grade 3 

diastolic dysfunction.  Patient is euvolemic


Sick sinus syndrome possibly related to underlying cardiomyopathy and PE.  He 

has had no further recurrence since September 23


Proximal atrial fibrillation currently in sinus.  This is likely secondary to 

his massive PE


Alcohol dependency with alcoholic hepatitis and transaminitis.  No evidence of 

withdrawal


Pulmonary nodule as noted on CT scan follow-up imaging in 3 months recommended


Homelessness still awaiting discharge planning to finalize situation





Patient is not stable for discharge at this time.  There is also concern about 

possible aspiration because apparently had been swallowing poorly and so a 

speech therapy evaluation is been obtained while patient has been changed to 

n.p.o.


I will change his antibiotic to cefepime empirically while awaiting further 

information and studies.





10/3/2020


Patient is currently scheduled to have a thoracentesis on October 5.  He had 

been on Eliquis but this was discontinued, or held in anticipation of the 

thoracentesis.  Because patient had his large clot burden and he actually had to

have TPA and his stent leaving unprotected over the next 48 to 72 hours and as 

such I have started him on heparin drip with no bolus this will be discontinued 

hopefully on night of October 4





10/4


Hyperkalemia will recheck in a.m. precise etiology not clear at this time


Thrombocytopenia with patient on anticoagulant will need to monitor this very 

closely


Possible ascites


Pleural effusion


Dysphagia


Plan is for possible thoracentesis as well as paracentesis in a.m. if possible. 

I did place him on heparin temporarily and this is slated to be discontinued by 

6 AM tomorrow.


He is currently on IV fluids as his n.p.o.











- Time


Time Spent with patient: 15-24 minutes


Medications reviewed and adjusted accordingly: Yes


Anticipated Discharge Disposition: Skilled Nursing Facility


Anticipated Discharge Timeframe: within 72 hours

## 2020-10-05 LAB
ALBUMIN SERPL-MCNC: 2.7 G/DL (ref 3.5–5)
ALP SERPL-CCNC: 163 U/L (ref 38–126)
ANION GAP SERPL CALC-SCNC: 10 MMOL/L (ref 5–19)
APPEARANCE FLD: CLEAR
APPEARANCE FLD: CLEAR
AST SERPL-CCNC: 70 U/L (ref 17–59)
BILIRUB DIRECT SERPL-MCNC: 1.4 MG/DL (ref 0–0.4)
BILIRUB SERPL-MCNC: 2.5 MG/DL (ref 0.2–1.3)
BODY FLD TYPE: (no result)
BODY FLD TYPE: (no result)
BUN SERPL-MCNC: 11 MG/DL (ref 7–20)
CALCIUM: 8.1 MG/DL (ref 8.4–10.2)
CHLORIDE SERPL-SCNC: 99 MMOL/L (ref 98–107)
CO2 SERPL-SCNC: 18 MMOL/L (ref 22–30)
ERYTHROCYTE [DISTWIDTH] IN BLOOD BY AUTOMATED COUNT: 19.4 % (ref 11.5–14)
FLUID COLOR: YELLOW
FLUID COLOR: YELLOW
FLUID SOURCE: (no result)
FLUID SOURCE: (no result)
FLUID VISCOSITY: (no result)
FLUID VISCOSITY: (no result)
GLUCOSE SERPL-MCNC: 80 MG/DL (ref 75–110)
HCT VFR BLD CALC: 47 % (ref 37.9–51)
HGB BLD-MCNC: 15.9 G/DL (ref 13.5–17)
INR PPP: 1.36
MCH RBC QN AUTO: 28 PG (ref 27–33.4)
MCHC RBC AUTO-ENTMCNC: 33.9 G/DL (ref 32–36)
MCV RBC AUTO: 83 FL (ref 80–97)
PHOSPHATE SERPL-MCNC: 3 MG/DL (ref 2.5–4.5)
PLATELET # BLD: 122 10^3/UL (ref 150–450)
POTASSIUM SERPL-SCNC: 5.6 MMOL/L (ref 3.6–5)
PROT SERPL-MCNC: 6 G/DL (ref 6.3–8.2)
PROTHROMBIN TIME: 16.9 SEC (ref 11.4–15.4)
RBC # BLD AUTO: 5.69 10^6/UL (ref 4.35–5.55)
WBC # BLD AUTO: 5.6 10^3/UL (ref 4–10.5)

## 2020-10-05 PROCEDURE — B518ZZA FLUOROSCOPY OF SUPERIOR VENA CAVA, GUIDANCE: ICD-10-PCS | Performed by: RADIOLOGY

## 2020-10-05 PROCEDURE — B548ZZA ULTRASONOGRAPHY OF SUPERIOR VENA CAVA, GUIDANCE: ICD-10-PCS | Performed by: RADIOLOGY

## 2020-10-05 PROCEDURE — 0W993ZX DRAINAGE OF RIGHT PLEURAL CAVITY, PERCUTANEOUS APPROACH, DIAGNOSTIC: ICD-10-PCS | Performed by: RADIOLOGY

## 2020-10-05 PROCEDURE — 02HV33Z INSERTION OF INFUSION DEVICE INTO SUPERIOR VENA CAVA, PERCUTANEOUS APPROACH: ICD-10-PCS | Performed by: RADIOLOGY

## 2020-10-05 PROCEDURE — 0W9G3ZX DRAINAGE OF PERITONEAL CAVITY, PERCUTANEOUS APPROACH, DIAGNOSTIC: ICD-10-PCS | Performed by: RADIOLOGY

## 2020-10-05 RX ADMIN — PANTOPRAZOLE SODIUM SCH: 40 TABLET, DELAYED RELEASE ORAL at 06:02

## 2020-10-05 RX ADMIN — Medication SCH: at 14:24

## 2020-10-05 RX ADMIN — CEFEPIME HYDROCHLORIDE SCH MLS/HR: 1 INJECTION, SOLUTION INTRAVENOUS at 09:29

## 2020-10-05 RX ADMIN — Medication SCH ML: at 06:01

## 2020-10-05 RX ADMIN — LORAZEPAM PRN MG: 2 INJECTION INTRAMUSCULAR; INTRAVENOUS at 05:53

## 2020-10-05 RX ADMIN — BACITRACIN SCH APPLIC: 500 OINTMENT TOPICAL at 17:21

## 2020-10-05 RX ADMIN — MAGNESIUM OXIDE TAB 400 MG (241.3 MG ELEMENTAL MG) SCH MG: 400 (241.3 MG) TAB at 09:25

## 2020-10-05 RX ADMIN — CEFEPIME HYDROCHLORIDE SCH MLS/HR: 1 INJECTION, SOLUTION INTRAVENOUS at 21:49

## 2020-10-05 RX ADMIN — LORAZEPAM PRN MG: 2 INJECTION INTRAMUSCULAR; INTRAVENOUS at 14:04

## 2020-10-05 RX ADMIN — Medication SCH ML: at 21:59

## 2020-10-05 RX ADMIN — LISINOPRIL SCH MG: 10 TABLET ORAL at 10:22

## 2020-10-05 RX ADMIN — MAGNESIUM OXIDE TAB 400 MG (241.3 MG ELEMENTAL MG) SCH MG: 400 (241.3 MG) TAB at 17:22

## 2020-10-05 RX ADMIN — METOPROLOL SUCCINATE SCH MG: 25 TABLET, EXTENDED RELEASE ORAL at 09:25

## 2020-10-05 RX ADMIN — BACITRACIN SCH APPLIC: 500 OINTMENT TOPICAL at 09:46

## 2020-10-05 RX ADMIN — Medication SCH MG: at 09:25

## 2020-10-05 RX ADMIN — NICOTINE SCH EACH: 14 PATCH, EXTENDED RELEASE TOPICAL at 09:26

## 2020-10-05 RX ADMIN — SODIUM CHLORIDE PRN MLS/HR: 9 INJECTION, SOLUTION INTRAVENOUS at 01:43

## 2020-10-05 RX ADMIN — ACETAMINOPHEN PRN MG: 160 SOLUTION ORAL at 04:48

## 2020-10-05 NOTE — RADIOLOGY REPORT (SQ)
EXAM DESCRIPTION:  U/S ABD PARACENTESIS



IMAGES COMPLETED DATE/TIME:  10/5/2020 1:45 pm



REASON FOR STUDY:  Ascites



COMPARISON  None.



LIMITATIONS:  None.



PROCEDURE:  After obtaining informed consent, the patient was brought to the ultrasound suite.  The p
rocedure was performed with the patient on a gurney.  Ultrasound was used to identify a prominent poc
ket of ascites in the left lower quadrant.  An appropriate access site was selected.  The patient was
 prepped and draped in usual sterile fashion.   The access site was anesthetized with 5 mL 1% lidocai
ne.  A Safe-T-Centesis needle was advanced into the fluid.  After aspiration of fluid the needle, the
 catheter was advanced off the needle into the fluid.  A total of 4,160 mL of straw-colored fluid was
 removed. The patient tolerated the procedure well left the department in satisfactory condition.



IMPRESSION:  Successful ultrasound-guided paracentesis



COMMENT:  Patient medication list reviewed: Yes- Quality ID# [id number]:Eligible professional attests to doc
umenting in the medical record they obtained, updated, or reviewed the patient's current medications.




TECHNICAL DOCUMENTATION:  JOB ID:  [id number]

 2011 [employer]- All Rights Reserved



Reading location - IP/workstation name: [email or web address]

## 2020-10-05 NOTE — RADIOLOGY REPORT (SQ)
EXAM DESCRIPTION:  PICC INSERTION



IMAGES COMPLETED DATE/TIME:  10/5/2020 3:08 pm



REASON FOR STUDY:  Difficult IV acess and labs



COMPARISON:  None.



FLUOROSCOPY TIME:  1.09 minutes

1 images saved to PACS.



TECHNIQUE:  Fluoroscopic and ultrasound guided PICC placement.



LIMITATIONS:  None.



PROCEDURE:  After written consent and assessment were obtained, the patient was brought into the fluo
roscopy room and placed supine on the table. Ultrasound evaluation of potential access sites were per
formed. After successfully identifying a patent left upper extremity brachiocephalic vein, the left a
rm was prepped and draped in a sterile fashion along with the ultrasound probe. The entry site was an
esthetized with 1% lidocaine. A 21 gauge 7 cm needle was advanced through the skin and into the brach
iocephalic vein under live ultrasound guidance.  An ultrasound image was saved to PACS confirming acc
ess site.  A .018 guide wire was then inserted through the needle and into the venous system. The nee
dle was then removed and an 11 blade scalpel was used to make a 1cm skin incision.  A 5 fr peel-away 
sheath was advanced over the wire and into the venous system. A measurement was then made using the e
xisting wire and live fluoroscopic guidance. The wire was then removed and trimmed. The PICC was adva
nced through the peel-away sheath and into the venous system. The peel-away sheath was removed and th
e catheter was adhered to the patients arm with a stat lock. The catheter was then aspirated and flus
hed and a sterile bandage was placed over the access site.  A fluoroscopic spot image was saved to [facility] confirming the catheter tip within the SVC.



IMPRESSION:  SUCCESSFUL PLACEMENT OF A 5 FR DUAL LUMEN 50 CM PICC IN THE LEFT BRACHIOCEPHALIC VEIN.



COMMENT:  Patient medication list reviewed: Yes- Quality ID# [id number]:Eligible professional attests to doc
umenting in the medical record they obtained, updated, or reviewed the patient's current medications.
.

Quality :  Final reports for procedures using fluoroscopy that document radiation exposure edwin
hawk, or exposure time and number of fluorographic images (if radiation exposure indices are not avail
able)

Quality ID #[id number]: The patient was prepped and draped using maximum sterile barrier technique including 
cap, mask, sterile gown, sterile gloves, a large sterile sheet, hand hygiene, and 2% Chlorhexidine fo
r cutaneous antisepsis. When ultrasound is used, sterile ultrasound techniques are followed requiring
 sterile gel and sterile probes.



TECHNICAL DOCUMENTATION:  JOB ID:  [id number]

 2011 [employer]- All Rights Reserved                           rev-5/18



Reading location - IP/workstation name: [email or web address]

## 2020-10-05 NOTE — PDOC PROGRESS REPORT
Subjective


Progress Note for:: 10/05/20


Subjective:: 








Patient breathing is much improved today.  He his complain about being hungry.  

He has been n.p.o. since yesterday due to his dysphagia which is thought to be 

mechanical.  Patient is unable to persistently cooperate with the recommended 

positioning of his neck so have opted to keep him n.p.o. for now.





10/4


Patient appears to be in a slightly better mood today.  He understands why he 

has been kept n.p.o.  The penile swelling is better.





10/5 patient's seems to be little bit more tachypneic today.  He scheduled to go

down for thoracentesis as well as paracentesis as I am concerned also about his 

abdominal swelling.  He remains n.p.o. due to concerns of mechanical dysphagia


Reason For Visit: 


BILATERAL PE








Physical Exam


Vital Signs: 


                                        











Temp Pulse Resp BP Pulse Ox


 


 97.3 F   87   16   122/102 H  100 


 


 10/05/20 08:27  10/05/20 07:56  10/05/20 07:56  10/05/20 07:56  10/05/20 07:56








                                 Intake & Output











 10/04/20 10/05/20 10/06/20





 06:59 06:59 06:59


 


Intake Total 992 2753 


 


Output Total 0 650 


 


Balance 992 2103 


 


Weight 90.4 kg 92.3 kg 











General appearance: PRESENT: no acute distress, disheveled - Chronically ill 

looking, thin


Head exam: PRESENT: atraumatic, normocephalic


Eye exam: PRESENT: conjunctiva pink, EOMI.  ABSENT: scleral icterus


Ear exam: PRESENT: normal external ear exam


Mouth exam: PRESENT: tongue midline


Neck exam: ABSENT: carotid bruit, JVD, lymphadenopathy, thyromegaly


Respiratory exam: PRESENT: decreased breath sounds, rales, rhonchi.  ABSENT: 

wheezes


Cardiovascular exam: PRESENT: RRR, +S1, +S2.  ABSENT: diastolic murmur, rubs, 

systolic murmur


Pulses: PRESENT: normal dorsalis pedis pul


Vascular exam: PRESENT: normal capillary refill


GI/Abdominal exam: PRESENT: distended, normal bowel sounds.  ABSENT: guarding, 

mass, organolmegaly, rebound, tenderness


Rectal exam: PRESENT: deferred


Gentrourinary exam: PRESENT: other - penile swelling, improved


Extremities exam: PRESENT: full ROM, other - anasarca, 3+ edema.  ABSENT: calf 

tenderness, clubbing, pedal edema


Neurological exam: PRESENT: alert, awake, oriented to person, oriented to place,

oriented to time.  ABSENT: motor sensory deficit


Psychiatric exam: PRESENT: normal mood.  ABSENT: homicidal ideation, suicidal 

ideation


Skin exam: PRESENT: skin tears, warm.  ABSENT: cyanosis, rash





Results


Laboratory Results: 


                                        





                                 10/05/20 06:25 





                                 10/05/20 07:20 





                                        











  10/05/20 10/05/20 10/05/20





  06:25 06:25 07:20


 


WBC   5.6 


 


RBC   5.69 H 


 


Hgb   15.9 


 


Hct   47.0 


 


MCV   83 


 


MCH   28.0 


 


MCHC   33.9 


 


RDW   19.4 H 


 


Plt Count   122 L 


 


Sodium  Cancelled   127.3 L


 


Potassium  Cancelled   5.6 H


 


Chloride  Cancelled   99


 


Carbon Dioxide  Cancelled   18 L


 


Anion Gap  Cancelled   10


 


BUN  Cancelled   11


 


Creatinine  Cancelled   0.98


 


Est GFR ( Amer)  Cancelled   > 60


 


Est GFR (Non-Af Amer)  Cancelled  


 


Glucose  Cancelled   80


 


Calcium  Cancelled   8.1 L


 


Phosphorus  Cancelled   3.0


 


Magnesium  Cancelled   1.9


 


Total Bilirubin  Cancelled   2.5 H


 


AST  Cancelled   70 H


 


Alkaline Phosphatase  Cancelled   163 H


 


Ammonia   


 


Total Protein  Cancelled   6.0 L


 


Albumin  Cancelled   2.7 L














  10/05/20





  10:19


 


WBC 


 


RBC 


 


Hgb 


 


Hct 


 


MCV 


 


MCH 


 


MCHC 


 


RDW 


 


Plt Count 


 


Sodium 


 


Potassium 


 


Chloride 


 


Carbon Dioxide 


 


Anion Gap 


 


BUN 


 


Creatinine 


 


Est GFR ( Amer) 


 


Est GFR (Non-Af Amer) 


 


Glucose 


 


Calcium 


 


Phosphorus 


 


Magnesium 


 


Total Bilirubin 


 


AST 


 


Alkaline Phosphatase 


 


Ammonia  14.6


 


Total Protein 


 


Albumin 








                                        











  09/20/20





  22:02


 


Troponin I  0.141


 


NT-Pro-B Natriuret Pep  61901 H











Impressions: 


                                        





Chest/Abdomen CTA  09/20/20 22:55


IMPRESSION: 


There are filling defects within the main pulmonary artery


concerning for developing saddle emboli. There are also filling


defects seen within the segmental arteries consistent with


pulmonary artery emboli.


 


 


There is a groundglass nodule at the right upper lobe measuring


up to 12 mm which will need interval follow-up within three


months.


There are trace pleural effusions with some overlying airspace


opacities which are favored to represent atelectasis, but may


also represent some mild edema or infection.


 








Modified Barium Swallow  09/25/20 00:00


IMPRESSION:  DEEP LARYNGEAL PENETRATION WITH TRACE ASPIRATION SEEN WITH THIN 

BARIUM.  . PLEASE SEE SPEECH PATHOLOGIST REPORT FOR OTHER FINDINGS AND R

ECOMMENDATIONS.


 








KUB X-Ray  09/29/20 00:00


IMPRESSION:  Nonobstructive bowel gas pattern.


 














Assessment and Plan





- Diagnosis


(1) Acute massive pulmonary embolism


Is this a current diagnosis for this admission?: Yes   





(2) Acute respiratory failure with hypoxia


Is this a current diagnosis for this admission?: Yes   





(3) Bacteremia


Is this a current diagnosis for this admission?: Yes   





(4) Bilateral pulmonary embolism


Is this a current diagnosis for this admission?: Yes   





(5) Dysphagia


Qualifiers: 


   Dysphagia type: pharyngoesophageal phase   Qualified Code(s): R13.14 - 

Dysphagia, pharyngoesophageal phase   


Is this a current diagnosis for this admission?: Yes   





(6) Hyponatremia


Is this a current diagnosis for this admission?: Yes   





(7) Pulmonary nodule


Is this a current diagnosis for this admission?: Yes   





(8) Sick sinus syndrome


Is this a current diagnosis for this admission?: Yes   





(9) Alcohol dependence


Qualifiers: 


   Substance use status: alcohol-induced persisting dementia   Qualified Code

(s): F10.27 - Alcohol dependence with alcohol-induced persisting dementia   


Is this a current diagnosis for this admission?: Yes   





(10) Congestive heart failure


Qualifiers: 


   Heart failure type: combined systolic and diastolic 


Is this a current diagnosis for this admission?: Yes   





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


Is this a current diagnosis for this admission?: Yes   





(12) Pleural effusion


Is this a current diagnosis for this admission?: Yes   





(13) Thrombocytopenia


Is this a current diagnosis for this admission?: Yes   


Plan: 


Possibly secondary to anticoagulant.  If his platelet count continues to decline

I will suggest checking him for HIT.








- Plan Summary


Summary: 


Acute pulmonary embolism status post TPA on 921.  Patient is currently on 

Eliquis 10 mg twice daily and then 5 mg twice daily from October 2


Bacteremia, initially with coag negative staph which was thought to be contam

inant although he completed 7 days of linezolid.  Repeat blood cultures on 926 

grew Klebsiella secondary to complicated UTI.  He was treated with ceftriaxone 

now on Bactrim for 5 more days.


Hyponatremia likely nutritional


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

lure with dilated cardiomyopathy, ejection fraction of 20 to 25% and grade 3 

diastolic dysfunction.  Patient is euvolemic


Sick sinus syndrome possibly related to underlying cardiomyopathy and PE.  He 

has had no further recurrence since September 23


Proximal atrial fibrillation currently in sinus.  This is likely secondary to 

his massive PE


Alcohol dependency with alcoholic hepatitis and transaminitis.  No evidence of 

withdrawal


Pulmonary nodule as noted on CT scan follow-up imaging in 3 months recommended


Homelessness still awaiting discharge planning to finalize situation





Patient is not stable for discharge at this time.  There is also concern about 

possible aspiration because apparently had been swallowing poorly and so a 

speech therapy evaluation is been obtained while patient has been changed to 

n.p.o.


I will change his antibiotic to cefepime empirically while awaiting further 

information and studies.





10/3/2020


Patient is currently scheduled to have a thoracentesis on October 5.  He had 

been on Eliquis but this was discontinued, or held in anticipation of the 

thoracentesis.  Because patient had his large clot burden and he actually had to

have TPA and his stent leaving unprotected over the next 48 to 72 hours and as 

such I have started him on heparin drip with no bolus this will be discontinued 

hopefully on night of October 4





10/4


Hyperkalemia will recheck in a.m. precise etiology not clear at this time


Thrombocytopenia with patient on anticoagulant will need to monitor this very 

closely


Possible ascites


Pleural effusion


Dysphagia


Plan is for possible thoracentesis as well as paracentesis in a.m. if possible. 

I did place him on heparin temporarily and this is slated to be discontinued by 

6 AM tomorrow.


He is currently on IV fluids as his n.p.o.





10/5 patient is slightly more tachypneic today but thoracentesis currently 

pending as well as a paracentesis and hopefully his breathing will improve after

these procedures.  I have also checked ammonia level which is normal.  Patient 

had been on heparin drip due to the thoracentesis and paracentesis being planned

for today.  He can likely be switched back to oral anticoagulant in a.m.  There 

is still needs to decide on his mode of eating.  He had been n.p.o. since 

October 2 due to concerns about his dysphagia.  He was seen by speech therapy.  

It is felt that patient has mechanical dysphagia and positioning will be the 

ultimate goal as there is nothing to offer at this time.  I think patient will 

need to be taught and cajoled into being compliant with this we can start him 

feeding again.  Meanwhile due to his challenging home situation that is 

homelessness a skilled nursing facility was being looked into for him but at 

this time this is still not available.


Had switched him to cefepime on October 2 partly because of his inability to 

swallow his antibiotics but this should be reassessed going forward as well.  He

had been on Bactrim recently for complicated UTI treatment











- Time


Time Spent with patient: 25-34 minutes


Medications reviewed and adjusted accordingly: Yes


Anticipated Discharge Disposition: Skilled Nursing Facility


Anticipated Discharge Timeframe: within 72 hours

## 2020-10-05 NOTE — RADIOLOGY REPORT (SQ)
EXAM DESCRIPTION:  CHEST SINGLE VIEW



IMAGES COMPLETED DATE/TIME:  10/5/2020 1:47 pm



REASON FOR STUDY:  post right thoracentesis



COMPARISON:  10/2/2020



EXAM PARAMETERS:  NUMBER OF VIEWS: One view.

TECHNIQUE: Single frontal radiographic view of the chest acquired.

RADIATION DOSE: NA

LIMITATIONS: None.



FINDINGS:  LUNGS AND PLEURA: No pneumothorax status post right thoracentesis.  There is considerable 
retrocardiac opacification on the left.

MEDIASTINUM AND HILAR STRUCTURES: No masses.  Contour normal.

HEART AND VASCULAR STRUCTURES: Cardiomegaly.  No carolann pulmonary edema.

BONES: No acute findings.

HARDWARE: None in the chest.

OTHER: No other significant finding.



IMPRESSION:  No pneumothorax.  Retrocardiac opacification, atelectasis versus pneumonia.  Cardiomegal
y without pulmonary edema.



TECHNICAL DOCUMENTATION:  JOB ID:  [id number]

 2011 [employer]- All Rights Reserved



Reading location - IP/workstation name: [email or web address]

## 2020-10-05 NOTE — RADIOLOGY REPORT (SQ)
EXAM DESCRIPTION:  CHEST SINGLE VIEW



IMAGES COMPLETED DATE/TIME:  10/5/2020 3:46 pm



REASON FOR STUDY:  2 hour post right thoracenstsis



COMPARISON:  Same day ultrasound



EXAM PARAMETERS:  NUMBER OF VIEWS: One view.

TECHNIQUE: Single frontal radiographic view of the chest acquired.

RADIATION DOSE: NA

LIMITATIONS: None.



FINDINGS:  LUNGS AND PLEURA: No pneumothorax post thoracentesis.  Persistent retrocardiac opacity on 
the left.  Mild bilateral residual effusions, left greater than right.

MEDIASTINUM AND HILAR STRUCTURES: Stable.

HEART AND VASCULAR STRUCTURES: Enlarged, stable.

BONES: No acute findings.

HARDWARE: Left approach PICC tip terminates at cavoatrial junction.

OTHER: No other significant finding.



IMPRESSION:  No pneumothorax post thoracentesis.



TECHNICAL DOCUMENTATION:  JOB ID:  [id number]

 2011 [employer]- All Rights Reserved



Reading location - IP/workstation name: [email or web address]

## 2020-10-05 NOTE — RADIOLOGY REPORT (SQ)
EXAM DESCRIPTION:  U/S THORACENTESIS WITH IMAGING



IMAGES COMPLETED DATE/TIME:  10/5/2020 1:45 pm



REASON FOR STUDY:  Dyspnea, Pleural effusion



COMPARISON:  None.



LIMITATIONS:  None.



PROCEDURE:  Procedure, risks, benefit, and alternative explained to patient who then gave written con
sent.  The posterior right chest wall was marked using ultrasound guidance.  A time-out was called fo
r correct marking verification. Chest prepped and draped using sterile technique.  Local anesthesia a
chieved using 10 ml of 1% lidocaine injection.  A 6fr Safe-T- Centesis set was introduced into the [facility] pleural space.  Fluid was aspirated.  The catheter was removed and the entry site was covered wit
h sterile bandage.  No immediate complications noted.

Images acquired during the procedure were stored on PACS.



FINDINGS:  ENTRY SITE: posterior right chest.

FLUID VOLUME: 1000 cc

FLUID ANALYSIS: Clear yellow

OTHER: Fluid sent to the lab for testing.



IMPRESSION:  SUCCESSFUL RIGHT-SIDED THORACENTESIS USING ULTRASOUND GUIDANCE.



COMMENT:  Patient medication list reviewed: Yes- Quality ID# [id number]:Eligible professional attests to doc
umenting in the medical record they obtained, updated, or reviewed the patient's current medications.




TECHNICAL DOCUMENTATION:  JOB ID: [id number]

 2011 [employer]- All Rights Reserved



Reading location - IP/workstation name: [email or web address]

## 2020-10-06 LAB
ADD MANUAL DIFF: NO
AMYLASE BODY FLUID: 27 U/L
ANION GAP SERPL CALC-SCNC: 6 MMOL/L (ref 5–19)
ANION GAP SERPL CALC-SCNC: 7 MMOL/L (ref 5–19)
BASOPHILS # BLD AUTO: 0.1 10^3/UL (ref 0–0.2)
BASOPHILS NFR BLD AUTO: 0.9 % (ref 0–2)
BUN SERPL-MCNC: 11 MG/DL (ref 7–20)
BUN SERPL-MCNC: 12 MG/DL (ref 7–20)
CALCIUM: 7.7 MG/DL (ref 8.4–10.2)
CALCIUM: 7.8 MG/DL (ref 8.4–10.2)
CHLORIDE SERPL-SCNC: 98 MMOL/L (ref 98–107)
CHLORIDE SERPL-SCNC: 99 MMOL/L (ref 98–107)
CO2 SERPL-SCNC: 21 MMOL/L (ref 22–30)
CO2 SERPL-SCNC: 23 MMOL/L (ref 22–30)
EOSINOPHIL # BLD AUTO: 0 10^3/UL (ref 0–0.6)
EOSINOPHIL NFR BLD AUTO: 0.3 % (ref 0–6)
ERYTHROCYTE [DISTWIDTH] IN BLOOD BY AUTOMATED COUNT: 19.5 % (ref 11.5–14)
GLUCOSE SERPL-MCNC: 273 MG/DL (ref 75–110)
GLUCOSE SERPL-MCNC: 94 MG/DL (ref 75–110)
HCT VFR BLD CALC: 48 % (ref 37.9–51)
HGB BLD-MCNC: 15.9 G/DL (ref 13.5–17)
LYMPHOCYTES # BLD AUTO: 1.2 10^3/UL (ref 0.5–4.7)
LYMPHOCYTES NFR BLD AUTO: 20.9 % (ref 13–45)
MCH RBC QN AUTO: 27.7 PG (ref 27–33.4)
MCHC RBC AUTO-ENTMCNC: 33.2 G/DL (ref 32–36)
MCV RBC AUTO: 84 FL (ref 80–97)
MONOCYTES # BLD AUTO: 0.6 10^3/UL (ref 0.1–1.4)
MONOCYTES NFR BLD AUTO: 10.9 % (ref 3–13)
NEUTROPHILS # BLD AUTO: 3.9 10^3/UL (ref 1.7–8.2)
NEUTS SEG NFR BLD AUTO: 67 % (ref 42–78)
PATH REV BLD -IMP: (no result)
PLATELET # BLD: 109 10^3/UL (ref 150–450)
POTASSIUM SERPL-SCNC: 5.4 MMOL/L (ref 3.6–5)
POTASSIUM SERPL-SCNC: 5.4 MMOL/L (ref 3.6–5)
RBC # BLD AUTO: 5.75 10^6/UL (ref 4.35–5.55)
TOTAL CELLS COUNTED % (AUTO): 100 %
TOTAL PROTEIN BODY FLUID: 2.4 G/DL
WBC # BLD AUTO: 5.8 10^3/UL (ref 4–10.5)

## 2020-10-06 RX ADMIN — PANTOPRAZOLE SODIUM SCH: 40 TABLET, DELAYED RELEASE ORAL at 05:38

## 2020-10-06 RX ADMIN — APIXABAN SCH MG: 5 TABLET, FILM COATED ORAL at 10:42

## 2020-10-06 RX ADMIN — SODIUM CHLORIDE AND POTASSIUM CHLORIDE PRN MLS/HR: 9; 2.98 INJECTION, SOLUTION INTRAVENOUS at 19:00

## 2020-10-06 RX ADMIN — ACETAMINOPHEN PRN MG: 160 SOLUTION ORAL at 17:52

## 2020-10-06 RX ADMIN — CEFEPIME HYDROCHLORIDE SCH MLS/HR: 1 INJECTION, SOLUTION INTRAVENOUS at 10:40

## 2020-10-06 RX ADMIN — SODIUM PHOSPHATE, MONOBASIC, MONOHYDRATE PRN GM: 276; 142 INJECTION, SOLUTION INTRAVENOUS at 11:58

## 2020-10-06 RX ADMIN — BACITRACIN SCH APPLIC: 500 OINTMENT TOPICAL at 10:41

## 2020-10-06 RX ADMIN — Medication SCH ML: at 05:39

## 2020-10-06 RX ADMIN — BACITRACIN SCH APPLIC: 500 OINTMENT TOPICAL at 17:50

## 2020-10-06 RX ADMIN — Medication SCH MG: at 10:40

## 2020-10-06 RX ADMIN — MAGNESIUM OXIDE TAB 400 MG (241.3 MG ELEMENTAL MG) SCH MG: 400 (241.3 MG) TAB at 17:50

## 2020-10-06 RX ADMIN — ALBUMIN (HUMAN) SCH MLS/HR: 12.5 SOLUTION INTRAVENOUS at 10:47

## 2020-10-06 RX ADMIN — LISINOPRIL SCH: 10 TABLET ORAL at 10:38

## 2020-10-06 RX ADMIN — Medication SCH: at 14:42

## 2020-10-06 RX ADMIN — ALBUMIN (HUMAN) SCH MLS/HR: 12.5 SOLUTION INTRAVENOUS at 10:40

## 2020-10-06 RX ADMIN — NICOTINE SCH EACH: 14 PATCH, EXTENDED RELEASE TOPICAL at 10:40

## 2020-10-06 RX ADMIN — MAGNESIUM OXIDE TAB 400 MG (241.3 MG ELEMENTAL MG) SCH MG: 400 (241.3 MG) TAB at 10:41

## 2020-10-06 RX ADMIN — METOPROLOL SUCCINATE SCH MG: 25 TABLET, EXTENDED RELEASE ORAL at 10:41

## 2020-10-06 RX ADMIN — SODIUM PHOSPHATE, MONOBASIC, MONOHYDRATE PRN GM: 276; 142 INJECTION, SOLUTION INTRAVENOUS at 00:58

## 2020-10-06 RX ADMIN — SODIUM PHOSPHATE, MONOBASIC, MONOHYDRATE PRN GM: 276; 142 INJECTION, SOLUTION INTRAVENOUS at 05:52

## 2020-10-06 RX ADMIN — Medication SCH ML: at 21:41

## 2020-10-06 RX ADMIN — SODIUM PHOSPHATE, MONOBASIC, MONOHYDRATE PRN GM: 276; 142 INJECTION, SOLUTION INTRAVENOUS at 11:00

## 2020-10-06 RX ADMIN — SODIUM PHOSPHATE, MONOBASIC, MONOHYDRATE PRN GM: 276; 142 INJECTION, SOLUTION INTRAVENOUS at 23:35

## 2020-10-06 RX ADMIN — SODIUM PHOSPHATE, MONOBASIC, MONOHYDRATE PRN GM: 276; 142 INJECTION, SOLUTION INTRAVENOUS at 21:55

## 2020-10-06 RX ADMIN — SODIUM PHOSPHATE, MONOBASIC, MONOHYDRATE PRN GM: 276; 142 INJECTION, SOLUTION INTRAVENOUS at 00:42

## 2020-10-06 RX ADMIN — CEFEPIME HYDROCHLORIDE SCH MLS/HR: 1 INJECTION, SOLUTION INTRAVENOUS at 21:40

## 2020-10-06 RX ADMIN — APIXABAN SCH MG: 5 TABLET, FILM COATED ORAL at 21:40

## 2020-10-06 RX ADMIN — SODIUM PHOSPHATE, MONOBASIC, MONOHYDRATE PRN GM: 276; 142 INJECTION, SOLUTION INTRAVENOUS at 01:17

## 2020-10-06 RX ADMIN — LORAZEPAM PRN MG: 2 INJECTION INTRAMUSCULAR; INTRAVENOUS at 00:03

## 2020-10-06 NOTE — PDOC PROGRESS REPORT
Subjective


Progress Note for:: 10/06/20


Subjective:: 





Patient wanting to eat today.  He denies shortness of breath or chest pain.  

Getting belligerent with staff. 


Reason For Visit: 


BILATERAL PE








Physical Exam


Vital Signs: 


                                        











Temp Pulse Resp BP Pulse Ox


 


 97.4 F   83   20   117/87 H  100 


 


 10/06/20 11:21  10/06/20 14:00  10/06/20 11:21  10/06/20 11:21  10/06/20 11:21








                                 Intake & Output











 10/05/20 10/06/20 10/07/20





 06:59 06:59 06:59


 


Intake Total 2753 100 488


 


Output Total 650 175 


 


Balance 2103 -75 488


 


Weight 92.3 kg 87.1 kg 











General appearance: PRESENT: no acute distress, cooperative


Eye exam: PRESENT: periorbital swelling - only over right eye


Neck exam: ABSENT: tracheal deviation


Respiratory exam: PRESENT: symmetrical, unlabored.  ABSENT: accessory muscle 

use, retraction, stridor, tachypnea, wheezes


Cardiovascular exam: PRESENT: RRR, +S1, +S2.  ABSENT: tachycardia


GI/Abdominal exam: PRESENT: distended, soft.  ABSENT: firm, guarding, rebound, 

rigid, tenderness


Extremities exam: PRESENT: pedal edema


Neurological exam: PRESENT: alert, awake, oriented to person, oriented to place,

oriented to time





Results


Laboratory Results: 


                                        





                                 10/06/20 05:00 





                                 10/06/20 05:00 





                                        











  10/05/20 10/05/20 10/05/20





  12:30 12:30 12:30


 


WBC   


 


RBC   


 


Hgb   


 


Hct   


 


MCV   


 


MCH   


 


MCHC   


 


RDW   


 


Plt Count   


 


Seg Neutrophils %   


 


Sodium   


 


Potassium   


 


Chloride   


 


Carbon Dioxide   


 


Anion Gap   


 


BUN   


 


Creatinine   


 


Est GFR (African Amer)   


 


Glucose   


 


Calcium   


 


Fluid Glucose  82  


 


Fluid Total Protein   2.4 


 


Fluid LDH    112


 


Fluid Amylase   27 














  10/05/20 10/06/20 10/06/20





  13:15 01:30 05:00


 


WBC    5.8


 


RBC    5.75 H


 


Hgb    15.9


 


Hct    48.0


 


MCV    84


 


MCH    27.7


 


MCHC    33.2


 


RDW    19.5 H


 


Plt Count    109 L


 


Seg Neutrophils %    67.0


 


Sodium   125.9 L 


 


Potassium   5.4 H 


 


Chloride   98 


 


Carbon Dioxide   21 L 


 


Anion Gap   7 


 


BUN   12 


 


Creatinine   0.83 


 


Est GFR ( Amer)   > 60 


 


Glucose   273 H 


 


Calcium   7.8 L 


 


Fluid Glucose   


 


Fluid Total Protein   


 


Fluid LDH   


 


Fluid Amylase  28  














  10/06/20





  05:00


 


WBC 


 


RBC 


 


Hgb 


 


Hct 


 


MCV 


 


MCH 


 


MCHC 


 


RDW 


 


Plt Count 


 


Seg Neutrophils % 


 


Sodium  127.8 L


 


Potassium  5.4 H


 


Chloride  99


 


Carbon Dioxide  23


 


Anion Gap  6


 


BUN  11


 


Creatinine  0.82


 


Est GFR (African Amer)  > 60


 


Glucose  94


 


Calcium  7.7 L


 


Fluid Glucose 


 


Fluid Total Protein 


 


Fluid LDH 


 


Fluid Amylase 








                                        











  09/20/20





  22:02


 


Troponin I  0.141


 


NT-Pro-B Natriuret Pep  15210 H











Impressions: 


                                        





Chest/Abdomen CTA  09/20/20 22:55


IMPRESSION: 


There are filling defects within the main pulmonary artery


concerning for developing saddle emboli. There are also filling


defects seen within the segmental arteries consistent with


pulmonary artery emboli.


 


 


There is a groundglass nodule at the right upper lobe measuring


up to 12 mm which will need interval follow-up within three


months.


There are trace pleural effusions with some overlying airspace


opacities which are favored to represent atelectasis, but may


also represent some mild edema or infection.


 








Modified Barium Swallow  09/25/20 00:00


IMPRESSION:  DEEP LARYNGEAL PENETRATION WITH TRACE ASPIRATION SEEN WITH THIN 

BARIUM.  . PLEASE SEE SPEECH PATHOLOGIST REPORT FOR OTHER FINDINGS AND RE

COMMENDATIONS.


 








KUB X-Ray  09/29/20 00:00


IMPRESSION:  Nonobstructive bowel gas pattern.


 








PICC Line Insertion  10/05/20 00:00


IMPRESSION:  SUCCESSFUL PLACEMENT OF A 5 FR DUAL LUMEN 50 CM PICC IN THE LEFT 

BRACHIOCEPHALIC VEIN.


 








Paracentesis Ultrasound  10/05/20 08:00


IMPRESSION:  Successful ultrasound-guided paracentesis


 








Thoracentesis Ultrasound  10/05/20 09:47


IMPRESSION:  SUCCESSFUL RIGHT-SIDED THORACENTESIS USING ULTRASOUND GUIDANCE.


 








Chest X-Ray  10/05/20 15:25


IMPRESSION:  No pneumothorax post thoracentesis.


 














Assessment and Plan





- Diagnosis


(1) Acute massive pulmonary embolism


Is this a current diagnosis for this admission?: Yes   


Plan: 


Saddle embolus extending into the right and left lower lobes.  Noted hemodynamic

instability with hypotension and bradycardic episodes.


Status post TPA administered on 9/21/2020


Resumed Eliquis today.  Discontinue heparin drip.








(2) Bacteremia


Is this a current diagnosis for this admission?: Yes   


Plan: 


Initially blood cultures positive for MR Coag neg Staph and only 1 blood culture

set-completed 7 days of linezolid though possibly could be contaminant.


Repeat blood cultures on 9/26/2020 showing Klebsiella secondary to complicated 

UTI.


Initially on ceftriaxone then Bactrim p.o. then switched to cefepime because he 

was n.p.o.  I will allow you on cefepime for 2 more days.








(3) Hyponatremia


Is this a current diagnosis for this admission?: Yes   


Plan: 


Urine osmolarity and urine sodium are low and suggest hyponatremia secondary to 

low caloric nutritional intake +/- chf/liver disease


Encourage p.o. intake.  Also received some Lasix.


Monitor BMP








(4) Ascites


Qualifiers: 


   Ascites type: other type   Qualified Code(s): R18.8 - Other ascites   


Is this a current diagnosis for this admission?: Yes   


Plan: 


Etiology is likely from chronic systolic heart failure/RHF due to PE/Chronic 

liver disease


s/p >4L from paracentesis 10/5 --> will give 25g of albumin today.


Lasix


F/u diagnostic labs on ascitic fluid for SAAG and total protein








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


Is this a current diagnosis for this admission?: Yes   


Plan: 


TTE showing biventricular failure with dilated cardiomyopathy, EF of 20 to 25% 

and grade 3 diastolic dysfunction of the LV.


Lasix. Toprol-XL 25 mg daily.  Switch lisinopril to losartan.








(6) Dysphagia


Qualifiers: 


   Dysphagia type: pharyngoesophageal phase   Qualified Code(s): R13.14 - 

Dysphagia, pharyngoesophageal phase   


Is this a current diagnosis for this admission?: Yes   


Plan: 


Had noted aspiration with thin liquids on MBS which resolved when upright with 

chin kept up.  Speech pathologist however recommends continuation of regular 

diet with thin liquids but prompting patient to sit upright with chin kept 

upright. 


Patient has been n.p.o. for the past few days and was significantly hypoglycemic

yesterday and this morning required several amps of D50 and had to be put on D5 

saline infusion and glucagon.





I have resumed patient on diet.  Hopefully should be able to discontinue the D5 

saline solution now his food intake is resumed.








(7) Paroxysmal atrial fibrillation


Is this a current diagnosis for this admission?: Yes   


Plan: 


Sinus rhythm currently








(8) Acute respiratory failure with hypoxia


Is this a current diagnosis for this admission?: Yes   





(9) Alcohol dependence


Qualifiers: 


   Substance use status: alcohol-induced persisting dementia   Qualified 

Code(s): F10.27 - Alcohol dependence with alcohol-induced persisting dementia   


Is this a current diagnosis for this admission?: Yes   





(10) Pulmonary nodule


Is this a current diagnosis for this admission?: Yes   





(11) Homeless


Is this a current diagnosis for this admission?: Yes   





- Time


Time Spent with patient: 15-24 minutes


Anticipated Discharge Disposition: Skilled Nursing Facility


Anticipated Discharge Timeframe: within 48 hours

## 2020-10-07 LAB
ALBUMIN FLD-MCNC: 0.5 G/DL
ANION GAP SERPL CALC-SCNC: 12 MMOL/L (ref 5–19)
BUN SERPL-MCNC: 11 MG/DL (ref 7–20)
CALCIUM: 8 MG/DL (ref 8.4–10.2)
CHLORIDE SERPL-SCNC: 98 MMOL/L (ref 98–107)
CO2 SERPL-SCNC: 20 MMOL/L (ref 22–30)
ERYTHROCYTE [DISTWIDTH] IN BLOOD BY AUTOMATED COUNT: 19.3 % (ref 11.5–14)
GLUCOSE SERPL-MCNC: 68 MG/DL (ref 75–110)
HCT VFR BLD CALC: 45.5 % (ref 37.9–51)
HGB BLD-MCNC: 15.1 G/DL (ref 13.5–17)
LDH BODY FLUID: 83 IU/L
MCH RBC QN AUTO: 27.5 PG (ref 27–33.4)
MCHC RBC AUTO-ENTMCNC: 33.1 G/DL (ref 32–36)
MCV RBC AUTO: 83 FL (ref 80–97)
PLATELET # BLD: 84 10^3/UL (ref 150–450)
POTASSIUM SERPL-SCNC: 4.9 MMOL/L (ref 3.6–5)
RBC # BLD AUTO: 5.47 10^6/UL (ref 4.35–5.55)
TOTAL PROTEIN BODY FLUID: 1.2 G/DL
WBC # BLD AUTO: 6.7 10^3/UL (ref 4–10.5)

## 2020-10-07 RX ADMIN — SPIRONOLACTONE SCH MG: 25 TABLET, FILM COATED ORAL at 17:17

## 2020-10-07 RX ADMIN — SODIUM PHOSPHATE, MONOBASIC, MONOHYDRATE PRN GM: 276; 142 INJECTION, SOLUTION INTRAVENOUS at 00:02

## 2020-10-07 RX ADMIN — LOSARTAN POTASSIUM SCH MG: 25 TABLET, FILM COATED ORAL at 09:29

## 2020-10-07 RX ADMIN — APIXABAN SCH MG: 5 TABLET, FILM COATED ORAL at 09:29

## 2020-10-07 RX ADMIN — BACITRACIN SCH APPLIC: 500 OINTMENT TOPICAL at 17:18

## 2020-10-07 RX ADMIN — CEFEPIME HYDROCHLORIDE SCH MLS/HR: 1 INJECTION, SOLUTION INTRAVENOUS at 21:57

## 2020-10-07 RX ADMIN — Medication SCH MG: at 09:29

## 2020-10-07 RX ADMIN — METOPROLOL SUCCINATE SCH MG: 25 TABLET, EXTENDED RELEASE ORAL at 09:29

## 2020-10-07 RX ADMIN — NICOTINE SCH EACH: 14 PATCH, EXTENDED RELEASE TOPICAL at 09:30

## 2020-10-07 RX ADMIN — SODIUM PHOSPHATE, MONOBASIC, MONOHYDRATE PRN GM: 276; 142 INJECTION, SOLUTION INTRAVENOUS at 06:22

## 2020-10-07 RX ADMIN — PANTOPRAZOLE SODIUM SCH MG: 40 TABLET, DELAYED RELEASE ORAL at 05:57

## 2020-10-07 RX ADMIN — Medication SCH: at 15:06

## 2020-10-07 RX ADMIN — CEFEPIME HYDROCHLORIDE SCH MLS/HR: 1 INJECTION, SOLUTION INTRAVENOUS at 09:30

## 2020-10-07 RX ADMIN — Medication SCH ML: at 05:57

## 2020-10-07 RX ADMIN — WARFARIN SODIUM SCH: 3 TABLET ORAL at 22:55

## 2020-10-07 RX ADMIN — SODIUM CHLORIDE AND POTASSIUM CHLORIDE PRN MLS/HR: 9; 2.98 INJECTION, SOLUTION INTRAVENOUS at 03:28

## 2020-10-07 RX ADMIN — Medication SCH ML: at 21:57

## 2020-10-07 RX ADMIN — SODIUM PHOSPHATE, MONOBASIC, MONOHYDRATE PRN GM: 276; 142 INJECTION, SOLUTION INTRAVENOUS at 04:03

## 2020-10-07 RX ADMIN — MAGNESIUM OXIDE TAB 400 MG (241.3 MG ELEMENTAL MG) SCH MG: 400 (241.3 MG) TAB at 09:29

## 2020-10-07 RX ADMIN — ACETAMINOPHEN PRN MG: 160 SOLUTION ORAL at 21:57

## 2020-10-07 RX ADMIN — MAGNESIUM OXIDE TAB 400 MG (241.3 MG ELEMENTAL MG) SCH MG: 400 (241.3 MG) TAB at 17:17

## 2020-10-07 RX ADMIN — BACITRACIN SCH APPLIC: 500 OINTMENT TOPICAL at 09:30

## 2020-10-07 RX ADMIN — ENOXAPARIN SODIUM SCH: 80 INJECTION SUBCUTANEOUS at 21:54

## 2020-10-07 NOTE — RADIOLOGY REPORT (SQ)
EXAM DESCRIPTION:  U/S ABDOMEN LTD W/DOPPLER



IMAGES COMPLETED DATE/TIME:  10/6/2020 7:43 pm



REASON FOR STUDY:  ?cirrhosis. check for budd chiari. w/ dopplers



COMPARISON:  None.



TECHNIQUE:  Dynamic and static grayscale images acquired of the abdomen and recorded on PACS. Additio
nal selected color Doppler and spectral images recorded.



LIMITATIONS:  Examination is limited due to the patient's clinical condition.



FINDINGS:  PANCREAS: No masses.  Visualized pancreatic duct normal caliber.

LIVER:  Nodular contour to the liver, raises the question of possible cirrhosis of the liver.  The li
kimberly measures 13.4 cm in length, normal size.

LIVER VASCULATURE: Normal directional flow of the main portal vein and hepatic veins.

GALLBLADDER: No stones.  The gallbladder wall measures 2.0 mm, normal wall thickness. No pericholecys
tic fluid.

ULTRASOUND-DETECTED HURTADO'S SIGN: Negative.

INTRAHEPATIC DUCTS AND COMMON DUCT: CBD measures 3.0 mm in diameter, normal.  The intrahepatic ducts 
normal caliber. No filling defects.

INFERIOR VENA CAVA: Normal flow.

AORTA:  The proximal segment is patent.  The mid and distal segments are obscured by overlying bowel 
gas.

RIGHT KIDNEY:  The right kidney measures 9.3 cm in length, normal size.  Normal echogenicity. No bonny
d or suspicious masses. No hydronephrosis. No calcifications.

PERITONEAL AND RIGHT PLEURAL SPACE:  Small volume of ascites.

OTHER: No other significant findings.



IMPRESSION:  1.  Examination is limited due to the patient's clinical condition.

2.  Nodular contour to the liver, raising question of cirrhosis of the liver. Liver vasculature is pa
tent.

3.  Suboptimal visualization of the abdominal aorta due to overlying bowel gas.

4.  Small volume of ascites right upper quadrant of the abdomen.



TECHNICAL DOCUMENTATION:  JOB ID:  [id number]

 [employer]- All Rights Reserved



Reading location - IP/workstation name: [facility]

## 2020-10-07 NOTE — PDOC PROGRESS REPORT
Subjective


Progress Note for:: 10/07/20


Subjective:: 





Patient was eating comfortably today.  He denies shortness of breath.  Noted on 

room air.


Reason For Visit: 


BILATERAL PE








Physical Exam


Vital Signs: 


                                        











Temp Pulse Resp BP Pulse Ox


 


 97.2 F   91   19   133/87 H  95 


 


 10/07/20 08:31  10/07/20 14:00  10/07/20 03:10  10/07/20 08:10  10/07/20 04:00








                                 Intake & Output











 10/06/20 10/07/20 10/08/20





 06:59 06:59 06:59


 


Intake Total 100 1505 50


 


Output Total 175 1750 


 


Balance -75 -245 50


 


Weight 87.1 kg 85.2 kg 











General appearance: PRESENT: no acute distress, cooperative


Neck exam: ABSENT: JVD


Respiratory exam: PRESENT: symmetrical, unlabored.  ABSENT: tachypnea, wheezes


Cardiovascular exam: PRESENT: RRR, +S1, +S2.  ABSENT: tachycardia


GI/Abdominal exam: PRESENT: soft.  ABSENT: rebound, rigid, tenderness


Extremities exam: PRESENT: pedal edema


Neurological exam: PRESENT: alert, awake


Psychiatric exam: ABSENT: agitated, anxious


Focused psych exam: ABSENT: pressured speech


Skin exam: ABSENT: jaundice





Results


Laboratory Results: 


                                        





                                 10/06/20 05:00 





                                 10/07/20 11:07 





                                        











  10/05/20 10/05/20 10/05/20





  12:30 13:15 13:35


 


Sodium   


 


Potassium   


 


Chloride   


 


Carbon Dioxide   


 


Anion Gap   


 


BUN   


 


Creatinine   


 


Est GFR ( Amer)   


 


Est GFR (Non-Af Amer)   


 


Glucose   


 


Calcium   


 


Fluid Glucose   80 


 


Fluid Total Protein   1.2 


 


Fluid Albumin  1.1   0.5


 


Fluid LDH    83














  10/07/20 10/07/20





  06:15 11:07


 


Sodium  Cancelled  130.2 L


 


Potassium  Cancelled  4.9


 


Chloride  Cancelled  98


 


Carbon Dioxide  Cancelled  20 L


 


Anion Gap  Cancelled  12


 


BUN  Cancelled  11


 


Creatinine  Cancelled  0.80


 


Est GFR ( Amer)  Cancelled  > 60


 


Est GFR (Non-Af Amer)  Cancelled 


 


Glucose  Cancelled  68 L


 


Calcium  Cancelled  8.0 L


 


Fluid Glucose  


 


Fluid Total Protein  


 


Fluid Albumin  


 


Fluid LDH  








                                        











  09/20/20





  22:02


 


Troponin I  0.141


 


NT-Pro-B Natriuret Pep  80113 H











Impressions: 


                                        





Chest/Abdomen CTA  09/20/20 22:55


IMPRESSION: 


There are filling defects within the main pulmonary artery


concerning for developing saddle emboli. There are also filling


defects seen within the segmental arteries consistent with


pulmonary artery emboli.


 


 


There is a groundglass nodule at the right upper lobe measuring


up to 12 mm which will need interval follow-up within three


months.


There are trace pleural effusions with some overlying airspace


opacities which are favored to represent atelectasis, but may


also represent some mild edema or infection.


 








Modified Barium Swallow  09/25/20 00:00


IMPRESSION:  DEEP LARYNGEAL PENETRATION WITH TRACE ASPIRATION SEEN WITH THIN 

BARIUM.  . PLEASE SEE SPEECH PATHOLOGIST REPORT FOR OTHER FINDINGS AND 

RECOMMENDATIONS.


 








KUB X-Ray  09/29/20 00:00


IMPRESSION:  Nonobstructive bowel gas pattern.


 








PICC Line Insertion  10/05/20 00:00


IMPRESSION:  SUCCESSFUL PLACEMENT OF A 5 FR DUAL LUMEN 50 CM PICC IN THE LEFT 

BRACHIOCEPHALIC VEIN.


 








Paracentesis Ultrasound  10/05/20 08:00


IMPRESSION:  Successful ultrasound-guided paracentesis


 








Thoracentesis Ultrasound  10/05/20 09:47


IMPRESSION:  SUCCESSFUL RIGHT-SIDED THORACENTESIS USING ULTRASOUND GUIDANCE.


 








Chest X-Ray  10/05/20 15:25


IMPRESSION:  No pneumothorax post thoracentesis.


 








Abdomen Ultrasound  10/06/20 00:00


IMPRESSION:  1.  Examination is limited due to the patient's clinical condition.


2.  Nodular contour to the liver, raising question of cirrhosis of the liver. 

Liver vasculature is patent.


3.  Suboptimal visualization of the abdominal aorta due to overlying bowel gas.


4.  Small volume of ascites right upper quadrant of the abdomen.


 














Assessment and Plan





- Diagnosis


(1) Acute massive pulmonary embolism


Is this a current diagnosis for this admission?: Yes   


Plan: 


Saddle embolus extending into the right and left lower lobes.  Noted hemodynamic

instability with hypotension and bradycardic episodes.


Status post TPA administered on 9/21/2020


Currently on Eliquis but in light of patient's cirrhosis, I will discuss with 

pharmacy to determine the most appropriate option for anticoagulation.








(2) Bacteremia


Is this a current diagnosis for this admission?: Yes   


Plan: 


Initially blood cultures positive for MR Coag neg Staph and only 1 blood culture

set-completed 7 days of linezolid though possibly could be contaminant.


Repeat blood cultures on 9/26/2020 showing Klebsiella secondary to complicated 

UTI.


Initially on ceftriaxone then Bactrim p.o. then switched to cefepime because he 

was n.p.o.  I will allow you on cefepime for 1 more day.








(3) Alcoholic cirrhosis of liver with ascites


Is this a current diagnosis for this admission?: Yes   


Plan: 


s/p >4L from paracentesis 10/5


Ascites fluid analysis and abdominal ultrasound are consistent with cirrhosis 

likely secondary to chronic alcoholism. 


Child's kessler class C.


Given his very recent alcoholism, he will of course be a poor candidate for l

iver transplantation.


Placed on Lasix and Aldactone added for ascites.








(4) Hyponatremia


Is this a current diagnosis for this admission?: Yes   


Plan: 


Urine osmolarity and urine sodium are low and suggest hyponatremia secondary to 

low caloric nutritional intake +/- chf/liver disease


Encourage p.o. intake and continue lasix.


sodium improving








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


Is this a current diagnosis for this admission?: Yes   


Plan: 


TTE showing biventricular failure with dilated cardiomyopathy, EF of 20 to 25% 

and grade 3 diastolic dysfunction of the LV.


Lasix, aldactone, Toprol-XL 25 mg daily and losartan








(6) Dysphagia


Qualifiers: 


   Dysphagia type: pharyngoesophageal phase   Qualified Code(s): R13.14 - 

Dysphagia, pharyngoesophageal phase   


Is this a current diagnosis for this admission?: Yes   


Plan: 


Had noted aspiration with thin liquids on MBS which resolved when upright with 

chin kept up.  Speech pathologist however recommends continuation of regular 

diet with thin liquids but prompting patient to sit upright with chin kept 

upright. 


Prone to hypoglycemia if kept n.p.o. especially in light of cirrhosis.  Continue

current diet.  Discontinue D5 saline.








(7) Acute respiratory failure with hypoxia


Is this a current diagnosis for this admission?: Yes   


Plan: 


Currently on room air.  Notably patient gets difficulty with adequate pulse 

oximetry using fingertips due to likely peripheral vascular disease.








(8) Paroxysmal atrial fibrillation


Is this a current diagnosis for this admission?: Yes   





(9) Alcohol dependence


Qualifiers: 


   Substance use status: alcohol-induced persisting dementia   Qualified 

Code(s): F10.27 - Alcohol dependence with alcohol-induced persisting dementia   


Is this a current diagnosis for this admission?: Yes   





(10) Pulmonary nodule


Is this a current diagnosis for this admission?: Yes   





(11) Homeless


Is this a current diagnosis for this admission?: Yes   





- Time


Time Spent with patient: Less than 15 minutes


Anticipated Discharge Disposition: Skilled Nursing Facility


Anticipated Discharge Timeframe: when bed available

## 2020-10-08 LAB
ANION GAP SERPL CALC-SCNC: 7 MMOL/L (ref 5–19)
APPEARANCE UR: (no result)
APTT PPP: YELLOW S
BILIRUB UR QL STRIP: NEGATIVE
BUN SERPL-MCNC: 9 MG/DL (ref 7–20)
CALCIUM: 7.7 MG/DL (ref 8.4–10.2)
CHLORIDE SERPL-SCNC: 97 MMOL/L (ref 98–107)
CO2 SERPL-SCNC: 24 MMOL/L (ref 22–30)
GLUCOSE SERPL-MCNC: 123 MG/DL (ref 75–110)
GLUCOSE UR STRIP-MCNC: 50 MG/DL
INR PPP: 1.44
KETONES UR STRIP-MCNC: NEGATIVE MG/DL
NITRITE UR QL STRIP: NEGATIVE
PH UR STRIP: 6 [PH] (ref 5–9)
POTASSIUM SERPL-SCNC: 4.5 MMOL/L (ref 3.6–5)
PROT UR STRIP-MCNC: 30 MG/DL
PROTHROMBIN TIME: 17.7 SEC (ref 11.4–15.4)
SP GR UR STRIP: 1.02
UROBILINOGEN UR-MCNC: NEGATIVE MG/DL (ref ?–2)

## 2020-10-08 RX ADMIN — ENOXAPARIN SODIUM SCH: 80 INJECTION SUBCUTANEOUS at 21:50

## 2020-10-08 RX ADMIN — WARFARIN SODIUM SCH MG: 3 TABLET ORAL at 21:49

## 2020-10-08 RX ADMIN — METOPROLOL SUCCINATE SCH MG: 25 TABLET, EXTENDED RELEASE ORAL at 09:48

## 2020-10-08 RX ADMIN — Medication SCH MG: at 09:48

## 2020-10-08 RX ADMIN — BACITRACIN SCH APPLIC: 500 OINTMENT TOPICAL at 09:49

## 2020-10-08 RX ADMIN — SPIRONOLACTONE SCH MG: 25 TABLET, FILM COATED ORAL at 17:51

## 2020-10-08 RX ADMIN — BACITRACIN SCH APPLIC: 500 OINTMENT TOPICAL at 17:51

## 2020-10-08 RX ADMIN — MAGNESIUM OXIDE TAB 400 MG (241.3 MG ELEMENTAL MG) SCH MG: 400 (241.3 MG) TAB at 09:48

## 2020-10-08 RX ADMIN — Medication SCH ML: at 05:41

## 2020-10-08 RX ADMIN — LOSARTAN POTASSIUM SCH MG: 25 TABLET, FILM COATED ORAL at 09:48

## 2020-10-08 RX ADMIN — MAGNESIUM OXIDE TAB 400 MG (241.3 MG ELEMENTAL MG) SCH MG: 400 (241.3 MG) TAB at 17:51

## 2020-10-08 RX ADMIN — NICOTINE SCH EACH: 14 PATCH, EXTENDED RELEASE TOPICAL at 09:48

## 2020-10-08 RX ADMIN — Medication SCH ML: at 21:49

## 2020-10-08 RX ADMIN — SODIUM PHOSPHATE, MONOBASIC, MONOHYDRATE PRN GM: 276; 142 INJECTION, SOLUTION INTRAVENOUS at 03:06

## 2020-10-08 RX ADMIN — FUROSEMIDE SCH MG: 20 TABLET ORAL at 09:48

## 2020-10-08 RX ADMIN — PANTOPRAZOLE SODIUM SCH MG: 40 TABLET, DELAYED RELEASE ORAL at 05:41

## 2020-10-08 RX ADMIN — ENOXAPARIN SODIUM SCH: 80 INJECTION SUBCUTANEOUS at 09:40

## 2020-10-08 RX ADMIN — Medication SCH ML: at 15:41

## 2020-10-08 NOTE — PDOC PROGRESS REPORT
Subjective


Progress Note for:: 10/08/20


Subjective:: 





Patient is doing well this morning.  Denies any shortness of breath.  

Comfortable in bed.


Reason For Visit: 


BILATERAL PE








Physical Exam


Vital Signs: 


                                        











Temp Pulse Resp BP Pulse Ox


 


 97.3 F   105 H  18   113/83   100 


 


 10/08/20 11:34  10/08/20 14:00  10/08/20 11:34  10/08/20 11:34  10/08/20 11:34








                                 Intake & Output











 10/07/20 10/08/20 10/09/20





 06:59 06:59 06:59


 


Intake Total 1505 1380 260


 


Output Total 1750 625 


 


Balance -245 755 260


 


Weight 85.2 kg 86 kg 











General appearance: PRESENT: no acute distress, cooperative


Neck exam: ABSENT: JVD


Respiratory exam: PRESENT: symmetrical, unlabored.  ABSENT: tachypnea, wheezes


Cardiovascular exam: PRESENT: RRR, +S1, +S2.  ABSENT: tachycardia


GI/Abdominal exam: PRESENT: ascites, soft.  ABSENT: rebound, rigid, tenderness


Neurological exam: PRESENT: alert, awake, oriented to person, oriented to place,

oriented to time


Psychiatric exam: ABSENT: agitated, anxious





Results


Laboratory Results: 


                                        





                                 10/07/20 22:40 





                                 10/08/20 06:35 





                                        











  10/05/20 10/05/20 10/07/20





  12:30 13:15 22:40


 


WBC    6.7


 


RBC    5.47


 


Hgb    15.1


 


Hct    45.5


 


MCV    83


 


MCH    27.5


 


MCHC    33.1


 


RDW    19.3 H


 


Plt Count    84 L


 


Sodium   


 


Potassium   


 


Chloride   


 


Carbon Dioxide   


 


Anion Gap   


 


BUN   


 


Creatinine   


 


Est GFR (African Amer)   


 


Glucose   


 


Calcium   


 


Urine Color   


 


Urine Appearance   


 


Urine pH   


 


Ur Specific Gravity   


 


Urine Protein   


 


Urine Glucose (UA)   


 


Urine Ketones   


 


Urine Blood   


 


Urine Nitrite   


 


Ur Leukocyte Esterase   


 


Urine WBC (Auto)   


 


Urine RBC (Auto)   


 


Fluid pH  8.2  8.1 














  10/08/20 10/08/20





  06:35 10:00


 


WBC  


 


RBC  


 


Hgb  


 


Hct  


 


MCV  


 


MCH  


 


MCHC  


 


RDW  


 


Plt Count  


 


Sodium  128.2 L 


 


Potassium  4.5 


 


Chloride  97 L 


 


Carbon Dioxide  24 


 


Anion Gap  7 


 


BUN  9 


 


Creatinine  0.71 


 


Est GFR (African Amer)  > 60 


 


Glucose  123 H 


 


Calcium  7.7 L 


 


Urine Color   YELLOW


 


Urine Appearance   SLIGHTLY-CLOUDY


 


Urine pH   6.0


 


Ur Specific Gravity   1.018


 


Urine Protein   30 H


 


Urine Glucose (UA)   50 H


 


Urine Ketones   NEGATIVE


 


Urine Blood   MODERATE H


 


Urine Nitrite   NEGATIVE


 


Ur Leukocyte Esterase   MODERATE H


 


Urine WBC (Auto)   13


 


Urine RBC (Auto)   3


 


Fluid pH  








                                        





10/05/20 13:15   Pleural Fluid - Right Pleural Effusion   Gram Stain - Final


10/05/20 13:15   Peritoneal   Gram Stain - Final





                                        











  09/20/20





  22:02


 


Troponin I  0.141


 


NT-Pro-B Natriuret Pep  75396 H











Impressions: 


                                        





Chest/Abdomen CTA  09/20/20 22:55


IMPRESSION: 


There are filling defects within the main pulmonary artery


concerning for developing saddle emboli. There are also filling


defects seen within the segmental arteries consistent with


pulmonary artery emboli.


 


 


There is a groundglass nodule at the right upper lobe measuring


up to 12 mm which will need interval follow-up within three


months.


There are trace pleural effusions with some overlying airspace


opacities which are favored to represent atelectasis, but may


also represent some mild edema or infection.


 








Modified Barium Swallow  09/25/20 00:00


IMPRESSION:  DEEP LARYNGEAL PENETRATION WITH TRACE ASPIRATION SEEN WITH THIN 

BARIUM.  . PLEASE SEE SPEECH PATHOLOGIST REPORT FOR OTHER FINDINGS AND 

RECOMMENDATIONS.


 








KUB X-Ray  09/29/20 00:00


IMPRESSION:  Nonobstructive bowel gas pattern.


 








PICC Line Insertion  10/05/20 00:00


IMPRESSION:  SUCCESSFUL PLACEMENT OF A 5 FR DUAL LUMEN 50 CM PICC IN THE LEFT 

BRACHIOCEPHALIC VEIN.


 








Paracentesis Ultrasound  10/05/20 08:00


IMPRESSION:  Successful ultrasound-guided paracentesis


 








Thoracentesis Ultrasound  10/05/20 09:47


IMPRESSION:  SUCCESSFUL RIGHT-SIDED THORACENTESIS USING ULTRASOUND GUIDANCE.


 








Chest X-Ray  10/05/20 15:25


IMPRESSION:  No pneumothorax post thoracentesis.


 








Abdomen Ultrasound  10/06/20 00:00


IMPRESSION:  1.  Examination is limited due to the patient's clinical condition.


2.  Nodular contour to the liver, raising question of cirrhosis of the liver. 

Liver vasculature is patent.


3.  Suboptimal visualization of the abdominal aorta due to overlying bowel gas.


4.  Small volume of ascites right upper quadrant of the abdomen.


 














Assessment and Plan





- Diagnosis


(1) Acute massive pulmonary embolism


Is this a current diagnosis for this admission?: Yes   


Plan: 


Saddle embolus extending into the right and left lower lobes.  Noted hemodynamic

instability with hypotension and bradycardic episodes.


Status post TPA administered on 9/21/2020


After discussion with pharmacist and some literature review, we have deemed that

the most adequate long-term anticoagulation option for patient will be warfarin 

given patient's cirrhosis and child's Sanders class C.


Discontinued Eliquis and switched patient to warfarin with Lovenox bridge last 

night.  Pharmacy to manage warfarin dosing.


Monitor INR goal of 2-3








(2) Bacteremia


Is this a current diagnosis for this admission?: Yes   


Plan: 


Initially blood cultures positive for MR Coag neg Staph and only 1 blood culture

set-completed 7 days of linezolid though possibly could be contaminant.


Repeat blood cultures on 9/26/2020 showing Klebsiella secondary to complicated 

UTI-completed treatment with cefepime and Bactrim.





Currently resolved.








(3) Alcoholic cirrhosis of liver with ascites


Is this a current diagnosis for this admission?: Yes   


Plan: 


s/p >4L from paracentesis 10/5


Ascites fluid analysis and abdominal ultrasound are consistent with cirrhosis 

likely secondary to chronic alcoholism. 


Child's sanders class C.


Given his very recent alcoholism and noncompliance, he will of course be a poor 

candidate for liver transplantation.


Lasix and Aldactone added for ascites.








(4) Hyponatremia


Is this a current diagnosis for this admission?: Yes   


Plan: 


Urine osmolarity and urine sodium are low and suggest hyponatremia secondary to 

low caloric nutritional intake +/- chf/liver disease


Encourage p.o. intake and continue lasix.








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


Is this a current diagnosis for this admission?: Yes   


Plan: 


TTE showing biventricular failure with dilated cardiomyopathy, EF of 20 to 25% 

and grade 3 diastolic dysfunction of the LV.


Lasix, aldactone, Toprol-XL and losartan








(6) Dysphagia


Qualifiers: 


   Dysphagia type: pharyngoesophageal phase   Qualified Code(s): R13.14 - 

Dysphagia, pharyngoesophageal phase   


Is this a current diagnosis for this admission?: Yes   


Plan: 


Had noted aspiration with thin liquids on MBS which resolved when upright with 

chin kept up.  Speech pathologist however recommends continuation of regular 

diet with thin liquids but prompting patient to sit upright with chin kept 

upright. 


Prone to hypoglycemia if kept n.p.o. especially in light of cirrhosis.  Continue

current diet. 








(7) Acute respiratory failure with hypoxia


Is this a current diagnosis for this admission?: Yes   


Plan: 


Was on room air this morning.  Notably patient gets difficulty with adequate 

pulse oximetry using fingertips due to likely peripheral vascular disease.








(8) Paroxysmal atrial fibrillation


Is this a current diagnosis for this admission?: Yes   


Plan: 


Sinus rhythm currently








(9) Alcohol dependence


Qualifiers: 


   Substance use status: alcohol-induced persisting dementia   Qualified 

Code(s): F10.27 - Alcohol dependence with alcohol-induced persisting dementia   


Is this a current diagnosis for this admission?: Yes   


Plan: 


Transaminitis likely secondary to alcoholic hepatitis which has significantly 

improved.  Currently no evidence of withdrawal and he is out of the withdrawal 

window at this point








(10) Pulmonary nodule


Is this a current diagnosis for this admission?: Yes   


Plan: 


Noted on CAT scan.  Follow-up imaging recommended in 3 months but given 

patient's homelessness and noncompliance, it is very unlikely he will follow-up.








(11) Homeless


Is this a current diagnosis for this admission?: Yes   


Plan: 


Physical therapy.  Patient will likely need long-term placement at SNF 

especially given presentation with massive and unlikely to comply with therapy 

very poor ability to care for self.  Discharge planning working on this but 

encountering a lot of difficulty with placing patient. D/w his brother Aakash 

who is reluctant to take patient to his home due to limited room/space.








- Time


Time Spent with patient: Less than 15 minutes


Anticipated Discharge Disposition: Skilled Nursing Facility


Anticipated Discharge Timeframe: when bed available

## 2020-10-09 LAB
ERYTHROCYTE [DISTWIDTH] IN BLOOD BY AUTOMATED COUNT: 19 % (ref 11.5–14)
HCT VFR BLD CALC: 39.5 % (ref 37.9–51)
HGB BLD-MCNC: 13.1 G/DL (ref 13.5–17)
INR PPP: 1.17
MCH RBC QN AUTO: 27.4 PG (ref 27–33.4)
MCHC RBC AUTO-ENTMCNC: 33.1 G/DL (ref 32–36)
MCV RBC AUTO: 83 FL (ref 80–97)
PLATELET # BLD: 69 10^3/UL (ref 150–450)
PROTHROMBIN TIME: 15.1 SEC (ref 11.4–15.4)
RBC # BLD AUTO: 4.78 10^6/UL (ref 4.35–5.55)
WBC # BLD AUTO: 6.3 10^3/UL (ref 4–10.5)

## 2020-10-09 RX ADMIN — WARFARIN SODIUM SCH MG: 5 TABLET ORAL at 21:25

## 2020-10-09 RX ADMIN — ENOXAPARIN SODIUM SCH: 80 INJECTION SUBCUTANEOUS at 10:20

## 2020-10-09 RX ADMIN — Medication SCH: at 21:26

## 2020-10-09 RX ADMIN — SPIRONOLACTONE SCH MG: 25 TABLET, FILM COATED ORAL at 18:54

## 2020-10-09 RX ADMIN — Medication SCH: at 16:33

## 2020-10-09 RX ADMIN — FUROSEMIDE SCH MG: 20 TABLET ORAL at 10:18

## 2020-10-09 RX ADMIN — METOPROLOL SUCCINATE SCH MG: 25 TABLET, EXTENDED RELEASE ORAL at 10:18

## 2020-10-09 RX ADMIN — BACITRACIN SCH APPLIC: 500 OINTMENT TOPICAL at 18:55

## 2020-10-09 RX ADMIN — PANTOPRAZOLE SODIUM SCH: 40 TABLET, DELAYED RELEASE ORAL at 06:44

## 2020-10-09 RX ADMIN — NICOTINE SCH EACH: 14 PATCH, EXTENDED RELEASE TOPICAL at 10:20

## 2020-10-09 RX ADMIN — MAGNESIUM OXIDE TAB 400 MG (241.3 MG ELEMENTAL MG) SCH MG: 400 (241.3 MG) TAB at 18:54

## 2020-10-09 RX ADMIN — Medication SCH: at 06:44

## 2020-10-09 RX ADMIN — MAGNESIUM OXIDE TAB 400 MG (241.3 MG ELEMENTAL MG) SCH MG: 400 (241.3 MG) TAB at 10:19

## 2020-10-09 RX ADMIN — BACITRACIN SCH: 500 OINTMENT TOPICAL at 14:39

## 2020-10-09 RX ADMIN — LOSARTAN POTASSIUM SCH MG: 25 TABLET, FILM COATED ORAL at 11:50

## 2020-10-09 RX ADMIN — ENOXAPARIN SODIUM SCH: 80 INJECTION SUBCUTANEOUS at 21:26

## 2020-10-09 RX ADMIN — Medication SCH MG: at 11:50

## 2020-10-09 NOTE — PDOC PROGRESS REPORT
Subjective


Progress Note for:: 10/09/20


Subjective:: 





64 -year-old -American male.  With a past medical history of atrial 

flutter, alcohol induced dilated cardiomyopathy, CHF, hypertension, peripheral 

vascular disease and COPD.  He presented to the ED with complaints of shortness 

of breath for 1 week, last evening his symptoms became more severe and EMS was 

called.  Upon their arrival his O2 saturation was 87% on room air, he was placed

on 4 L nasal cannula with improvement in his O2 saturations to 98%.  Work-up in 

the ED consisted of a CTA of the chest which revealed defects within the main 

pulmonary artery concerning for developing saddle emboli.  There is also filling

defects within the segmental arteries consistent with pulmonary artery emboli.  

Of note he had a recent admission in July for a flutter RVR at which time he was

not started on anticoagulation given the fact that he is homeless and has 

alcohol dependency issues.  He was ordered beta-blockers for rate control and LV

dysfunction.  He states he has not been taking these as these were stolen.  He 

also had an admission June 29, 2020 for pulseless right lower extremity a lower 

extremity ultrasound at that time showed occlusion of the distal anterior tibial

artery in the right lower extremity and high-grade stenosis in the distal supe

rficial femoral artery.  He is admitted to the ICU for close observation.  

Heparin drip started.








10/9/4534-99-coon-old -American male with history of atrial flutter, 

alcohol induced dilated cardiomyopathy, hypertension, COPD, peripheral vascular 

disease, congestive heart failure admitted with shortness of breath.  CT of the 

chest indicate you have saddle emboli.  He was admitted to intensive care and 

transferred to medical floor.  Patient is on treatment dose of Lovenox and 

warfarin 3 mg daily.  INR today is 1.17.  Asymptomatic.  Plan is to increase 

warfarin to 5 mg p.o. nightly and to recheck PT/INR.  Plan is to bring the INR 

to therapeutic level prior to discharge.  He is homeless need a placement.


Reason For Visit: 


BILATERAL PE








Physical Exam


Vital Signs: 


                                        











Temp Pulse Resp BP Pulse Ox


 


 98.4 F   92   24 H  124/89 H  100 


 


 10/09/20 07:46  10/09/20 07:46  10/09/20 07:46  10/09/20 07:46  10/09/20 07:46








                                 Intake & Output











 10/08/20 10/09/20 10/10/20





 06:59 06:59 06:59


 


Intake Total 1380 900 


 


Output Total 625  


 


Balance 755 900 


 


Weight 86 kg 54.4 kg 











General appearance: PRESENT: no acute distress


Head exam: PRESENT: atraumatic


Eye exam: PRESENT: PERRLA


Ear exam: PRESENT: normal external ear exam


Mouth exam: PRESENT: neck supple


Neck exam: ABSENT: carotid bruit, JVD, lymphadenopathy, thyromegaly


Respiratory exam: PRESENT: decreased breath sounds


Cardiovascular exam: PRESENT: RRR.  ABSENT: diastolic murmur, rubs, systolic 

murmur


GI/Abdominal exam: PRESENT: normal bowel sounds, soft.  ABSENT: distended, 

guarding, mass, organolmegaly, rebound, tenderness


Rectal exam: PRESENT: deferred


Extremities exam: PRESENT: full ROM.  ABSENT: calf tenderness, clubbing, pedal 

edema


Neurological exam: PRESENT: alert, awake, oriented to person, oriented to place,

oriented to time, oriented to situation, CN II-XII grossly intact.  ABSENT: 

motor sensory deficit


Psychiatric exam: PRESENT: appropriate affect, normal mood.  ABSENT: homicidal 

ideation, suicidal ideation


Skin exam: PRESENT: dry, intact, warm.  ABSENT: cyanosis, rash





Results


Laboratory Results: 


                                        





                                 10/09/20 06:35 





                                 10/08/20 06:35 





                                        











  10/09/20





  06:35


 


WBC  6.3


 


RBC  4.78


 


Hgb  13.1 L


 


Hct  39.5


 


MCV  83


 


MCH  27.4


 


MCHC  33.1


 


RDW  19.0 H


 


Plt Count  69 L








                                        





10/05/20 13:15   Peritoneal   Gram Stain - Final


10/05/20 13:15   Peritoneal   Body Fluid Culture - Final


                            NO AEROBIC OR ANAEROBIC ORGANISMS RECOVERED


10/05/20 13:15   Pleural Fluid - Right Pleural Effusion   Gram Stain - Final


10/05/20 13:15   Pleural Fluid - Right Pleural Effusion   Body Fluid Culture - 

Final


                            NO AEROBIC OR ANAEROBIC ORGANISMS RECOVERED





                                        











  09/20/20





  22:02


 


Troponin I  0.141


 


NT-Pro-B Natriuret Pep  62088 H











Impressions: 


                                        





Chest/Abdomen CTA  09/20/20 22:55


IMPRESSION: 


There are filling defects within the main pulmonary artery


concerning for developing saddle emboli. There are also filling


defects seen within the segmental arteries consistent with


pulmonary artery emboli.


 


 


There is a groundglass nodule at the right upper lobe measuring


up to 12 mm which will need interval follow-up within three


months.


There are trace pleural effusions with some overlying airspace


opacities which are favored to represent atelectasis, but may


also represent some mild edema or infection.


 








Modified Barium Swallow  09/25/20 00:00


IMPRESSION:  DEEP LARYNGEAL PENETRATION WITH TRACE ASPIRATION SEEN WITH THIN 

BARIUM.  . PLEASE SEE SPEECH PATHOLOGIST REPORT FOR OTHER FINDINGS AND 

RECOMMENDATIONS.


 








KUB X-Ray  09/29/20 00:00


IMPRESSION:  Nonobstructive bowel gas pattern.


 








PICC Line Insertion  10/05/20 00:00


IMPRESSION:  SUCCESSFUL PLACEMENT OF A 5 FR DUAL LUMEN 50 CM PICC IN THE LEFT 

BRACHIOCEPHALIC VEIN.


 








Paracentesis Ultrasound  10/05/20 08:00


IMPRESSION:  Successful ultrasound-guided paracentesis


 








Thoracentesis Ultrasound  10/05/20 09:47


IMPRESSION:  SUCCESSFUL RIGHT-SIDED THORACENTESIS USING ULTRASOUND GUIDANCE.


 








Chest X-Ray  10/05/20 15:25


IMPRESSION:  No pneumothorax post thoracentesis.


 








Abdomen Ultrasound  10/06/20 00:00


IMPRESSION:  1.  Examination is limited due to the patient's clinical condition.


2.  Nodular contour to the liver, raising question of cirrhosis of the liver. 

Liver vasculature is patent.


3.  Suboptimal visualization of the abdominal aorta due to overlying bowel gas.


4.  Small volume of ascites right upper quadrant of the abdomen.


 














Assessment and Plan





- Diagnosis


(1) Acute massive pulmonary embolism


Is this a current diagnosis for this admission?: Yes   


Plan: 


Saddle embolus extending into the right and left lower lobes.  Noted hemodynamic

instability with hypotension and bradycardic episodes.


Status post TPA administered on 9/21/2020


After discussion with pharmacist and some literature review, we have deemed that

the most adequate long-term anticoagulation option for patient will be warfarin 

given patient's cirrhosis and child's Sanders class C.


Discontinued Eliquis and switched patient to warfarin with Lovenox bridge last 

night.  Pharmacy to manage warfarin dosing.


Monitor INR goal of 2-3





10/9/2020-patient is on Lovenox treatment dose, warfarin 3 mg p.o. nightly.  INR

today is 1.17 goal is to bring the INR to between 2-3.








(2) Bacteremia


Is this a current diagnosis for this admission?: Yes   


Plan: 


Initially blood cultures positive for MR Coag neg Staph and only 1 blood culture

set-completed 7 days of linezolid though possibly could be contaminant.


Repeat blood cultures on 9/26/2020 showing Klebsiella secondary to complicated 

UTI-completed treatment with cefepime and Bactrim.





Currently resolved.








(3) Alcoholic cirrhosis of liver with ascites


Is this a current diagnosis for this admission?: Yes   


Plan: 


s/p >4L from paracentesis 10/5


Ascites fluid analysis and abdominal ultrasound are consistent with cirrhosis 

likely secondary to chronic alcoholism. 


Child's sanders class C.


Given his very recent alcoholism and noncompliance, he will of course be a poor 

candidate for liver transplantation.


Lasix and Aldactone added for ascites.








10/9/20-patient has history of alcohol abuse with liver cirrhosis.  Presently on

Lasix, Aldactone for ascites.








(4) Hyponatremia


Is this a current diagnosis for this admission?: Yes   


Plan: 


Urine osmolarity and urine sodium are low and suggest hyponatremia secondary to 

low caloric nutritional intake +/- chf/liver disease


Encourage p.o. intake and continue lasix.





10/9/2020-patient serum sodium is 28.2.  Chronic hyponatremia most likely 

secondary to liver failure.








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


Is this a current diagnosis for this admission?: Yes   


Plan: 


TTE showing biventricular failure with dilated cardiomyopathy, EF of 20 to 25% 

and grade 3 diastolic dysfunction of the LV.


Lasix, aldactone, Toprol-XL and losartan








(6) Acute respiratory failure with hypoxia


Is this a current diagnosis for this admission?: Yes   


Plan: 


Was on room air this morning.  Notably patient gets difficulty with adequate 

pulse oximetry using fingertips due to likely peripheral vascular disease.














(7) Paroxysmal atrial fibrillation


Is this a current diagnosis for this admission?: No   


Plan: 


Sinus rhythm currently











(8) Pulmonary nodule


Is this a current diagnosis for this admission?: Yes   


Plan: 


Noted on CAT scan.  Follow-up imaging recommended in 3 months but given 

patient's homelessness and noncompliance, it is very unlikely he will follow-up.








(9) Homeless


Is this a current diagnosis for this admission?: Yes   


Plan: 


Physical therapy.  Patient will likely need long-term placement at SNF 

especially given presentation with massive and unlikely to comply with therapy 

very poor ability to care for self.  Discharge planning working on this but 

encountering a lot of difficulty with placing patient. D/w his brother Aakash 

who is reluctant to take patient to his home due to limited room/space.








- Time


Anticipated Discharge Disposition: Home, Self Care


Anticipated Discharge Timeframe: oct 30th

## 2020-10-10 LAB
ERYTHROCYTE [DISTWIDTH] IN BLOOD BY AUTOMATED COUNT: 18.9 % (ref 11.5–14)
HCT VFR BLD CALC: 37.7 % (ref 37.9–51)
HGB BLD-MCNC: 12.7 G/DL (ref 13.5–17)
INR PPP: 1.09
MCH RBC QN AUTO: 27.5 PG (ref 27–33.4)
MCHC RBC AUTO-ENTMCNC: 33.7 G/DL (ref 32–36)
MCV RBC AUTO: 82 FL (ref 80–97)
PLATELET # BLD: 62 10^3/UL (ref 150–450)
PROTHROMBIN TIME: 14.3 SEC (ref 11.4–15.4)
RBC # BLD AUTO: 4.61 10^6/UL (ref 4.35–5.55)
WBC # BLD AUTO: 6.9 10^3/UL (ref 4–10.5)

## 2020-10-10 RX ADMIN — BACITRACIN SCH APPLIC: 500 OINTMENT TOPICAL at 10:17

## 2020-10-10 RX ADMIN — FUROSEMIDE SCH MG: 20 TABLET ORAL at 10:18

## 2020-10-10 RX ADMIN — NICOTINE SCH EACH: 14 PATCH, EXTENDED RELEASE TOPICAL at 10:17

## 2020-10-10 RX ADMIN — ENOXAPARIN SODIUM SCH: 80 INJECTION SUBCUTANEOUS at 22:00

## 2020-10-10 RX ADMIN — METOPROLOL SUCCINATE SCH MG: 25 TABLET, EXTENDED RELEASE ORAL at 10:18

## 2020-10-10 RX ADMIN — Medication SCH: at 05:50

## 2020-10-10 RX ADMIN — SODIUM CHLORIDE SCH ML: 9 INJECTION INTRAMUSCULAR; INTRAVENOUS; SUBCUTANEOUS at 22:00

## 2020-10-10 RX ADMIN — MAGNESIUM OXIDE TAB 400 MG (241.3 MG ELEMENTAL MG) SCH MG: 400 (241.3 MG) TAB at 10:18

## 2020-10-10 RX ADMIN — WARFARIN SODIUM SCH MG: 5 TABLET ORAL at 21:59

## 2020-10-10 RX ADMIN — Medication SCH: at 13:15

## 2020-10-10 RX ADMIN — Medication SCH: at 22:00

## 2020-10-10 RX ADMIN — Medication SCH MG: at 10:18

## 2020-10-10 RX ADMIN — ENOXAPARIN SODIUM SCH: 80 INJECTION SUBCUTANEOUS at 10:16

## 2020-10-10 RX ADMIN — MAGNESIUM OXIDE TAB 400 MG (241.3 MG ELEMENTAL MG) SCH MG: 400 (241.3 MG) TAB at 17:58

## 2020-10-10 RX ADMIN — PANTOPRAZOLE SODIUM SCH MG: 40 TABLET, DELAYED RELEASE ORAL at 05:50

## 2020-10-10 RX ADMIN — SPIRONOLACTONE SCH MG: 25 TABLET, FILM COATED ORAL at 17:58

## 2020-10-10 RX ADMIN — Medication SCH UNIT: at 21:59

## 2020-10-10 RX ADMIN — LOSARTAN POTASSIUM SCH MG: 25 TABLET, FILM COATED ORAL at 10:18

## 2020-10-10 NOTE — PDOC PROGRESS REPORT
Subjective


Progress Note for:: 10/10/20


Subjective:: 





64 -year-old -American male.  With a past medical history of atrial 

flutter, alcohol induced dilated cardiomyopathy, CHF, hypertension, peripheral 

vascular disease and COPD.  He presented to the ED with complaints of shortness 

of breath for 1 week, last evening his symptoms became more severe and EMS was 

called.  Upon their arrival his O2 saturation was 87% on room air, he was placed

on 4 L nasal cannula with improvement in his O2 saturations to 98%.  Work-up in 

the ED consisted of a CTA of the chest which revealed defects within the main 

pulmonary artery concerning for developing saddle emboli.  There is also filling

defects within the segmental arteries consistent with pulmonary artery emboli.  

Of note he had a recent admission in July for a flutter RVR at which time he was

not started on anticoagulation given the fact that he is homeless and has 

alcohol dependency issues.  He was ordered beta-blockers for rate control and LV

dysfunction.  He states he has not been taking these as these were stolen.  He 

also had an admission June 29, 2020 for pulseless right lower extremity a lower 

extremity ultrasound at that time showed occlusion of the distal anterior tibial

artery in the right lower extremity and high-grade stenosis in the distal supe

rficial femoral artery.  He is admitted to the ICU for close observation.  

Heparin drip started.








10/9/3578-99-vmzh-old -American male with history of atrial flutter, 

alcohol induced dilated cardiomyopathy, hypertension, COPD, peripheral vascular 

disease, congestive heart failure admitted with shortness of breath.  CT of the 

chest indicate you have saddle emboli.  He was admitted to intensive care and 

transferred to medical floor.  Patient is on treatment dose of Lovenox and 

warfarin 3 mg daily.  INR today is 1.17.  Asymptomatic.  Plan is to increase 

warfarin to 5 mg p.o. nightly and to recheck PT/INR.  Plan is to bring the INR 

to therapeutic level prior to discharge.  He is homeless need a placement.





10/10/9649-31-brrx-old male with history of atrial flutter, alcohol induced card

iomyopathy, hypertension, COPD, chronic heart failure admitted with dyspnea.  

CTA of the chest indicated of saddle emboli.  Patient is presently on treatment 

dose of Lovenox and Coumadin 5 mg p.o. nightly.  INR is still subtherapeutic 

around 1.09.  Patient is comfortably in the bed communicating well not in 

distress.  Patient need a placement at this time.


Reason For Visit: 


BILATERAL PE








Physical Exam


Vital Signs: 


                                        











Temp Pulse Resp BP Pulse Ox


 


 98.4 F   102 H  16   112/74   100 


 


 10/10/20 00:09  10/10/20 07:00  10/10/20 00:09  10/10/20 00:09  10/10/20 00:09








                                 Intake & Output











 10/09/20 10/10/20 10/11/20





 06:59 06:59 06:59


 


Intake Total 900 834 


 


Balance 900 834 


 


Weight 54.4 kg 81.9 kg 











General appearance: PRESENT: no acute distress


Head exam: PRESENT: atraumatic


Eye exam: PRESENT: conjunctiva pale, PERRLA


Mouth exam: PRESENT: moist, tongue midline


Teeth exam: PRESENT: poor dentation


Neck exam: ABSENT: carotid bruit, JVD, lymphadenopathy, thyromegaly


Respiratory exam: PRESENT: clear to auscultation ana paula.  ABSENT: rales, rhonchi, 

wheezes


Cardiovascular exam: PRESENT: RRR.  ABSENT: diastolic murmur, rubs, systolic 

murmur


GI/Abdominal exam: PRESENT: normal bowel sounds, soft.  ABSENT: distended, 

guarding, mass, organolmegaly, rebound, tenderness


Rectal exam: PRESENT: deferred


Extremities exam: PRESENT: full ROM.  ABSENT: calf tenderness, clubbing, pedal 

edema


Neurological exam: PRESENT: alert, awake, oriented to person, oriented to place,

oriented to time, oriented to situation, CN II-XII grossly intact.  ABSENT: 

motor sensory deficit


Psychiatric exam: PRESENT: appropriate affect, normal mood.  ABSENT: homicidal 

ideation, suicidal ideation





Results


Laboratory Results: 


                                        





                                 10/10/20 06:00 





                                 10/08/20 06:35 





                                        











  10/10/20





  06:00


 


WBC  6.9


 


RBC  4.61


 


Hgb  12.7 L


 


Hct  37.7 L


 


MCV  82


 


MCH  27.5


 


MCHC  33.7


 


RDW  18.9 H


 


Plt Count  62 L








                                        





10/05/20 13:15   Peritoneal   Gram Stain - Final


10/05/20 13:15   Peritoneal   Body Fluid Culture - Final


                            NO AEROBIC OR ANAEROBIC ORGANISMS RECOVERED


10/05/20 13:15   Pleural Fluid - Right Pleural Effusion   Gram Stain - Final


10/05/20 13:15   Pleural Fluid - Right Pleural Effusion   Body Fluid Culture - 

Final


                            NO AEROBIC OR ANAEROBIC ORGANISMS RECOVERED





                                        











  09/20/20





  22:02


 


Troponin I  0.141


 


NT-Pro-B Natriuret Pep  65890 H











Impressions: 


                                        





Chest/Abdomen CTA  09/20/20 22:55


IMPRESSION: 


There are filling defects within the main pulmonary artery


concerning for developing saddle emboli. There are also filling


defects seen within the segmental arteries consistent with


pulmonary artery emboli.


 


 


There is a groundglass nodule at the right upper lobe measuring


up to 12 mm which will need interval follow-up within three


months.


There are trace pleural effusions with some overlying airspace


opacities which are favored to represent atelectasis, but may


also represent some mild edema or infection.


 








Modified Barium Swallow  09/25/20 00:00


IMPRESSION:  DEEP LARYNGEAL PENETRATION WITH TRACE ASPIRATION SEEN WITH THIN 

BARIUM.  . PLEASE SEE SPEECH PATHOLOGIST REPORT FOR OTHER FINDINGS AND 

RECOMMENDATIONS.


 








KUB X-Ray  09/29/20 00:00


IMPRESSION:  Nonobstructive bowel gas pattern.


 








PICC Line Insertion  10/05/20 00:00


IMPRESSION:  SUCCESSFUL PLACEMENT OF A 5 FR DUAL LUMEN 50 CM PICC IN THE LEFT 

BRACHIOCEPHALIC VEIN.


 








Paracentesis Ultrasound  10/05/20 08:00


IMPRESSION:  Successful ultrasound-guided paracentesis


 








Thoracentesis Ultrasound  10/05/20 09:47


IMPRESSION:  SUCCESSFUL RIGHT-SIDED THORACENTESIS USING ULTRASOUND GUIDANCE.


 








Chest X-Ray  10/05/20 15:25


IMPRESSION:  No pneumothorax post thoracentesis.


 








Abdomen Ultrasound  10/06/20 00:00


IMPRESSION:  1.  Examination is limited due to the patient's clinical condition.


2.  Nodular contour to the liver, raising question of cirrhosis of the liver. 

Liver vasculature is patent.


3.  Suboptimal visualization of the abdominal aorta due to overlying bowel gas.


4.  Small volume of ascites right upper quadrant of the abdomen.


 














Assessment and Plan





- Diagnosis


(1) Acute massive pulmonary embolism


Is this a current diagnosis for this admission?: Yes   


Plan: 


Saddle embolus extending into the right and left lower lobes.  Noted hemodynamic

instability with hypotension and bradycardic episodes.


Status post TPA administered on 9/21/2020


After discussion with pharmacist and some literature review, we have deemed that

the most adequate long-term anticoagulation option for patient will be warfarin 

given patient's cirrhosis and child's Sanders class C.


Discontinued Eliquis and switched patient to warfarin with Lovenox bridge last 

night.  Pharmacy to manage warfarin dosing.


Monitor INR goal of 2-3





10/9/2020-patient is on Lovenox treatment dose, warfarin 3 mg p.o. nightly.  INR

today is 1.17 goal is to bring the INR to between 2-3.





1020-INR today is 1.09.  To continue Coumadin 5 mg p.o. nightly and continue 

Lovenox treatment dose.  To repeat PT/INR tomorrow.  Goal is between 2-3.








(2) Bacteremia


Is this a current diagnosis for this admission?: Yes   


Plan: 


Initially blood cultures positive for MR Coag neg Staph and only 1 blood culture

set-completed 7 days of linezolid though possibly could be contaminant.


Repeat blood cultures on 9/26/2020 showing Klebsiella secondary to complicated 

UTI-completed treatment with cefepime and Bactrim.





Currently resolved.








(3) Alcoholic cirrhosis of liver with ascites


Is this a current diagnosis for this admission?: Yes   


Plan: 


s/p >4L from paracentesis 10/5


Ascites fluid analysis and abdominal ultrasound are consistent with cirrhosis 

likely secondary to chronic alcoholism. 


Child's sanders class C.


Given his very recent alcoholism and noncompliance, he will of course be a poor 

candidate for liver transplantation.


Lasix and Aldactone added for ascites.








10/9/20-patient has history of alcohol abuse with liver cirrhosis.  Presently on

Lasix, Aldactone for ascites.








(4) Hyponatremia


Is this a current diagnosis for this admission?: Yes   


Plan: 


Urine osmolarity and urine sodium are low and suggest hyponatremia secondary to 

low caloric nutritional intake +/- chf/liver disease


Encourage p.o. intake and continue lasix.





10/9/2020-patient serum sodium is 128.2.  Chronic hyponatremia most likely 

secondary to liver failure.





10/10/2020-latest serum sodium is around 128.  Chronic hyponatremia secondary to

alcoholic liver disease and congestive heart failure.








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


Is this a current diagnosis for this admission?: Yes   


Plan: 


TTE showing biventricular failure with dilated cardiomyopathy, EF of 20 to 25% 

and grade 3 diastolic dysfunction of the LV.


Lasix, aldactone, Toprol-XL and losartan








(6) Acute respiratory failure with hypoxia


Is this a current diagnosis for this admission?: Yes   


Plan: 


Was on room air this morning.  Notably patient gets difficulty with adequate 

pulse oximetry using fingertips due to likely peripheral vascular disease.





10/10/2020-pulse ox today is 100% on 4 L.  Plan is to continue the present 

management at this time.








(7) Paroxysmal atrial fibrillation


Is this a current diagnosis for this admission?: No   


Plan: 


Sinus rhythm currently











(8) Pulmonary nodule


Is this a current diagnosis for this admission?: Yes   


Plan: 


Noted on CAT scan.  Follow-up imaging recommended in 3 months but given luz maria stewart's homelessness and noncompliance, it is very unlikely he will follow-up.








(9) Homeless


Is this a current diagnosis for this admission?: Yes   


Plan: 


Physical therapy.  Patient will likely need long-term placement at SNF 

especially given presentation with massive and unlikely to comply with therapy 

very poor ability to care for self.  Discharge planning working on this but 

encountering a lot of difficulty with placing patient. D/w his brother Aakash 

who is reluctant to take patient to his home due to limited room/space.








- Time


Anticipated Discharge Disposition: Long Term Care Facility


Anticipated Discharge Timeframe: within 72 hours

## 2020-10-11 LAB
ADD MANUAL DIFF: NO
ALBUMIN SERPL-MCNC: 2.6 G/DL (ref 3.5–5)
ALP SERPL-CCNC: 188 U/L (ref 38–126)
ANION GAP SERPL CALC-SCNC: 5 MMOL/L (ref 5–19)
AST SERPL-CCNC: 53 U/L (ref 17–59)
BASOPHILS # BLD AUTO: 0.1 10^3/UL (ref 0–0.2)
BASOPHILS NFR BLD AUTO: 0.7 % (ref 0–2)
BILIRUB DIRECT SERPL-MCNC: 0.8 MG/DL (ref 0–0.4)
BILIRUB SERPL-MCNC: 1.4 MG/DL (ref 0.2–1.3)
BUN SERPL-MCNC: 10 MG/DL (ref 7–20)
CALCIUM: 7.9 MG/DL (ref 8.4–10.2)
CHLORIDE SERPL-SCNC: 94 MMOL/L (ref 98–107)
CO2 SERPL-SCNC: 29 MMOL/L (ref 22–30)
EOSINOPHIL # BLD AUTO: 0.2 10^3/UL (ref 0–0.6)
EOSINOPHIL NFR BLD AUTO: 2.6 % (ref 0–6)
ERYTHROCYTE [DISTWIDTH] IN BLOOD BY AUTOMATED COUNT: 18.8 % (ref 11.5–14)
GLUCOSE SERPL-MCNC: 102 MG/DL (ref 75–110)
HCT VFR BLD CALC: 36.7 % (ref 37.9–51)
HGB BLD-MCNC: 12.3 G/DL (ref 13.5–17)
INR PPP: 1.12
LYMPHOCYTES # BLD AUTO: 2.2 10^3/UL (ref 0.5–4.7)
LYMPHOCYTES NFR BLD AUTO: 30.2 % (ref 13–45)
MCH RBC QN AUTO: 27.6 PG (ref 27–33.4)
MCHC RBC AUTO-ENTMCNC: 33.6 G/DL (ref 32–36)
MCV RBC AUTO: 82 FL (ref 80–97)
MONOCYTES # BLD AUTO: 1 10^3/UL (ref 0.1–1.4)
MONOCYTES NFR BLD AUTO: 14.2 % (ref 3–13)
NEUTROPHILS # BLD AUTO: 3.7 10^3/UL (ref 1.7–8.2)
NEUTS SEG NFR BLD AUTO: 52.3 % (ref 42–78)
PLATELET # BLD: 103 10^3/UL (ref 150–450)
POTASSIUM SERPL-SCNC: 4.5 MMOL/L (ref 3.6–5)
PROT SERPL-MCNC: 5.5 G/DL (ref 6.3–8.2)
PROTHROMBIN TIME: 14.6 SEC (ref 11.4–15.4)
RBC # BLD AUTO: 4.47 10^6/UL (ref 4.35–5.55)
TOTAL CELLS COUNTED % (AUTO): 100 %
WBC # BLD AUTO: 7.2 10^3/UL (ref 4–10.5)

## 2020-10-11 RX ADMIN — Medication SCH: at 13:31

## 2020-10-11 RX ADMIN — Medication SCH UNIT: at 09:42

## 2020-10-11 RX ADMIN — ENOXAPARIN SODIUM SCH: 80 INJECTION SUBCUTANEOUS at 22:03

## 2020-10-11 RX ADMIN — Medication SCH MG: at 09:41

## 2020-10-11 RX ADMIN — SPIRONOLACTONE SCH MG: 25 TABLET, FILM COATED ORAL at 18:29

## 2020-10-11 RX ADMIN — MAGNESIUM OXIDE TAB 400 MG (241.3 MG ELEMENTAL MG) SCH MG: 400 (241.3 MG) TAB at 09:41

## 2020-10-11 RX ADMIN — Medication SCH UNIT: at 21:04

## 2020-10-11 RX ADMIN — Medication SCH: at 05:09

## 2020-10-11 RX ADMIN — SODIUM CHLORIDE SCH ML: 9 INJECTION INTRAMUSCULAR; INTRAVENOUS; SUBCUTANEOUS at 09:42

## 2020-10-11 RX ADMIN — METOPROLOL SUCCINATE SCH MG: 25 TABLET, EXTENDED RELEASE ORAL at 09:41

## 2020-10-11 RX ADMIN — ACETAMINOPHEN PRN MG: 160 SOLUTION ORAL at 01:58

## 2020-10-11 RX ADMIN — ACETAMINOPHEN PRN MG: 160 SOLUTION ORAL at 13:29

## 2020-10-11 RX ADMIN — SODIUM CHLORIDE SCH ML: 9 INJECTION INTRAMUSCULAR; INTRAVENOUS; SUBCUTANEOUS at 21:04

## 2020-10-11 RX ADMIN — FUROSEMIDE SCH MG: 20 TABLET ORAL at 09:41

## 2020-10-11 RX ADMIN — WARFARIN SODIUM SCH MG: 5 TABLET ORAL at 21:04

## 2020-10-11 RX ADMIN — LOSARTAN POTASSIUM SCH MG: 25 TABLET, FILM COATED ORAL at 09:42

## 2020-10-11 RX ADMIN — ENOXAPARIN SODIUM SCH: 80 INJECTION SUBCUTANEOUS at 09:44

## 2020-10-11 RX ADMIN — MAGNESIUM OXIDE TAB 400 MG (241.3 MG ELEMENTAL MG) SCH MG: 400 (241.3 MG) TAB at 18:31

## 2020-10-11 RX ADMIN — NICOTINE SCH EACH: 14 PATCH, EXTENDED RELEASE TOPICAL at 09:43

## 2020-10-11 RX ADMIN — PANTOPRAZOLE SODIUM SCH MG: 40 TABLET, DELAYED RELEASE ORAL at 05:09

## 2020-10-11 RX ADMIN — Medication SCH: at 22:03

## 2020-10-11 NOTE — PDOC PROGRESS REPORT
Subjective


Progress Note for:: 10/11/20


Subjective:: 





64 -year-old -American male.  With a past medical history of atrial 

flutter, alcohol induced dilated cardiomyopathy, CHF, hypertension, peripheral 

vascular disease and COPD.  He presented to the ED with complaints of shortness 

of breath for 1 week, last evening his symptoms became more severe and EMS was 

called.  Upon their arrival his O2 saturation was 87% on room air, he was placed

on 4 L nasal cannula with improvement in his O2 saturations to 98%.  Work-up in 

the ED consisted of a CTA of the chest which revealed defects within the main 

pulmonary artery concerning for developing saddle emboli.  There is also filling

defects within the segmental arteries consistent with pulmonary artery emboli.  

Of note he had a recent admission in July for a flutter RVR at which time he was

not started on anticoagulation given the fact that he is homeless and has 

alcohol dependency issues.  He was ordered beta-blockers for rate control and LV

dysfunction.  He states he has not been taking these as these were stolen.  He 

also had an admission June 29, 2020 for pulseless right lower extremity a lower 

extremity ultrasound at that time showed occlusion of the distal anterior tibial

artery in the right lower extremity and high-grade stenosis in the distal supe

rficial femoral artery.  He is admitted to the ICU for close observation.  

Heparin drip started.








10/9/6909-76-kftc-old -American male with history of atrial flutter, 

alcohol induced dilated cardiomyopathy, hypertension, COPD, peripheral vascular 

disease, congestive heart failure admitted with shortness of breath.  CT of the 

chest indicate you have saddle emboli.  He was admitted to intensive care and 

transferred to medical floor.  Patient is on treatment dose of Lovenox and 

warfarin 3 mg daily.  INR today is 1.17.  Asymptomatic.  Plan is to increase 

warfarin to 5 mg p.o. nightly and to recheck PT/INR.  Plan is to bring the INR 

to therapeutic level prior to discharge.  He is homeless need a placement.





10/10/1005-74-gohb-old male with history of atrial flutter, alcohol induced card

iomyopathy, hypertension, COPD, chronic heart failure admitted with dyspnea.  

CTA of the chest indicated of saddle emboli.  Patient is presently on treatment 

dose of Lovenox and Coumadin 5 mg p.o. nightly.  INR is still subtherapeutic 

around 1.09.  Patient is comfortably in the bed communicating well not in 

distress.  Patient need a placement at this time.





10/11/82-57-lyof-old male history of atrial flutter, cardiomyopathy found to 

have bilateral PE.  On Lovenox treatment dose, Coumadin 5 mg p.o. nightly.  INR 

today's 1.12.  Goal is to bring the INR up to 2.  Patient is also waiting for 

placement.


Reason For Visit: 


BILATERAL PE








Physical Exam


Vital Signs: 


                                        











Temp Pulse Resp BP Pulse Ox


 


 97.7 F   98   20   124/92 H  100 


 


 10/11/20 08:00  10/11/20 08:00  10/11/20 08:00  10/11/20 08:00  10/11/20 08:00








                                 Intake & Output











 10/10/20 10/11/20 10/12/20





 06:59 06:59 06:59


 


Intake Total 834 2020 


 


Output Total  1775 


 


Balance 834 245 


 


Weight 81.9 kg 81 kg 











General appearance: PRESENT: no acute distress, thin


Head exam: PRESENT: atraumatic


Eye exam: PRESENT: PERRLA


Mouth exam: PRESENT: moist, tongue midline


Teeth exam: PRESENT: poor dentation


Neck exam: ABSENT: carotid bruit, JVD, lymphadenopathy, thyromegaly


Respiratory exam: PRESENT: decreased breath sounds


Cardiovascular exam: PRESENT: RRR.  ABSENT: diastolic murmur, rubs, systolic 

murmur


GI/Abdominal exam: PRESENT: normal bowel sounds, soft.  ABSENT: distended, 

guarding, mass, organolmegaly, rebound, tenderness


Rectal exam: PRESENT: deferred


Extremities exam: PRESENT: full ROM.  ABSENT: calf tenderness, clubbing, pedal 

edema


Neurological exam: PRESENT: alert, awake, oriented to person, oriented to place,

oriented to time, oriented to situation, CN II-XII grossly intact.  ABSENT: 

motor sensory deficit


Psychiatric exam: PRESENT: appropriate affect, normal mood.  ABSENT: homicidal 

ideation, suicidal ideation


Skin exam: PRESENT: dry, intact, warm.  ABSENT: cyanosis, rash





Results


Laboratory Results: 


                                        





                                 10/11/20 05:16 





                                 10/11/20 05:16 





                                        











  10/11/20 10/11/20 10/11/20





  00:00 05:16 05:16


 


WBC   7.2 


 


RBC   4.47 


 


Hgb   12.3 L 


 


Hct   36.7 L 


 


MCV   82 


 


MCH   27.6 


 


MCHC   33.6 


 


RDW   18.8 H 


 


Plt Count   103 L 


 


Seg Neutrophils %   52.3 


 


Sodium    127.8 L


 


Potassium    4.5


 


Chloride    94 L


 


Carbon Dioxide    29


 


Anion Gap    5


 


BUN    10


 


Creatinine    0.61


 


Est GFR ( Amer)    > 60


 


Glucose    102


 


Calcium    7.9 L


 


Magnesium    1.5 L


 


Total Bilirubin    1.4 H


 


AST    53


 


Alkaline Phosphatase    188 H


 


Total Protein    5.5 L


 


Albumin    2.6 L


 


Urine Color  Cancelled  


 


Urine Appearance  Cancelled  


 


Urine pH  Cancelled  


 


Ur Specific Gravity  Cancelled  


 


Urine Protein  Cancelled  


 


Urine Glucose (UA)  Cancelled  


 


Urine Ketones  Cancelled  


 


Urine Blood  Cancelled  


 


Urine Nitrite  Cancelled  


 


Ur Leukocyte Esterase  Cancelled  


 


Urine WBC (Auto)  Cancelled  


 


Urine RBC (Auto)  Cancelled  








                                        











  09/20/20





  22:02


 


Troponin I  0.141


 


NT-Pro-B Natriuret Pep  58514 H











Impressions: 


                                        





Chest/Abdomen CTA  09/20/20 22:55


IMPRESSION: 


There are filling defects within the main pulmonary artery


concerning for developing saddle emboli. There are also filling


defects seen within the segmental arteries consistent with


pulmonary artery emboli.


 


 


There is a groundglass nodule at the right upper lobe measuring


up to 12 mm which will need interval follow-up within three


months.


There are trace pleural effusions with some overlying airspace


opacities which are favored to represent atelectasis, but may


also represent some mild edema or infection.


 








Modified Barium Swallow  09/25/20 00:00


IMPRESSION:  DEEP LARYNGEAL PENETRATION WITH TRACE ASPIRATION SEEN WITH THIN 

BARIUM.  . PLEASE SEE SPEECH PATHOLOGIST REPORT FOR OTHER FINDINGS AND 

RECOMMENDATIONS.


 








KUB X-Ray  09/29/20 00:00


IMPRESSION:  Nonobstructive bowel gas pattern.


 








PICC Line Insertion  10/05/20 00:00


IMPRESSION:  SUCCESSFUL PLACEMENT OF A 5 FR DUAL LUMEN 50 CM PICC IN THE LEFT 

BRACHIOCEPHALIC VEIN.


 








Paracentesis Ultrasound  10/05/20 08:00


IMPRESSION:  Successful ultrasound-guided paracentesis


 








Thoracentesis Ultrasound  10/05/20 09:47


IMPRESSION:  SUCCESSFUL RIGHT-SIDED THORACENTESIS USING ULTRASOUND GUIDANCE.


 








Chest X-Ray  10/05/20 15:25


IMPRESSION:  No pneumothorax post thoracentesis.


 








Abdomen Ultrasound  10/06/20 00:00


IMPRESSION:  1.  Examination is limited due to the patient's clinical condition.


2.  Nodular contour to the liver, raising question of cirrhosis of the liver. 

Liver vasculature is patent.


3.  Suboptimal visualization of the abdominal aorta due to overlying bowel gas.


4.  Small volume of ascites right upper quadrant of the abdomen.


 














Assessment and Plan





- Diagnosis


(1) Acute massive pulmonary embolism


Is this a current diagnosis for this admission?: Yes   


Plan: 


Saddle embolus extending into the right and left lower lobes.  Noted hemodynamic

instability with hypotension and bradycardic episodes.


Status post TPA administered on 9/21/2020


After discussion with pharmacist and some literature review, we have deemed that

the most adequate long-term anticoagulation option for patient will be warfarin 

given patient's cirrhosis and child's Sanders class C.


Discontinued Eliquis and switched patient to warfarin with Lovenox bridge last 

night.  Pharmacy to manage warfarin dosing.


Monitor INR goal of 2-3





10/9/2020-patient is on Lovenox treatment dose, warfarin 3 mg p.o. nightly.  INR

today is 1.17 goal is to bring the INR to between 2-3.





10/10/20-INR today is 1.09.  To continue Coumadin 5 mg p.o. nightly and continue

Lovenox treatment dose.  To repeat PT/INR tomorrow.  Goal is between 2-3.





10/11/2020-INR today is 1.12.  To continue Coumadin 5 mg p.o. nightly to recheck

PT/INR tomorrow.  Patient is also on treatment dose of Lovenox.








(2) Bacteremia


Is this a current diagnosis for this admission?: Yes   


Plan: 


Initially blood cultures positive for MR Coag neg Staph and only 1 blood culture

set-completed 7 days of linezolid though possibly could be contaminant.


Repeat blood cultures on 9/26/2020 showing Klebsiella secondary to complicated 

UTI-completed treatment with cefepime and Bactrim.





Currently resolved.














(3) Alcoholic cirrhosis of liver with ascites


Is this a current diagnosis for this admission?: Yes   


Plan: 


s/p >4L from paracentesis 10/5


Ascites fluid analysis and abdominal ultrasound are consistent with cirrhosis 

likely secondary to chronic alcoholism. 


Child's sanders class C.


Given his very recent alcoholism and noncompliance, he will of course be a poor 

candidate for liver transplantation.


Lasix and Aldactone added for ascites.








10/9/20-patient has history of alcohol abuse with liver cirrhosis.  Presently on

Lasix, Aldactone for ascites.





10/11/20-patient has history of alcoholic liver cirrhosis.  Plan is to continue 

Lasix, Aldactone, for ascites.








(4) Hyponatremia


Is this a current diagnosis for this admission?: Yes   


Plan: 


Urine osmolarity and urine sodium are low and suggest hyponatremia secondary to 

low caloric nutritional intake +/- chf/liver disease


Encourage p.o. intake and continue lasix.





10/9/2020-patient serum sodium is 128.2.  Chronic hyponatremia most likely 

secondary to liver failure.





10/10/2020-latest serum sodium is around 128.  Chronic hyponatremia secondary to

alcoholic liver disease and congestive heart failure.





10/11/20-serum sodium is 127.8.  Stable.








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


Is this a current diagnosis for this admission?: Yes   


Plan: 


TTE showing biventricular failure with dilated cardiomyopathy, EF of 20 to 25% 

and grade 3 diastolic dysfunction of the LV.


Lasix, aldactone, Toprol-XL and losartan








(6) Acute respiratory failure with hypoxia


Is this a current diagnosis for this admission?: Yes   


Plan: 


Was on room air this morning.  Notably patient gets difficulty with adequate 

pulse oximetry using fingertips due to likely peripheral vascular disease.





10/10/2020-pulse ox today is 100% on 4 L.  Plan is to continue the present 

management at this time.








(7) Paroxysmal atrial fibrillation


Is this a current diagnosis for this admission?: No   


Plan: 


Sinus rhythm currently











(8) Pulmonary nodule


Is this a current diagnosis for this admission?: Yes   


Plan: 


Noted on CAT scan.  Follow-up imaging recommended in 3 months but given 

patient's homelessness and noncompliance, it is very unlikely he will follow-up.








(9) Homeless


Is this a current diagnosis for this admission?: Yes   


Plan: 


Physical therapy.  Patient will likely need long-term placement at SNF 

especially given presentation with massive and unlikely to comply with therapy 

very poor ability to care for self.  Discharge planning working on this but 

encountering a lot of difficulty with placing patient. D/w his brother Aakash 

who is reluctant to take patient to his home due to limited room/space.








- Time


Anticipated Discharge Disposition: Skilled Nursing Facility


Anticipated Discharge Timeframe: within 72 hours

## 2020-10-12 LAB
ADD MANUAL DIFF: NO
ALBUMIN SERPL-MCNC: 3.2 G/DL (ref 3.5–5)
ALP SERPL-CCNC: 231 U/L (ref 38–126)
ANION GAP SERPL CALC-SCNC: 9 MMOL/L (ref 5–19)
AST SERPL-CCNC: 69 U/L (ref 17–59)
BASOPHILS # BLD AUTO: 0.1 10^3/UL (ref 0–0.2)
BASOPHILS NFR BLD AUTO: 0.7 % (ref 0–2)
BILIRUB DIRECT SERPL-MCNC: 1 MG/DL (ref 0–0.4)
BILIRUB SERPL-MCNC: 1.6 MG/DL (ref 0.2–1.3)
BUN SERPL-MCNC: 11 MG/DL (ref 7–20)
CALCIUM: 8.1 MG/DL (ref 8.4–10.2)
CHLORIDE SERPL-SCNC: 91 MMOL/L (ref 98–107)
CO2 SERPL-SCNC: 27 MMOL/L (ref 22–30)
EOSINOPHIL # BLD AUTO: 0.1 10^3/UL (ref 0–0.6)
EOSINOPHIL NFR BLD AUTO: 1.4 % (ref 0–6)
ERYTHROCYTE [DISTWIDTH] IN BLOOD BY AUTOMATED COUNT: 18.9 % (ref 11.5–14)
GLUCOSE SERPL-MCNC: 112 MG/DL (ref 75–110)
HCT VFR BLD CALC: 36.5 % (ref 37.9–51)
HGB BLD-MCNC: 12.2 G/DL (ref 13.5–17)
INR PPP: 1.6
LYMPHOCYTES # BLD AUTO: 1.4 10^3/UL (ref 0.5–4.7)
LYMPHOCYTES NFR BLD AUTO: 19.7 % (ref 13–45)
MCH RBC QN AUTO: 27.5 PG (ref 27–33.4)
MCHC RBC AUTO-ENTMCNC: 33.5 G/DL (ref 32–36)
MCV RBC AUTO: 82 FL (ref 80–97)
MONOCYTES # BLD AUTO: 0.9 10^3/UL (ref 0.1–1.4)
MONOCYTES NFR BLD AUTO: 12.3 % (ref 3–13)
NEUTROPHILS # BLD AUTO: 4.8 10^3/UL (ref 1.7–8.2)
NEUTS SEG NFR BLD AUTO: 65.9 % (ref 42–78)
PLATELET # BLD: 132 10^3/UL (ref 150–450)
POTASSIUM SERPL-SCNC: 4.9 MMOL/L (ref 3.6–5)
PROT SERPL-MCNC: 6.3 G/DL (ref 6.3–8.2)
PROTHROMBIN TIME: 19.2 SEC (ref 11.4–15.4)
RBC # BLD AUTO: 4.43 10^6/UL (ref 4.35–5.55)
TOTAL CELLS COUNTED % (AUTO): 100 %
WBC # BLD AUTO: 7.3 10^3/UL (ref 4–10.5)

## 2020-10-12 RX ADMIN — ACETAMINOPHEN PRN MG: 160 SOLUTION ORAL at 22:43

## 2020-10-12 RX ADMIN — NICOTINE SCH EACH: 14 PATCH, EXTENDED RELEASE TOPICAL at 10:41

## 2020-10-12 RX ADMIN — PANTOPRAZOLE SODIUM SCH MG: 40 TABLET, DELAYED RELEASE ORAL at 05:44

## 2020-10-12 RX ADMIN — Medication SCH MG: at 10:43

## 2020-10-12 RX ADMIN — SODIUM CHLORIDE SCH ML: 9 INJECTION INTRAMUSCULAR; INTRAVENOUS; SUBCUTANEOUS at 21:35

## 2020-10-12 RX ADMIN — Medication SCH UNIT: at 21:34

## 2020-10-12 RX ADMIN — ENOXAPARIN SODIUM SCH MG: 80 INJECTION SUBCUTANEOUS at 21:47

## 2020-10-12 RX ADMIN — LOSARTAN POTASSIUM SCH MG: 25 TABLET, FILM COATED ORAL at 10:43

## 2020-10-12 RX ADMIN — Medication SCH: at 21:35

## 2020-10-12 RX ADMIN — SPIRONOLACTONE SCH MG: 25 TABLET, FILM COATED ORAL at 17:07

## 2020-10-12 RX ADMIN — WARFARIN SODIUM SCH MG: 5 TABLET ORAL at 21:34

## 2020-10-12 RX ADMIN — Medication SCH: at 15:58

## 2020-10-12 RX ADMIN — Medication SCH: at 05:49

## 2020-10-12 RX ADMIN — SODIUM CHLORIDE SCH ML: 9 INJECTION INTRAMUSCULAR; INTRAVENOUS; SUBCUTANEOUS at 10:44

## 2020-10-12 RX ADMIN — MAGNESIUM OXIDE TAB 400 MG (241.3 MG ELEMENTAL MG) SCH MG: 400 (241.3 MG) TAB at 10:43

## 2020-10-12 RX ADMIN — Medication SCH UNIT: at 10:42

## 2020-10-12 RX ADMIN — MAGNESIUM OXIDE TAB 400 MG (241.3 MG ELEMENTAL MG) SCH MG: 400 (241.3 MG) TAB at 17:07

## 2020-10-12 RX ADMIN — ENOXAPARIN SODIUM SCH: 80 INJECTION SUBCUTANEOUS at 10:34

## 2020-10-12 RX ADMIN — METOPROLOL SUCCINATE SCH MG: 25 TABLET, EXTENDED RELEASE ORAL at 10:43

## 2020-10-12 RX ADMIN — FUROSEMIDE SCH MG: 20 TABLET ORAL at 10:43

## 2020-10-12 NOTE — PDOC PROGRESS REPORT
Subjective


Progress Note for:: 10/12/20


Subjective:: 





64 -year-old -American male.  With a past medical history of atrial 

flutter, alcohol induced dilated cardiomyopathy, CHF, hypertension, peripheral 

vascular disease and COPD.  He presented to the ED with complaints of shortness 

of breath for 1 week, last evening his symptoms became more severe and EMS was 

called.  Upon their arrival his O2 saturation was 87% on room air, he was placed

on 4 L nasal cannula with improvement in his O2 saturations to 98%.  Work-up in 

the ED consisted of a CTA of the chest which revealed defects within the main 

pulmonary artery concerning for developing saddle emboli.  There is also filling

defects within the segmental arteries consistent with pulmonary artery emboli.  

Of note he had a recent admission in July for a flutter RVR at which time he was

not started on anticoagulation given the fact that he is homeless and has 

alcohol dependency issues.  He was ordered beta-blockers for rate control and LV

dysfunction.  He states he has not been taking these as these were stolen.  He 

also had an admission June 29, 2020 for pulseless right lower extremity a lower 

extremity ultrasound at that time showed occlusion of the distal anterior tibial

artery in the right lower extremity and high-grade stenosis in the distal supe

rficial femoral artery.  He is admitted to the ICU for close observation.  

Heparin drip started.








10/9/7454-62-cnwj-old -American male with history of atrial flutter, 

alcohol induced dilated cardiomyopathy, hypertension, COPD, peripheral vascular 

disease, congestive heart failure admitted with shortness of breath.  CT of the 

chest indicate you have saddle emboli.  He was admitted to intensive care and 

transferred to medical floor.  Patient is on treatment dose of Lovenox and 

warfarin 3 mg daily.  INR today is 1.17.  Asymptomatic.  Plan is to increase 

warfarin to 5 mg p.o. nightly and to recheck PT/INR.  Plan is to bring the INR 

to therapeutic level prior to discharge.  He is homeless need a placement.





10/10/4183-55-xswe-old male with history of atrial flutter, alcohol induced card

iomyopathy, hypertension, COPD, chronic heart failure admitted with dyspnea.  

CTA of the chest indicated of saddle emboli.  Patient is presently on treatment 

dose of Lovenox and Coumadin 5 mg p.o. nightly.  INR is still subtherapeutic 

around 1.09.  Patient is comfortably in the bed communicating well not in 

distress.  Patient need a placement at this time.





10/11/52-08-ufvl-old male history of atrial flutter, cardiomyopathy found to 

have bilateral PE.  On Lovenox treatment dose, Coumadin 5 mg p.o. nightly.  INR 

today's 1.12.  Goal is to bring the INR up to 2.  Patient is also waiting for 

placement.





10/12/2020-INR today is 1.6.  To continue Coumadin 5 mg p.o. nightly and to 

recheck PT/INR tomorrow.  Patient is receiving treatment dose of Lovenox for 

bilateral PE.  He is waiting for placement.


Reason For Visit: 


BILATERAL PE








Physical Exam


Vital Signs: 


                                        











Temp Pulse Resp BP Pulse Ox


 


 97.3 F   108 H  24 H  132/92 H  100 


 


 10/12/20 08:00  10/12/20 08:00  10/12/20 08:00  10/12/20 08:00  10/12/20 08:00








                                 Intake & Output











 10/11/20 10/12/20 10/13/20





 06:59 06:59 06:59


 


Intake Total 2020 800 236


 


Output Total 1775 400 


 


Balance 245 400 236


 


Weight 81 kg 81.3 kg 











General appearance: PRESENT: no acute distress, cooperative


Head exam: PRESENT: atraumatic


Eye exam: PRESENT: PERRLA


Mouth exam: PRESENT: moist, tongue midline


Teeth exam: PRESENT: poor dentation


Neck exam: ABSENT: carotid bruit, JVD, lymphadenopathy, thyromegaly


Respiratory exam: PRESENT: decreased breath sounds


Cardiovascular exam: PRESENT: RRR.  ABSENT: diastolic murmur, rubs, systolic 

murmur


GI/Abdominal exam: PRESENT: normal bowel sounds, soft.  ABSENT: distended, 

guarding, mass, organolmegaly, rebound, tenderness


Rectal exam: PRESENT: deferred


Extremities exam: PRESENT: full ROM.  ABSENT: calf tenderness, clubbing, pedal 

edema


Neurological exam: PRESENT: alert, awake, oriented to person, oriented to place,

oriented to time, oriented to situation, CN II-XII grossly intact.  ABSENT: 

motor sensory deficit


Psychiatric exam: PRESENT: appropriate affect, normal mood.  ABSENT: homicidal 

ideation, suicidal ideation


Skin exam: PRESENT: dry, intact, warm.  ABSENT: cyanosis, rash





Results


Laboratory Results: 


                                        





                                 10/12/20 05:48 





                                 10/12/20 05:48 





                                        











  10/12/20 10/12/20





  05:48 05:48


 


WBC  7.3 


 


RBC  4.43 


 


Hgb  12.2 L 


 


Hct  36.5 L 


 


MCV  82 


 


MCH  27.5 


 


MCHC  33.5 


 


RDW  18.9 H 


 


Plt Count  132 L 


 


Seg Neutrophils %  65.9 


 


Sodium   127.0 L


 


Potassium   4.9


 


Chloride   91 L


 


Carbon Dioxide   27


 


Anion Gap   9


 


BUN   11


 


Creatinine   0.62


 


Est GFR (African Amer)   > 60


 


Glucose   112 H


 


Calcium   8.1 L


 


Magnesium   1.5 L


 


Total Bilirubin   1.6 H


 


AST   69 H


 


Alkaline Phosphatase   231 H


 


Total Protein   6.3


 


Albumin   3.2 L








                                        











  09/20/20





  22:02


 


Troponin I  0.141


 


NT-Pro-B Natriuret Pep  35719 H











Impressions: 


                                        





Chest/Abdomen CTA  09/20/20 22:55


IMPRESSION: 


There are filling defects within the main pulmonary artery


concerning for developing saddle emboli. There are also filling


defects seen within the segmental arteries consistent with


pulmonary artery emboli.


 


 


There is a groundglass nodule at the right upper lobe measuring


up to 12 mm which will need interval follow-up within three


months.


There are trace pleural effusions with some overlying airspace


opacities which are favored to represent atelectasis, but may


also represent some mild edema or infection.


 








Modified Barium Swallow  09/25/20 00:00


IMPRESSION:  DEEP LARYNGEAL PENETRATION WITH TRACE ASPIRATION SEEN WITH THIN 

BARIUM.  . PLEASE SEE SPEECH PATHOLOGIST REPORT FOR OTHER FINDINGS AND 

RECOMMENDATIONS.


 








KUB X-Ray  09/29/20 00:00


IMPRESSION:  Nonobstructive bowel gas pattern.


 








PICC Line Insertion  10/05/20 00:00


IMPRESSION:  SUCCESSFUL PLACEMENT OF A 5 FR DUAL LUMEN 50 CM PICC IN THE LEFT 

BRACHIOCEPHALIC VEIN.


 








Paracentesis Ultrasound  10/05/20 08:00


IMPRESSION:  Successful ultrasound-guided paracentesis


 








Thoracentesis Ultrasound  10/05/20 09:47


IMPRESSION:  SUCCESSFUL RIGHT-SIDED THORACENTESIS USING ULTRASOUND GUIDANCE.


 








Chest X-Ray  10/05/20 15:25


IMPRESSION:  No pneumothorax post thoracentesis.


 








Abdomen Ultrasound  10/06/20 00:00


IMPRESSION:  1.  Examination is limited due to the patient's clinical condition.


2.  Nodular contour to the liver, raising question of cirrhosis of the liver. 

Liver vasculature is patent.


3.  Suboptimal visualization of the abdominal aorta due to overlying bowel gas.


4.  Small volume of ascites right upper quadrant of the abdomen.


 














Assessment and Plan





- Diagnosis


(1) Acute massive pulmonary embolism


Is this a current diagnosis for this admission?: Yes   


Plan: 


Saddle embolus extending into the right and left lower lobes.  Noted hemodynamic

instability with hypotension and bradycardic episodes.


Status post TPA administered on 9/21/2020


After discussion with pharmacist and some literature review, we have deemed that

the most adequate long-term anticoagulation option for patient will be warfarin 

given patient's cirrhosis and child's Sanders class C.


Discontinued Eliquis and switched patient to warfarin with Lovenox bridge last 

night.  Pharmacy to manage warfarin dosing.


Monitor INR goal of 2-3





10/9/2020-patient is on Lovenox treatment dose, warfarin 3 mg p.o. nightly.  INR

today is 1.17 goal is to bring the INR to between 2-3.





10/10/20-INR today is 1.09.  To continue Coumadin 5 mg p.o. nightly and continue

Lovenox treatment dose.  To repeat PT/INR tomorrow.  Goal is between 2-3.





10/11/2020-INR today is 1.12.  To continue Coumadin 5 mg p.o. nightly to recheck

PT/INR tomorrow.  Patient is also on treatment dose of Lovenox.





10/12/2020-patient is on Coumadin 5 mg p.o. nightly INR is 1.6 today.  Plan is 

to repeat the PT/INR tomorrow.  He is also on treatment dose of Lovenox at this 

time.








(2) Bacteremia


Is this a current diagnosis for this admission?: Yes   


Plan: 


Initially blood cultures positive for MR Coag neg Staph and only 1 blood culture

set-completed 7 days of linezolid though possibly could be contaminant.


Repeat blood cultures on 9/26/2020 showing Klebsiella secondary to complicated 

UTI-completed treatment with cefepime and Bactrim.





Currently resolved.











10/12/2020-patient is not on antibiotics at this time.  Sepsis resolved.








(3) Alcoholic cirrhosis of liver with ascites


Is this a current diagnosis for this admission?: Yes   


Plan: 


s/p >4L from paracentesis 10/5


Ascites fluid analysis and abdominal ultrasound are consistent with cirrhosis 

likely secondary to chronic alcoholism. 


Child's sanders class C.


Given his very recent alcoholism and noncompliance, he will of course be a poor 

candidate for liver transplantation.


Lasix and Aldactone added for ascites.








10/9/20-patient has history of alcohol abuse with liver cirrhosis.  Presently on

Lasix, Aldactone for ascites.





10/11/20-patient has history of alcoholic liver cirrhosis.  Plan is to continue 

Lasix, Aldactone, for ascites.








(4) Hyponatremia


Is this a current diagnosis for this admission?: Yes   


Plan: 


Urine osmolarity and urine sodium are low and suggest hyponatremia secondary to 

low caloric nutritional intake +/- chf/liver disease


Encourage p.o. intake and continue lasix.





10/9/2020-patient serum sodium is 128.2.  Chronic hyponatremia most likely 

secondary to liver failure.





10/10/2020-latest serum sodium is around 128.  Chronic hyponatremia secondary to

alcoholic liver disease and congestive heart failure.





10/11/20-serum sodium is 127.8.  Stable.





10/12/2020-serum sodium is 127.  Stable.  Patient is asymptomatic.








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


Is this a current diagnosis for this admission?: Yes   


Plan: 


TTE showing biventricular failure with dilated cardiomyopathy, EF of 20 to 25% 

and grade 3 diastolic dysfunction of the LV.


Lasix, aldactone, Toprol-XL and losartan








(6) Acute respiratory failure with hypoxia


Is this a current diagnosis for this admission?: Yes   


Plan: 


Was on room air this morning.  Notably patient gets difficulty with adequate 

pulse oximetry using fingertips due to likely peripheral vascular disease.





10/10/2020-pulse ox today is 100% on 4 L.  Plan is to continue the present 

management at this time.





10/12/2020-pulse ox today is 96% room air.  Acute respiratory failure with 

hypoxia resolved.








(7) Paroxysmal atrial fibrillation


Is this a current diagnosis for this admission?: No   


Plan: 


Sinus rhythm currently











(8) Pulmonary nodule


Is this a current diagnosis for this admission?: Yes   


Plan: 


Noted on CAT scan.  Follow-up imaging recommended in 3 months but given 

patient's homelessness and noncompliance, it is very unlikely he will follow-up.








(9) Homeless


Is this a current diagnosis for this admission?: Yes   


Plan: 


Physical therapy.  Patient will likely need long-term placement at SNF 

especially given presentation with massive and unlikely to comply with therapy 

very poor ability to care for self.  Discharge planning working on this but 

encountering a lot of difficulty with placing patient. D/w his brother Aakash 

who is reluctant to take patient to his home due to limited room/space.








- Time


Anticipated Discharge Disposition: Home, Self Care


Anticipated Discharge Timeframe: within 48 hours

## 2020-10-13 LAB
APPEARANCE UR: CLEAR
APTT PPP: (no result) S
BILIRUB UR QL STRIP: NEGATIVE
ERYTHROCYTE [DISTWIDTH] IN BLOOD BY AUTOMATED COUNT: 18.8 % (ref 11.5–14)
GLUCOSE UR STRIP-MCNC: NEGATIVE MG/DL
HCT VFR BLD CALC: 35.3 % (ref 37.9–51)
HGB BLD-MCNC: 12 G/DL (ref 13.5–17)
INR PPP: 2.35
KETONES UR STRIP-MCNC: NEGATIVE MG/DL
MCH RBC QN AUTO: 28.2 PG (ref 27–33.4)
MCHC RBC AUTO-ENTMCNC: 34 G/DL (ref 32–36)
MCV RBC AUTO: 83 FL (ref 80–97)
NITRITE UR QL STRIP: NEGATIVE
PH UR STRIP: 5 [PH] (ref 5–9)
PLATELET # BLD: 198 10^3/UL (ref 150–450)
PROT UR STRIP-MCNC: 100 MG/DL
PROTHROMBIN TIME: 25.7 SEC (ref 11.4–15.4)
RBC # BLD AUTO: 4.27 10^6/UL (ref 4.35–5.55)
SP GR UR STRIP: 1.01
UROBILINOGEN UR-MCNC: NEGATIVE MG/DL (ref ?–2)
WBC # BLD AUTO: 8 10^3/UL (ref 4–10.5)

## 2020-10-13 RX ADMIN — Medication SCH: at 21:46

## 2020-10-13 RX ADMIN — MAGNESIUM OXIDE TAB 400 MG (241.3 MG ELEMENTAL MG) SCH MG: 400 (241.3 MG) TAB at 10:12

## 2020-10-13 RX ADMIN — Medication SCH UNIT: at 10:15

## 2020-10-13 RX ADMIN — FUROSEMIDE SCH MG: 20 TABLET ORAL at 10:13

## 2020-10-13 RX ADMIN — PANTOPRAZOLE SODIUM SCH MG: 40 TABLET, DELAYED RELEASE ORAL at 05:46

## 2020-10-13 RX ADMIN — SODIUM CHLORIDE SCH ML: 9 INJECTION INTRAMUSCULAR; INTRAVENOUS; SUBCUTANEOUS at 21:46

## 2020-10-13 RX ADMIN — Medication SCH UNIT: at 21:46

## 2020-10-13 RX ADMIN — MAGNESIUM OXIDE TAB 400 MG (241.3 MG ELEMENTAL MG) SCH MG: 400 (241.3 MG) TAB at 18:15

## 2020-10-13 RX ADMIN — WHITE PETROLATUM SCH: 1.75 OINTMENT TOPICAL at 18:11

## 2020-10-13 RX ADMIN — NICOTINE SCH EACH: 14 PATCH, EXTENDED RELEASE TOPICAL at 10:10

## 2020-10-13 RX ADMIN — SODIUM PHOSPHATE, MONOBASIC, MONOHYDRATE PRN GM: 276; 142 INJECTION, SOLUTION INTRAVENOUS at 07:07

## 2020-10-13 RX ADMIN — Medication SCH MG: at 10:15

## 2020-10-13 RX ADMIN — Medication SCH: at 14:56

## 2020-10-13 RX ADMIN — Medication PRN UNIT: at 10:15

## 2020-10-13 RX ADMIN — WHITE PETROLATUM SCH APPLIC: 1.75 OINTMENT TOPICAL at 14:53

## 2020-10-13 RX ADMIN — SPIRONOLACTONE SCH MG: 25 TABLET, FILM COATED ORAL at 18:15

## 2020-10-13 RX ADMIN — WARFARIN SODIUM SCH MG: 5 TABLET ORAL at 21:45

## 2020-10-13 RX ADMIN — SODIUM CHLORIDE PRN ML: 9 INJECTION INTRAMUSCULAR; INTRAVENOUS; SUBCUTANEOUS at 10:14

## 2020-10-13 RX ADMIN — Medication SCH ML: at 14:52

## 2020-10-13 RX ADMIN — SODIUM PHOSPHATE, MONOBASIC, MONOHYDRATE PRN GM: 276; 142 INJECTION, SOLUTION INTRAVENOUS at 07:01

## 2020-10-13 RX ADMIN — SODIUM CHLORIDE SCH ML: 9 INJECTION INTRAMUSCULAR; INTRAVENOUS; SUBCUTANEOUS at 10:14

## 2020-10-13 RX ADMIN — Medication SCH ML: at 05:46

## 2020-10-13 RX ADMIN — LOSARTAN POTASSIUM SCH MG: 25 TABLET, FILM COATED ORAL at 10:15

## 2020-10-13 RX ADMIN — METOPROLOL SUCCINATE SCH MG: 25 TABLET, EXTENDED RELEASE ORAL at 10:17

## 2020-10-13 RX ADMIN — ENOXAPARIN SODIUM SCH MG: 80 INJECTION SUBCUTANEOUS at 10:12

## 2020-10-13 NOTE — PDOC PROGRESS REPORT
Subjective


Progress Note for:: 10/13/20


Subjective:: 





Patient is sitting up at the edge of the bed waiting for lunch.  Breathing is 

comfortable.  He has a small spot of bleeding on his foot.  He has extremely dry

scaly skin and it appears that there is a small crack in the skin.


Reason For Visit: 


BILATERAL PE








Physical Exam


Vital Signs: 


                                        











Temp Pulse Resp BP Pulse Ox


 


 98.6 F   115 H  16   152/98 H  96 


 


 10/13/20 08:00  10/13/20 08:00  10/13/20 08:00  10/13/20 08:00  10/13/20 08:00








                                 Intake & Output











 10/12/20 10/13/20 10/14/20





 06:59 06:59 06:59


 


Intake Total 800 1732 


 


Output Total 400  


 


Balance 400 1732 


 


Weight 81.3 kg 82 kg 82 kg











General appearance: PRESENT: no acute distress, cooperative, well-developed, 

well-nourished


Head exam: PRESENT: atraumatic, normocephalic


Respiratory exam: PRESENT: clear to auscultation ana paula, decreased breath sounds - 

At bases, symmetrical, unlabored.  ABSENT: prolonged expiratory phas, rales, 

rhonchi, tachypnea, wheezes


Cardiovascular exam: PRESENT: RRR, +S1, +S2.  ABSENT: bradycardia, diastolic 

murmur, irregular rhythm, systolic murmur, tachycardia


GI/Abdominal exam: PRESENT: distended, normal bowel sounds.  ABSENT: tenderness


Rectal exam: PRESENT: deferred


Gentrourinary exam: ABSENT: indwelling catheter


Extremities exam: ABSENT: pedal edema


Neurological exam: PRESENT: alert, altered - Very slow to answer question, awake





Results


Laboratory Results: 


                                        





                                 10/12/20 05:48 





                                 10/12/20 05:48 





                                        











  09/20/20





  22:02


 


Troponin I  0.141


 


NT-Pro-B Natriuret Pep  34186 H











Impressions: 


                                        





Chest/Abdomen CTA  09/20/20 22:55


IMPRESSION: 


There are filling defects within the main pulmonary artery


concerning for developing saddle emboli. There are also filling


defects seen within the segmental arteries consistent with


pulmonary artery emboli.


 


 


There is a groundglass nodule at the right upper lobe measuring


up to 12 mm which will need interval follow-up within three


months.


There are trace pleural effusions with some overlying airspace


opacities which are favored to represent atelectasis, but may


also represent some mild edema or infection.


 








Modified Barium Swallow  09/25/20 00:00


IMPRESSION:  DEEP LARYNGEAL PENETRATION WITH TRACE ASPIRATION SEEN WITH THIN 

BARIUM.  . PLEASE SEE SPEECH PATHOLOGIST REPORT FOR OTHER FINDINGS AND 

RECOMMENDATIONS.


 








KUB X-Ray  09/29/20 00:00


IMPRESSION:  Nonobstructive bowel gas pattern.


 








PICC Line Insertion  10/05/20 00:00


IMPRESSION:  SUCCESSFUL PLACEMENT OF A 5 FR DUAL LUMEN 50 CM PICC IN THE LEFT 

BRACHIOCEPHALIC VEIN.


 








Paracentesis Ultrasound  10/05/20 08:00


IMPRESSION:  Successful ultrasound-guided paracentesis


 








Thoracentesis Ultrasound  10/05/20 09:47


IMPRESSION:  SUCCESSFUL RIGHT-SIDED THORACENTESIS USING ULTRASOUND GUIDANCE.


 








Chest X-Ray  10/05/20 15:25


IMPRESSION:  No pneumothorax post thoracentesis.


 








Abdomen Ultrasound  10/06/20 00:00


IMPRESSION:  1.  Examination is limited due to the patient's clinical condition.


2.  Nodular contour to the liver, raising question of cirrhosis of the liver. 

Liver vasculature is patent.


3.  Suboptimal visualization of the abdominal aorta due to overlying bowel gas.


4.  Small volume of ascites right upper quadrant of the abdomen.


 














Assessment and Plan





- Diagnosis


(1) Acute massive pulmonary embolism


Is this a current diagnosis for this admission?: Yes   


Plan: 


Saddle embolus extending into the right and left lower lobes.  Noted hemodynamic

instability with hypotension and bradycardic episodes.


Status post TPA administered on 9/21/2020


After discussion with pharmacist and some literature review, we have deemed that

the most adequate long-term anticoagulation option for patient will be warfarin 

given patient's cirrhosis and child's Sanders class C.


Discontinued Eliquis and switched patient to warfarin with Lovenox bridge last 

night.  Pharmacy to manage warfarin dosing.


Monitor INR goal of 2-3





10/9/2020-patient is on Lovenox treatment dose, warfarin 3 mg p.o. nightly.  INR

today is 1.17 goal is to bring the INR to between 2-3.





10/10/20-INR today is 1.09.  To continue Coumadin 5 mg p.o. nightly and continue

Lovenox treatment dose.  To repeat PT/INR tomorrow.  Goal is between 2-3.





10/11/2020-INR today is 1.12.  To continue Coumadin 5 mg p.o. nightly to recheck

PT/INR tomorrow.  Patient is also on treatment dose of Lovenox.





10/12/2020-patient is on Coumadin 5 mg p.o. nightly INR is 1.6 today.  Plan is 

to repeat the PT/INR tomorrow.  He is also on treatment dose of Lovenox at this 

time.


10/13/2020-INR is 2.35.  We can discontinue the Lovenox.  Will need a provider 

to follow INR and warfarin dosing as an outpatient.








(2) Bacteremia


Is this a current diagnosis for this admission?: Yes   


Plan: 


Initially blood cultures positive for MR Coag neg Staph and only 1 blood culture

set-completed 7 days of linezolid though possibly could be contaminant.


Repeat blood cultures on 9/26/2020 showing Klebsiella secondary to complicated 

UTI-completed treatment with cefepime and Bactrim.





Currently resolved.











10/12/2020-patient is not on antibiotics at this time.  Sepsis resolved.


10/13/2020-antibiotics as above.  White blood cell count is now normal.








(3) Alcoholic cirrhosis of liver with ascites


Is this a current diagnosis for this admission?: Yes   


Plan: 


s/p >4L from paracentesis 10/5


Ascites fluid analysis and abdominal ultrasound are consistent with cirrhosis 

likely secondary to chronic alcoholism. 


Child's sanders class C.


Given his very recent alcoholism and noncompliance, he will of course be a poor 

candidate for liver transplantation.


Lasix and Aldactone added for ascites.








10/9/20-patient has history of alcohol abuse with liver cirrhosis.  Presently on

Lasix, Aldactone for ascites.





10/11/20-patient has history of alcoholic liver cirrhosis.  Plan is to continue 

Lasix, Aldactone, for ascites.





10/13/2020-on spironolactone and furosemide.  Monitor intake and output.








(4) Hyponatremia


Is this a current diagnosis for this admission?: Yes   


Plan: 


Urine osmolarity and urine sodium are low and suggest hyponatremia secondary to 

low caloric nutritional intake +/- chf/liver disease


Encourage p.o. intake and continue lasix.





10/9/2020-patient serum sodium is 128.2.  Chronic hyponatremia most likely 

secondary to liver failure.





10/10/2020-latest serum sodium is around 128.  Chronic hyponatremia secondary to

alcoholic liver disease and congestive heart failure.





10/11/20-serum sodium is 127.8.  Stable.





10/12/2020-serum sodium is 127.  Stable.  Patient is asymptomatic.


10/13/2020-chronic due to his liver failure.  Continue to monitor.  May need to 

introduce fluid restriction.








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


Is this a current diagnosis for this admission?: Yes   


Plan: 


TTE showing biventricular failure with dilated cardiomyopathy, EF of 20 to 25% 

and grade 3 diastolic dysfunction of the LV.


Lasix, aldactone, Toprol-XL and losartan





10/13/2020-continue current regimen.  Consider reassessment by cardiology to try

and maximize medications such as using Entresto instead of losartan.








(6) Acute respiratory failure with hypoxia


Is this a current diagnosis for this admission?: Yes   


Plan: 


Was on room air this morning.  Notably patient gets difficulty with adequate 

pulse oximetry using fingertips due to likely peripheral vascular disease.





10/10/2020-pulse ox today is 100% on 4 L.  Plan is to continue the present 

management at this time.





10/12/2020-pulse ox today is 96% room air.  Acute respiratory failure with 

hypoxia resolved.








(7) Paroxysmal atrial fibrillation


Is this a current diagnosis for this admission?: Yes   


Plan: 


Sinus rhythm currently











(8) Pulmonary nodule


Is this a current diagnosis for this admission?: Yes   


Plan: 


Noted on CAT scan.  Follow-up imaging recommended in 3 months but given 

patient's homelessness and noncompliance, it is very unlikely he will follow-up.








(9) Homeless


Is this a current diagnosis for this admission?: Yes   


Plan: 


Physical therapy.  Patient will likely need long-term placement at SNF 

especially given presentation with massive and unlikely to comply with therapy 

very poor ability to care for self.  Discharge planning working on this but 

encountering a lot of difficulty with placing patient. D/w his brother Aakash 

who is reluctant to take patient to his home due to limited room/space.








(10) Urinary tract infection due to Klebsiella species


Is this a current diagnosis for this admission?: Yes   


Plan: 


10/13/2020-antibiotic therapy completed as noted above








- Time


Time Spent with patient: 15-24 minutes


Medications reviewed and adjusted accordingly: Yes


Anticipated Discharge Disposition: Unknown


Anticipated Discharge Timeframe: Unknown

## 2020-10-14 LAB
ADD MANUAL DIFF: NO
ALBUMIN SERPL-MCNC: 3.3 G/DL (ref 3.5–5)
ALP SERPL-CCNC: 220 U/L (ref 38–126)
ANION GAP SERPL CALC-SCNC: 13 MMOL/L (ref 5–19)
APPEARANCE UR: (no result)
APTT PPP: (no result) S
AST SERPL-CCNC: 62 U/L (ref 17–59)
BASOPHILS # BLD AUTO: 0 10^3/UL (ref 0–0.2)
BASOPHILS NFR BLD AUTO: 0.4 % (ref 0–2)
BILIRUB DIRECT SERPL-MCNC: 1.2 MG/DL (ref 0–0.4)
BILIRUB SERPL-MCNC: 1.8 MG/DL (ref 0.2–1.3)
BILIRUB UR QL STRIP: NEGATIVE
BUN SERPL-MCNC: 13 MG/DL (ref 7–20)
CALCIUM: 8.8 MG/DL (ref 8.4–10.2)
CHLORIDE SERPL-SCNC: 91 MMOL/L (ref 98–107)
CO2 SERPL-SCNC: 22 MMOL/L (ref 22–30)
EOSINOPHIL # BLD AUTO: 0.1 10^3/UL (ref 0–0.6)
EOSINOPHIL NFR BLD AUTO: 1.3 % (ref 0–6)
ERYTHROCYTE [DISTWIDTH] IN BLOOD BY AUTOMATED COUNT: 18.7 % (ref 11.5–14)
GLUCOSE SERPL-MCNC: 89 MG/DL (ref 75–110)
GLUCOSE UR STRIP-MCNC: 50 MG/DL
HCT VFR BLD CALC: 35.6 % (ref 37.9–51)
HGB BLD-MCNC: 12.1 G/DL (ref 13.5–17)
INR PPP: 3.06
KETONES UR STRIP-MCNC: NEGATIVE MG/DL
LYMPHOCYTES # BLD AUTO: 1.4 10^3/UL (ref 0.5–4.7)
LYMPHOCYTES NFR BLD AUTO: 19.8 % (ref 13–45)
MCH RBC QN AUTO: 27.7 PG (ref 27–33.4)
MCHC RBC AUTO-ENTMCNC: 34 G/DL (ref 32–36)
MCV RBC AUTO: 82 FL (ref 80–97)
MONOCYTES # BLD AUTO: 0.8 10^3/UL (ref 0.1–1.4)
MONOCYTES NFR BLD AUTO: 11.6 % (ref 3–13)
NEUTROPHILS # BLD AUTO: 4.6 10^3/UL (ref 1.7–8.2)
NEUTS SEG NFR BLD AUTO: 66.9 % (ref 42–78)
PH UR STRIP: 5 [PH] (ref 5–9)
PLATELET # BLD: 196 10^3/UL (ref 150–450)
POTASSIUM SERPL-SCNC: 5.1 MMOL/L (ref 3.6–5)
PROT SERPL-MCNC: 6.6 G/DL (ref 6.3–8.2)
PROT UR STRIP-MCNC: >=500 MG/DL
PROTHROMBIN TIME: 31.5 SEC (ref 11.4–15.4)
RBC # BLD AUTO: 4.37 10^6/UL (ref 4.35–5.55)
SP GR UR STRIP: 1.02
TOTAL CELLS COUNTED % (AUTO): 100 %
UROBILINOGEN UR-MCNC: 2 MG/DL (ref ?–2)
WBC # BLD AUTO: 6.9 10^3/UL (ref 4–10.5)

## 2020-10-14 RX ADMIN — SODIUM CHLORIDE SCH ML: 9 INJECTION INTRAMUSCULAR; INTRAVENOUS; SUBCUTANEOUS at 22:22

## 2020-10-14 RX ADMIN — SODIUM CHLORIDE PRN ML: 9 INJECTION INTRAMUSCULAR; INTRAVENOUS; SUBCUTANEOUS at 12:26

## 2020-10-14 RX ADMIN — Medication SCH UNIT: at 11:31

## 2020-10-14 RX ADMIN — Medication SCH: at 14:00

## 2020-10-14 RX ADMIN — NICOTINE SCH EACH: 14 PATCH, EXTENDED RELEASE TOPICAL at 11:26

## 2020-10-14 RX ADMIN — Medication SCH MG: at 11:39

## 2020-10-14 RX ADMIN — FUROSEMIDE SCH MG: 20 TABLET ORAL at 11:24

## 2020-10-14 RX ADMIN — Medication SCH: at 22:22

## 2020-10-14 RX ADMIN — ACETAMINOPHEN PRN MG: 160 SOLUTION ORAL at 11:39

## 2020-10-14 RX ADMIN — SODIUM CHLORIDE SCH ML: 9 INJECTION INTRAMUSCULAR; INTRAVENOUS; SUBCUTANEOUS at 12:26

## 2020-10-14 RX ADMIN — WHITE PETROLATUM SCH APPLIC: 1.75 OINTMENT TOPICAL at 18:20

## 2020-10-14 RX ADMIN — IPRATROPIUM BROMIDE AND ALBUTEROL SULFATE PRN ML: 2.5; .5 SOLUTION RESPIRATORY (INHALATION) at 18:18

## 2020-10-14 RX ADMIN — PANTOPRAZOLE SODIUM SCH MG: 40 TABLET, DELAYED RELEASE ORAL at 05:59

## 2020-10-14 RX ADMIN — ACETAMINOPHEN PRN MG: 160 SOLUTION ORAL at 18:14

## 2020-10-14 RX ADMIN — Medication SCH UNIT: at 22:21

## 2020-10-14 RX ADMIN — HYDRALAZINE HYDROCHLORIDE SCH MG: 10 TABLET, FILM COATED ORAL at 18:16

## 2020-10-14 RX ADMIN — Medication SCH ML: at 06:02

## 2020-10-14 RX ADMIN — SPIRONOLACTONE SCH MG: 25 TABLET, FILM COATED ORAL at 18:17

## 2020-10-14 RX ADMIN — HYDRALAZINE HYDROCHLORIDE SCH MG: 10 TABLET, FILM COATED ORAL at 05:59

## 2020-10-14 RX ADMIN — MAGNESIUM OXIDE TAB 400 MG (241.3 MG ELEMENTAL MG) SCH MG: 400 (241.3 MG) TAB at 18:16

## 2020-10-14 RX ADMIN — SODIUM PHOSPHATE, MONOBASIC, MONOHYDRATE PRN GM: 276; 142 INJECTION, SOLUTION INTRAVENOUS at 12:12

## 2020-10-14 RX ADMIN — LOSARTAN POTASSIUM SCH MG: 25 TABLET, FILM COATED ORAL at 11:38

## 2020-10-14 RX ADMIN — SODIUM CHLORIDE PRN ML: 9 INJECTION INTRAMUSCULAR; INTRAVENOUS; SUBCUTANEOUS at 16:55

## 2020-10-14 RX ADMIN — METOPROLOL SUCCINATE SCH MG: 25 TABLET, EXTENDED RELEASE ORAL at 11:23

## 2020-10-14 RX ADMIN — LOSARTAN POTASSIUM SCH MG: 25 TABLET, FILM COATED ORAL at 22:20

## 2020-10-14 RX ADMIN — Medication PRN UNIT: at 16:55

## 2020-10-14 RX ADMIN — WHITE PETROLATUM SCH APPLIC: 1.75 OINTMENT TOPICAL at 12:26

## 2020-10-14 RX ADMIN — MAGNESIUM OXIDE TAB 400 MG (241.3 MG ELEMENTAL MG) SCH MG: 400 (241.3 MG) TAB at 11:23

## 2020-10-14 NOTE — RADIOLOGY REPORT (SQ)
EXAM DESCRIPTION:  10/14/2020

CHEST SINGLE VIEW



IMAGES COMPLETED DATE/TIME:  10/14/2020



REASON FOR STUDY:  Difficulty breathing



COMPARISON:  10/8/2020



EXAM PARAMETERS:  NUMBER OF VIEWS: 1

TECHNIQUE: Single frontal radiographic view of the chest acquired.

RADIATION DOSE: NA

LIMITATIONS: None.



FINDINGS:  LUNGS AND PLEURA: New small right pleural effusion.  Bibasilar atelectasis.  No pneumothor
ax.  Lungs are hyperinflated.

MEDIASTINUM AND HILAR STRUCTURES: No masses.  Contour normal.

HEART AND VASCULAR STRUCTURES: Marked cardiomegaly.  No pulmonary vascular congestion.

BONES: No acute findings.

HARDWARE: Right PICC with tip in the right atrium unchanged.

OTHER: No other significant finding.



IMPRESSION:  New small right pleural effusion.  Hyperinflated lungs which can be seen with obstructiv
e lung disease.



TECHNICAL DOCUMENTATION:  JOB ID:  [id number]

 2011 [employer]- All Rights Reserved



Reading location - IP/workstation name: [email or web address]

## 2020-10-14 NOTE — PDOC PROGRESS REPORT
Subjective


Progress Note for:: 10/14/20


Subjective:: 





Having episodes of hypoglycemia while not on any insulin or antidiabetic 

regimen.


Reason For Visit: 


BILATERAL PE








Physical Exam


Vital Signs: 


                                        











Temp Pulse Resp BP Pulse Ox


 


 97.8 F   101 H  26 H  142/92 H  92 


 


 10/13/20 23:52  10/14/20 07:52  10/14/20 07:52  10/14/20 07:52  10/14/20 07:52








                                 Intake & Output











 10/13/20 10/14/20 10/15/20





 06:59 06:59 06:59


 


Intake Total 1732 1252 


 


Output Total  100 


 


Balance 1732 1152 


 


Weight 82 kg 82.1 kg 











General appearance: PRESENT: no acute distress, cooperative, well-developed


Head exam: PRESENT: atraumatic, normocephalic


Ear exam: PRESENT: normal external ear exam.  ABSENT: bleeding, drainage


Mouth exam: PRESENT: moist, tongue midline


Neck exam: PRESENT: JVD.  ABSENT: carotid bruit, lymphadenopathy


Respiratory exam: PRESENT: clear to auscultation ana paula, symmetrical, unlabored.  

ABSENT: rales, rhonchi, tachypnea, wheezes


Cardiovascular exam: PRESENT: RRR, +S1, +S2.  ABSENT: bradycardia, diastolic 

murmur, irregular rhythm, systolic murmur, tachycardia


GI/Abdominal exam: PRESENT: ascites, diminished bowel sounds, distended, firm.  

ABSENT: tenderness


Rectal exam: PRESENT: deferred


Gentrourinary exam: ABSENT: indwelling catheter


Musculoskeletal exam: PRESENT: ambulatory.  ABSENT: deformity, dislocation


Neurological exam: PRESENT: alert, awake, oriented to person, oriented to place,

oriented to situation, CN II-XII grossly intact


Psychiatric exam: PRESENT: flat affect.  ABSENT: agitated, anxious


Focused psych exam: ABSENT: delusional, paranoid, restlessness


Skin exam: PRESENT: dry - Dry cracked skin especially on feet, normal color, 

warm.  ABSENT: rash





Results


Laboratory Results: 


                                        





                                 10/14/20 06:45 





                                 10/14/20 06:45 





                                        











  10/13/20 10/13/20 10/14/20





  15:13 22:30 06:45


 


WBC   8.0  6.9


 


RBC   4.27 L  4.37


 


Hgb   12.0 L  12.1 L


 


Hct   35.3 L  35.6 L


 


MCV   83  82


 


MCH   28.2  27.7


 


MCHC   34.0  34.0


 


RDW   18.8 H  18.7 H


 


Plt Count   198  196


 


Seg Neutrophils %    66.9


 


Sodium   


 


Potassium   


 


Chloride   


 


Carbon Dioxide   


 


Anion Gap   


 


BUN   


 


Creatinine   


 


Est GFR (African Amer)   


 


Glucose   


 


Calcium   


 


Magnesium   


 


Total Bilirubin   


 


AST   


 


Alkaline Phosphatase   


 


Total Protein   


 


Albumin   


 


Urine Color  MANDI  


 


Urine Appearance  CLEAR  


 


Urine pH  5.0  


 


Ur Specific Gravity  1.014  


 


Urine Protein  100 H  


 


Urine Glucose (UA)  NEGATIVE  


 


Urine Ketones  NEGATIVE  


 


Urine Blood  NEGATIVE  


 


Urine Nitrite  NEGATIVE  


 


Ur Leukocyte Esterase  NEGATIVE  


 


Urine WBC (Auto)  2  


 


Urine RBC (Auto)  0  














  10/14/20





  06:45


 


WBC 


 


RBC 


 


Hgb 


 


Hct 


 


MCV 


 


MCH 


 


MCHC 


 


RDW 


 


Plt Count 


 


Seg Neutrophils % 


 


Sodium  126.0 L


 


Potassium  5.1 H


 


Chloride  91 L


 


Carbon Dioxide  22


 


Anion Gap  13


 


BUN  13


 


Creatinine  0.62


 


Est GFR (African Amer)  > 60


 


Glucose  89


 


Calcium  8.8


 


Magnesium  1.6


 


Total Bilirubin  1.8 H


 


AST  62 H


 


Alkaline Phosphatase  220 H


 


Total Protein  6.6


 


Albumin  3.3 L


 


Urine Color 


 


Urine Appearance 


 


Urine pH 


 


Ur Specific Gravity 


 


Urine Protein 


 


Urine Glucose (UA) 


 


Urine Ketones 


 


Urine Blood 


 


Urine Nitrite 


 


Ur Leukocyte Esterase 


 


Urine WBC (Auto) 


 


Urine RBC (Auto) 








                                        











  09/20/20





  22:02


 


Troponin I  0.141


 


NT-Pro-B Natriuret Pep  10079 H











Impressions: 


                                        





Chest/Abdomen CTA  09/20/20 22:55


IMPRESSION: 


There are filling defects within the main pulmonary artery


concerning for developing saddle emboli. There are also filling


defects seen within the segmental arteries consistent with


pulmonary artery emboli.


 


 


There is a groundglass nodule at the right upper lobe measuring


up to 12 mm which will need interval follow-up within three


months.


There are trace pleural effusions with some overlying airspace


opacities which are favored to represent atelectasis, but may


also represent some mild edema or infection.


 








Modified Barium Swallow  09/25/20 00:00


IMPRESSION:  DEEP LARYNGEAL PENETRATION WITH TRACE ASPIRATION SEEN WITH THIN 

BARIUM.  . PLEASE SEE SPEECH PATHOLOGIST REPORT FOR OTHER FINDINGS AND 

RECOMMENDATIONS.


 








KUB X-Ray  09/29/20 00:00


IMPRESSION:  Nonobstructive bowel gas pattern.


 








PICC Line Insertion  10/05/20 00:00


IMPRESSION:  SUCCESSFUL PLACEMENT OF A 5 FR DUAL LUMEN 50 CM PICC IN THE LEFT 

BRACHIOCEPHALIC VEIN.


 








Paracentesis Ultrasound  10/05/20 08:00


IMPRESSION:  Successful ultrasound-guided paracentesis


 








Thoracentesis Ultrasound  10/05/20 09:47


IMPRESSION:  SUCCESSFUL RIGHT-SIDED THORACENTESIS USING ULTRASOUND GUIDANCE.


 








Chest X-Ray  10/05/20 15:25


IMPRESSION:  No pneumothorax post thoracentesis.


 








Abdomen Ultrasound  10/06/20 00:00


IMPRESSION:  1.  Examination is limited due to the patient's clinical condition.


2.  Nodular contour to the liver, raising question of cirrhosis of the liver. 

Liver vasculature is patent.


3.  Suboptimal visualization of the abdominal aorta due to overlying bowel gas.


4.  Small volume of ascites right upper quadrant of the abdomen.


 














Assessment and Plan





- Diagnosis


(1) Acute massive pulmonary embolism


Is this a current diagnosis for this admission?: Yes   


Plan: 


Saddle embolus extending into the right and left lower lobes.  Noted hemodynamic

instability with hypotension and bradycardic episodes.


Status post TPA administered on 9/21/2020


After discussion with pharmacist and some literature review, we have deemed that

the most adequate long-term anticoagulation option for patient will be warfarin 

given patient's cirrhosis and child's Sanders class C.


Discontinued Eliquis and switched patient to warfarin with Lovenox bridge last 

night.  Pharmacy to manage warfarin dosing.


Monitor INR goal of 2-3





10/9/2020-patient is on Lovenox treatment dose, warfarin 3 mg p.o. nightly.  INR

today is 1.17 goal is to bring the INR to between 2-3.





10/10/20-INR today is 1.09.  To continue Coumadin 5 mg p.o. nightly and continue

Lovenox treatment dose.  To repeat PT/INR tomorrow.  Goal is between 2-3.





10/11/2020-INR today is 1.12.  To continue Coumadin 5 mg p.o. nightly to recheck

PT/INR tomorrow.  Patient is also on treatment dose of Lovenox.





10/12/2020-patient is on Coumadin 5 mg p.o. nightly INR is 1.6 today.  Plan is 

to repeat the PT/INR tomorrow.  He is also on treatment dose of Lovenox at this 

time.


10/13/2020-INR is 2.35.  We can discontinue the Lovenox.  Will need a provider 

to follow INR and warfarin dosing as an outpatient.


10/14/2020-INR 3.06.  Pharmacy is adjusting the warfarin.  Maintaining normal 

oxygenation on room air.








(2) Bacteremia


Is this a current diagnosis for this admission?: Yes   


Plan: 


Initially blood cultures positive for MR Coag neg Staph and only 1 blood culture

set-completed 7 days of linezolid though possibly could be contaminant.


Repeat blood cultures on 9/26/2020 showing Klebsiella secondary to complicated 

UTI-completed treatment with cefepime and Bactrim.





Currently resolved.











10/12/2020-patient is not on antibiotics at this time.  Sepsis resolved.


10/13/2020-antibiotics as above.  White blood cell count is now normal.








(3) Alcoholic cirrhosis of liver with ascites


Is this a current diagnosis for this admission?: Yes   


Plan: 


s/p >4L from paracentesis 10/5


Ascites fluid analysis and abdominal ultrasound are consistent with cirrhosis 

likely secondary to chronic alcoholism. 


Child's sanders class C.


Given his very recent alcoholism and noncompliance, he will of course be a poor 

candidate for liver transplantation.


Lasix and Aldactone added for ascites.








10/9/20-patient has history of alcohol abuse with liver cirrhosis.  Presently on

Lasix, Aldactone for ascites.





10/11/20-patient has history of alcoholic liver cirrhosis.  Plan is to continue 

Lasix, Aldactone, for ascites.





10/13/2020-on spironolactone and furosemide.  Monitor intake and output.


10/14/2020-abdomen is distended and firm.  The patient already has had 1 

paracentesis.  May need to consider a second however he is on warfarin.  At this

point the patient appears to be tolerating the ascites.








(4) Hyponatremia


Is this a current diagnosis for this admission?: Yes   


Plan: 


Urine osmolarity and urine sodium are low and suggest hyponatremia secondary to 

low caloric nutritional intake +/- chf/liver disease


Encourage p.o. intake and continue lasix.





10/9/2020-patient serum sodium is 128.2.  Chronic hyponatremia most likely 

secondary to liver failure.





10/10/2020-latest serum sodium is around 128.  Chronic hyponatremia secondary to

alcoholic liver disease and congestive heart failure.





10/11/20-serum sodium is 127.8.  Stable.





10/12/2020-serum sodium is 127.  Stable.  Patient is asymptomatic.


10/13/2020-chronic due to his liver failure.  Continue to monitor.  May need to 

introduce fluid restriction.


10/14/2020-sodium is 126 today.  This is going to be a chronic issue for this 

patient.  Continue to monitor.  No fluid restriction instituted as yet.








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


Is this a current diagnosis for this admission?: Yes   


Plan: 


TTE showing biventricular failure with dilated cardiomyopathy, EF of 20 to 25% 

and grade 3 diastolic dysfunction of the LV.


Lasix, aldactone, Toprol-XL and losartan





10/13/2020-continue current regimen.  Consider reassessment by cardiology to try

and maximize medications such as using Entresto instead of losartan.


10/14/2020-echocardiogram revealed ejection fraction 20 to 25%.  At this point 

will optimize medication regimen.  Losartan was increased.  Continue Aldactone, 

beta-blocker and furosemide.








(6) Acute respiratory failure with hypoxia


Is this a current diagnosis for this admission?: Yes   


Plan: 


Was on room air this morning.  Notably patient gets difficulty with adequate 

pulse oximetry using fingertips due to likely peripheral vascular disease.





10/10/2020-pulse ox today is 100% on 4 L.  Plan is to continue the present 

management at this time.





10/12/2020-pulse ox today is 96% room air.  Acute respiratory failure with 

hypoxia resolved.








(7) Paroxysmal atrial fibrillation


Is this a current diagnosis for this admission?: Yes   


Plan: 


Sinus rhythm currently


10/14/2020-continue metoprolol.








(8) Pulmonary nodule


Is this a current diagnosis for this admission?: Yes   


Plan: 


Noted on CAT scan.  Follow-up imaging recommended in 3 months but given 

patient's homelessness and noncompliance, it is very unlikely he will follow-up.


10/14/2020-cardiac dysfunction and liver failure are the most prominent issues 

currently.  Work-up of the pulmonary nodule is secondary at this point.








(9) Homeless


Is this a current diagnosis for this admission?: Yes   


Plan: 


Physical therapy.  Patient will likely need long-term placement at SNF 

especially given presentation with massive and unlikely to comply with therapy 

very poor ability to care for self.  Discharge planning working on this but 

encountering a lot of difficulty with placing patient. D/w his brother Aakash 

who is reluctant to take patient to his home due to limited room/space.








(10) Urinary tract infection due to Klebsiella species


Is this a current diagnosis for this admission?: Yes   


Plan: 


10/13/2020-antibiotic therapy completed as noted above








- Time


Time Spent with patient: 15-24 minutes


Medications reviewed and adjusted accordingly: Yes


Anticipated Discharge Disposition: Unknown


Anticipated Discharge Timeframe: Unknown

## 2020-10-15 LAB
INR PPP: 3.52
PROTHROMBIN TIME: 35 SEC (ref 11.4–15.4)

## 2020-10-15 RX ADMIN — HYDRALAZINE HYDROCHLORIDE SCH MG: 10 TABLET, FILM COATED ORAL at 06:12

## 2020-10-15 RX ADMIN — PANTOPRAZOLE SODIUM SCH MG: 40 TABLET, DELAYED RELEASE ORAL at 06:12

## 2020-10-15 RX ADMIN — MAGNESIUM OXIDE TAB 400 MG (241.3 MG ELEMENTAL MG) SCH MG: 400 (241.3 MG) TAB at 10:08

## 2020-10-15 RX ADMIN — LOSARTAN POTASSIUM SCH MG: 25 TABLET, FILM COATED ORAL at 21:59

## 2020-10-15 RX ADMIN — SODIUM PHOSPHATE, MONOBASIC, MONOHYDRATE PRN GM: 276; 142 INJECTION, SOLUTION INTRAVENOUS at 15:57

## 2020-10-15 RX ADMIN — IPRATROPIUM BROMIDE AND ALBUTEROL SULFATE SCH ML: 2.5; .5 SOLUTION RESPIRATORY (INHALATION) at 16:24

## 2020-10-15 RX ADMIN — Medication SCH ML: at 06:16

## 2020-10-15 RX ADMIN — SODIUM CHLORIDE SCH ML: 9 INJECTION INTRAMUSCULAR; INTRAVENOUS; SUBCUTANEOUS at 10:08

## 2020-10-15 RX ADMIN — Medication SCH: at 13:14

## 2020-10-15 RX ADMIN — NICOTINE SCH EACH: 14 PATCH, EXTENDED RELEASE TOPICAL at 10:07

## 2020-10-15 RX ADMIN — HYDRALAZINE HYDROCHLORIDE SCH MG: 10 TABLET, FILM COATED ORAL at 17:49

## 2020-10-15 RX ADMIN — METOPROLOL SUCCINATE SCH MG: 25 TABLET, EXTENDED RELEASE ORAL at 10:07

## 2020-10-15 RX ADMIN — LOSARTAN POTASSIUM SCH MG: 25 TABLET, FILM COATED ORAL at 10:08

## 2020-10-15 RX ADMIN — WHITE PETROLATUM SCH APPLIC: 1.75 OINTMENT TOPICAL at 17:52

## 2020-10-15 RX ADMIN — FUROSEMIDE SCH MG: 20 TABLET ORAL at 10:08

## 2020-10-15 RX ADMIN — SODIUM CHLORIDE SCH ML: 9 INJECTION INTRAMUSCULAR; INTRAVENOUS; SUBCUTANEOUS at 22:00

## 2020-10-15 RX ADMIN — WHITE PETROLATUM SCH APPLIC: 1.75 OINTMENT TOPICAL at 10:10

## 2020-10-15 RX ADMIN — Medication SCH UNIT: at 21:59

## 2020-10-15 RX ADMIN — SPIRONOLACTONE SCH MG: 25 TABLET, FILM COATED ORAL at 17:49

## 2020-10-15 RX ADMIN — Medication SCH MG: at 10:08

## 2020-10-15 RX ADMIN — Medication SCH UNIT: at 10:09

## 2020-10-15 RX ADMIN — Medication SCH: at 22:01

## 2020-10-15 RX ADMIN — ACETAMINOPHEN PRN MG: 160 SOLUTION ORAL at 19:39

## 2020-10-15 RX ADMIN — MAGNESIUM OXIDE TAB 400 MG (241.3 MG ELEMENTAL MG) SCH MG: 400 (241.3 MG) TAB at 17:49

## 2020-10-15 NOTE — PDOC PROGRESS REPORT
Subjective


Progress Note for:: 10/15/20


Subjective:: 





The patient is sitting on the edge of the bed.  He is all wrapped up in 

blankets.  He has not eaten any of his lunch.  There appears to be stool 

staining on the bed sheets.  He seems lethargic.


Reason For Visit: 


BILATERAL PE








Physical Exam


Vital Signs: 


                                        











Temp Pulse Resp BP Pulse Ox


 


 97.9 F   98   15   112/95 H  96 


 


 10/15/20 12:00  10/15/20 12:20  10/15/20 12:20  10/15/20 12:00  10/15/20 12:20








                                 Intake & Output











 10/14/20 10/15/20 10/16/20





 06:59 06:59 06:59


 


Intake Total 1252 1046 500


 


Output Total 100 1500 


 


Balance 1152 -454 500


 


Weight 82.1 kg 82.4 kg 











General appearance: PRESENT: no acute distress, cooperative, well-developed


Head exam: PRESENT: atraumatic, normocephalic


Ear exam: PRESENT: normal external ear exam.  ABSENT: bleeding, drainage


Respiratory exam: PRESENT: prolonged expiratory phas, rhonchi - Very congested 

breath sounds with faint rhonchi on the right, symmetrical, unlabored.  ABSENT: 

rales, tachypnea, wheezes


Cardiovascular exam: PRESENT: RRR, +S1, +S2


GI/Abdominal exam: PRESENT: ascites, distended, firm, hypoactive bowel sounds.  

ABSENT: soft, tenderness


Rectal exam: PRESENT: deferred


Gentrourinary exam: ABSENT: indwelling catheter


Musculoskeletal exam: PRESENT: ambulatory.  ABSENT: deformity, dislocation


Neurological exam: PRESENT: awake, oriented to person, oriented to place, 

oriented to situation.  ABSENT: alert - Somewhat lethargic today


Psychiatric exam: PRESENT: unusual affect.  ABSENT: agitated, anxious


Skin exam: PRESENT: dry, warm, other - Keratin scaling on feet improved





Results


Laboratory Results: 


                                        





                                 10/14/20 06:45 





                                 10/14/20 06:45 





                                        











  10/14/20





  15:15


 


Urine Color  MANDI


 


Urine Appearance  SLIGHTLY-CLOUDY


 


Urine pH  5.0


 


Ur Specific Gravity  1.019


 


Urine Protein  >=500 H


 


Urine Glucose (UA)  50 H


 


Urine Ketones  NEGATIVE


 


Urine Blood  NEGATIVE


 


Urine RBC (Auto)  1








                                        











  09/20/20





  22:02


 


Troponin I  0.141


 


NT-Pro-B Natriuret Pep  70495 H











Impressions: 


                                        





Chest/Abdomen CTA  09/20/20 22:55


IMPRESSION: 


There are filling defects within the main pulmonary artery


concerning for developing saddle emboli. There are also filling


defects seen within the segmental arteries consistent with


pulmonary artery emboli.


 


 


There is a groundglass nodule at the right upper lobe measuring


up to 12 mm which will need interval follow-up within three


months.


There are trace pleural effusions with some overlying airspace


opacities which are favored to represent atelectasis, but may


also represent some mild edema or infection.


 








Modified Barium Swallow  09/25/20 00:00


IMPRESSION:  DEEP LARYNGEAL PENETRATION WITH TRACE ASPIRATION SEEN WITH THIN 

BARIUM.  . PLEASE SEE SPEECH PATHOLOGIST REPORT FOR OTHER FINDINGS AND 

RECOMMENDATIONS.


 








KUB X-Ray  09/29/20 00:00


IMPRESSION:  Nonobstructive bowel gas pattern.


 








PICC Line Insertion  10/05/20 00:00


IMPRESSION:  SUCCESSFUL PLACEMENT OF A 5 FR DUAL LUMEN 50 CM PICC IN THE LEFT 

BRACHIOCEPHALIC VEIN.


 








Paracentesis Ultrasound  10/05/20 08:00


IMPRESSION:  Successful ultrasound-guided paracentesis


 








Thoracentesis Ultrasound  10/05/20 09:47


IMPRESSION:  SUCCESSFUL RIGHT-SIDED THORACENTESIS USING ULTRASOUND GUIDANCE.


 








Abdomen Ultrasound  10/06/20 00:00


IMPRESSION:  1.  Examination is limited due to the patient's clinical condition.


2.  Nodular contour to the liver, raising question of cirrhosis of the liver. 

Liver vasculature is patent.


3.  Suboptimal visualization of the abdominal aorta due to overlying bowel gas.


4.  Small volume of ascites right upper quadrant of the abdomen.


 








Chest X-Ray  10/14/20 00:00


IMPRESSION:  New small right pleural effusion.  Hyperinflated lungs which can be

seen with obstructive lung disease.


 














Assessment and Plan





- Diagnosis


(1) Acute massive pulmonary embolism


Is this a current diagnosis for this admission?: Yes   


Plan: 


Saddle embolus extending into the right and left lower lobes.  Noted hemodynamic

instability with hypotension and bradycardic episodes.


Status post TPA administered on 9/21/2020


After discussion with pharmacist and some literature review, we have deemed that

the most adequate long-term anticoagulation option for patient will be warfarin 

given patient's cirrhosis and child's Sanders class C.


Discontinued Eliquis and switched patient to warfarin with Lovenox bridge last 

night.  Pharmacy to manage warfarin dosing.


Monitor INR goal of 2-3





10/9/2020-patient is on Lovenox treatment dose, warfarin 3 mg p.o. nightly.  INR

today is 1.17 goal is to bring the INR to between 2-3.





10/10/20-INR today is 1.09.  To continue Coumadin 5 mg p.o. nightly and continue

Lovenox treatment dose.  To repeat PT/INR tomorrow.  Goal is between 2-3.





10/11/2020-INR today is 1.12.  To continue Coumadin 5 mg p.o. nightly to recheck

PT/INR tomorrow.  Patient is also on treatment dose of Lovenox.





10/12/2020-patient is on Coumadin 5 mg p.o. nightly INR is 1.6 today.  Plan is 

to repeat the PT/INR tomorrow.  He is also on treatment dose of Lovenox at this 

time.


10/13/2020-INR is 2.35.  We can discontinue the Lovenox.  Will need a provider 

to follow INR and warfarin dosing as an outpatient.


10/14/2020-INR 3.06.  Pharmacy is adjusting the warfarin.  Maintaining normal 

oxygenation on room air.


10/15/2020-INR now 3.5.  Pharmacy adjusting warfarin.  Recheck INR tomorrow.








(2) Bacteremia


Is this a current diagnosis for this admission?: Yes   


Plan: 


Initially blood cultures positive for MR Coag neg Staph and only 1 blood culture

set-completed 7 days of linezolid though possibly could be contaminant.


Repeat blood cultures on 9/26/2020 showing Klebsiella secondary to complicated 

UTI-completed treatment with cefepime and Bactrim.





Currently resolved.











10/12/2020-patient is not on antibiotics at this time.  Sepsis resolved.


10/13/2020-antibiotics as above.  White blood cell count is now normal.








(3) Alcoholic cirrhosis of liver with ascites


Is this a current diagnosis for this admission?: Yes   


Plan: 


s/p >4L from paracentesis 10/5


Ascites fluid analysis and abdominal ultrasound are consistent with cirrhosis 

likely secondary to chronic alcoholism. 


Child's sanders class C.


Given his very recent alcoholism and noncompliance, he will of course be a poor 

candidate for liver transplantation.


Lasix and Aldactone added for ascites.








10/9/20-patient has history of alcohol abuse with liver cirrhosis.  Presently on

Lasix, Aldactone for ascites.





10/11/20-patient has history of alcoholic liver cirrhosis.  Plan is to continue 

Lasix, Aldactone, for ascites.





10/13/2020-on spironolactone and furosemide.  Monitor intake and output.


10/14/2020-abdomen is distended and firm.  The patient already has had 1 

paracentesis.  May need to consider a second however he is on warfarin.  At this

point the patient appears to be tolerating the ascites.


10/15/2020-we will increase diuresis.  We will check electrolytes.  Consider 

increasing Aldactone.








(4) Hyponatremia


Is this a current diagnosis for this admission?: Yes   


Plan: 


Urine osmolarity and urine sodium are low and suggest hyponatremia secondary to 

low caloric nutritional intake +/- chf/liver disease


Encourage p.o. intake and continue lasix.





10/9/2020-patient serum sodium is 128.2.  Chronic hyponatremia most likely 

secondary to liver failure.





10/10/2020-latest serum sodium is around 128.  Chronic hyponatremia secondary to

alcoholic liver disease and congestive heart failure.





10/11/20-serum sodium is 127.8.  Stable.





10/12/2020-serum sodium is 127.  Stable.  Patient is asymptomatic.


10/13/2020-chronic due to his liver failure.  Continue to monitor.  May need to 

introduce fluid restriction.


10/14/2020-sodium is 126 today.  This is going to be a chronic issue for this 

patient.  Continue to monitor.  No fluid restriction instituted as yet.


10/15/2020-recheck serum sodium tomorrow.








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


Is this a current diagnosis for this admission?: Yes   


Plan: 


TTE showing biventricular failure with dilated cardiomyopathy, EF of 20 to 25% 

and grade 3 diastolic dysfunction of the LV.


Lasix, aldactone, Toprol-XL and losartan





10/13/2020-continue current regimen.  Consider reassessment by cardiology to try

and maximize medications such as using Entresto instead of losartan.


10/14/2020-echocardiogram revealed ejection fraction 20 to 25%.  At this point 

will optimize medication regimen.  Losartan was increased.  Continue Aldactone, 

beta-blocker and furosemide.


10/15/2020-continue current medication regimen.








(6) Acute respiratory failure with hypoxia


Is this a current diagnosis for this admission?: Yes   


Plan: 


Was on room air this morning.  Notably patient gets difficulty with adequate 

pulse oximetry using fingertips due to likely peripheral vascular disease.





10/10/2020-pulse ox today is 100% on 4 L.  Plan is to continue the present 

management at this time.





10/12/2020-pulse ox today is 96% room air.  Acute respiratory failure with 

hypoxia resolved.


10/15/2020-resolved.  Continue to monitor via vital signs and pulse oximetry.








(7) Paroxysmal atrial fibrillation


Is this a current diagnosis for this admission?: Yes   


Plan: 


Sinus rhythm currently


10/14/2020-continue metoprolol.


10/15/2020-pulse rate still mostly above 90.  Consider increasing metoprolol.








(8) Pulmonary nodule


Is this a current diagnosis for this admission?: Yes   


Plan: 


Noted on CAT scan.  Follow-up imaging recommended in 3 months but given 

patient's homelessness and noncompliance, it is very unlikely he will follow-up.


10/14/2020-cardiac dysfunction and liver failure are the most prominent issues 

currently.  Work-up of the pulmonary nodule is secondary at this point.








(9) Homeless


Is this a current diagnosis for this admission?: Yes   


Plan: 


Physical therapy.  Patient will likely need long-term placement at SNF 

especially given presentation with massive and unlikely to comply with therapy v

danette poor ability to care for self.  Discharge planning working on this but 

encountering a lot of difficulty with placing patient. D/w his brother Aakash 

who is reluctant to take patient to his home due to limited room/space.


10/15/2020-Case management is working on placement.








(10) Urinary tract infection due to Klebsiella species


Is this a current diagnosis for this admission?: Yes   


Plan: 


10/13/2020-antibiotic therapy completed as noted above








- Time


Time Spent with patient: 15-24 minutes


Medications reviewed and adjusted accordingly: Yes


Anticipated Discharge Disposition: Homeless shelter


Anticipated Discharge Timeframe: when bed available

## 2020-10-16 VITALS — SYSTOLIC BLOOD PRESSURE: 130 MMHG | DIASTOLIC BLOOD PRESSURE: 94 MMHG

## 2020-10-16 LAB
ALBUMIN SERPL-MCNC: 3 G/DL (ref 3.5–5)
ALP SERPL-CCNC: 186 U/L (ref 38–126)
ANION GAP SERPL CALC-SCNC: 8 MMOL/L (ref 5–19)
APPEARANCE UR: CLEAR
APTT PPP: (no result) S
AST SERPL-CCNC: 41 U/L (ref 17–59)
BILIRUB DIRECT SERPL-MCNC: 1 MG/DL (ref 0–0.4)
BILIRUB SERPL-MCNC: 1.9 MG/DL (ref 0.2–1.3)
BILIRUB UR QL STRIP: NEGATIVE
BUN SERPL-MCNC: 12 MG/DL (ref 7–20)
CALCIUM: 8.6 MG/DL (ref 8.4–10.2)
CHLORIDE SERPL-SCNC: 92 MMOL/L (ref 98–107)
CO2 SERPL-SCNC: 27 MMOL/L (ref 22–30)
ERYTHROCYTE [DISTWIDTH] IN BLOOD BY AUTOMATED COUNT: 18.4 % (ref 11.5–14)
GLUCOSE SERPL-MCNC: 106 MG/DL (ref 75–110)
GLUCOSE UR STRIP-MCNC: NEGATIVE MG/DL
HCT VFR BLD CALC: 34.6 % (ref 37.9–51)
HGB BLD-MCNC: 11.9 G/DL (ref 13.5–17)
INR PPP: 4.48
KETONES UR STRIP-MCNC: NEGATIVE MG/DL
MCH RBC QN AUTO: 27.9 PG (ref 27–33.4)
MCHC RBC AUTO-ENTMCNC: 34.4 G/DL (ref 32–36)
MCV RBC AUTO: 81 FL (ref 80–97)
NITRITE UR QL STRIP: NEGATIVE
PH UR STRIP: 7 [PH] (ref 5–9)
PLATELET # BLD: 237 10^3/UL (ref 150–450)
POTASSIUM SERPL-SCNC: 4.6 MMOL/L (ref 3.6–5)
PROT SERPL-MCNC: 6.2 G/DL (ref 6.3–8.2)
PROT UR STRIP-MCNC: 100 MG/DL
PROTHROMBIN TIME: 42.1 SEC (ref 11.4–15.4)
RBC # BLD AUTO: 4.26 10^6/UL (ref 4.35–5.55)
SP GR UR STRIP: 1.02
UROBILINOGEN UR-MCNC: 2 MG/DL (ref ?–2)
WBC # BLD AUTO: 6.4 10^3/UL (ref 4–10.5)

## 2020-10-16 PROCEDURE — 3E02340 INTRODUCTION OF INFLUENZA VACCINE INTO MUSCLE, PERCUTANEOUS APPROACH: ICD-10-PCS | Performed by: INTERNAL MEDICINE

## 2020-10-16 RX ADMIN — MAGNESIUM OXIDE TAB 400 MG (241.3 MG ELEMENTAL MG) SCH MG: 400 (241.3 MG) TAB at 09:25

## 2020-10-16 RX ADMIN — Medication SCH MG: at 09:26

## 2020-10-16 RX ADMIN — SODIUM CHLORIDE SCH ML: 9 INJECTION INTRAMUSCULAR; INTRAVENOUS; SUBCUTANEOUS at 09:26

## 2020-10-16 RX ADMIN — PANTOPRAZOLE SODIUM SCH MG: 40 TABLET, DELAYED RELEASE ORAL at 05:37

## 2020-10-16 RX ADMIN — Medication SCH UNIT: at 09:25

## 2020-10-16 RX ADMIN — Medication SCH ML: at 05:38

## 2020-10-16 RX ADMIN — IPRATROPIUM BROMIDE AND ALBUTEROL SULFATE SCH ML: 2.5; .5 SOLUTION RESPIRATORY (INHALATION) at 00:25

## 2020-10-16 RX ADMIN — HYDRALAZINE HYDROCHLORIDE SCH MG: 10 TABLET, FILM COATED ORAL at 05:37

## 2020-10-16 RX ADMIN — ACETAMINOPHEN PRN MG: 160 SOLUTION ORAL at 09:30

## 2020-10-16 RX ADMIN — ACETAMINOPHEN PRN MG: 160 SOLUTION ORAL at 03:17

## 2020-10-16 RX ADMIN — METOPROLOL SUCCINATE SCH MG: 25 TABLET, EXTENDED RELEASE ORAL at 09:25

## 2020-10-16 RX ADMIN — IPRATROPIUM BROMIDE AND ALBUTEROL SULFATE SCH ML: 2.5; .5 SOLUTION RESPIRATORY (INHALATION) at 08:32

## 2020-10-16 RX ADMIN — WHITE PETROLATUM SCH APPLIC: 1.75 OINTMENT TOPICAL at 09:25

## 2020-10-16 RX ADMIN — LOSARTAN POTASSIUM SCH MG: 25 TABLET, FILM COATED ORAL at 09:26

## 2020-10-16 RX ADMIN — NICOTINE SCH EACH: 14 PATCH, EXTENDED RELEASE TOPICAL at 09:25

## 2020-10-16 RX ADMIN — FUROSEMIDE SCH MG: 20 TABLET ORAL at 09:25

## 2020-10-16 NOTE — PDOC DISCHARGE SUMMARY
Impression





- Admit/DC Date/PCP


Admission Date/Primary Care Provider: 


  09/21/20 02:36





  





Discharge Date: 10/16/20





- Discharge Diagnosis


(1) Acute massive pulmonary embolism


Is this a current diagnosis for this admission?: Yes   





(2) Bacteremia


Is this a current diagnosis for this admission?: Yes   





(3) Alcoholic cirrhosis of liver with ascites


Is this a current diagnosis for this admission?: Yes   





(4) Hyponatremia


Is this a current diagnosis for this admission?: Yes   





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


Is this a current diagnosis for this admission?: Yes   





(6) Acute respiratory failure with hypoxia


Is this a current diagnosis for this admission?: Yes   





(7) Paroxysmal atrial fibrillation


Is this a current diagnosis for this admission?: Yes   





(8) Pulmonary nodule


Is this a current diagnosis for this admission?: Yes   





(9) Homeless


Is this a current diagnosis for this admission?: Yes   





(10) Urinary tract infection due to Klebsiella species


Is this a current diagnosis for this admission?: Yes   





- Additional Information


Resuscitation Status: Full Code


Discharge Diet: Cardiac


Discharge Activity: Activity As Tolerated, Balance Activity w/Rest, Weigh Daily


Prescriptions: 


Hydralazine HCl [Apresoline 10 mg Tablet] 10 mg PO BID #60 tablet


Spironolactone 50 mg PO QPM #30 tablet


Warfarin Sodium 2 mg PO DAILY #30 tablet


Home Medications: 








Furosemide [Lasix 20 mg Tablet] 20 mg PO DAILY #30 tablet 07/03/20 


Lisinopril [Prinivil 10 mg Tablet] 10 mg PO DAILY #30 tablet 07/03/20 


Furosemide [Lasix 20 mg Tablet] 20 mg PO DAILY #0 tablet 10/16/20 


Hydralazine HCl [Apresoline 10 mg Tablet] 10 mg PO BID #60 tablet 10/16/20 


Magnesium Oxide [Mag-Ox 400 mg Tablet] 800 mg PO BID  tablet 10/16/20 


Metoprolol Succinate [Toprol Xl 50 mg Tab.sr] 50 mg PO DAILY #60 tab.sr.24h 

10/16/20 


Nicotine [Nicoderm 14 mg/24 Hr Transdermal Patch] 1 each TD DAILY  patch.td24 

10/16/20 


Pantot AC/Min Oil/Pet Hy-Phl [Aquaphor W-Victoria Heal Oint 50 gm] 1 applic TOP BID  

tube 10/16/20 


Spironolactone 50 mg PO QPM #30 tablet 10/16/20 


Thiamine HCl [Thiamine 100 mg Tablet] 100 mg PO DAILY  tablet 10/16/20 


Warfarin Sodium 2 mg PO DAILY #30 tablet 10/16/20 








Additional Information: 


Hold warfarin.  Repeat INR blood test early next week.  Primary care doctor to 

adjust warfarin dosing.


Previously on metoprolol succinate 100 mg daily.  Decrease dose to 50 mg daily.


Multiple new medications to address both heart and liver disease.








History of Present Illiness


History of Present Illness: 


KIESHA WINN is a 64 year old  -American male.  With a past medical 

history of atrial flutter, alcohol induced dilated cardiomyopathy, CHF, 

hypertension, peripheral vascular disease and COPD.  He presented to the ED with

complaints of shortness of breath for 1 week, last evening his symptoms became 

more severe and EMS was called.  Upon their arrival his O2 saturation was 87% on

room air, he was placed on 4 L nasal cannula with improvement in his O2 

saturations to 98%.  Work-up in the ED consisted of a CTA of the chest which 

revealed defects within the main pulmonary artery concerning for developing 

saddle emboli.  There is also filling defects within the segmental arteries 

consistent with pulmonary artery emboli.  Of note he had a recent admission in 

July for a flutter RVR at which time he was not started on anticoagulation given

the fact that he is homeless and has alcohol dependency issues.  He was ordered 

beta-blockers for rate control and LV dysfunction.  He states he has not been 

taking these as these were stolen.  He also had an admission June 29, 2020 for 

pulseless right lower extremity a lower extremity ultrasound at that time showed

occlusion of the distal anterior tibial artery in the right lower extremity and 

high-grade stenosis in the distal superficial femoral artery.  He is admitted to

the ICU for close observation.  Heparin drip started.








Hospital Course


Hospital Course: 


(1) Acute massive pulmonary embolism


Is this a current diagnosis for this admission?: Yes   


Plan: 


Saddle embolus extending into the right and left lower lobes.  Noted hemodynamic

instability with hypotension and bradycardic episodes.


Status post TPA administered on 9/21/2020


After discussion with pharmacist and some literature review, we have deemed that

the most adequate long-term anticoagulation option for patient will be warfarin 

given patient's cirrhosis and child's Sanders class C.


Discontinued Eliquis and switched patient to warfarin with Lovenox bridge last 

night.  Pharmacy to manage warfarin dosing.


Monitor INR goal of 2-3





10/9/2020-patient is on Lovenox treatment dose, warfarin 3 mg p.o. nightly.  INR

today is 1.17 goal is to bring the INR to between 2-3.





10/10/20-INR today is 1.09.  To continue Coumadin 5 mg p.o. nightly and continue

Lovenox treatment dose.  To repeat PT/INR tomorrow.  Goal is between 2-3.





10/11/2020-INR today is 1.12.  To continue Coumadin 5 mg p.o. nightly to recheck

PT/INR tomorrow.  Patient is also on treatment dose of Lovenox.





10/12/2020-patient is on Coumadin 5 mg p.o. nightly INR is 1.6 today.  Plan is 

to repeat the PT/INR tomorrow.  He is also on treatment dose of Lovenox at this 

time.


10/13/2020-INR is 2.35.  We can discontinue the Lovenox.  Will need a provider 

to follow INR and warfarin dosing as an outpatient.


10/14/2020-INR 3.06.  Pharmacy is adjusting the warfarin.  Maintaining normal 

oxygenation on room air.


10/15/2020-INR now 3.5.  Pharmacy adjusting warfarin.  Recheck INR tomorrow.


10/16/2020-INR greater than 4.0.  We will hold warfarin for several days.  

Suggest checking INR on Monday with adjustments per his primary care provider.








(2) Bacteremia


Is this a current diagnosis for this admission?: Yes   


Plan: 


Initially blood cultures positive for MR Coag neg Staph and only 1 blood culture

set-completed 7 days of linezolid though possibly could be contaminant.


Repeat blood cultures on 9/26/2020 showing Klebsiella secondary to complicated 

UTI-completed treatment with cefepime and Bactrim.





Currently resolved.











10/12/2020-patient is not on antibiotics at this time.  Sepsis resolved.


10/13/2020-antibiotics as above.  White blood cell count is now normal.








(3) Alcoholic cirrhosis of liver with ascites


Is this a current diagnosis for this admission?: Yes   


Plan: 


s/p >4L from paracentesis 10/5


Ascites fluid analysis and abdominal ultrasound are consistent with cirrhosis 

likely secondary to chronic alcoholism. 


Child's sanders class C.


Given his very recent alcoholism and noncompliance, he will of course be a poor 

candidate for liver transplantation.


Lasix and Aldactone added for ascites.








10/9/20-patient has history of alcohol abuse with liver cirrhosis.  Presently on

Lasix, Aldactone for ascites.





10/11/20-patient has history of alcoholic liver cirrhosis.  Plan is to continue 

Lasix, Aldactone, for ascites.





10/13/2020-on spironolactone and furosemide.  Monitor intake and output.


10/14/2020-abdomen is distended and firm.  The patient already has had 1 

paracentesis.  May need to consider a second however he is on warfarin.  At this

point the patient appears to be tolerating the ascites.


10/15/2020-we will increase diuresis.  We will check electrolytes.  Consider 

increasing Aldactone.


10/16/2020-continue to monitor intake and output.  May need repeat paracentesis 

in the future.








(4) Hyponatremia


Is this a current diagnosis for this admission?: Yes   


Plan: 


Urine osmolarity and urine sodium are low and suggest hyponatremia secondary to 

low caloric nutritional intake +/- chf/liver disease


Encourage p.o. intake and continue lasix.





10/9/2020-patient serum sodium is 128.2.  Chronic hyponatremia most likely 

secondary to liver failure.





10/10/2020-latest serum sodium is around 128.  Chronic hyponatremia secondary to

alcoholic liver disease and congestive heart failure.





10/11/20-serum sodium is 127.8.  Stable.





10/12/2020-serum sodium is 127.  Stable.  Patient is asymptomatic.


10/13/2020-chronic due to his liver failure.  Continue to monitor.  May need to 

introduce fluid restriction.


10/14/2020-sodium is 126 today.  This is going to be a chronic issue for this 

patient.  Continue to monitor.  No fluid restriction instituted as yet.


10/15/2020-recheck serum sodium tomorrow.


10/16/2020-it is most likely that an acceptable normal range for his serum 

sodium will be 125-130 based on his comorbidities.








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


Is this a current diagnosis for this admission?: Yes   


Plan: 


TTE showing biventricular failure with dilated cardiomyopathy, EF of 20 to 25% 

and grade 3 diastolic dysfunction of the LV.


Lasix, aldactone, Toprol-XL and losartan





10/13/2020-continue current regimen.  Consider reassessment by cardiology to try

and maximize medications such as using Entresto instead of losartan.


10/14/2020-echocardiogram revealed ejection fraction 20 to 25%.  At this point 

will optimize medication regimen.  Losartan was increased.  Continue Aldactone, 

beta-blocker and furosemide.


10/15/2020-continue current medication regimen.


10/16/2020-consider follow-up with cardiology.  Will likely benefit most from 

pharmacologic management considering his comorbidities.








(6) Acute respiratory failure with hypoxia


Is this a current diagnosis for this admission?: Yes   


Plan: 


Was on room air this morning.  Notably patient gets difficulty with adequate 

pulse oximetry using fingertips due to likely peripheral vascular disease.





10/10/2020-pulse ox today is 100% on 4 L.  Plan is to continue the present 

management at this time.





10/12/2020-pulse ox today is 96% room air.  Acute respiratory failure with 

hypoxia resolved.


10/15/2020-resolved.  Continue to monitor via vital signs and pulse oximetry.


10/16/2020-back to room air








(7) Paroxysmal atrial fibrillation


Is this a current diagnosis for this admission?: Yes   


Plan: 


Sinus rhythm currently


10/14/2020-continue metoprolol.


10/15/2020-pulse rate still mostly above 90.  Consider increasing metoprolol.


10/16/2020-we will defer to outpatient physician to continue adjusting 

medication








(8) Pulmonary nodule


Is this a current diagnosis for this admission?: Yes   


Plan: 


Noted on CAT scan.  Follow-up imaging recommended in 3 months but given 

patient's homelessness and noncompliance, it is very unlikely he will follow-up.


10/14/2020-cardiac dysfunction and liver failure are the most prominent issues 

currently.  Work-up of the pulmonary nodule is secondary at this point.


10/16/2020-defer to primary care








(9) Homeless


Is this a current diagnosis for this admission?: Yes   


Plan: 


Physical therapy.  Patient will likely need long-term placement at SNF 

especially given presentation with massive and unlikely to comply with therapy 

very poor ability to care for self.  Discharge planning working on this but 

encountering a lot of difficulty with placing patient. D/w his brother Aakash 

who is reluctant to take patient to his home due to limited room/space.


10/15/2020-Case management is working on placement.


10/16/2020-the patient has been accepted by the homeless shelter.  They will 

help arrange lodging and supportive care.








(10) Urinary tract infection due to Klebsiella species


Is this a current diagnosis for this admission?: Yes   


Plan: 


10/13/2020-antibiotic therapy completed as noted above





Physical Exam


Vital Signs: 


                                        











Temp Pulse Resp BP Pulse Ox


 


 97.1 F   96   17   121/93 H  95 


 


 10/16/20 07:41  10/16/20 08:32  10/16/20 08:32  10/16/20 07:41  10/16/20 08:32








                                 Intake & Output











 10/15/20 10/16/20 10/17/20





 06:59 06:59 06:59


 


Intake Total 1046 1010 236


 


Output Total 1500 450 


 


Balance -454 560 236


 


Weight 82.4 kg 82.1 kg 











General appearance: PRESENT: no acute distress, cooperative, well-developed


Head exam: PRESENT: atraumatic, normocephalic


Respiratory exam: PRESENT: clear to auscultation ana paula, symmetrical, unlabored.  

ABSENT: rales, rhonchi, tachypnea, wheezes


Cardiovascular exam: PRESENT: RRR, +S1, +S2, systolic murmur - 2/6


GI/Abdominal exam: PRESENT: distended, firm, hypoactive bowel sounds.  ABSENT: 

tenderness


Rectal exam: PRESENT: deferred


Gentrourinary exam: ABSENT: indwelling catheter


Extremities exam: ABSENT: pedal edema


Musculoskeletal exam: PRESENT: ambulatory


Neurological exam: PRESENT: alert, awake, oriented to person, oriented to place,

oriented to situation


Psychiatric exam: PRESENT: flat affect.  ABSENT: agitated, anxious


Skin exam: PRESENT: other - Extremely dry skin.  Keratin scaling no longer 

peeling on his feet.





Results


Laboratory Results: 


                                        











WBC  6.4 10^3/uL (4.0-10.5)   10/16/20  05:48    


 


RBC  4.26 10^6/uL (4.35-5.55)  L  10/16/20  05:48    


 


Hgb  11.9 g/dL (13.5-17.0)  L  10/16/20  05:48    


 


Hct  34.6 % (37.9-51.0)  L  10/16/20  05:48    


 


MCV  81 fl (80-97)   10/16/20  05:48    


 


MCH  27.9 pg (27.0-33.4)   10/16/20  05:48    


 


MCHC  34.4 g/dL (32.0-36.0)   10/16/20  05:48    


 


RDW  18.4 % (11.5-14.0)  H  10/16/20  05:48    


 


Plt Count  237 10^3/uL (150-450)   10/16/20  05:48    


 


Lymph % (Auto)  19.8 % (13-45)   10/14/20  06:45    


 


Mono % (Auto)  11.6 % (3-13)   10/14/20  06:45    


 


Eos % (Auto)  1.3 % (0-6)   10/14/20  06:45    


 


Baso % (Auto)  0.4 % (0-2)   10/14/20  06:45    


 


Absolute Neuts (auto)  4.6 10^3/uL (1.7-8.2)   10/14/20  06:45    


 


Absolute Lymphs (auto)  1.4 10^3/uL (0.5-4.7)   10/14/20  06:45    


 


Absolute Monos (auto)  0.8 10^3/uL (0.1-1.4)   10/14/20  06:45    


 


Absolute Eos (auto)  0.1 10^3/uL (0.0-0.6)   10/14/20  06:45    


 


Absolute Basos (auto)  0.0 10^3/uL (0.0-0.2)   10/14/20  06:45    


 


Total Counted  100   09/24/20  05:35    


 


Seg Neutrophils %  66.9 % (42-78)   10/14/20  06:45    


 


Seg Neuts % (Manual)  92 % (42-78)  H  09/24/20  05:35    


 


Lymphocytes % (Manual)  4 % (13-45)  L  09/24/20  05:35    


 


Monocytes % (Manual)  4 % (3-13)   09/24/20  05:35    


 


Eosinophils % (Manual)  0 % (0-6)   09/24/20  05:35    


 


Basophils % (Manual)  0 % (0-2)   09/24/20  05:35    


 


Abs Neuts (Manual)  3.3 10^3/uL (1.7-8.2)   09/24/20  05:35    


 


Abs Lymphs (Manual)  0.1 10^3/uL (0.5-4.7)  L  09/24/20  05:35    


 


Abs Monocytes (Manual)  0.1 10^3/uL (0.1-1.4)   09/24/20  05:35    


 


Absolute Eos (Manual)  0.0 10^3/uL (0.0-0.6)   09/24/20  05:35    


 


Abs Basophils (Manual)  0.0 10^3/uL (0.0-0.2)   09/24/20  05:35    


 


Platelet Comment  ADEQUATE   09/24/20  05:35    


 


Poikilocytosis  SLIGHT   09/24/20  05:35    


 


Anisocytosis  2+   09/24/20  05:35    


 


Ovalocytes  SLIGHT   09/24/20  05:35    


 


PT  42.1 SEC (11.4-15.4)  H  10/16/20  05:48    


 


INR  4.48   10/16/20  05:48    


 


APTT  > 235.0 SEC (23.5-35.8)  H*  10/04/20  21:27    


 


Carbonic Acid  0.88 mmol/L (1.05-1.35)  L  09/26/20  09:10    


 


HCO3/H2CO3 Ratio  19:1   09/26/20  09:10    


 


ABG pH  7.39  (7.35-7.45)   09/26/20  09:10    


 


ABG pCO2  29.3 mmHg (35-45)  L  09/26/20  09:10    


 


ABG pO2  77.8 mmHg ()  L  09/26/20  09:10    


 


ABG HCO3  17.1 mmol/L (20-24)  L  09/26/20  09:10    


 


ABG Total CO2  18.0 mmol/L (23-27)  L  09/26/20  09:10    


 


ABG O2 Saturation  95.5 % (94-98)   09/26/20  09:10    


 


ABG Base Excess  -6.3 mmol/L  09/26/20  09:10    


 


VBG pH  7.31  (7.30-7.42)   09/20/20  22:02    


 


VBG pCO2  36.3 mmHg (35-63)   09/20/20  22:02    


 


VBG HCO3  17.7 mmol/L (20-32)  L  09/20/20  22:02    


 


VBG Base Excess  -7.9 mmol/L  09/20/20  22:02    


 


FiO2  3L   09/26/20  09:10    


 


Sodium  126.5 mmol/L (137-145)  L  10/16/20  08:55    


 


Potassium  4.6 mmol/L (3.6-5.0)   10/16/20  08:55    


 


Chloride  92 mmol/L ()  L  10/16/20  08:55    


 


Carbon Dioxide  27 mmol/L (22-30)   10/16/20  08:55    


 


Anion Gap  8  (5-19)   10/16/20  08:55    


 


BUN  12 mg/dL (7-20)   10/16/20  08:55    


 


Creatinine  0.60 mg/dL (0.52-1.25)   10/16/20  08:55    


 


Est GFR ( Amer)  > 60  (>60)   10/16/20  08:55    


 


Est GFR (Non-Af Amer)  Cancelled   10/07/20  06:15    


 


Est GFR (MDRD) Non-Af  > 60  (>60)   10/16/20  08:55    


 


Glucose  106 mg/dL ()   10/16/20  08:55    


 


POC Glucose  80 mg/dL ()   10/16/20  05:38    


 


Serum Osmolality  271 mOsm/kg (275-301)  L  09/26/20  13:22    


 


Calcium  8.6 mg/dL (8.4-10.2)   10/16/20  08:55    


 


Phosphorus  3.0 mg/dL (2.5-4.5)   10/05/20  07:20    


 


Magnesium  1.6 mg/dL (1.6-2.3)   10/16/20  08:55    


 


Total Bilirubin  1.9 mg/dL (0.2-1.3)  H  10/16/20  08:55    


 


Direct Bilirubin  1.0 mg/dL (0.0-0.4)  H  10/16/20  08:55    


 


Neonat Total Bilirubin  Not Reportable   10/16/20  08:55    


 


Neonat Direct Bilirubin  Not Reportable   10/16/20  08:55    


 


Neonat Indirect Bili  Not Reportable   10/16/20  08:55    


 


AST  41 U/L (17-59)   10/16/20  08:55    


 


ALT  35 U/L (<50)   10/16/20  08:55    


 


Alkaline Phosphatase  186 U/L ()  H  10/16/20  08:55    


 


Ammonia  14.6 umol/L (9-33)   10/05/20  10:19    


 


Troponin I  0.141 ng/mL  09/20/20  22:02    


 


NT-Pro-B Natriuret Pep  23548 pg/mL (<125)  H  09/20/20  22:02    


 


Total Protein  6.2 g/dL (6.3-8.2)  L  10/16/20  08:55    


 


Albumin  3.0 g/dL (3.5-5.0)  L  10/16/20  08:55    


 


Triglycerides  62 mg/dL (<150)   09/22/20  03:55    


 


Cholesterol  150.29 mg/dL (0-200)   09/22/20  03:55    


 


LDL Cholesterol Direct  113 mg/dL (<100)  H  09/22/20  03:55    


 


VLDL Cholesterol  12.0 mg/dL (10-31)   09/22/20  03:55    


 


HDL Cholesterol  18 mg/dL (>40)  L  09/22/20  03:55    


 


EGFR   Cancelled   10/07/20  06:15    


 


Folate  6.45 ng/mL (>2.76)   09/28/20  05:00    


 


Cortisol PM Sample  21.00 ug/dL (1.7-14.1)  H  09/24/20  17:07    


 


Cortisol AM Sample  21.80 ug/dL (4.46-22.7)   10/15/20  06:30    


 


Urine Color  MANDI   10/16/20  03:15    


 


Urine Appearance  CLEAR   10/16/20  03:15    


 


Urine pH  7.0  (5.0-9.0)   10/16/20  03:15    


 


Ur Specific Gravity  1.020   10/16/20  03:15    


 


Urine Protein  100 mg/dL (NEGATIVE)  H  10/16/20  03:15    


 


Urine Glucose (UA)  NEGATIVE mg/dL (NEGATIVE)   10/16/20  03:15    


 


Urine Ketones  NEGATIVE mg/dL (NEGATIVE)   10/16/20  03:15    


 


Urine Blood  NEGATIVE  (NEGATIVE)   10/16/20  03:15    


 


Urine Nitrite  NEGATIVE  (NEGATIVE)   10/16/20  03:15    


 


Urine Nitrite (Reflex)  NEGATIVE  (NEGATIVE)   10/14/20  15:15    


 


Urine Bilirubin  NEGATIVE  (NEGATIVE)   10/16/20  03:15    


 


Urine Urobilinogen  2.0 mg/dL (<2.0)  H  10/16/20  03:15    


 


Ur Leukocyte Esterase  NEGATIVE  (NEGATIVE)   10/16/20  03:15    


 


Leukocyte Esterase Rfl  NEGATIVE  (NEGATIVE)   10/14/20  15:15    


 


Urine WBC (Auto)  1 /HPF  10/16/20  03:15    


 


Urine RBC (Auto)  1 /HPF  10/16/20  03:15    


 


U Hyaline Cast (Auto)  2 /LPF  10/16/20  03:15    


 


Urine Bacteria (Auto)  Cancelled   10/11/20  00:00    


 


Urine RBC  NONE SEEN /HPF  09/21/20  09:45    


 


Urine WBC  0-1 /HPF  09/21/20  09:45    


 


Urine Red Cell Clumps  Cancelled   10/11/20  00:00    


 


Urine WBC (Reflex)  2 /HPF  10/14/20  15:15    


 


Urine WBC Clumps  Cancelled   10/11/20  00:00    


 


Squamous Epi Cells Auto  <1 /HPF  10/13/20  15:13    


 


U Non-Squamous Epis Auto  Cancelled   10/11/20  00:00    


 


Calcium Carbonate Cryst  Cancelled   10/11/20  00:00    


 


Calcium Phosphate Cryst  Cancelled   10/11/20  00:00    


 


Calcium Oxalate Cr Auto  Cancelled   10/11/20  00:00    


 


Leucine Crystals  Cancelled   10/11/20  00:00    


 


Cystine Crystals  Cancelled   10/11/20  00:00    


 


Uric Acid Cryst (Auto)  Cancelled   10/11/20  00:00    


 


Triple Phos Cryst (Auto)  Cancelled   10/11/20  00:00    


 


Tyrosine Crystals  Cancelled   10/11/20  00:00    


 


Amorphous Sediment Auto  Cancelled   10/11/20  00:00    


 


Cellular Casts  Cancelled   10/11/20  00:00    


 


Hyaline Casts  1-5 /LPF  09/21/20  09:45    


 


Epithelial Casts (Auto)  Cancelled   10/11/20  00:00    


 


Fatty Casts  Cancelled   10/11/20  00:00    


 


Granular Casts (Auto)  Cancelled   10/11/20  00:00    


 


Waxy Casts (Auto)  Cancelled   10/11/20  00:00    


 


Broad Casts  Cancelled   10/11/20  00:00    


 


RBC Casts (Auto)  Cancelled   10/11/20  00:00    


 


WBC Casts (Auto)  Cancelled   10/11/20  00:00    


 


Urine Mucus  TRACE   09/21/20  09:45    


 


Urine Mucus (Auto)  RARE /LPF  10/16/20  03:15    


 


U Trichomonas (Auto)  Cancelled   10/11/20  00:00    


 


Ur Yeast w Hyphae  Cancelled   10/11/20  00:00    


 


Urine Yeast (Budding)  Cancelled   10/11/20  00:00    


 


Urine Osmolality  243 mOsm/kg (300-900)  L  09/26/20  18:15    


 


Urine Sodium  < 5 mmol/L (30-90)  L  09/26/20  18:15    


 


Urine Ascorbic Acid  NEGATIVE  (NEGATIVE)   10/16/20  03:15    


 


Fluid Type  PERITONEAL   10/05/20  13:15    


 


Fluid Type  PLEURAL   10/05/20  13:15    


 


Fluid Source  ASCITES   10/05/20  13:15    


 


Fluid Source  LUNG   10/05/20  13:15    


 


Fluid Color  YELLOW   10/05/20  13:15    


 


Fluid Color  YELLOW   10/05/20  13:15    


 


Fluid Appearance  CLEAR   10/05/20  13:15    


 


Fluid Appearance  CLEAR   10/05/20  13:15    


 


Fluid Viscosity  LIQUID   10/05/20  13:15    


 


Fluid Viscosity  LIQUID   10/05/20  13:15    


 


Fluid pH  8.1  (Not Estab.)   10/05/20  13:15    


 


Fluid WBC  162 /uL  10/05/20  13:15    


 


Fluid WBC  328 /uL  10/05/20  13:15    


 


Fluid RBC  43 /uL  10/05/20  13:15    


 


Fluid RBC  63 /uL  10/05/20  13:15    


 


Fluid Seg Neutrophils  52 %  10/05/20  13:15    


 


Fluid Seg Neutrophils  88 %  10/05/20  13:15    


 


Fluid Lymphocytes  11 %  10/05/20  13:15    


 


Fluid Lymphocytes  24 %  10/05/20  13:15    


 


Fluid Monocytes  1 %  10/05/20  13:15    


 


Fluid Monocytes  24 %  10/05/20  13:15    


 


Fluid Eosinophils  0 %  10/05/20  13:15    


 


Fluid Eosinophils  0 %  10/05/20  13:15    


 


Fluid Basophils  0 %  10/05/20  13:15    


 


Fluid Basophils  0 %  10/05/20  13:15    


 


Fluid Glucose  80 mg/dL (.)   10/05/20  13:15    


 


Fluid Total Protein  1.2 g/dL (.)   10/05/20  13:15    


 


Fluid Albumin  0.5 g/dL (Not Estab.)   10/05/20  13:35    


 


Fluid LDH  83 IU/L (.)   10/05/20  13:35    


 


Fluid Amylase  Cancelled   10/05/20  13:15    


 


Fluid Amylase  28 U/L (.)   10/05/20  13:15    


 


Stl C. Difficile GDH Ag  NEGATIVE  (NEGATIVE)   09/26/20  23:30    


 


Stl C.difficile Tox A&B  NEGATIVE  (NEGATIVE)   09/26/20  23:30    


 


Urine Opiates Screen  NEGATIVE   09/21/20  02:05    


 


Urine Methadone Screen  NEGATIVE   09/21/20  02:05    


 


Ur Barbiturates Screen  NEGATIVE   09/21/20  02:05    


 


Ur Phencyclidine Scrn  NEGATIVE   09/21/20  02:05    


 


Ur Amphetamines Screen  NEGATIVE   09/21/20  02:05    


 


U Benzodiazepines Scrn  NEGATIVE   09/21/20  02:05    


 


Urine Cocaine Screen  NEGATIVE   09/21/20  02:05    


 


U Marijuana (THC) Screen  UNCONFIRMED POSITIVE   09/21/20  02:05    


 


Serum Alcohol  < 10 mg/dL (NONE DETECTED)   09/20/20  22:02    


 


COVID-19 Source  See comment   09/29/20  16:24    


 


COVID-19 (ABIGAIL)  Not Detected  (Not Detect)   09/29/20  16:24    


 


Slides for Path Review  SEE COMMENT   10/05/20  13:15    








                                        











  09/20/20





  22:02


 


Troponin I  0.141


 


NT-Pro-B Natriuret Pep  68606 H











Impressions: 


                                        





Chest X-Ray  09/20/20 20:34


IMPRESSION:


 


No acute abnormality as above.


 


 


copyright 2011 [employer]- All Rights Reserved


 








Chest/Abdomen CTA  09/20/20 22:55


IMPRESSION: 


There are filling defects within the main pulmonary artery


concerning for developing saddle emboli. There are also filling


defects seen within the segmental arteries consistent with


pulmonary artery emboli.


 


 


There is a groundglass nodule at the right upper lobe measuring


up to 12 mm which will need interval follow-up within three


months.


There are trace pleural effusions with some overlying airspace


opacities which are favored to represent atelectasis, but may


also represent some mild edema or infection.


 








Modified Barium Swallow  09/25/20 00:00


IMPRESSION:  DEEP LARYNGEAL PENETRATION WITH TRACE ASPIRATION SEEN WITH THIN 

BARIUM.  . PLEASE SEE SPEECH PATHOLOGIST REPORT FOR OTHER FINDINGS AND 

RECOMMENDATIONS.


 








KUB X-Ray  09/26/20 00:00


IMPRESSION:  There are mildly dilated small bowel loops throughout the abdomen, 

measuring up to 3.3 cm. Possible developing ileus, or early obstruction cannot 

be excluded.


 








KUB X-Ray  09/29/20 00:00


IMPRESSION:  Nonobstructive bowel gas pattern.


 








Chest X-Ray  10/02/20 00:00


IMPRESSION:  Interval development of small to moderate bilateral pleural 

effusions with compressive atelectasis at the lung bases.


 








PICC Line Insertion  10/05/20 00:00


IMPRESSION:  SUCCESSFUL PLACEMENT OF A 5 FR DUAL LUMEN 50 CM PICC IN THE LEFT 

BRACHIOCEPHALIC VEIN.


 








Paracentesis Ultrasound  10/05/20 08:00


IMPRESSION:  Successful ultrasound-guided paracentesis


 








Thoracentesis Ultrasound  10/05/20 09:47


IMPRESSION:  SUCCESSFUL RIGHT-SIDED THORACENTESIS USING ULTRASOUND GUIDANCE.


 








Chest X-Ray  10/05/20 13:27


IMPRESSION:  No pneumothorax.  Retrocardiac opacification, atelectasis versus 

pneumonia.  Cardiomegaly without pulmonary edema.


 








Chest X-Ray  10/05/20 15:25


IMPRESSION:  No pneumothorax post thoracentesis.


 








Abdomen Ultrasound  10/06/20 00:00


IMPRESSION:  1.  Examination is limited due to the patient's clinical condition.


2.  Nodular contour to the liver, raising question of cirrhosis of the liver. 

Liver vasculature is patent.


3.  Suboptimal visualization of the abdominal aorta due to overlying bowel gas.


4.  Small volume of ascites right upper quadrant of the abdomen.


 








Chest X-Ray  10/14/20 00:00


IMPRESSION:  New small right pleural effusion.  Hyperinflated lungs which can be

seen with obstructive lung disease.


 














Plan


Health Concerns: 


Multiple comorbidities with very advanced cardiac and liver disease.  With 

limited resources compliance may be an issue.


Plan of Treatment: 


Discharging to homeless shelter.  Will send prescriptions to the pharmacy of the

patient's choice.  He will need outpatient follow-up to monitor Coumadin, 

monitor cirrhosis as well as heart failure.


Goals: 


Establish with providers for ongoing care for his multiple comorbidities.


Time Spent: Greater than 30 Minutes





Stroke


Is this a Stroke Patient?: No





Acute Heart Failure


Is this a Heart Failure Patient?: Yes


Documentation of LVEF assessment?: Yes


LVEF: LVEF Less Than or Equal to 35%


Anticoagulant Therapy: Yes


Discharged on Evidence-Based Beta Blockers: Yes


Discharged on ARNI?: No-Document Contraindications


Reason(s) not discharged on ARNI: Other


ARNI Reason - Other: Cost prohibitive.  Discharged on angiotensin receptor 

blocker.


Discharged on ARB?: Yes


Discharged on ACEI?: N/A Discharged on ARB


For LVEF <35%, discharged on Aldosterone Antagonist?: Yes


Follow-up Appointment scheduled within 7 days?: No, document reason - Patient 

without insurance.  Attempting to establish care with outpatient providers.  No 

previous primary care provider.  May require multiple specialists.

## 2020-10-20 ENCOUNTER — HOSPITAL ENCOUNTER (OUTPATIENT)
Dept: HOSPITAL 62 - OD | Age: 65
End: 2020-10-20
Attending: INTERNAL MEDICINE
Payer: MEDICAID

## 2020-10-20 DIAGNOSIS — E87.1: ICD-10-CM

## 2020-10-20 DIAGNOSIS — K74.60: ICD-10-CM

## 2020-10-20 DIAGNOSIS — I27.82: Primary | ICD-10-CM

## 2020-10-20 LAB
ANION GAP SERPL CALC-SCNC: 9 MMOL/L (ref 5–19)
BUN SERPL-MCNC: 11 MG/DL (ref 7–20)
CALCIUM: 8.7 MG/DL (ref 8.4–10.2)
CHLORIDE SERPL-SCNC: 101 MMOL/L (ref 98–107)
CO2 SERPL-SCNC: 25 MMOL/L (ref 22–30)
GLUCOSE SERPL-MCNC: 69 MG/DL (ref 75–110)
INR PPP: 2.69
POTASSIUM SERPL-SCNC: 4.7 MMOL/L (ref 3.6–5)
PROTHROMBIN TIME: 28.5 SEC (ref 11.4–15.4)

## 2020-10-20 PROCEDURE — 85610 PROTHROMBIN TIME: CPT

## 2020-10-20 PROCEDURE — 36415 COLL VENOUS BLD VENIPUNCTURE: CPT

## 2020-10-20 PROCEDURE — 80048 BASIC METABOLIC PNL TOTAL CA: CPT
